# Patient Record
Sex: MALE | Race: WHITE | NOT HISPANIC OR LATINO | URBAN - METROPOLITAN AREA
[De-identification: names, ages, dates, MRNs, and addresses within clinical notes are randomized per-mention and may not be internally consistent; named-entity substitution may affect disease eponyms.]

---

## 2017-02-22 ENCOUNTER — OUTPATIENT (OUTPATIENT)
Dept: OUTPATIENT SERVICES | Facility: HOSPITAL | Age: 67
LOS: 1 days | Discharge: HOME | End: 2017-02-22
Payer: MEDICARE

## 2017-02-22 ENCOUNTER — APPOINTMENT (OUTPATIENT)
Dept: MRI IMAGING | Facility: HOSPITAL | Age: 67
End: 2017-02-22

## 2017-02-22 DIAGNOSIS — K74.60 UNSPECIFIED CIRRHOSIS OF LIVER: ICD-10-CM

## 2017-02-22 DIAGNOSIS — R79.1 ABNORMAL COAGULATION PROFILE: ICD-10-CM

## 2017-02-22 LAB
BUN SERPL-MCNC: 37 MG/DL — HIGH (ref 7–23)
CREAT SERPL-MCNC: 2.06 MG/DL — HIGH (ref 0.5–1.3)

## 2017-02-22 PROCEDURE — 74183 MRI ABD W/O CNTR FLWD CNTR: CPT | Mod: 26

## 2017-03-24 ENCOUNTER — APPOINTMENT (OUTPATIENT)
Dept: ORTHOPEDIC SURGERY | Facility: CLINIC | Age: 67
End: 2017-03-24

## 2017-03-24 VITALS — HEIGHT: 64 IN | WEIGHT: 153 LBS | BODY MASS INDEX: 26.12 KG/M2

## 2017-03-24 DIAGNOSIS — Z78.9 OTHER SPECIFIED HEALTH STATUS: ICD-10-CM

## 2017-03-24 DIAGNOSIS — Z86.79 PERSONAL HISTORY OF OTHER DISEASES OF THE CIRCULATORY SYSTEM: ICD-10-CM

## 2017-03-24 DIAGNOSIS — Z60.2 PROBLEMS RELATED TO LIVING ALONE: ICD-10-CM

## 2017-03-24 SDOH — SOCIAL STABILITY - SOCIAL INSECURITY: PROBLEMS RELATED TO LIVING ALONE: Z60.2

## 2017-04-20 ENCOUNTER — APPOINTMENT (OUTPATIENT)
Dept: PHYSICAL MEDICINE AND REHAB | Facility: CLINIC | Age: 67
End: 2017-04-20

## 2017-04-20 VITALS
HEIGHT: 64 IN | HEART RATE: 85 BPM | TEMPERATURE: 97.7 F | OXYGEN SATURATION: 99 % | SYSTOLIC BLOOD PRESSURE: 169 MMHG | WEIGHT: 152 LBS | DIASTOLIC BLOOD PRESSURE: 93 MMHG | BODY MASS INDEX: 25.95 KG/M2

## 2017-04-20 DIAGNOSIS — G14 POSTPOLIO SYNDROME: ICD-10-CM

## 2017-04-20 DIAGNOSIS — G83.12: ICD-10-CM

## 2017-04-20 DIAGNOSIS — R26.1 PARALYTIC GAIT: ICD-10-CM

## 2017-04-20 DIAGNOSIS — A80.9 ACUTE POLIOMYELITIS, UNSPECIFIED: ICD-10-CM

## 2017-04-20 DIAGNOSIS — M21.42 FLAT FOOT [PES PLANUS] (ACQUIRED), LEFT FOOT: ICD-10-CM

## 2018-03-10 ENCOUNTER — TRANSCRIPTION ENCOUNTER (OUTPATIENT)
Age: 68
End: 2018-03-10

## 2018-03-10 ENCOUNTER — INPATIENT (INPATIENT)
Facility: HOSPITAL | Age: 68
LOS: 1 days | Discharge: HOME | DRG: 871 | End: 2018-03-12
Attending: INTERNAL MEDICINE | Admitting: INTERNAL MEDICINE
Payer: MEDICARE

## 2018-03-10 DIAGNOSIS — B91 SEQUELAE OF POLIOMYELITIS: ICD-10-CM

## 2018-03-10 DIAGNOSIS — J12.3 HUMAN METAPNEUMOVIRUS PNEUMONIA: ICD-10-CM

## 2018-03-10 DIAGNOSIS — D69.6 THROMBOCYTOPENIA, UNSPECIFIED: ICD-10-CM

## 2018-03-10 DIAGNOSIS — N18.3 CHRONIC KIDNEY DISEASE, STAGE 3 (MODERATE): ICD-10-CM

## 2018-03-10 DIAGNOSIS — Z88.8 ALLERGY STATUS TO OTHER DRUGS, MEDICAMENTS AND BIOLOGICAL SUBSTANCES: ICD-10-CM

## 2018-03-10 DIAGNOSIS — K74.60 UNSPECIFIED CIRRHOSIS OF LIVER: ICD-10-CM

## 2018-03-10 DIAGNOSIS — Z90.5 ACQUIRED ABSENCE OF KIDNEY: ICD-10-CM

## 2018-03-10 DIAGNOSIS — G83.14 MONOPLEGIA OF LOWER LIMB AFFECTING LEFT NONDOMINANT SIDE: ICD-10-CM

## 2018-03-10 DIAGNOSIS — J18.9 PNEUMONIA, UNSPECIFIED ORGANISM: ICD-10-CM

## 2018-03-10 DIAGNOSIS — N40.0 BENIGN PROSTATIC HYPERPLASIA WITHOUT LOWER URINARY TRACT SYMPTOMS: ICD-10-CM

## 2018-03-10 DIAGNOSIS — Z87.891 PERSONAL HISTORY OF NICOTINE DEPENDENCE: ICD-10-CM

## 2018-03-10 DIAGNOSIS — B18.2 CHRONIC VIRAL HEPATITIS C: ICD-10-CM

## 2018-03-10 DIAGNOSIS — I12.9 HYPERTENSIVE CHRONIC KIDNEY DISEASE WITH STAGE 1 THROUGH STAGE 4 CHRONIC KIDNEY DISEASE, OR UNSPECIFIED CHRONIC KIDNEY DISEASE: ICD-10-CM

## 2018-03-10 DIAGNOSIS — A41.9 SEPSIS, UNSPECIFIED ORGANISM: ICD-10-CM

## 2018-03-10 PROCEDURE — 71045 X-RAY EXAM CHEST 1 VIEW: CPT | Mod: 26

## 2018-03-10 PROCEDURE — 99223 1ST HOSP IP/OBS HIGH 75: CPT | Mod: AI

## 2018-03-10 PROCEDURE — 93010 ELECTROCARDIOGRAM REPORT: CPT

## 2018-03-11 PROCEDURE — 99233 SBSQ HOSP IP/OBS HIGH 50: CPT

## 2018-03-12 PROCEDURE — 83605 ASSAY OF LACTIC ACID: CPT

## 2018-03-12 PROCEDURE — 99285 EMERGENCY DEPT VISIT HI MDM: CPT | Mod: 25

## 2018-03-12 PROCEDURE — 87040 BLOOD CULTURE FOR BACTERIA: CPT

## 2018-03-12 PROCEDURE — 93005 ELECTROCARDIOGRAM TRACING: CPT

## 2018-03-12 PROCEDURE — 81003 URINALYSIS AUTO W/O SCOPE: CPT

## 2018-03-12 PROCEDURE — 85025 COMPLETE CBC W/AUTO DIFF WBC: CPT

## 2018-03-12 PROCEDURE — 96365 THER/PROPH/DIAG IV INF INIT: CPT

## 2018-03-12 PROCEDURE — 85027 COMPLETE CBC AUTOMATED: CPT

## 2018-03-12 PROCEDURE — 85610 PROTHROMBIN TIME: CPT

## 2018-03-12 PROCEDURE — 83735 ASSAY OF MAGNESIUM: CPT

## 2018-03-12 PROCEDURE — 80076 HEPATIC FUNCTION PANEL: CPT

## 2018-03-12 PROCEDURE — 87486 CHLMYD PNEUM DNA AMP PROBE: CPT

## 2018-03-12 PROCEDURE — 87581 M.PNEUMON DNA AMP PROBE: CPT

## 2018-03-12 PROCEDURE — 87400 INFLUENZA A/B EACH AG IA: CPT

## 2018-03-12 PROCEDURE — 87633 RESP VIRUS 12-25 TARGETS: CPT

## 2018-03-12 PROCEDURE — 71045 X-RAY EXAM CHEST 1 VIEW: CPT

## 2018-03-12 PROCEDURE — 85730 THROMBOPLASTIN TIME PARTIAL: CPT

## 2018-03-12 PROCEDURE — 80048 BASIC METABOLIC PNL TOTAL CA: CPT

## 2018-03-19 ENCOUNTER — CHART COPY (OUTPATIENT)
Age: 68
End: 2018-03-19

## 2018-03-19 ENCOUNTER — APPOINTMENT (OUTPATIENT)
Dept: FAMILY MEDICINE | Facility: HOSPITAL | Age: 68
End: 2018-03-19

## 2018-11-08 ENCOUNTER — APPOINTMENT (OUTPATIENT)
Dept: UROLOGY | Facility: CLINIC | Age: 68
End: 2018-11-08
Payer: MEDICARE

## 2018-11-08 VITALS
HEART RATE: 85 BPM | OXYGEN SATURATION: 98 % | TEMPERATURE: 97.8 F | HEIGHT: 64 IN | WEIGHT: 152 LBS | SYSTOLIC BLOOD PRESSURE: 166 MMHG | RESPIRATION RATE: 16 BRPM | BODY MASS INDEX: 25.95 KG/M2 | DIASTOLIC BLOOD PRESSURE: 100 MMHG

## 2018-11-08 DIAGNOSIS — Z63.4 DISAPPEARANCE AND DEATH OF FAMILY MEMBER: ICD-10-CM

## 2018-11-08 DIAGNOSIS — Z78.9 OTHER SPECIFIED HEALTH STATUS: ICD-10-CM

## 2018-11-08 PROCEDURE — 99203 OFFICE O/P NEW LOW 30 MIN: CPT

## 2018-11-08 SDOH — SOCIAL STABILITY - SOCIAL INSECURITY: DISSAPEARANCE AND DEATH OF FAMILY MEMBER: Z63.4

## 2019-01-14 ENCOUNTER — OUTPATIENT (OUTPATIENT)
Dept: OUTPATIENT SERVICES | Facility: HOSPITAL | Age: 69
LOS: 1 days | Discharge: HOME | End: 2019-01-14
Payer: MEDICARE

## 2019-01-14 DIAGNOSIS — K74.60 UNSPECIFIED CIRRHOSIS OF LIVER: ICD-10-CM

## 2019-01-14 DIAGNOSIS — Q61.02 CONGENITAL MULTIPLE RENAL CYSTS: ICD-10-CM

## 2019-01-14 DIAGNOSIS — N17.9 ACUTE KIDNEY FAILURE, UNSPECIFIED: ICD-10-CM

## 2019-01-14 DIAGNOSIS — Z90.5 ACQUIRED ABSENCE OF KIDNEY: ICD-10-CM

## 2019-01-14 DIAGNOSIS — I12.9 HYPERTENSIVE CHRONIC KIDNEY DISEASE WITH STAGE 1 THROUGH STAGE 4 CHRONIC KIDNEY DISEASE, OR UNSPECIFIED CHRONIC KIDNEY DISEASE: ICD-10-CM

## 2019-01-14 PROCEDURE — 76700 US EXAM ABDOM COMPLETE: CPT | Mod: 26

## 2019-04-11 ENCOUNTER — APPOINTMENT (OUTPATIENT)
Dept: UROLOGY | Facility: CLINIC | Age: 69
End: 2019-04-11

## 2019-04-18 ENCOUNTER — APPOINTMENT (OUTPATIENT)
Dept: UROLOGY | Facility: CLINIC | Age: 69
End: 2019-04-18

## 2019-05-01 ENCOUNTER — OUTPATIENT (OUTPATIENT)
Dept: OUTPATIENT SERVICES | Facility: HOSPITAL | Age: 69
LOS: 1 days | Discharge: HOME | End: 2019-05-01
Payer: MEDICARE

## 2019-05-01 DIAGNOSIS — N17.9 ACUTE KIDNEY FAILURE, UNSPECIFIED: ICD-10-CM

## 2019-05-01 PROCEDURE — 76775 US EXAM ABDO BACK WALL LIM: CPT | Mod: 26,59

## 2019-05-01 PROCEDURE — 93975 VASCULAR STUDY: CPT | Mod: 26

## 2019-05-01 PROCEDURE — 93976 VASCULAR STUDY: CPT

## 2019-06-04 ENCOUNTER — LABORATORY RESULT (OUTPATIENT)
Age: 69
End: 2019-06-04

## 2019-06-04 ENCOUNTER — APPOINTMENT (OUTPATIENT)
Dept: TRANSPLANT | Facility: CLINIC | Age: 69
End: 2019-06-04

## 2019-06-04 ENCOUNTER — APPOINTMENT (OUTPATIENT)
Dept: TRANSPLANT | Facility: CLINIC | Age: 69
End: 2019-06-04
Payer: COMMERCIAL

## 2019-06-04 ENCOUNTER — APPOINTMENT (OUTPATIENT)
Dept: NEPHROLOGY | Facility: CLINIC | Age: 69
End: 2019-06-04
Payer: COMMERCIAL

## 2019-06-04 VITALS
HEART RATE: 89 BPM | BODY MASS INDEX: 26.63 KG/M2 | DIASTOLIC BLOOD PRESSURE: 96 MMHG | TEMPERATURE: 97.6 F | OXYGEN SATURATION: 98 % | SYSTOLIC BLOOD PRESSURE: 154 MMHG | RESPIRATION RATE: 16 BRPM | HEIGHT: 64 IN | WEIGHT: 156 LBS

## 2019-06-04 PROCEDURE — 99205 OFFICE O/P NEW HI 60 MIN: CPT

## 2019-06-04 PROCEDURE — 99204 OFFICE O/P NEW MOD 45 MIN: CPT

## 2019-06-04 NOTE — HISTORY OF PRESENT ILLNESS
[FreeTextEntry1] : 68 years old male, born in [facility], living in  since .\par Patient has known CKD (), on follow up with Dr. Lewis is here for pre kidney transplant evaluation. \par He is a preemptive candidate. Was told to have 20% kidney function.\par Kidney disease- had nephrectomy in  and hypertension, h/o Hep C.\par he is accompanied by his daughter, Shahrzad today. 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 Known  HTN (). No H/o Hyperlipidemia/ Gout\par Has known h/o kidney stone () Had ECSWL; Has h/o  Prostatism.\par H/o hematuria in  and had left nephrectomy for a tumor- was told no cancer ([facility])  No h/o Transfusions\par H/o Hep C (diagnosed in ), treated and cured 2 years ago.(dr. suazo)\par Nocturia:\par Has no h/o Pneumonia / UTI.\par No known h/o active CAD/CVA/PVD/DVT/neoplasia/active infections/bleeding.\par Reports h/o allergy to Demerol (passed out). Does not take any blood thinners. No known h/o tuberculosis.\par Most recent hospitalization/for:Pneumonia (Winter 2018), at [facility].\par Past surgeries:\par Left nephrectomy\par Left leg surgery for Polio\par No history of bladder/ prostate surgery.\par Non smoker. Quit 40 years ago. Smoked 1 ppd X 15 years\par Fam: Parents are . Father - at 95yrs old  Mother- was on dialysis, HTN Siblings- One brother had brain Ca, sister had CVA. One brother alive and healthy\par Children: 3, Healthy. \par Has family history of kidney disease- mother\par Independent for ADL\par Able to walk a mile, uses crutches for about a year. Had POlio as a child, can climb stairs with difficulty.\par ROS: Has h/o shortness of breath on exertion. No h/o Sleep apnea. No h/o Thyroid disease.\par Functional/employment status: Retired, previously did import/export accounting.\par He lives by himself. In a [employer] coop. Wife passed away in .\par Dialysis history:preemptive\par Potential Live donors: All 3 daughters are willing and one son in law. \par Cardiology: None\par Cancer Screen:Had colonoscopy 1.5 years ago\par Primary MD:Micheal Bobo\par \par \par

## 2019-06-04 NOTE — PHYSICAL EXAM
[General Appearance - Alert] : alert [General Appearance - In No Acute Distress] : in no acute distress [Sclera] : the sclera and conjunctiva were normal [PERRL With Normal Accommodation] : pupils were equal in size, round, and reactive to light [Extraocular Movements] : extraocular movements were intact [Outer Ear] : the ears and nose were normal in appearance [Oropharynx] : the oropharynx was normal [Neck Appearance] : the appearance of the neck was normal [Thyroid Diffuse Enlargement] : the thyroid was not enlarged [Jugular Venous Distention Increased] : there was no jugular-venous distention [Neck Cervical Mass (___cm)] : no neck mass was observed [Thyroid Nodule] : there were no palpable thyroid nodules [Auscultation Breath Sounds / Voice Sounds] : lungs were clear to auscultation bilaterally [Heart Rate And Rhythm] : heart rate was normal and rhythm regular [Heart Sounds] : normal S1 and S2 [Heart Sounds Gallop] : no gallops [Murmurs] : no murmurs [Heart Sounds Pericardial Friction Rub] : no pericardial rub [Full Pulse] : the pedal pulses are present [Bowel Sounds] : normal bowel sounds [Edema] : there was no peripheral edema [Abdomen Tenderness] : non-tender [Abdomen Mass (___ Cm)] : no abdominal mass palpated [Abdomen Soft] : soft [Cervical Lymph Nodes Enlarged Anterior Bilaterally] : anterior cervical [Supraclavicular Lymph Nodes Enlarged Bilaterally] : supraclavicular [Cervical Lymph Nodes Enlarged Posterior Bilaterally] : posterior cervical [Inguinal Lymph Nodes Enlarged Bilaterally] : inguinal [Axillary Lymph Nodes Enlarged Bilaterally] : axillary [Femoral Lymph Nodes Enlarged Bilaterally] : femoral [Involuntary Movements] : no involuntary movements were seen [___ (cm) Fistula] : [unfilled] (cm) fistula [] : no rash [Oriented To Time, Place, And Person] : oriented to person, place, and time [Impaired Insight] : insight and judgment were intact [Affect] : the affect was normal [FreeTextEntry1] : left leg muscle wasting

## 2019-06-04 NOTE — ASSESSMENT
[FreeTextEntry1] : .Mr. PLUMMER 68 year He is evaluated for kidney transplantation.\par Pre transplant/CKD: Patient will benefit from renal allotransplantation he is an acceptable  risk candidate\par Medical risks: Cardiovascular, cancer screening.\par Hypertension: Discussed implications. Continue follow up with primary physicians.\par Cardiac risk:  will get further evaluation; echo, stress test; Reviewed cardiovascular risk reduction strategies\par Cancer screening: PSA.  Colon saritha screening. No known h/o neoplastic disease\par ID: Serology for acute and chronic viral infections. Screening for latent TB. \par Imaging: Renal/abdominal /chest /Iliac/  imaging \par Consults: Nutrition, social work, cardiology, Transplant surgery.\par Reviewed factors affecting survival and morbidity while on wait list and reviewed kaylee-operative and long-term risk factors affecting outcome in kidney transplantation.\par Details of transplant surgery, immunosuppression and its complications and benefits of live donor transplantation as well as variability in wait times across regions and multiple listing were discussed. KDPI >85% and PHS high risk criteria donors were discussed. Discussed factors affecting morbidity and mortality while on hemodialysis.\par Patient has potential live donor (possible- daughters and son in law ) at present. \par Will proceed with completing/ updating work up and listing for transplant/ live donor transplant once work up is reviewed and found to be ok.\par

## 2019-06-05 LAB
ABO + RH PNL BLD: NORMAL
ALBUMIN SERPL ELPH-MCNC: 4.7 G/DL
ALP BLD-CCNC: 74 U/L
ALT SERPL-CCNC: 9 U/L
ANION GAP SERPL CALC-SCNC: 14 MMOL/L
APPEARANCE: CLEAR
AST SERPL-CCNC: 11 U/L
BACTERIA: NEGATIVE
BASOPHILS # BLD AUTO: 0.01 K/UL
BASOPHILS NFR BLD AUTO: 0.2 %
BILIRUB SERPL-MCNC: 0.3 MG/DL
BILIRUBIN URINE: NEGATIVE
BLOOD URINE: NEGATIVE
BUN SERPL-MCNC: 70 MG/DL
C PEPTIDE SERPL-MCNC: 7.2 NG/ML
CALCIUM SERPL-MCNC: 9.8 MG/DL
CHLORIDE SERPL-SCNC: 107 MMOL/L
CMV IGG SERPL QL: >10 U/ML
CMV IGG SERPL-IMP: POSITIVE
CO2 SERPL-SCNC: 22 MMOL/L
COLOR: NORMAL
CREAT SERPL-MCNC: 3.24 MG/DL
CREAT SPEC-SCNC: 62 MG/DL
CREAT/PROT UR: 3.4 RATIO
EBV EA AB SER IA-ACNC: <5 U/ML
EBV EA AB TITR SER IF: POSITIVE
EBV EA IGG SER QL IA: 351 U/ML
EBV EA IGG SER-ACNC: NEGATIVE
EBV EA IGM SER IA-ACNC: NEGATIVE
EBV PATRN SPEC IB-IMP: NORMAL
EBV VCA IGG SER IA-ACNC: 13.8 U/ML
EBV VCA IGM SER QL IA: <10 U/ML
EOSINOPHIL # BLD AUTO: 0.11 K/UL
EOSINOPHIL NFR BLD AUTO: 2.2 %
EPSTEIN-BARR VIRUS CAPSID ANTIGEN IGG: NEGATIVE
ESTIMATED AVERAGE GLUCOSE: 103 MG/DL
GLUCOSE QUALITATIVE U: NEGATIVE
GLUCOSE SERPL-MCNC: 98 MG/DL
HAV IGM SER QL: NONREACTIVE
HBA1C MFR BLD HPLC: 5.2 %
HBV CORE IGG+IGM SER QL: NONREACTIVE
HBV SURFACE AB SER QL: NONREACTIVE
HBV SURFACE AG SER QL: NONREACTIVE
HCT VFR BLD CALC: 41.8 %
HCV AB SER QL: REACTIVE
HCV S/CO RATIO: 12.09 S/CO
HGB BLD-MCNC: 13.9 G/DL
HIV1+2 AB SPEC QL IA.RAPID: NONREACTIVE
HSV 1+2 IGG SER IA-IMP: NEGATIVE
HSV 1+2 IGG SER IA-IMP: POSITIVE
HSV1 IGG SER QL: 14.8 INDEX
HSV2 IGG SER QL: 0.16 INDEX
HYALINE CASTS: 2 /LPF
IMM GRANULOCYTES NFR BLD AUTO: 0.2 %
KETONES URINE: NEGATIVE
LEUKOCYTE ESTERASE URINE: NEGATIVE
LYMPHOCYTES # BLD AUTO: 1.31 K/UL
LYMPHOCYTES NFR BLD AUTO: 25.6 %
MAGNESIUM SERPL-MCNC: 2.3 MG/DL
MAN DIFF?: NORMAL
MCHC RBC-ENTMCNC: 30.2 PG
MCHC RBC-ENTMCNC: 33.3 GM/DL
MCV RBC AUTO: 90.9 FL
MICROSCOPIC-UA: NORMAL
MONOCYTES # BLD AUTO: 0.37 K/UL
MONOCYTES NFR BLD AUTO: 7.2 %
NEUTROPHILS # BLD AUTO: 3.3 K/UL
NEUTROPHILS NFR BLD AUTO: 64.6 %
NITRITE URINE: NEGATIVE
PH URINE: 6
PHOSPHATE SERPL-MCNC: 4.6 MG/DL
PLATELET # BLD AUTO: 167 K/UL
POTASSIUM SERPL-SCNC: 4.6 MMOL/L
PROT SERPL-MCNC: 7.1 G/DL
PROT UR-MCNC: 212 MG/DL
PROTEIN URINE: ABNORMAL
PSA SERPL-MCNC: 1.14 NG/ML
RBC # BLD: 4.6 M/UL
RBC # FLD: 13.2 %
RED BLOOD CELLS URINE: 4 /HPF
RUBV IGG FLD-ACNC: 2 INDEX
RUBV IGG SER-IMP: POSITIVE
SODIUM SERPL-SCNC: 143 MMOL/L
SPECIFIC GRAVITY URINE: 1.01
SQUAMOUS EPITHELIAL CELLS: 1 /HPF
T GONDII AB SER-IMP: POSITIVE
T GONDII IGG SER QL: 26.8 IU/ML
T PALLIDUM AB SER QL IA: NEGATIVE
URATE SERPL-MCNC: 9.2 MG/DL
UROBILINOGEN URINE: NORMAL
VZV AB TITR SER: POSITIVE
VZV IGG SER IF-ACNC: 2157 INDEX
WBC # FLD AUTO: 5.11 K/UL
WHITE BLOOD CELLS URINE: 6 /HPF

## 2019-06-06 LAB
EBV DNA SERPL NAA+PROBE-ACNC: NOT DETECTED IU/ML
EBVPCR LOG: NOT DETECTED LOGIU/ML
M TB IFN-G BLD-IMP: NEGATIVE
QUANTIFERON TB PLUS MITOGEN MINUS NIL: 7.72 IU/ML
QUANTIFERON TB PLUS NIL: 0.01 IU/ML
QUANTIFERON TB PLUS TB1 MINUS NIL: 0.01 IU/ML
QUANTIFERON TB PLUS TB2 MINUS NIL: 0.01 IU/ML

## 2019-06-07 ENCOUNTER — TRANSCRIPTION ENCOUNTER (OUTPATIENT)
Age: 69
End: 2019-06-07

## 2019-06-10 ENCOUNTER — APPOINTMENT (OUTPATIENT)
Dept: UROLOGY | Facility: CLINIC | Age: 69
End: 2019-06-10
Payer: MEDICARE

## 2019-06-10 PROCEDURE — 99213 OFFICE O/P EST LOW 20 MIN: CPT

## 2019-06-10 NOTE — HISTORY OF PRESENT ILLNESS
[FreeTextEntry1] : He is a 68-year-old man who is seen today in follow up for a small prostate nodule and hydrocele. Nocturia is 1-2 times. He is not bothered by hydrocele. There is no hematuria or dysuria. PSA level was 1.1 in June 2019. Urinalysis shows protein and creatinine was 3.2. Patient states that he is on renal transplant list.\par Previous notes: There is no family history of prostate cancer. In October 2018, PSA level was 0.9, urinalysis was normal. His left kidney was removed in 1990 because of lack of function. He has a large left-sided hydrocele for almost 20 years. He uses crutches because of history of polio. He is on Hytrin 5 mg.

## 2019-06-10 NOTE — ASSESSMENT
[FreeTextEntry1] : Tiny prostate nodule is unchanged. PSA level has remained low. He would rather continue observation. Alternatively, pelvic MRI of prostate biopsy have been discussed. He will continue with close observation and reexamination in about 6 months. He is not bothered by hydrocele.\par \par Martín Rios MD, FACS\par [facility]\par  of Urology\par \par [address], [address]\[place] [address]\par \par [address], [address]\[place] [address]\par \par Tel: [phone]\par Fax: [phone]

## 2019-06-10 NOTE — PHYSICAL EXAM
[General Appearance - Well Developed] : well developed [General Appearance - Well Nourished] : well nourished [Well Groomed] : well groomed [Normal Appearance] : normal appearance [General Appearance - In No Acute Distress] : no acute distress [Abdomen Soft] : soft [Abdomen Tenderness] : non-tender [Costovertebral Angle Tenderness] : no ~M costovertebral angle tenderness [Urethral Meatus] : meatus normal [Urinary Bladder Findings] : the bladder was normal on palpation [Penis Abnormality] : normal uncircumcised penis [Prostate Tenderness] : the prostate was not tender [] : no respiratory distress [Respiration, Rhythm And Depth] : normal respiratory rhythm and effort [Exaggerated Use Of Accessory Muscles For Inspiration] : no accessory muscle use [FreeTextEntry1] : using a cane

## 2019-06-10 NOTE — LETTER BODY
[Dear  ___] : Dear  [unfilled], [Consult Letter:] : I had the pleasure of evaluating your patient, [unfilled]. [Consult Closing:] : Thank you very much for allowing me to participate in the care of this patient.  If you have any questions, please do not hesitate to contact me. [FreeTextEntry1] : \par \par Address: [address], [place] [address]\par Phone: [phone]

## 2019-07-11 ENCOUNTER — APPOINTMENT (OUTPATIENT)
Dept: CARDIOLOGY | Facility: CLINIC | Age: 69
End: 2019-07-11
Payer: COMMERCIAL

## 2019-07-11 ENCOUNTER — APPOINTMENT (OUTPATIENT)
Dept: CT IMAGING | Facility: CLINIC | Age: 69
End: 2019-07-11
Payer: COMMERCIAL

## 2019-07-11 ENCOUNTER — APPOINTMENT (OUTPATIENT)
Dept: RADIOLOGY | Facility: CLINIC | Age: 69
End: 2019-07-11
Payer: COMMERCIAL

## 2019-07-11 ENCOUNTER — OUTPATIENT (OUTPATIENT)
Dept: OUTPATIENT SERVICES | Facility: HOSPITAL | Age: 69
LOS: 1 days | End: 2019-07-11
Payer: COMMERCIAL

## 2019-07-11 ENCOUNTER — NON-APPOINTMENT (OUTPATIENT)
Age: 69
End: 2019-07-11

## 2019-07-11 VITALS
WEIGHT: 148 LBS | OXYGEN SATURATION: 99 % | HEIGHT: 64 IN | HEART RATE: 87 BPM | BODY MASS INDEX: 25.27 KG/M2 | DIASTOLIC BLOOD PRESSURE: 86 MMHG | SYSTOLIC BLOOD PRESSURE: 138 MMHG

## 2019-07-11 DIAGNOSIS — Z00.8 ENCOUNTER FOR OTHER GENERAL EXAMINATION: ICD-10-CM

## 2019-07-11 PROCEDURE — 99204 OFFICE O/P NEW MOD 45 MIN: CPT

## 2019-07-11 PROCEDURE — 74176 CT ABD & PELVIS W/O CONTRAST: CPT

## 2019-07-11 PROCEDURE — 71046 X-RAY EXAM CHEST 2 VIEWS: CPT | Mod: 26

## 2019-07-11 PROCEDURE — 74176 CT ABD & PELVIS W/O CONTRAST: CPT | Mod: 26

## 2019-07-11 PROCEDURE — 71046 X-RAY EXAM CHEST 2 VIEWS: CPT

## 2019-07-11 PROCEDURE — 93000 ELECTROCARDIOGRAM COMPLETE: CPT

## 2019-07-12 ENCOUNTER — MEDICATION RENEWAL (OUTPATIENT)
Age: 69
End: 2019-07-12

## 2019-08-07 ENCOUNTER — APPOINTMENT (OUTPATIENT)
Dept: HEPATOLOGY | Facility: HOSPITAL | Age: 69
End: 2019-08-07

## 2019-08-15 ENCOUNTER — APPOINTMENT (OUTPATIENT)
Dept: CARDIOLOGY | Facility: CLINIC | Age: 69
End: 2019-08-15

## 2019-09-05 ENCOUNTER — APPOINTMENT (OUTPATIENT)
Dept: CV DIAGNOSITCS | Facility: HOSPITAL | Age: 69
End: 2019-09-05

## 2019-09-05 ENCOUNTER — APPOINTMENT (OUTPATIENT)
Dept: CV DIAGNOSTICS | Facility: HOSPITAL | Age: 69
End: 2019-09-05

## 2019-09-11 ENCOUNTER — APPOINTMENT (OUTPATIENT)
Dept: CV DIAGNOSITCS | Facility: HOSPITAL | Age: 69
End: 2019-09-11

## 2019-09-11 ENCOUNTER — OUTPATIENT (OUTPATIENT)
Dept: OUTPATIENT SERVICES | Facility: HOSPITAL | Age: 69
LOS: 1 days | End: 2019-09-11
Payer: COMMERCIAL

## 2019-09-11 DIAGNOSIS — Z01.818 ENCOUNTER FOR OTHER PREPROCEDURAL EXAMINATION: ICD-10-CM

## 2019-09-11 PROCEDURE — 93018 CV STRESS TEST I&R ONLY: CPT

## 2019-09-11 PROCEDURE — 93320 DOPPLER ECHO COMPLETE: CPT | Mod: 26

## 2019-09-11 PROCEDURE — 93320 DOPPLER ECHO COMPLETE: CPT

## 2019-09-11 PROCEDURE — 93351 STRESS TTE COMPLETE: CPT

## 2019-09-11 PROCEDURE — 93325 DOPPLER ECHO COLOR FLOW MAPG: CPT | Mod: 26

## 2019-09-11 PROCEDURE — 93325 DOPPLER ECHO COLOR FLOW MAPG: CPT

## 2019-09-11 PROCEDURE — 93350 STRESS TTE ONLY: CPT | Mod: 26

## 2019-09-11 PROCEDURE — 93016 CV STRESS TEST SUPVJ ONLY: CPT

## 2019-10-02 PROBLEM — Z60.2 PERSON LIVING ALONE: Status: ACTIVE | Noted: 2017-03-24

## 2019-11-18 NOTE — ADDENDUM
[FreeTextEntry1] : On 9/11/2019, patient presented for dobutamine stress echo.\par The resting echocardiogram (with Definity) showed normal LV systolic function with LVEF 60%, normal LA size, and normal pulmonary pressures.\par With dobutamine, patient achieved target HR of 89% maximum predicted. He had normal augmentation of LV systolic function and there was no evidence of ischemia seen.\par \par No further cardiovascular testing is necessary prior to consideration for renal transplant.\par Would repeat testing in one year or earlier if clinical status changes.

## 2019-11-18 NOTE — DISCUSSION/SUMMARY
[FreeTextEntry1] : 68 year-old gentleman with cardiovascular risk factors as above presents today for evaluation prior to possible renal transplant.\par \par Will check echocardiogram to evaluate for structural heart disease.\par Will check pharmacologic nuclear stress test to assess for ischemia in patient who exertional capacity is limited by a history of polio during infancy.

## 2019-11-18 NOTE — PHYSICAL EXAM
[Normal Appearance] : normal appearance [General Appearance - Well Developed] : well developed [General Appearance - Well Nourished] : well nourished [Well Groomed] : well groomed [No Deformities] : no deformities [General Appearance - In No Acute Distress] : no acute distress [Conjunctiva] : the conjunctiva were normal in both eyes [PERRL] : pupils were equal in size, round, and reactive to light [EOM Intact] : extraocular movements were intact [Normal Oral Mucosa] : normal oral mucosa [No Oral Pallor] : no oral pallor [Normal Oropharynx] : normal oropharynx [Normal Jugular Venous A Waves Present] : normal jugular venous A waves present [No Oral Cyanosis] : no oral cyanosis [Normal Jugular Venous V Waves Present] : normal jugular venous V waves present [No Jugular Venous Murcia A Waves] : no jugular venous murcia A waves [5th Left ICS - MCL] : palpated at the 5th LICS in the midclavicular line [No Precordial Heave] : no precordial heave was noted [Normal] : normal [Normal Rate] : normal [Rhythm Regular] : regular [Normal S1] : normal S1 [Normal S2] : normal S2 [No Gallop] : no gallop heard [No Murmur] : no murmurs heard [2+] : left 2+ [No Pitting Edema] : no pitting edema present [] : no respiratory distress [Respiration, Rhythm And Depth] : normal respiratory rhythm and effort [Exaggerated Use Of Accessory Muscles For Inspiration] : no accessory muscle use [Auscultation Breath Sounds / Voice Sounds] : lungs were clear to auscultation bilaterally [Bowel Sounds] : normal bowel sounds [Abdomen Soft] : soft [Abdomen Tenderness] : non-tender [Nail Clubbing] : no clubbing of the fingernails [Cyanosis, Localized] : no localized cyanosis [Skin Color & Pigmentation] : normal skin color and pigmentation [No Venous Stasis] : no venous stasis [No Xanthoma] : no  xanthoma was observed [Oriented To Time, Place, And Person] : oriented to person, place, and time [Impaired Insight] : insight and judgment were intact [Affect] : the affect was normal [No Anxiety] : not feeling anxious [Mood] : the mood was normal [Yellow Sclera (Icteric)] : no scleral icterus was seen [Right Carotid Bruit] : no bruit heard over the right carotid [Left Carotid Bruit] : no bruit heard over the left carotid [FreeTextEntry1] : tattoo in honor of his late wife on right arm

## 2019-11-18 NOTE — HISTORY OF PRESENT ILLNESS
[FreeTextEntry1] : Patient is a 68 year-old gentleman with cardiovascular risk factors of hypertension, HCV, and CKD who presents today for cardiac evaluation as a preemptive candidate for renal transplant.\par Patient had polio as a child (age 6 months, in [place]). Walks with crutches as a result.\par He takes two antihypertensive medications (diltiazem and terazosin) as his only medications.\par Patient exercises regularly, walking approximately one mile with crutches, and doing some upper extremity resistance exercises.\par He gets occasional lower extremity edema, but it is resolved with elevation.\par \par PMD: David Schwartz DO [phone]\par Nephrologist: Adonis Lewis MD [phone]

## 2019-11-22 ENCOUNTER — OTHER (OUTPATIENT)
Age: 69
End: 2019-11-22

## 2019-12-09 ENCOUNTER — APPOINTMENT (OUTPATIENT)
Dept: UROLOGY | Facility: CLINIC | Age: 69
End: 2019-12-09
Payer: MEDICARE

## 2019-12-09 VITALS
DIASTOLIC BLOOD PRESSURE: 82 MMHG | OXYGEN SATURATION: 98 % | RESPIRATION RATE: 13 BRPM | HEART RATE: 88 BPM | WEIGHT: 148 LBS | SYSTOLIC BLOOD PRESSURE: 134 MMHG | HEIGHT: 64 IN | BODY MASS INDEX: 25.27 KG/M2

## 2019-12-09 DIAGNOSIS — N43.3 HYDROCELE, UNSPECIFIED: ICD-10-CM

## 2019-12-09 PROCEDURE — 99213 OFFICE O/P EST LOW 20 MIN: CPT

## 2019-12-09 NOTE — ASSESSMENT
[FreeTextEntry1] : His urologic examination is unchanged. His PSA level was normal. He will be checked again in about 6 months. Continue alpha-blocker therapy. CT scan results were discussed.

## 2019-12-09 NOTE — LETTER BODY
[Dear  ___] : Dear  [unfilled], [Consult Letter:] : I had the pleasure of evaluating your patient, [unfilled]. [Consult Closing:] : Thank you very much for allowing me to participate in the care of this patient.  If you have any questions, please do not hesitate to contact me. [FreeTextEntry1] : \par \par Address: [address], [place] [address]\par Phone: [phone]

## 2019-12-09 NOTE — PHYSICAL EXAM
[General Appearance - Well Developed] : well developed [General Appearance - Well Nourished] : well nourished [Normal Appearance] : normal appearance [Well Groomed] : well groomed [General Appearance - In No Acute Distress] : no acute distress [Abdomen Soft] : soft [Abdomen Tenderness] : non-tender [Costovertebral Angle Tenderness] : no ~M costovertebral angle tenderness [Urethral Meatus] : meatus normal [Penis Abnormality] : normal uncircumcised penis [Urinary Bladder Findings] : the bladder was normal on palpation [Prostate Tenderness] : the prostate was not tender [FreeTextEntry1] : Left hydrocele, tiny right prostate nodule. [] : no respiratory distress [Respiration, Rhythm And Depth] : normal respiratory rhythm and effort [Exaggerated Use Of Accessory Muscles For Inspiration] : no accessory muscle use [Oriented To Time, Place, And Person] : oriented to person, place, and time [Affect] : the affect was normal [Mood] : the mood was normal [Not Anxious] : not anxious

## 2019-12-09 NOTE — HISTORY OF PRESENT ILLNESS
[FreeTextEntry1] : He is a 69-year-old man who is seen today in follow up for a small prostate nodule and hydrocele. He is not significantly bothered by urination. Nocturia is 2-3 times. He is following with the transplant team for possible future renal transplant. GFR was 18. He is not on dialysis. There is no hematuria, dysuria or flank pain. He is not bothered by hydrocele. PSA level was 1.1 in June 2019. Urinalysis shows protein and creatinine was 3.2. CT scan in July 2019 showed large right kidney with multiple cysts and parenchymal calcification and also dense cysts.\par Previous notes: There is no family history of prostate cancer. His left kidney was removed in 1990 because of lack of function. He has a left-sided hydrocele for almost 20 years. He uses crutches because of history of polio. He is on Hytrin 5 mg.

## 2019-12-13 ENCOUNTER — OTHER (OUTPATIENT)
Age: 69
End: 2019-12-13

## 2019-12-16 ENCOUNTER — APPOINTMENT (OUTPATIENT)
Dept: TRANSPLANT | Facility: CLINIC | Age: 69
End: 2019-12-16

## 2020-11-24 ENCOUNTER — LABORATORY RESULT (OUTPATIENT)
Age: 70
End: 2020-11-24

## 2020-11-24 ENCOUNTER — APPOINTMENT (OUTPATIENT)
Dept: NEPHROLOGY | Facility: CLINIC | Age: 70
End: 2020-11-24
Payer: COMMERCIAL

## 2020-11-24 VITALS
OXYGEN SATURATION: 99 % | DIASTOLIC BLOOD PRESSURE: 99 MMHG | RESPIRATION RATE: 13 BRPM | TEMPERATURE: 97 F | BODY MASS INDEX: 26.63 KG/M2 | HEART RATE: 94 BPM | WEIGHT: 156 LBS | SYSTOLIC BLOOD PRESSURE: 159 MMHG | HEIGHT: 64 IN

## 2020-11-24 PROCEDURE — 99214 OFFICE O/P EST MOD 30 MIN: CPT

## 2020-11-24 RX ORDER — POLYETHYLENE GLYCOL 3350, SODIUM SULFATE, SODIUM CHLORIDE, POTASSIUM CHLORIDE, ASCORBIC ACID, SODIUM ASCORBATE 7.5-2.691G
100 KIT ORAL
Qty: 1 | Refills: 0 | Status: DISCONTINUED | COMMUNITY
Start: 2019-07-12 | End: 2020-11-24

## 2020-11-24 RX ORDER — DILTIAZEM HYDROCHLORIDE 300 MG/1
300 CAPSULE, COATED, EXTENDED RELEASE ORAL
Refills: 0 | Status: DISCONTINUED | COMMUNITY
End: 2020-11-24

## 2020-11-24 NOTE — HISTORY OF PRESENT ILLNESS
[FreeTextEntry1] : 70years old male, born in [facility], living in  since .He is a preemptive candidate. Has no acute symptoms except mild ankle swelling towards evening.\par Patient has known CKD (), on follow up with Dr. Lewis is here for pre kidney transplant evaluation. \par He is a preemptive candidate. Was told to have 20% kidney function.\par Kidney disease- had nephrectomy in  and hypertension, h/o Hep C.\par he is accompanied by his daughter, Shahrzad today. Shahrzad is a nurse works for [employer] at [employer]\par He has no known DM. Known  HTN (). No H/o Hyperlipidemia/ Gout\par Has known h/o kidney stone () Had ECSWL; Has h/o  Prostatism.\par H/o hematuria in  and had left nephrectomy for a tumor- was told no cancer ([facility])  No h/o Transfusions\par H/o Hep C (diagnosed in ), treated and cured 2 years ago.(dr. suazo)\par Nocturia:\par Has no h/o Pneumonia / UTI.\par No known h/o active CAD/CVA/PVD/DVT/neoplasia/active infections/bleeding.\par Reports h/o allergy to Demerol (passed out). Does not take any blood thinners. No known h/o tuberculosis.\par Most recent hospitalization/for:Pneumonia (Winter 2018), at [facility].\par Past surgeries:\par Left nephrectomy\par Left leg surgery for Polio\par No history of bladder/ prostate surgery.\par Non smoker. Quit 40 years ago. Smoked 1 ppd X 15 years\par Fam: Parents are . Father - at 95yrs old  Mother- was on dialysis, HTN Siblings- One brother had brain Ca, sister had CVA. One brother alive and healthy\par Children: 3, Healthy. \par Has family history of kidney disease- mother\par Independent for ADL\par Able to walk a mile, uses crutches for about a year. Had POlio as a child, can climb stairs with difficulty.\par ROS: Has h/o shortness of breath on exertion. No h/o Sleep apnea. No h/o Thyroid disease.\par Functional/employment status: Retired, previously did import/export accounting.\par He lives by himself. In a [facility]. Wife passed away in .\par Dialysis history:preemptive\par Potential Live donors: All 3 daughters are willing and one son in law. \par \par PMD: David Schwartz DO [phone]\par Nephrologist: Adonis Lewis MD [phone] \par \par \par Weight Changes: None\par Activity: No limitations (Has childhood polio and uses crutches, left leg weaker)\par Open Lesions/ swelling :None\par Interim Event/ hospitalizations/transfusions\par COVID Exposure\par Living Donor: Patient is refusing his daughters and son in law from being live donors\par \par Last Seen 19\par Listed 19\par Dialysis pre-emptive\par ABO O\par PRA 19% PRA as per [facility]OS\par \par Most recent testing\par EK2019, normal, sinus 76 bpm \par ECHO/Stress 2019  The resting echocardiogram (with Definity) showed normal LV systolic function with LVEF 60%, normal LA size, and normal pulmonary pressures.\par With dobutamine, patient achieved target HR of 89% maximum predicted. He had normal augmentation of LV systolic function and there was no evidence of ischemia seen.\par \par Radiology\par 2019 Chest X-Ray  Clear lungs\par 2019 CT of Abd/Pelvis without Contrast Enlarged multicystic right kidney.  No hydronephrosis. Left nephrectomy Multiple parenchymal calcifications, cysts and hyperdense foci. Moderate prostatic hypertrophy. Moderate sigmoid diverticulosis.Vasculature WNL\par 25-Sep-2019 MRCP \par 1. Multiple Gallstones\par 2. No intrahepatic or extrahepatic biliary dilatation. Small\par CBD stones are seen.\par 3. Absent left kidney. Right kidney replaced by\par 4. Several scattered cystic foci of the pancreas. Also,\par multiple punctate cystic foci seen in the pancreatic head.\par Given presence of innumerable cystic foci in the right\par kidney, findings likely represent polycystic kidney disease\par with multiple tiny pancreatic cysts. Clinical correlation\par \par Cancer Screening\par 2019 Screen PSA 1.14\par 2019 Colonoscopy A. Tubular adenoma, B. 10 cm hyperplastic polyp C. 5 cm Hyperplastic polyp\par RECOMMENDATIONS: High fiber diet, REPEAT in 5years innumberable cysts and hemorrhagic or proteinaceous cysts. Solid lesion can't be excluded. Suspect polycystic kidney disease.\par \par COnsults\par Urology 19 Dr Rios on followup for prostate nodule and hydrocele. His urologic examination is unchanged. His PSA level was normal. He will be checked again in about 6 months. Continue alpha-blocker therapy. CT scan results were discussed.

## 2020-11-24 NOTE — PHYSICAL EXAM
[General Appearance - Alert] : alert [General Appearance - In No Acute Distress] : in no acute distress [Sclera] : the sclera and conjunctiva were normal [PERRL With Normal Accommodation] : pupils were equal in size, round, and reactive to light [Extraocular Movements] : extraocular movements were intact [Outer Ear] : the ears and nose were normal in appearance [Oropharynx] : the oropharynx was normal [Neck Appearance] : the appearance of the neck was normal [Neck Cervical Mass (___cm)] : no neck mass was observed [Jugular Venous Distention Increased] : there was no jugular-venous distention [Thyroid Diffuse Enlargement] : the thyroid was not enlarged [Thyroid Nodule] : there were no palpable thyroid nodules [Auscultation Breath Sounds / Voice Sounds] : lungs were clear to auscultation bilaterally [Heart Rate And Rhythm] : heart rate was normal and rhythm regular [Heart Sounds] : normal S1 and S2 [Heart Sounds Gallop] : no gallops [Murmurs] : no murmurs [Heart Sounds Pericardial Friction Rub] : no pericardial rub [Full Pulse] : the pedal pulses are present [Edema] : there was no peripheral edema [Bowel Sounds] : normal bowel sounds [Abdomen Soft] : soft [Abdomen Tenderness] : non-tender [Abdomen Mass (___ Cm)] : no abdominal mass palpated [Cervical Lymph Nodes Enlarged Posterior Bilaterally] : posterior cervical [Cervical Lymph Nodes Enlarged Anterior Bilaterally] : anterior cervical [Supraclavicular Lymph Nodes Enlarged Bilaterally] : supraclavicular [Axillary Lymph Nodes Enlarged Bilaterally] : axillary [Femoral Lymph Nodes Enlarged Bilaterally] : femoral [Inguinal Lymph Nodes Enlarged Bilaterally] : inguinal [Involuntary Movements] : no involuntary movements were seen [___ (cm) Fistula] : [unfilled] (cm) fistula [] : no rash [Oriented To Time, Place, And Person] : oriented to person, place, and time [Impaired Insight] : insight and judgment were intact [Affect] : the affect was normal [FreeTextEntry1] : left leg muscle wasting

## 2020-12-02 ENCOUNTER — NON-APPOINTMENT (OUTPATIENT)
Age: 70
End: 2020-12-02

## 2020-12-02 LAB
ABO + RH PNL BLD: NORMAL
ALBUMIN SERPL ELPH-MCNC: 4.6 G/DL
ALP BLD-CCNC: 76 U/L
ALT SERPL-CCNC: 10 U/L
ANION GAP SERPL CALC-SCNC: 13 MMOL/L
APPEARANCE: CLEAR
AST SERPL-CCNC: 10 U/L
BASOPHILS # BLD AUTO: 0.01 K/UL
BASOPHILS NFR BLD AUTO: 0.2 %
BILIRUB SERPL-MCNC: 0.4 MG/DL
BILIRUBIN URINE: NEGATIVE
BLOOD URINE: NEGATIVE
BUN SERPL-MCNC: 51 MG/DL
C PEPTIDE SERPL-MCNC: 6.9 NG/ML
CALCIUM SERPL-MCNC: 10 MG/DL
CHLORIDE SERPL-SCNC: 107 MMOL/L
CHOLEST SERPL-MCNC: 183 MG/DL
CK SERPL-CCNC: 147 U/L
CMV DNA SPEC QL NAA+PROBE: NOT DETECTED IU/ML
CMV IGG SERPL QL: >10 U/ML
CMV IGG SERPL-IMP: POSITIVE
CMV IGM SERPL QL: <8 AU/ML
CMV IGM SERPL QL: NEGATIVE
CO2 SERPL-SCNC: 23 MMOL/L
COLOR: NORMAL
CREAT SERPL-MCNC: 3.39 MG/DL
CREAT SPEC-SCNC: 73 MG/DL
CREAT/PROT UR: 3.3 RATIO
CRP SERPL-MCNC: <0.1 MG/DL
EBV EA AB SER IA-ACNC: <5 U/ML
EBV EA AB TITR SER IF: POSITIVE
EBV EA IGG SER QL IA: 468 U/ML
EBV EA IGG SER-ACNC: NEGATIVE
EBV EA IGM SER IA-ACNC: NEGATIVE
EBV PATRN SPEC IB-IMP: NORMAL
EBV VCA IGG SER IA-ACNC: 14 U/ML
EBV VCA IGM SER QL IA: <10 U/ML
EOSINOPHIL # BLD AUTO: 0.09 K/UL
EOSINOPHIL NFR BLD AUTO: 1.5 %
EPSTEIN-BARR VIRUS CAPSID ANTIGEN IGG: NEGATIVE
ERYTHROCYTE [SEDIMENTATION RATE] IN BLOOD BY WESTERGREN METHOD: 4 MM/HR
ESTIMATED AVERAGE GLUCOSE: 100 MG/DL
GLUCOSE QUALITATIVE U: NEGATIVE
GLUCOSE SERPL-MCNC: 100 MG/DL
HAV IGM SER QL: NONREACTIVE
HBA1C MFR BLD HPLC: 5.1 %
HBV CORE IGG+IGM SER QL: NONREACTIVE
HBV SURFACE AB SER QL: NONREACTIVE
HBV SURFACE AB SERPL IA-ACNC: <3 MIU/ML
HBV SURFACE AG SER QL: NONREACTIVE
HCT VFR BLD CALC: 39 %
HCV AB SER QL: REACTIVE
HCV S/CO RATIO: 9.78 S/CO
HDLC SERPL-MCNC: 59 MG/DL
HEPATITIS A IGG ANTIBODY: REACTIVE
HGB BLD-MCNC: 12.8 G/DL
HIV1+2 AB SPEC QL IA.RAPID: NONREACTIVE
HSV 1+2 IGG SER IA-IMP: NEGATIVE
HSV 1+2 IGG SER IA-IMP: POSITIVE
HSV 1+2 IGG SER IA-IMP: POSITIVE
HSV1 IGG SER QL: 13.5 INDEX
HSV1 IGG SER QL: 13.5 INDEX
HSV1 IGM SER QL: NORMAL TITER
HSV2 AB FLD-ACNC: NORMAL TITER
HSV2 IGG SER QL: 0.15 INDEX
IMM GRANULOCYTES NFR BLD AUTO: 0.2 %
KETONES URINE: NEGATIVE
LDLC SERPL CALC-MCNC: 112 MG/DL
LEUKOCYTE ESTERASE URINE: NEGATIVE
LYMPHOCYTES # BLD AUTO: 1.05 K/UL
LYMPHOCYTES NFR BLD AUTO: 17.2 %
M TB IFN-G BLD-IMP: NEGATIVE
MAGNESIUM SERPL-MCNC: 2.4 MG/DL
MAN DIFF?: NORMAL
MCHC RBC-ENTMCNC: 30.3 PG
MCHC RBC-ENTMCNC: 32.8 GM/DL
MCV RBC AUTO: 92.4 FL
MONOCYTES # BLD AUTO: 0.42 K/UL
MONOCYTES NFR BLD AUTO: 6.9 %
NEUTROPHILS # BLD AUTO: 4.54 K/UL
NEUTROPHILS NFR BLD AUTO: 74 %
NITRITE URINE: NEGATIVE
NONHDLC SERPL-MCNC: 124 MG/DL
PH URINE: 6
PHOSPHATE SERPL-MCNC: 3.8 MG/DL
PLATELET # BLD AUTO: 137 K/UL
POTASSIUM SERPL-SCNC: 4.9 MMOL/L
PROT SERPL-MCNC: 6.8 G/DL
PROT UR-MCNC: 238 MG/DL
PROTEIN URINE: ABNORMAL
PSA SERPL-MCNC: 1.45 NG/ML
QUANTIFERON TB PLUS MITOGEN MINUS NIL: 7.7 IU/ML
QUANTIFERON TB PLUS NIL: 0.01 IU/ML
QUANTIFERON TB PLUS TB1 MINUS NIL: 0 IU/ML
QUANTIFERON TB PLUS TB2 MINUS NIL: 0.01 IU/ML
RBC # BLD: 4.22 M/UL
RBC # FLD: 13.8 %
RUBV IGG FLD-ACNC: 2 INDEX
RUBV IGG SER-IMP: POSITIVE
SARS-COV-2 IGG SERPL IA-ACNC: 0.1 INDEX
SARS-COV-2 IGG SERPL QL IA: NEGATIVE
SODIUM SERPL-SCNC: 143 MMOL/L
SPECIFIC GRAVITY URINE: 1.01
T GONDII AB SER-IMP: POSITIVE
T GONDII IGG SER QL: 31.6 IU/ML
T PALLIDUM AB SER QL IA: NEGATIVE
T3 SERPL-MCNC: 118 NG/DL
T4 FREE SERPL-MCNC: 1.1 NG/DL
TRIGL SERPL-MCNC: 60 MG/DL
TSH SERPL-ACNC: 1.02 UIU/ML
URATE SERPL-MCNC: 8.1 MG/DL
UROBILINOGEN URINE: NORMAL
VZV AB TITR SER: POSITIVE
VZV IGG SER IF-ACNC: 3627 INDEX
WBC # FLD AUTO: 6.12 K/UL

## 2020-12-23 ENCOUNTER — NON-APPOINTMENT (OUTPATIENT)
Age: 70
End: 2020-12-23

## 2020-12-29 ENCOUNTER — APPOINTMENT (OUTPATIENT)
Dept: RADIOLOGY | Facility: CLINIC | Age: 70
End: 2020-12-29
Payer: COMMERCIAL

## 2020-12-29 ENCOUNTER — APPOINTMENT (OUTPATIENT)
Dept: ULTRASOUND IMAGING | Facility: CLINIC | Age: 70
End: 2020-12-29
Payer: COMMERCIAL

## 2020-12-29 ENCOUNTER — APPOINTMENT (OUTPATIENT)
Dept: CARDIOLOGY | Facility: CLINIC | Age: 70
End: 2020-12-29
Payer: COMMERCIAL

## 2020-12-29 ENCOUNTER — NON-APPOINTMENT (OUTPATIENT)
Age: 70
End: 2020-12-29

## 2020-12-29 ENCOUNTER — APPOINTMENT (OUTPATIENT)
Dept: TRANSPLANT | Facility: CLINIC | Age: 70
End: 2020-12-29

## 2020-12-29 ENCOUNTER — OUTPATIENT (OUTPATIENT)
Dept: OUTPATIENT SERVICES | Facility: HOSPITAL | Age: 70
LOS: 1 days | End: 2020-12-29
Payer: COMMERCIAL

## 2020-12-29 VITALS
OXYGEN SATURATION: 98 % | SYSTOLIC BLOOD PRESSURE: 130 MMHG | HEART RATE: 89 BPM | BODY MASS INDEX: 26.61 KG/M2 | DIASTOLIC BLOOD PRESSURE: 78 MMHG | TEMPERATURE: 97 F | WEIGHT: 155 LBS

## 2020-12-29 DIAGNOSIS — I10 ESSENTIAL (PRIMARY) HYPERTENSION: ICD-10-CM

## 2020-12-29 DIAGNOSIS — N18.9 CHRONIC KIDNEY DISEASE, UNSPECIFIED: ICD-10-CM

## 2020-12-29 DIAGNOSIS — Z00.8 ENCOUNTER FOR OTHER GENERAL EXAMINATION: ICD-10-CM

## 2020-12-29 DIAGNOSIS — Z01.818 ENCOUNTER FOR OTHER PREPROCEDURAL EXAMINATION: ICD-10-CM

## 2020-12-29 PROCEDURE — 93000 ELECTROCARDIOGRAM COMPLETE: CPT

## 2020-12-29 PROCEDURE — 71046 X-RAY EXAM CHEST 2 VIEWS: CPT

## 2020-12-29 PROCEDURE — 76700 US EXAM ABDOM COMPLETE: CPT | Mod: 26,59

## 2020-12-29 PROCEDURE — 93976 VASCULAR STUDY: CPT | Mod: 26

## 2020-12-29 PROCEDURE — 93975 VASCULAR STUDY: CPT

## 2020-12-29 PROCEDURE — 99072 ADDL SUPL MATRL&STAF TM PHE: CPT

## 2020-12-29 PROCEDURE — 71046 X-RAY EXAM CHEST 2 VIEWS: CPT | Mod: 26

## 2020-12-29 PROCEDURE — 99214 OFFICE O/P EST MOD 30 MIN: CPT

## 2020-12-29 PROCEDURE — 76700 US EXAM ABDOM COMPLETE: CPT

## 2020-12-29 NOTE — HISTORY OF PRESENT ILLNESS
[FreeTextEntry1] : 70-year-old male being seen in cardiac follow-up as a renal pretransplant candidate.  He was initially seen and evaluated about 15 months ago and an echo and stress test were normal at that time.  He has remained active with no exertional symptoms or change in his exercise capacity and is feeling generally well.  His renal function is slowly deteriorating, and it is a few years away from transplant.

## 2020-12-29 NOTE — PHYSICAL EXAM
[General Appearance - Well Developed] : well developed [Normal Appearance] : normal appearance [Well Groomed] : well groomed [General Appearance - Well Nourished] : well nourished [No Deformities] : no deformities [General Appearance - In No Acute Distress] : no acute distress [Normal Conjunctiva] : the conjunctiva exhibited no abnormalities [Eyelids - No Xanthelasma] : the eyelids demonstrated no xanthelasmas [Normal Oral Mucosa] : normal oral mucosa [No Oral Pallor] : no oral pallor [No Oral Cyanosis] : no oral cyanosis [Normal Jugular Venous A Waves Present] : normal jugular venous A waves present [Normal Jugular Venous V Waves Present] : normal jugular venous V waves present [No Jugular Venous Murcia A Waves] : no jugular venous murcia A waves [Respiration, Rhythm And Depth] : normal respiratory rhythm and effort [Exaggerated Use Of Accessory Muscles For Inspiration] : no accessory muscle use [Auscultation Breath Sounds / Voice Sounds] : lungs were clear to auscultation bilaterally [Heart Rate And Rhythm] : heart rate and rhythm were normal [Heart Sounds] : normal S1 and S2 [Murmurs] : no murmurs present [Abdomen Soft] : soft [Abdomen Tenderness] : non-tender [Abdomen Mass (___ Cm)] : no abdominal mass palpated [Abnormal Walk] : normal gait [Gait - Sufficient For Exercise Testing] : the gait was sufficient for exercise testing [Nail Clubbing] : no clubbing of the fingernails [Cyanosis, Localized] : no localized cyanosis [Petechial Hemorrhages (___cm)] : no petechial hemorrhages [Skin Color & Pigmentation] : normal skin color and pigmentation [] : no rash [No Venous Stasis] : no venous stasis [Skin Lesions] : no skin lesions [No Skin Ulcers] : no skin ulcer [No Xanthoma] : no  xanthoma was observed [Oriented To Time, Place, And Person] : oriented to person, place, and time [Affect] : the affect was normal [Mood] : the mood was normal [No Anxiety] : not feeling anxious

## 2020-12-29 NOTE — DISCUSSION/SUMMARY
[FreeTextEntry1] : Mr. Hawthorne remains without any cardiac symptoms and appears unchanged.  His exam shows no change in his weight, normal blood pressure, clear lungs, and a normal cardiac exam.  An EKG is within normal limits.  He does not require repeat stress testing or echocardiography at this time.  He will continue on his current regimen and return again in a year.

## 2021-01-12 LAB
HCV RNA SERPL NAA DL=5-ACNC: NOT DETECTED IU/ML
HCV RNA SERPL NAA+PROBE-LOG IU: NOT DETECTED LOG10IU/ML

## 2021-07-12 ENCOUNTER — APPOINTMENT (OUTPATIENT)
Dept: ULTRASOUND IMAGING | Facility: HOSPITAL | Age: 71
End: 2021-07-12

## 2021-07-12 ENCOUNTER — OUTPATIENT (OUTPATIENT)
Dept: OUTPATIENT SERVICES | Facility: HOSPITAL | Age: 71
LOS: 1 days | Discharge: HOME | End: 2021-07-12
Payer: MEDICARE

## 2021-07-12 DIAGNOSIS — Z90.5 ACQUIRED ABSENCE OF KIDNEY: ICD-10-CM

## 2021-07-12 DIAGNOSIS — I10 ESSENTIAL (PRIMARY) HYPERTENSION: ICD-10-CM

## 2021-07-12 DIAGNOSIS — Q61.02 CONGENITAL MULTIPLE RENAL CYSTS: ICD-10-CM

## 2021-07-12 DIAGNOSIS — N20.0 CALCULUS OF KIDNEY: ICD-10-CM

## 2021-07-12 PROCEDURE — 93975 VASCULAR STUDY: CPT | Mod: 26

## 2021-10-06 PROBLEM — I10 ESSENTIAL HYPERTENSION: Status: ACTIVE | Noted: 2017-04-20

## 2021-11-30 ENCOUNTER — NON-APPOINTMENT (OUTPATIENT)
Age: 71
End: 2021-11-30

## 2021-11-30 ENCOUNTER — APPOINTMENT (OUTPATIENT)
Dept: NEPHROLOGY | Facility: CLINIC | Age: 71
End: 2021-11-30

## 2022-02-18 ENCOUNTER — TRANSCRIPTION ENCOUNTER (OUTPATIENT)
Age: 72
End: 2022-02-18

## 2022-03-22 ENCOUNTER — EMERGENCY (EMERGENCY)
Facility: HOSPITAL | Age: 72
LOS: 0 days | Discharge: HOME | End: 2022-03-22
Attending: STUDENT IN AN ORGANIZED HEALTH CARE EDUCATION/TRAINING PROGRAM
Payer: MEDICARE

## 2022-03-22 VITALS
DIASTOLIC BLOOD PRESSURE: 100 MMHG | OXYGEN SATURATION: 96 % | WEIGHT: 149.91 LBS | SYSTOLIC BLOOD PRESSURE: 163 MMHG | HEART RATE: 79 BPM | HEIGHT: 64 IN | RESPIRATION RATE: 19 BRPM | TEMPERATURE: 98 F

## 2022-03-22 VITALS
DIASTOLIC BLOOD PRESSURE: 80 MMHG | RESPIRATION RATE: 16 BRPM | HEART RATE: 72 BPM | SYSTOLIC BLOOD PRESSURE: 152 MMHG | TEMPERATURE: 98 F | OXYGEN SATURATION: 97 %

## 2022-03-22 DIAGNOSIS — I12.0 HYPERTENSIVE CHRONIC KIDNEY DISEASE WITH STAGE 5 CHRONIC KIDNEY DISEASE OR END STAGE RENAL DISEASE: ICD-10-CM

## 2022-03-22 DIAGNOSIS — Z88.5 ALLERGY STATUS TO NARCOTIC AGENT: ICD-10-CM

## 2022-03-22 DIAGNOSIS — R33.9 RETENTION OF URINE, UNSPECIFIED: ICD-10-CM

## 2022-03-22 DIAGNOSIS — N18.6 END STAGE RENAL DISEASE: ICD-10-CM

## 2022-03-22 DIAGNOSIS — Z90.5 ACQUIRED ABSENCE OF KIDNEY: ICD-10-CM

## 2022-03-22 DIAGNOSIS — N99.89 OTHER POSTPROCEDURAL COMPLICATIONS AND DISORDERS OF GENITOURINARY SYSTEM: ICD-10-CM

## 2022-03-22 DIAGNOSIS — Z85.528 PERSONAL HISTORY OF OTHER MALIGNANT NEOPLASM OF KIDNEY: ICD-10-CM

## 2022-03-22 DIAGNOSIS — Z90.5 ACQUIRED ABSENCE OF KIDNEY: Chronic | ICD-10-CM

## 2022-03-22 LAB
ALBUMIN SERPL ELPH-MCNC: 3.8 G/DL — SIGNIFICANT CHANGE UP (ref 3.3–5)
ALP SERPL-CCNC: 73 U/L — SIGNIFICANT CHANGE UP (ref 40–120)
ALT FLD-CCNC: 13 U/L — SIGNIFICANT CHANGE UP (ref 12–78)
ANION GAP SERPL CALC-SCNC: 8 MMOL/L — SIGNIFICANT CHANGE UP (ref 5–17)
AST SERPL-CCNC: 9 U/L — LOW (ref 15–37)
BASOPHILS # BLD AUTO: 0.01 K/UL — SIGNIFICANT CHANGE UP (ref 0–0.2)
BASOPHILS NFR BLD AUTO: 0.1 % — SIGNIFICANT CHANGE UP (ref 0–2)
BILIRUB SERPL-MCNC: 0.4 MG/DL — SIGNIFICANT CHANGE UP (ref 0.2–1.2)
BUN SERPL-MCNC: 99 MG/DL — HIGH (ref 7–23)
CALCIUM SERPL-MCNC: 8.5 MG/DL — SIGNIFICANT CHANGE UP (ref 8.5–10.1)
CHLORIDE SERPL-SCNC: 112 MMOL/L — HIGH (ref 96–108)
CO2 SERPL-SCNC: 20 MMOL/L — LOW (ref 22–31)
CREAT SERPL-MCNC: 6.59 MG/DL — HIGH (ref 0.5–1.3)
EGFR: 8 ML/MIN/1.73M2 — LOW
EOSINOPHIL # BLD AUTO: 0.11 K/UL — SIGNIFICANT CHANGE UP (ref 0–0.5)
EOSINOPHIL NFR BLD AUTO: 1.6 % — SIGNIFICANT CHANGE UP (ref 0–6)
GLUCOSE SERPL-MCNC: 130 MG/DL — HIGH (ref 70–99)
HCT VFR BLD CALC: 27.3 % — LOW (ref 39–50)
HGB BLD-MCNC: 9.3 G/DL — LOW (ref 13–17)
IMM GRANULOCYTES NFR BLD AUTO: 0.4 % — SIGNIFICANT CHANGE UP (ref 0–1.5)
LYMPHOCYTES # BLD AUTO: 0.82 K/UL — LOW (ref 1–3.3)
LYMPHOCYTES # BLD AUTO: 12.3 % — LOW (ref 13–44)
MCHC RBC-ENTMCNC: 30.1 PG — SIGNIFICANT CHANGE UP (ref 27–34)
MCHC RBC-ENTMCNC: 34.1 G/DL — SIGNIFICANT CHANGE UP (ref 32–36)
MCV RBC AUTO: 88.3 FL — SIGNIFICANT CHANGE UP (ref 80–100)
MONOCYTES # BLD AUTO: 0.37 K/UL — SIGNIFICANT CHANGE UP (ref 0–0.9)
MONOCYTES NFR BLD AUTO: 5.5 % — SIGNIFICANT CHANGE UP (ref 2–14)
NEUTROPHILS # BLD AUTO: 5.35 K/UL — SIGNIFICANT CHANGE UP (ref 1.8–7.4)
NEUTROPHILS NFR BLD AUTO: 80.1 % — HIGH (ref 43–77)
NRBC # BLD: 0 /100 WBCS — SIGNIFICANT CHANGE UP (ref 0–0)
PLATELET # BLD AUTO: 109 K/UL — LOW (ref 150–400)
POTASSIUM SERPL-MCNC: 5.6 MMOL/L — HIGH (ref 3.5–5.3)
POTASSIUM SERPL-SCNC: 5.6 MMOL/L — HIGH (ref 3.5–5.3)
PROT SERPL-MCNC: 7.1 GM/DL — SIGNIFICANT CHANGE UP (ref 6–8.3)
RBC # BLD: 3.09 M/UL — LOW (ref 4.2–5.8)
RBC # FLD: 14.4 % — SIGNIFICANT CHANGE UP (ref 10.3–14.5)
SODIUM SERPL-SCNC: 140 MMOL/L — SIGNIFICANT CHANGE UP (ref 135–145)
WBC # BLD: 6.69 K/UL — SIGNIFICANT CHANGE UP (ref 3.8–10.5)
WBC # FLD AUTO: 6.69 K/UL — SIGNIFICANT CHANGE UP (ref 3.8–10.5)

## 2022-03-22 PROCEDURE — 99282 EMERGENCY DEPT VISIT SF MDM: CPT

## 2022-03-22 PROCEDURE — 99284 EMERGENCY DEPT VISIT MOD MDM: CPT

## 2022-03-22 RX ORDER — TAMSULOSIN HYDROCHLORIDE 0.4 MG/1
1 CAPSULE ORAL
Qty: 7 | Refills: 0
Start: 2022-03-22 | End: 2022-03-28

## 2022-03-22 RX ORDER — SODIUM ZIRCONIUM CYCLOSILICATE 10 G/10G
10 POWDER, FOR SUSPENSION ORAL ONCE
Refills: 0 | Status: COMPLETED | OUTPATIENT
Start: 2022-03-22 | End: 2022-03-22

## 2022-03-22 RX ADMIN — SODIUM ZIRCONIUM CYCLOSILICATE 10 GRAM(S): 10 POWDER, FOR SUSPENSION ORAL at 16:56

## 2022-03-22 NOTE — CONSULT NOTE ADULT - NS ATTEND AMEND GEN_ALL_CORE FT
Full hx performed.  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 Long on Friday: AUR for about 375 ml

## 2022-03-22 NOTE — ED PROVIDER NOTE - OBJECTIVE STATEMENT
71 year old male with h/o HTN, left renal cancer s/p nephrectomy, and ESRD (for dialysis) s/p left forearm AV fistula (placed this morning) presents today in urinary retention, pt states that he last voided at 6:30am, pt did have a procedure this morning, a left arm AV fistula 71 year old male with h/o HTN, left renal cancer s/p nephrectomy, and ESRD (for dialysis) s/p left forearm AV fistula (placed this morning) presents today in urinary retention, pt states that he last voided at 6:30am, pt did have a procedure this morning, a left arm AV fistula placed, prior to discharge pt admits to urinating dribbles, currently feels the need to urinate but unable

## 2022-03-22 NOTE — ED ADULT NURSE NOTE - OBJECTIVE STATEMENT
71 year old male with h/o HTN, left renal cancer s/p nephrectomy, and ESRD (for dialysis) s/p left forearm AV fistula (placed this morning) presents today in urinary retention, pt states that he last voided at 6:30am, pt did have a procedure this morning, a left arm AV fistula

## 2022-03-22 NOTE — CONSULT NOTE ADULT - SUBJECTIVE AND OBJECTIVE BOX
UROLOGY CONSULT NOTE    Patient is a 71y old  Male who presents with a chief complaint of     HPI:  Patient is a 72yo male with pmhx HTN, s/p L nephrectomy 30 years ago (cystic kidney disease), and ESRD (for dialysis) s/p left forearm AV fistula (placed this morning) presents today in urinary retention, pt states that he last voided at 6:30am, pt did have a procedure this morning, a left arm AV fistula placed, prior to discharge pt admits to urinating dribbles. Patient has     PAST MEDICAL & SURGICAL HISTORY:  Hypertension  Renal failure, chronic  Cancer of kidney, left  History of left nephrectomy        Review of Systems:      MEDICATIONS  (STANDING):    MEDICATIONS  (PRN):      Allergies    Demerol HCl (Unknown)    Intolerances        SOCIAL HISTORY          Smoking: Yes [ ]  No [ ]   ______pk yrs          ETOH  Yes [ ]  No [ ]  Social [ ]          DRUGS:  Yes [ ]  No [ ]  if so what______________    FAMILY HISTORY:      Vital Signs Last 24 Hrs  T(C): 36.4 (22 Mar 2022 15:27), Max: 36.8 (22 Mar 2022 13:21)  T(F): 97.6 (22 Mar 2022 15:27), Max: 98.3 (22 Mar 2022 13:21)  HR: 72 (22 Mar 2022 15:27) (72 - 79)  BP: 152/80 (22 Mar 2022 15:27) (152/80 - 163/100)  BP(mean): --  RR: 16 (22 Mar 2022 15:27) (16 - 19)  SpO2: 97% (22 Mar 2022 15:27) (96% - 97%)    Physical Exam:    General:  Appears stated age, well-groomed, well-nourished, no distress  Eyes : RENETTA  HENT:  WNL, no JVD  Chest:  clear breath sounds  Cardiovascular:  Regular rate & rhythm  Abdomen:    :  Extremities:    Skin:    Musculoskeletal:    Neuro/Psych:        LABS:                        9.3    6.69  )-----------( 109      ( 22 Mar 2022 14:23 )             27.3     03-22    140  |  112<H>  |  99<H>  ----------------------------<  130<H>  5.6<H>   |  20<L>  |  6.59<H>    Ca    8.5      22 Mar 2022 14:23    TPro  7.1  /  Alb  3.8  /  TBili  0.4  /  DBili  x   /  AST  9<L>  /  ALT  13  /  AlkPhos  73  03-22          RADIOLOGY & ADDITIONAL STUDIES: UROLOGY CONSULT NOTE    Patient is a 71y old  Male who presents with a chief complaint of     HPI:  Patient is a 70yo male with pmhx HTN, s/p L nephrectomy 30 years ago (cystic kidney disease), and ESRD (for dialysis) s/p left forearm AV fistula (placed this morning) presents today in urinary retention, pt states that he last voided at 6:30am, pt did have a procedure this morning, a left arm AV fistula placed, prior to discharge pt admits to urinating dribbles. Patient has had urinary symptoms for some time, frequency, urgency, weak stream, +nocturia. Denies gross hematuria. Has never required a moody catheter.    PAST MEDICAL & SURGICAL HISTORY:  Hypertension  Renal failure, chronic  History of left nephrectomy    REVIEW OF SYSTEMS:  Constitutional: Denies fever, weight loss, fatigue  Eye: Denies eye pain, visual changes, discharge, blurred vision  ENT: Denies hearing changes, tinnitus, vertigo, sinus congestion, sore throat  Neck: Denies pain or stiffness  Respiratory: Denies cough, wheezing, chills, hemoptysis, shortness of breath, difficulty breathing  Cardiovascular: Denies chest pain, palpitations, dizziness, leg swelling  Gastrointestinal: Denies abdominal pain, nausea, vomiting, hematemesis, diarrhea, constipation, melena, hematochezia  Genitourinary: Denies dysuria, frequency, hematuria, retention, incontinence  Neurological: Denies headaches, memory loss, loss of strength, numbness, tremors  Skin: Denies itching, burning, rashes, lesions   Endocrine: Denies heat or cold intolerance, hair loss  Musculoskeletal: Denies joint pain or swelling, back, extremity pain  Psychiatric: Denies depression, anxiety, mood swings, difficulty sleeping, suicidal ideation  Hematology: Denies easy bruising, bleeding gums  Immunologic: Denies hives or eczema    MEDICATIONS  (STANDING):    MEDICATIONS  (PRN):    Allergies  Demerol HCl (Unknown)    SOCIAL HISTORY          Smoking: Yes [ ]  No [x ]   ______pk yrs          ETOH  Yes [ ]  No [x ]  Social [ ]          DRUGS:  Yes [ ]  No [x ]  if so what______________    FAMILY HISTORY:      Vital Signs Last 24 Hrs  T(C): 36.4 (22 Mar 2022 15:27), Max: 36.8 (22 Mar 2022 13:21)  T(F): 97.6 (22 Mar 2022 15:27), Max: 98.3 (22 Mar 2022 13:21)  HR: 72 (22 Mar 2022 15:27) (72 - 79)  BP: 152/80 (22 Mar 2022 15:27) (152/80 - 163/100)  BP(mean): --  RR: 16 (22 Mar 2022 15:27) (16 - 19)  SpO2: 97% (22 Mar 2022 15:27) (96% - 97%)    Physical Exam:    GENERAL: NAD, well-groomed, thin  HEAD:  Atraumatic, Normocephalic  EYES: EOMI, PERRLA, conjunctiva and sclera clear  NECK: Supple, No JVD, Normal thyroid  NERVOUS SYSTEM:  Alert & Oriented X3, Good concentration  CHEST/LUNG: Clear to percussion bilaterally  HEART: Regular rate and rhythm  ABDOMEN: Soft, mildly diffuse tenderness, Nondistended  EXTREMITIES:  2+ Peripheral Pulses  LYMPH: No cervical adenopathy  : No suprapubic tenderness, no bladder distention, no gross hematuria.  SKIN: No rashes or lesions      LABS:                        9.3    6.69  )-----------( 109      ( 22 Mar 2022 14:23 )             27.3     03-22    140  |  112<H>  |  99<H>  ----------------------------<  130<H>  5.6<H>   |  20<L>  |  6.59<H>    Ca    8.5      22 Mar 2022 14:23    TPro  7.1  /  Alb  3.8  /  TBili  0.4  /  DBili  x   /  AST  9<L>  /  ALT  13  /  AlkPhos  73  03-22          RADIOLOGY & ADDITIONAL STUDIES:

## 2022-03-22 NOTE — CONSULT NOTE ADULT - ASSESSMENT
Impression: Patient is a 72yo male with pmhx HTN, s/p L nephrectomy 30 years ago (cystic kidney disease), and ESRD (for dialysis) s/p left forearm AV fistula (placed this morning) presents today in urinary retention,    Recs:  --Patient should dc with moody in place  --ToV Friday morning   --F/u in office Friday afternoon  --Please send with rx for Flomax    Seen and discussed with Dr. Stone

## 2022-03-22 NOTE — ED PROVIDER NOTE - CLINICAL SUMMARY MEDICAL DECISION MAKING FREE TEXT BOX
pt presented with postop urinary retention, last voided at 6:30am, moody placed, labs obtained, pt's baseline creatinine is 5-6 as per Dr Lewis (pt's nephrologist)

## 2022-03-22 NOTE — ED ADULT NURSE REASSESSMENT NOTE - NS ED NURSE REASSESS COMMENT FT1
late entry, 15f moody placed with sterile technique, dr shaikh at bedside, patient reports immediate relief of urinary retention

## 2022-03-22 NOTE — ED PROVIDER NOTE - PROGRESS NOTE DETAILS
urology consulted pt evaluated by urology PA, plan is for discharge with leg bag, pt instructed to removed the moody Friday morning and follow up with Dr Mendez on Friday afternoon, flomax also to be prescribed x 1 week dr aguilar called, labs discussed, recommend to give a dose stat of Lokelma 10 grams, then d/c on it q8 x 6 doses, script will be sent to the pharmacy

## 2022-03-22 NOTE — ED PROVIDER NOTE - PATIENT PORTAL LINK FT
You can access the FollowMyHealth Patient Portal offered by [facility] by registering at the following website: [email or web address]. By joining [facility]’s FollowMyHealth portal, you will also be able to view your health information using other applications (apps) compatible with our system.

## 2022-03-22 NOTE — ED ADULT TRIAGE NOTE - CHIEF COMPLAINT QUOTE
Left AV/fistula placement outpatient this morning  Unable to urinate  Last urinated around 630am  Denies pain only c/o urgency

## 2022-03-22 NOTE — ED PROVIDER NOTE - CARE PLAN
Principal Discharge DX:	Postoperative urinary retention   1 Principal Discharge DX:	Postoperative urinary retention  Secondary Diagnosis:	Renal failure, chronic

## 2022-03-23 PROBLEM — I10 ESSENTIAL (PRIMARY) HYPERTENSION: Chronic | Status: ACTIVE | Noted: 2022-03-22

## 2022-03-23 PROBLEM — N18.9 CHRONIC KIDNEY DISEASE, UNSPECIFIED: Chronic | Status: ACTIVE | Noted: 2022-03-22

## 2022-03-23 PROBLEM — C64.2 MALIGNANT NEOPLASM OF LEFT KIDNEY, EXCEPT RENAL PELVIS: Chronic | Status: ACTIVE | Noted: 2022-03-22

## 2022-03-25 ENCOUNTER — APPOINTMENT (OUTPATIENT)
Dept: UROLOGY | Facility: CLINIC | Age: 72
End: 2022-03-25
Payer: MEDICARE

## 2022-03-25 VITALS
OXYGEN SATURATION: 99 % | WEIGHT: 150 LBS | HEIGHT: 64 IN | HEART RATE: 71 BPM | BODY MASS INDEX: 25.61 KG/M2 | DIASTOLIC BLOOD PRESSURE: 79 MMHG | SYSTOLIC BLOOD PRESSURE: 149 MMHG | TEMPERATURE: 98.6 F

## 2022-03-25 DIAGNOSIS — N40.0 BENIGN PROSTATIC HYPERPLASIA WITHOUT LOWER URINARY TRACT SYMPMS: ICD-10-CM

## 2022-03-25 PROCEDURE — 51700 IRRIGATION OF BLADDER: CPT

## 2022-03-25 PROCEDURE — 99213 OFFICE O/P EST LOW 20 MIN: CPT | Mod: 25

## 2022-03-25 PROCEDURE — A4216: CPT | Mod: NC

## 2022-03-25 PROCEDURE — 51798 US URINE CAPACITY MEASURE: CPT

## 2022-03-25 NOTE — REVIEW OF SYSTEMS
[Eyesight Problems] : eyesight problems [Urine retention] : urine retention [Wake up at night to urinate  How many times?  ___] : wakes up to urinate [unfilled] times during the night [Limb Weakness] : limb weakness [Difficulty Walking] : difficulty walking [Negative] : Heme/Lymph

## 2022-03-28 ENCOUNTER — APPOINTMENT (OUTPATIENT)
Dept: UROLOGY | Facility: CLINIC | Age: 72
End: 2022-03-28
Payer: MEDICARE

## 2022-03-28 VITALS
WEIGHT: 150 LBS | HEIGHT: 64 IN | OXYGEN SATURATION: 99 % | DIASTOLIC BLOOD PRESSURE: 81 MMHG | TEMPERATURE: 97.7 F | BODY MASS INDEX: 25.61 KG/M2 | SYSTOLIC BLOOD PRESSURE: 158 MMHG | HEART RATE: 73 BPM

## 2022-03-28 DIAGNOSIS — Z82.49 FAMILY HISTORY OF ISCHEMIC HEART DISEASE AND OTHER DISEASES OF THE CIRCULATORY SYSTEM: ICD-10-CM

## 2022-03-28 DIAGNOSIS — Z86.19 PERSONAL HISTORY OF OTHER INFECTIOUS AND PARASITIC DISEASES: ICD-10-CM

## 2022-03-28 DIAGNOSIS — Z82.3 FAMILY HISTORY OF STROKE: ICD-10-CM

## 2022-03-28 DIAGNOSIS — Z87.448 PERSONAL HISTORY OF OTHER DISEASES OF URINARY SYSTEM: ICD-10-CM

## 2022-03-28 DIAGNOSIS — Z84.1 FAMILY HISTORY OF DISORDERS OF KIDNEY AND URETER: ICD-10-CM

## 2022-03-28 DIAGNOSIS — Z87.442 PERSONAL HISTORY OF URINARY CALCULI: ICD-10-CM

## 2022-03-28 PROBLEM — N40.0 BPH (BENIGN PROSTATIC HYPERPLASIA): Status: ACTIVE | Noted: 2017-04-20

## 2022-03-28 PROCEDURE — 51798 US URINE CAPACITY MEASURE: CPT

## 2022-03-28 PROCEDURE — 99213 OFFICE O/P EST LOW 20 MIN: CPT | Mod: 25

## 2022-03-28 PROCEDURE — 51700 IRRIGATION OF BLADDER: CPT

## 2022-03-28 PROCEDURE — A4216: CPT | Mod: NC

## 2022-03-28 RX ORDER — TERAZOSIN 5 MG/1
5 CAPSULE ORAL
Refills: 0 | Status: DISCONTINUED | COMMUNITY
End: 2022-03-28

## 2022-03-28 NOTE — ASSESSMENT
[FreeTextEntry1] : 71 year old male s/p TOV \par PVR: 27\par culture sent today\par \par to continue with flomax-to d/c hytrin when flomax starts\par x \par \par \par Follow up in 1 month - PSA, exam , PVR

## 2022-03-28 NOTE — LETTER BODY
[Dear  ___] : Dear  [unfilled], [Consult Letter:] : I had the pleasure of evaluating your patient, [unfilled]. [Please see my note below.] : Please see my note below. [Consult Closing:] : Thank you very much for allowing me to participate in the care of this patient.  If you have any questions, please do not hesitate to contact me. [Sincerely,] : Sincerely, [FreeTextEntry1] : see below\par \par on flomax -will d/c hytrin\par \par f/u one month with exam and psa [FreeTextEntry3] : Bo Long MD FACS\par \par Attending Urologist-[facility]\par Chief-Urology Division [facility]\par Associate Clinical Professor-[employer] at [facility]\par \par

## 2022-03-28 NOTE — ASSESSMENT
[FreeTextEntry1] : 71 year old male for TOV\par cath was removed \par patient was able to void with a PVR: 5 \par \par advised to return to the office or go to ER if unable to urinate or c/o severe abdominal pain\par \par Follow up on Monday or Tuesday\par \par eventual psa and exam

## 2022-03-28 NOTE — HISTORY OF PRESENT ILLNESS
[FreeTextEntry1] : 71 year old male here today for TOV after recent ER visit\par hx left  nephrectomy for benign disease and PCKD? \par  ESRD (-soon to be on dialysis-had AVF plaaced last week)\par \par presented to ER after left forearm AV fistula placement (that morning) -3/22\par unable to urinate, found to be in urinary retention\par cath was inserted\par \par before ER visit was having weak stream and frequency \par IPSS: 23 \par \par on terazosin chronically (5 mg), tamsulosin added in ER

## 2022-03-28 NOTE — END OF VISIT
[FreeTextEntry3] : Medical record entries made by the scribe today, were at my direction and personally dictated to them by me, Dr. Bo Long on 03/28/2022. I have reviewed the chart and agree that the record accurately reflects my personal performance of the history, physical exam, assessment, and plan.

## 2022-03-28 NOTE — END OF VISIT
[FreeTextEntry3] : Medical record entries made by the scribe today, were at my direction and personally dictated to them by me, Dr. Bo Long on 03/25/2022. I have reviewed the chart and agree that the record accurately reflects my personal performance of the history, physical exam, assessment, and plan.

## 2022-03-28 NOTE — HISTORY OF PRESENT ILLNESS
[FreeTextEntry1] : 71 year old male following up after recent TOV 3/25/22\par was prescribed 6 pills of Flomax- finished Saturday\par on terazosin 10 mg? vs 5 mg\par \par slow flow at night otherwsie not complaining

## 2022-04-08 ENCOUNTER — APPOINTMENT (OUTPATIENT)
Dept: UROLOGY | Facility: CLINIC | Age: 72
End: 2022-04-08

## 2022-04-25 ENCOUNTER — APPOINTMENT (OUTPATIENT)
Dept: UROLOGY | Facility: CLINIC | Age: 72
End: 2022-04-25
Payer: MEDICARE

## 2022-04-25 VITALS
HEART RATE: 71 BPM | WEIGHT: 147 LBS | HEIGHT: 64 IN | TEMPERATURE: 98.5 F | OXYGEN SATURATION: 100 % | DIASTOLIC BLOOD PRESSURE: 83 MMHG | BODY MASS INDEX: 25.1 KG/M2 | SYSTOLIC BLOOD PRESSURE: 157 MMHG

## 2022-04-25 DIAGNOSIS — N40.2 NODULAR PROSTATE W/OUT LOWER URINARY TRACT SYMPTOMS: ICD-10-CM

## 2022-04-25 LAB — BACTERIA UR CULT: NORMAL

## 2022-04-25 PROCEDURE — 99214 OFFICE O/P EST MOD 30 MIN: CPT

## 2022-04-25 PROCEDURE — 51798 US URINE CAPACITY MEASURE: CPT

## 2022-04-25 RX ORDER — TERAZOSIN 10 MG/1
10 CAPSULE ORAL
Refills: 0 | Status: DISCONTINUED | COMMUNITY
End: 2022-04-25

## 2022-04-25 NOTE — END OF VISIT
[FreeTextEntry3] : Medical record entries made by the scribe today, were at my direction and personally dictated to them by me, Dr. Bo Long on 04/25/2022. I have reviewed the chart and agree that the record accurately reflects my personal performance of the history, physical exam, assessment, and plan.

## 2022-04-25 NOTE — ASSESSMENT
[FreeTextEntry1] : 71 year old male with nodule on right side of prostate, hx of urinary retention\par PSA sent today\par PVR: 5 \par \par continue on flomax\par \par discussed need for prostate biopsy- will order Valium\par to follow up for prostate biopsy\par \par d/w Dr. Lewis-to see pt prior and check labs\par

## 2022-04-25 NOTE — PHYSICAL EXAM
[Urethral Meatus] : meatus normal [Penis Abnormality] : normal circumcised penis [Scrotum] : the scrotum was normal [Epididymis] : the epididymides were normal [Testes Tenderness] : no tenderness of the testes [Testes Mass (___cm)] : there were no testicular masses [Anus Abnormality] : the anus and perineum were normal [Rectal Exam - Rectum] : digital rectal exam was normal [Prostate Enlargement] : the prostate was not enlarged [Prostate Tenderness] : the prostate was not tender [General Appearance - Well Developed] : well developed [General Appearance - Well Nourished] : well nourished [Normal Appearance] : normal appearance [Well Groomed] : well groomed [General Appearance - In No Acute Distress] : no acute distress [Abdomen Soft] : soft [Abdomen Tenderness] : non-tender [Costovertebral Angle Tenderness] : no ~M costovertebral angle tenderness [FreeTextEntry1] : hard nodule on the right side of prostate

## 2022-04-25 NOTE — LETTER BODY
[Dear  ___] : Dear  [unfilled], [Courtesy Letter:] : I had the pleasure of seeing your patient, [unfilled], in my office today. [Please see my note below.] : Please see my note below. [Sincerely,] : Sincerely, [FreeTextEntry1] : see below\par \par as we discussed\par \par to have biopsy after he sees you.\par \par please also check PT /INR and fax labs to \par \par thanks [FreeTextEntry3] : Bo Long MD FACS\par \par Attending Urologist-[facility]\par Chief-Urology Division [facility]\par Associate Clinical Professor-[facility] [employer] at [facility]\par \par

## 2022-04-25 NOTE — HISTORY OF PRESENT ILLNESS
[FreeTextEntry1] : 71 year old male following up for urinary retention, also PSA screening and exam\par s/p tov 3/25\par \par patient is doing well-not yet on dialysis\par \par culture 3/25- negative\par PSA 11/24/20: 1.45\par \par on flomax 0.4\par IPSS: 8

## 2022-05-09 ENCOUNTER — APPOINTMENT (OUTPATIENT)
Dept: UROLOGY | Facility: CLINIC | Age: 72
End: 2022-05-09
Payer: MEDICARE

## 2022-05-09 VITALS
BODY MASS INDEX: 24.92 KG/M2 | DIASTOLIC BLOOD PRESSURE: 76 MMHG | HEART RATE: 73 BPM | WEIGHT: 146 LBS | HEIGHT: 64 IN | SYSTOLIC BLOOD PRESSURE: 146 MMHG | TEMPERATURE: 97.6 F | OXYGEN SATURATION: 97 %

## 2022-05-09 VITALS — HEART RATE: 64 BPM | OXYGEN SATURATION: 98 % | SYSTOLIC BLOOD PRESSURE: 112 MMHG | DIASTOLIC BLOOD PRESSURE: 69 MMHG

## 2022-05-09 LAB — PSA SERPL-MCNC: 1.28 NG/ML

## 2022-05-09 PROCEDURE — 76872 US TRANSRECTAL: CPT

## 2022-05-09 PROCEDURE — 55700: CPT

## 2022-05-10 ENCOUNTER — NON-APPOINTMENT (OUTPATIENT)
Age: 72
End: 2022-05-10

## 2022-05-16 ENCOUNTER — INPATIENT (INPATIENT)
Facility: HOSPITAL | Age: 72
LOS: 2 days | Discharge: HOME | End: 2022-05-19
Attending: HOSPITALIST | Admitting: HOSPITALIST
Payer: MEDICARE

## 2022-05-16 VITALS
HEART RATE: 72 BPM | RESPIRATION RATE: 18 BRPM | TEMPERATURE: 98 F | SYSTOLIC BLOOD PRESSURE: 138 MMHG | DIASTOLIC BLOOD PRESSURE: 76 MMHG | WEIGHT: 147.71 LBS | OXYGEN SATURATION: 97 %

## 2022-05-16 DIAGNOSIS — C64.2 MALIGNANT NEOPLASM OF LEFT KIDNEY, EXCEPT RENAL PELVIS: ICD-10-CM

## 2022-05-16 DIAGNOSIS — N40.0 BENIGN PROSTATIC HYPERPLASIA WITHOUT LOWER URINARY TRACT SYMPTOMS: ICD-10-CM

## 2022-05-16 DIAGNOSIS — Z90.5 ACQUIRED ABSENCE OF KIDNEY: Chronic | ICD-10-CM

## 2022-05-16 DIAGNOSIS — I10 ESSENTIAL (PRIMARY) HYPERTENSION: ICD-10-CM

## 2022-05-16 DIAGNOSIS — N18.9 CHRONIC KIDNEY DISEASE, UNSPECIFIED: ICD-10-CM

## 2022-05-16 LAB
ALBUMIN SERPL ELPH-MCNC: 3.5 G/DL — SIGNIFICANT CHANGE UP (ref 3.3–5)
ALP SERPL-CCNC: 68 U/L — SIGNIFICANT CHANGE UP (ref 40–120)
ALT FLD-CCNC: 10 U/L — LOW (ref 12–78)
ANION GAP SERPL CALC-SCNC: 15 MMOL/L — SIGNIFICANT CHANGE UP (ref 5–17)
APTT BLD: 29.6 SEC — SIGNIFICANT CHANGE UP (ref 27.5–35.5)
AST SERPL-CCNC: 7 U/L — LOW (ref 15–37)
BILIRUB SERPL-MCNC: 0.4 MG/DL — SIGNIFICANT CHANGE UP (ref 0.2–1.2)
BLD GP AB SCN SERPL QL: SIGNIFICANT CHANGE UP
BUN SERPL-MCNC: 140 MG/DL — HIGH (ref 7–23)
CALCIUM SERPL-MCNC: 8.2 MG/DL — LOW (ref 8.5–10.1)
CHLORIDE SERPL-SCNC: 111 MMOL/L — HIGH (ref 96–108)
CO2 SERPL-SCNC: 15 MMOL/L — LOW (ref 22–31)
CREAT SERPL-MCNC: 8.13 MG/DL — HIGH (ref 0.5–1.3)
EGFR: 6 ML/MIN/1.73M2 — LOW
GLUCOSE SERPL-MCNC: 101 MG/DL — HIGH (ref 70–99)
HAV IGM SER-ACNC: SIGNIFICANT CHANGE UP
HBV CORE IGM SER-ACNC: SIGNIFICANT CHANGE UP
HBV SURFACE AG SER-ACNC: SIGNIFICANT CHANGE UP
HCT VFR BLD CALC: 25 % — LOW (ref 39–50)
HCV AB S/CO SERPL IA: 11.69 S/CO — HIGH (ref 0–0.99)
HCV AB SERPL-IMP: REACTIVE
HGB BLD-MCNC: 8.4 G/DL — LOW (ref 13–17)
INR BLD: 1.12 RATIO — SIGNIFICANT CHANGE UP (ref 0.88–1.16)
MCHC RBC-ENTMCNC: 29.3 PG — SIGNIFICANT CHANGE UP (ref 27–34)
MCHC RBC-ENTMCNC: 33.6 G/DL — SIGNIFICANT CHANGE UP (ref 32–36)
MCV RBC AUTO: 87.1 FL — SIGNIFICANT CHANGE UP (ref 80–100)
NRBC # BLD: 0 /100 WBCS — SIGNIFICANT CHANGE UP (ref 0–0)
PLATELET # BLD AUTO: 146 K/UL — LOW (ref 150–400)
POTASSIUM SERPL-MCNC: 4.1 MMOL/L — SIGNIFICANT CHANGE UP (ref 3.5–5.3)
POTASSIUM SERPL-SCNC: 4.1 MMOL/L — SIGNIFICANT CHANGE UP (ref 3.5–5.3)
PROT SERPL-MCNC: 6.7 GM/DL — SIGNIFICANT CHANGE UP (ref 6–8.3)
PROTHROM AB SERPL-ACNC: 13.3 SEC — SIGNIFICANT CHANGE UP (ref 10.5–13.4)
RAPID RVP RESULT: SIGNIFICANT CHANGE UP
RBC # BLD: 2.87 M/UL — LOW (ref 4.2–5.8)
RBC # FLD: 14.6 % — HIGH (ref 10.3–14.5)
SARS-COV-2 RNA SPEC QL NAA+PROBE: SIGNIFICANT CHANGE UP
SODIUM SERPL-SCNC: 141 MMOL/L — SIGNIFICANT CHANGE UP (ref 135–145)
WBC # BLD: 5.79 K/UL — SIGNIFICANT CHANGE UP (ref 3.8–10.5)
WBC # FLD AUTO: 5.79 K/UL — SIGNIFICANT CHANGE UP (ref 3.8–10.5)

## 2022-05-16 PROCEDURE — 77001 FLUOROGUIDE FOR VEIN DEVICE: CPT | Mod: 26

## 2022-05-16 PROCEDURE — 36556 INSERT NON-TUNNEL CV CATH: CPT

## 2022-05-16 PROCEDURE — 99223 1ST HOSP IP/OBS HIGH 75: CPT | Mod: AI

## 2022-05-16 PROCEDURE — 76937 US GUIDE VASCULAR ACCESS: CPT | Mod: 26

## 2022-05-16 RX ORDER — ACETAMINOPHEN 500 MG
650 TABLET ORAL EVERY 6 HOURS
Refills: 0 | Status: DISCONTINUED | OUTPATIENT
Start: 2022-05-16 | End: 2022-05-19

## 2022-05-16 RX ORDER — OXYCODONE AND ACETAMINOPHEN 5; 325 MG/1; MG/1
1 TABLET ORAL EVERY 4 HOURS
Refills: 0 | Status: DISCONTINUED | OUTPATIENT
Start: 2022-05-16 | End: 2022-05-19

## 2022-05-16 RX ORDER — OXYCODONE AND ACETAMINOPHEN 5; 325 MG/1; MG/1
2 TABLET ORAL EVERY 6 HOURS
Refills: 0 | Status: DISCONTINUED | OUTPATIENT
Start: 2022-05-16 | End: 2022-05-19

## 2022-05-16 RX ORDER — AMLODIPINE BESYLATE 2.5 MG/1
10 TABLET ORAL DAILY
Refills: 0 | Status: DISCONTINUED | OUTPATIENT
Start: 2022-05-16 | End: 2022-05-19

## 2022-05-16 RX ORDER — DESMOPRESSIN ACETATE 0.1 MG/1
20 TABLET ORAL ONCE
Refills: 0 | Status: COMPLETED | OUTPATIENT
Start: 2022-05-16 | End: 2022-05-16

## 2022-05-16 RX ORDER — HEPARIN SODIUM 5000 [USP'U]/ML
5000 INJECTION INTRAVENOUS; SUBCUTANEOUS EVERY 12 HOURS
Refills: 0 | Status: DISCONTINUED | OUTPATIENT
Start: 2022-05-16 | End: 2022-05-16

## 2022-05-16 RX ORDER — CARVEDILOL PHOSPHATE 80 MG/1
25 CAPSULE, EXTENDED RELEASE ORAL EVERY 12 HOURS
Refills: 0 | Status: DISCONTINUED | OUTPATIENT
Start: 2022-05-16 | End: 2022-05-19

## 2022-05-16 RX ORDER — TAMSULOSIN HYDROCHLORIDE 0.4 MG/1
0.4 CAPSULE ORAL AT BEDTIME
Refills: 0 | Status: DISCONTINUED | OUTPATIENT
Start: 2022-05-16 | End: 2022-05-19

## 2022-05-16 RX ADMIN — TAMSULOSIN HYDROCHLORIDE 0.4 MILLIGRAM(S): 0.4 CAPSULE ORAL at 22:43

## 2022-05-16 RX ADMIN — CARVEDILOL PHOSPHATE 25 MILLIGRAM(S): 80 CAPSULE, EXTENDED RELEASE ORAL at 17:15

## 2022-05-16 RX ADMIN — DESMOPRESSIN ACETATE 220 MICROGRAM(S): 0.1 TABLET ORAL at 14:03

## 2022-05-16 NOTE — PATIENT PROFILE ADULT - FALL HARM RISK - RISK INTERVENTIONS

## 2022-05-16 NOTE — H&P ADULT - HISTORY OF PRESENT ILLNESS
72 y/o male PMH significant for HTN, end stage renal disease, left nephrectomy, polio  presents as a direct to have inpatient dialysis session and temp to perm cath placement. Currently without complaints.

## 2022-05-16 NOTE — PRE PROCEDURE NOTE - PRE PROCEDURE EVALUATION
Procedure: Nontunneled Hemodialysis Catheter Placement    Indication: End Stage Renal Disease (ESRD)      Clinical History: 71 year old male with end-stage renal disease s/p left upper extremity AV fistula creation. Now planned for nontunneled hemodialysis catheter placement for initiation of hemodialysis. Preprocedural labs are acceptable with the exception of an elevated Blood Urea Nitrogen (BUN) level of 140. Elevated BUN inhibits platelet function. As a result, preprocedural DDAVP will be administered to counteract its effect and prevent post procedural bleeding. Plan will be for initiation of dialysis tonight and conversion to a tunneled dialysis catheter on Wednesday if the hemodialysis session is well tolerated.      Meds:acetaminophen     Tablet .. 650 milliGRAM(s) Oral every 6 hours PRN  amLODIPine   Tablet 10 milliGRAM(s) Oral daily  carvedilol 25 milliGRAM(s) Oral every 12 hours  oxycodone    5 mG/acetaminophen 325 mG 1 Tablet(s) Oral every 4 hours PRN  oxycodone    5 mG/acetaminophen 325 mG 2 Tablet(s) Oral every 6 hours PRN  tamsulosin 0.4 milliGRAM(s) Oral at bedtime      Allergies:Demerol HCl (Unknown)        Labs:                           8.4    5.79  )-----------( 146      ( 16 May 2022 12:32 )             25.0     PT/INR - ( 16 May 2022 12:32 )   PT: 13.3 sec;   INR: 1.12 ratio         PTT - ( 16 May 2022 12:32 )  PTT:29.6 sec  05-16    141  |  111<H>  |  140<H>  ----------------------------<  101<H>  4.1   |  15<L>  |  8.13<H>    Ca    8.2<L>      16 May 2022 12:32    TPro  6.7  /  Alb  3.5  /  TBili  0.4  /  DBili  x   /  AST  7<L>  /  ALT  10<L>  /  AlkPhos  68  05-16        Physical Exam: NAD, LUE AVF with palpable thrill.      Anesthesia: Local    ASA: 2    NPO: No

## 2022-05-16 NOTE — H&P ADULT - NSHPPHYSICALEXAM_GEN_ALL_CORE
T(C): 36.5 (05-16-22 @ 10:40), Max: 36.5 (05-16-22 @ 10:40)  HR: 72 (05-16-22 @ 10:40) (72 - 72)  BP: 138/76 (05-16-22 @ 10:40) (138/76 - 138/76)  RR: 18 (05-16-22 @ 10:40) (18 - 18)  SpO2: 97% (05-16-22 @ 10:40) (97% - 97%)    CONSTITUTIONAL: Well groomed, no apparent distress    EYES: PERRLA and symmetric, EOMI, No conjunctival or scleral injection, non-icteric    ENMT: Oral mucosa with moist membranes. No external nasal lesions; nasal mucosa not inflamed; normal dentition; no pharyngeal injection or exudates. Otoscopic exam with normal tympanic membranes; no gross hearing impairment noted.  	NECK: Supple, symmetric and without tracheal deviation; thyroid gland not enlarged and without palpable masses    RESPIRATORY: No respiratory distress, no use of accessory muscles; CTA b/l, no wheezes, rales or rhonchi, no dullness or hyperresonance to percussion, no tactile fremitus, no subcutaneous emphysema    CARDIOVASCULAR: RRRR, +S1S2, no murmurs, no rubs, no gallops; no JVD; no peripheral edema  	Vascular: no carotid bruits; no abdominal bruit; carotid pulse palpable, radial pulse palpable, femoral pulse palpable, dorsalis pedis pulse palpable, posterior tibialis pulse palpable    GASTROINTESTINAL: Soft, non tender, non distended, no rebound, no guarding; No palpable masses; no hepatosplenomegaly; no hernia palpated;  	    LYMPHATIC: No cervical LAD or tenderness; no axillary LAD or tenderness; no inguinal LAD or tenderness    MUSCULOSKELETAL: Right avf not mature     SKIN: No rashes or ulcers noted; no subcutaneous nodules or induration palpable    NEUROLOGIC: CN II-XII intact; normal reflexes in upper and lower extremities, sensation intact in upper and lower extremities b/l to light touch; Babinski down b/l; no Kernig’s sign, no Brudzinski’s sign    PSYCHIATRIC: Appropriate insight/judgment; A+O x 3, mood and affect appropriate, recent/remote memory intact

## 2022-05-16 NOTE — CONSULT NOTE ADULT - SUBJECTIVE AND OBJECTIVE BOX
Patient chart reviewed, full consult to follow.     Advancing CKD 5; meeting indications to initiate HD;   Immature AVF LUE;     For christopher placement, 2 hours HD today  Plan for perm cath Wed;   [place] [facility] has financially cleared   Discharge planning for Wed.    Adonis Lewis MD [facility] NEPHROLOGY SERVICES, [facility]  NEPHROLOGY AND HYPERTENSION  [address]  [address]  [place] [address]  [phone]    MD MARQUEZ NAVA MD ANDREY GONCHARUK, MD MADHU KORRAPATI, MD YELENA ROSENBERG, MD BINNY KOSHY, MD CHRISTOPHER CAPUTO, MD EDWARD BOVER, MD      Information from chart:  "Patient is a 71y old  Male who presents with a chief complaint of Dialysis access and session (16 May 2022 11:43)    HPI:  72 y/o male PMH significant for HTN, end stage renal disease, left nephrectomy, polio  presents as a direct to have inpatient dialysis session and temp to perm cath placement. Currently without complaints.  (16 May 2022 11:43)   "      No distress; denies sob or chest pain  For christopher catheter and HD initiation today     PAST MEDICAL & SURGICAL HISTORY:  Hypertension      Renal failure, chronic      Cancer of kidney, left      History of left nephrectomy        FAMILY HISTORY:    Allergies    Demerol HCl (Unknown)    Intolerances      Home Medications:  amLODIPine 10 mg oral tablet: 1 tab(s) orally once a day (16 May 2022 10:56)  Coreg 25 mg oral tablet: 1 tab(s) orally 2 times a day (16 May 2022 10:57)  Iron 100 Plus oral tablet: 1 tab(s) orally once a day (16 May 2022 10:57)  tamsulosin 0.4 mg oral capsule: 1 cap(s) orally once a day (16 May 2022 10:57)    MEDICATIONS  (STANDING):  amLODIPine   Tablet 10 milliGRAM(s) Oral daily  carvedilol 25 milliGRAM(s) Oral every 12 hours  tamsulosin 0.4 milliGRAM(s) Oral at bedtime    MEDICATIONS  (PRN):  acetaminophen     Tablet .. 650 milliGRAM(s) Oral every 6 hours PRN Temp greater or equal to 38C (100.4F), Mild Pain (1 - 3)  oxycodone    5 mG/acetaminophen 325 mG 1 Tablet(s) Oral every 4 hours PRN Moderate Pain (4 - 6)  oxycodone    5 mG/acetaminophen 325 mG 2 Tablet(s) Oral every 6 hours PRN Severe Pain (7 - 10)    Vital Signs Last 24 Hrs  T(C): 36.8 (16 May 2022 22:34), Max: 36.8 (16 May 2022 22:34)  T(F): 98.2 (16 May 2022 22:34), Max: 98.2 (16 May 2022 22:34)  HR: 73 (16 May 2022 22:34) (72 - 83)  BP: 151/70 (16 May 2022 22:34) (125/68 - 151/70)  BP(mean): --  RR: 18 (16 May 2022 22:34) (16 - 20)  SpO2: 98% (16 May 2022 22:34) (96% - 98%)    Daily     Daily Weight in k.5 (16 May 2022 22:00)    22 @ 07:01  -  22 @ 22:42  --------------------------------------------------------  IN: 360 mL / OUT: 200 mL / NET: 160 mL      CAPILLARY BLOOD GLUCOSE        PHYSICAL EXAM:      T(C): 36.8 (22 @ 22:34), Max: 36.8 (22 @ 22:34)  HR: 73 (22 @ 22:34) (72 - 83)  BP: 151/70 (22 @ 22:34) (125/68 - 151/70)  RR: 18 (22 @ 22:34) (16 - 20)  SpO2: 98% (22 @ 22:34) (96% - 98%)  Wt(kg): --  Lungs clear  Heart S1S2  Abd soft NT ND  Extremities:   tr edema                  141  |  111<H>  |  140<H>  ----------------------------<  101<H>  4.1   |  15<L>  |  8.13<H>    Ca    8.2<L>      16 May 2022 12:32    TPro  6.7  /  Alb  3.5  /  TBili  0.4  /  DBili  x   /  AST  7<L>  /  ALT  10<L>  /  AlkPhos  68                            8.4    5.79  )-----------( 146      ( 16 May 2022 12:32 )             25.0     Creatinine Trend: 8.13<--          Assessment   CKD 5-6 new ESRD    Plan  Christopher catheter placement  Initiate HD today   Will follow course     Adonis Lewis MD        Patient chart reviewed, full consult to follow.     Advancing CKD 5; meeting indications to initiate HD;   Immature AVF LUE;     For christopher placement, 2 hours HD today  Plan for perm cath Wed;   [facility] has financially cleared   Discharge planning for Wed.    Adonis Lewis MD

## 2022-05-17 ENCOUNTER — APPOINTMENT (OUTPATIENT)
Dept: UROLOGY | Facility: CLINIC | Age: 72
End: 2022-05-17

## 2022-05-17 LAB
ALBUMIN SERPL ELPH-MCNC: 3.2 G/DL — LOW (ref 3.3–5)
ALP SERPL-CCNC: 61 U/L — SIGNIFICANT CHANGE UP (ref 40–120)
ALT FLD-CCNC: 10 U/L — LOW (ref 12–78)
ANION GAP SERPL CALC-SCNC: 15 MMOL/L — SIGNIFICANT CHANGE UP (ref 5–17)
AST SERPL-CCNC: 9 U/L — LOW (ref 15–37)
BILIRUB SERPL-MCNC: 0.4 MG/DL — SIGNIFICANT CHANGE UP (ref 0.2–1.2)
BUN SERPL-MCNC: 98 MG/DL — HIGH (ref 7–23)
CALCIUM SERPL-MCNC: 7.9 MG/DL — LOW (ref 8.5–10.1)
CHLORIDE SERPL-SCNC: 110 MMOL/L — HIGH (ref 96–108)
CO2 SERPL-SCNC: 19 MMOL/L — LOW (ref 22–31)
CREAT SERPL-MCNC: 6.4 MG/DL — HIGH (ref 0.5–1.3)
EGFR: 9 ML/MIN/1.73M2 — LOW
GLUCOSE SERPL-MCNC: 138 MG/DL — HIGH (ref 70–99)
HBV SURFACE AB SER-ACNC: SIGNIFICANT CHANGE UP
HCT VFR BLD CALC: 21.9 % — LOW (ref 39–50)
HCV AB S/CO SERPL IA: 10.97 S/CO — HIGH (ref 0–0.99)
HCV AB SERPL-IMP: REACTIVE
HCV RNA FLD QL NAA+PROBE: SIGNIFICANT CHANGE UP
HCV RNA SPEC QL PROBE+SIG AMP: DETECTED
HGB BLD-MCNC: 7.8 G/DL — LOW (ref 13–17)
INR BLD: 1.21 RATIO — HIGH (ref 0.88–1.16)
MCHC RBC-ENTMCNC: 30.1 PG — SIGNIFICANT CHANGE UP (ref 27–34)
MCHC RBC-ENTMCNC: 35.6 G/DL — SIGNIFICANT CHANGE UP (ref 32–36)
MCV RBC AUTO: 84.6 FL — SIGNIFICANT CHANGE UP (ref 80–100)
NRBC # BLD: 0 /100 WBCS — SIGNIFICANT CHANGE UP (ref 0–0)
PLATELET # BLD AUTO: 129 K/UL — LOW (ref 150–400)
POTASSIUM SERPL-MCNC: 3.3 MMOL/L — LOW (ref 3.5–5.3)
POTASSIUM SERPL-SCNC: 3.3 MMOL/L — LOW (ref 3.5–5.3)
PROT SERPL-MCNC: 6.3 GM/DL — SIGNIFICANT CHANGE UP (ref 6–8.3)
PROTHROM AB SERPL-ACNC: 14.5 SEC — HIGH (ref 10.5–13.4)
RBC # BLD: 2.59 M/UL — LOW (ref 4.2–5.8)
RBC # FLD: 14.6 % — HIGH (ref 10.3–14.5)
SODIUM SERPL-SCNC: 144 MMOL/L — SIGNIFICANT CHANGE UP (ref 135–145)
WBC # BLD: 5.01 K/UL — SIGNIFICANT CHANGE UP (ref 3.8–10.5)
WBC # FLD AUTO: 5.01 K/UL — SIGNIFICANT CHANGE UP (ref 3.8–10.5)

## 2022-05-17 PROCEDURE — 99233 SBSQ HOSP IP/OBS HIGH 50: CPT

## 2022-05-17 PROCEDURE — 99232 SBSQ HOSP IP/OBS MODERATE 35: CPT

## 2022-05-17 RX ORDER — ERYTHROPOIETIN 10000 [IU]/ML
10000 INJECTION, SOLUTION INTRAVENOUS; SUBCUTANEOUS ONCE
Refills: 0 | Status: COMPLETED | OUTPATIENT
Start: 2022-05-17 | End: 2022-05-18

## 2022-05-17 RX ORDER — POTASSIUM CHLORIDE 20 MEQ
20 PACKET (EA) ORAL ONCE
Refills: 0 | Status: COMPLETED | OUTPATIENT
Start: 2022-05-17 | End: 2022-05-17

## 2022-05-17 RX ORDER — CHLORHEXIDINE GLUCONATE 213 G/1000ML
1 SOLUTION TOPICAL DAILY
Refills: 0 | Status: DISCONTINUED | OUTPATIENT
Start: 2022-05-17 | End: 2022-05-19

## 2022-05-17 RX ADMIN — AMLODIPINE BESYLATE 10 MILLIGRAM(S): 2.5 TABLET ORAL at 06:34

## 2022-05-17 RX ADMIN — TAMSULOSIN HYDROCHLORIDE 0.4 MILLIGRAM(S): 0.4 CAPSULE ORAL at 21:47

## 2022-05-17 RX ADMIN — CHLORHEXIDINE GLUCONATE 1 APPLICATION(S): 213 SOLUTION TOPICAL at 07:12

## 2022-05-17 RX ADMIN — Medication 20 MILLIEQUIVALENT(S): at 18:02

## 2022-05-17 RX ADMIN — CARVEDILOL PHOSPHATE 25 MILLIGRAM(S): 80 CAPSULE, EXTENDED RELEASE ORAL at 18:02

## 2022-05-17 RX ADMIN — CARVEDILOL PHOSPHATE 25 MILLIGRAM(S): 80 CAPSULE, EXTENDED RELEASE ORAL at 06:34

## 2022-05-18 LAB
ALBUMIN SERPL ELPH-MCNC: 2.9 G/DL — LOW (ref 3.3–5)
ALP SERPL-CCNC: 59 U/L — SIGNIFICANT CHANGE UP (ref 40–120)
ALT FLD-CCNC: 11 U/L — LOW (ref 12–78)
ANION GAP SERPL CALC-SCNC: 11 MMOL/L — SIGNIFICANT CHANGE UP (ref 5–17)
ANION GAP SERPL CALC-SCNC: 14 MMOL/L — SIGNIFICANT CHANGE UP (ref 5–17)
AST SERPL-CCNC: 11 U/L — LOW (ref 15–37)
BILIRUB SERPL-MCNC: 0.4 MG/DL — SIGNIFICANT CHANGE UP (ref 0.2–1.2)
BUN SERPL-MCNC: 57 MG/DL — HIGH (ref 7–23)
BUN SERPL-MCNC: 62 MG/DL — HIGH (ref 7–23)
CALCIUM SERPL-MCNC: 7.7 MG/DL — LOW (ref 8.5–10.1)
CALCIUM SERPL-MCNC: 7.7 MG/DL — LOW (ref 8.5–10.1)
CHLORIDE SERPL-SCNC: 103 MMOL/L — SIGNIFICANT CHANGE UP (ref 96–108)
CHLORIDE SERPL-SCNC: 105 MMOL/L — SIGNIFICANT CHANGE UP (ref 96–108)
CO2 SERPL-SCNC: 24 MMOL/L — SIGNIFICANT CHANGE UP (ref 22–31)
CO2 SERPL-SCNC: 26 MMOL/L — SIGNIFICANT CHANGE UP (ref 22–31)
CREAT SERPL-MCNC: 4.09 MG/DL — HIGH (ref 0.5–1.3)
CREAT SERPL-MCNC: 4.46 MG/DL — HIGH (ref 0.5–1.3)
EGFR: 13 ML/MIN/1.73M2 — LOW
EGFR: 15 ML/MIN/1.73M2 — LOW
GLUCOSE SERPL-MCNC: 97 MG/DL — SIGNIFICANT CHANGE UP (ref 70–99)
GLUCOSE SERPL-MCNC: 98 MG/DL — SIGNIFICANT CHANGE UP (ref 70–99)
HBV SURFACE AB SER-ACNC: SIGNIFICANT CHANGE UP
HCT VFR BLD CALC: 22.6 % — LOW (ref 39–50)
HCV RNA FLD QL NAA+PROBE: SIGNIFICANT CHANGE UP
HCV RNA FLD QL NAA+PROBE: SIGNIFICANT CHANGE UP
HCV RNA SPEC QL PROBE+SIG AMP: SIGNIFICANT CHANGE UP
HCV RNA SPEC QL PROBE+SIG AMP: SIGNIFICANT CHANGE UP
HGB BLD-MCNC: 7.7 G/DL — LOW (ref 13–17)
MAGNESIUM SERPL-MCNC: 1.8 MG/DL — SIGNIFICANT CHANGE UP (ref 1.6–2.6)
MCHC RBC-ENTMCNC: 29.4 PG — SIGNIFICANT CHANGE UP (ref 27–34)
MCHC RBC-ENTMCNC: 34.1 G/DL — SIGNIFICANT CHANGE UP (ref 32–36)
MCV RBC AUTO: 86.3 FL — SIGNIFICANT CHANGE UP (ref 80–100)
NRBC # BLD: 0 /100 WBCS — SIGNIFICANT CHANGE UP (ref 0–0)
PHOSPHATE SERPL-MCNC: 4.1 MG/DL — SIGNIFICANT CHANGE UP (ref 2.5–4.5)
PLATELET # BLD AUTO: 123 K/UL — LOW (ref 150–400)
POTASSIUM SERPL-MCNC: 3.3 MMOL/L — LOW (ref 3.5–5.3)
POTASSIUM SERPL-MCNC: 3.4 MMOL/L — LOW (ref 3.5–5.3)
POTASSIUM SERPL-SCNC: 3.3 MMOL/L — LOW (ref 3.5–5.3)
POTASSIUM SERPL-SCNC: 3.4 MMOL/L — LOW (ref 3.5–5.3)
PROT SERPL-MCNC: 5.7 GM/DL — LOW (ref 6–8.3)
RBC # BLD: 2.62 M/UL — LOW (ref 4.2–5.8)
RBC # FLD: 14.6 % — HIGH (ref 10.3–14.5)
SODIUM SERPL-SCNC: 141 MMOL/L — SIGNIFICANT CHANGE UP (ref 135–145)
SODIUM SERPL-SCNC: 142 MMOL/L — SIGNIFICANT CHANGE UP (ref 135–145)
WBC # BLD: 4.84 K/UL — SIGNIFICANT CHANGE UP (ref 3.8–10.5)
WBC # FLD AUTO: 4.84 K/UL — SIGNIFICANT CHANGE UP (ref 3.8–10.5)

## 2022-05-18 PROCEDURE — 77001 FLUOROGUIDE FOR VEIN DEVICE: CPT | Mod: 26

## 2022-05-18 PROCEDURE — 71045 X-RAY EXAM CHEST 1 VIEW: CPT | Mod: 26

## 2022-05-18 PROCEDURE — 36558 INSERT TUNNELED CV CATH: CPT

## 2022-05-18 PROCEDURE — 99233 SBSQ HOSP IP/OBS HIGH 50: CPT

## 2022-05-18 PROCEDURE — 93010 ELECTROCARDIOGRAM REPORT: CPT

## 2022-05-18 RX ADMIN — Medication 650 MILLIGRAM(S): at 22:16

## 2022-05-18 RX ADMIN — TAMSULOSIN HYDROCHLORIDE 0.4 MILLIGRAM(S): 0.4 CAPSULE ORAL at 21:14

## 2022-05-18 RX ADMIN — Medication 650 MILLIGRAM(S): at 21:16

## 2022-05-18 RX ADMIN — CARVEDILOL PHOSPHATE 25 MILLIGRAM(S): 80 CAPSULE, EXTENDED RELEASE ORAL at 18:26

## 2022-05-18 RX ADMIN — CHLORHEXIDINE GLUCONATE 1 APPLICATION(S): 213 SOLUTION TOPICAL at 18:26

## 2022-05-18 RX ADMIN — ERYTHROPOIETIN 10000 UNIT(S): 10000 INJECTION, SOLUTION INTRAVENOUS; SUBCUTANEOUS at 07:50

## 2022-05-18 NOTE — PROCEDURE NOTE - GENERAL PROCEDURE NAME
Nontunneled Hemodialysis Catheter Placement
Nontunneled to Tunneled Hemodialysis Catheter Conversion

## 2022-05-18 NOTE — PROGRESS NOTE ADULT - PROBLEM SELECTOR PROBLEM 3
[Home] : at home, [unfilled] , at the time of the visit. [Medical Office: ([facility])___] : at ~his/her~ medical office located in [place] [Patient] : the patient [Self] : self [TextBox_4] : Sleep medicine followup after home sleep study BPH (benign prostatic hyperplasia)

## 2022-05-18 NOTE — PRE PROCEDURE NOTE - PRE PROCEDURE EVALUATION
Interventional Radiology    HPI: 71y Male with ESRD on dialysis    Allergies: Demerol HCl (Unknown)    Medications (Abx/Cardiac/Anticoagulation/Blood Products)  amLODIPine   Tablet: 10 milliGRAM(s) Oral (05-17 @ 06:34)  carvedilol: 25 milliGRAM(s) Oral (05-17 @ 18:02)  tamsulosin: 0.4 milliGRAM(s) Oral (05-17 @ 21:47)    Data:    T(C): 36.8  HR: 79  BP: 150/78  RR: 18  SpO2: 97%    Exam  General: No acute distress  Chest: Non labored breathing  Abdomen: Non-distended  Extremities: No swelling, warm    -WBC 4.84 / HgB 7.7 / Hct 22.6 / Plt 123  -Na 142 / Cl 105 / BUN 62 / Glucose 98  -K 3.3 / CO2 26 / Cr 4.46  -ALT 11 / Alk Phos 59 / T.Bili 0.4  -INR1.21    Plan: 71y Male presents for Tunneled dialysis catheter placement  -Risks/Benefits/alternatives explained with the patient and/or healthcare proxy and witnessed informed consent obtained.    Interventional Radiology    HPI: 71y Male with ESRD on dialysis. Status post placement of a nontunneled hemodialysis catheter on 5/16. He is tolerating dialysis and has a need for long term dialysis until his LUE AV Fistula matures. Will plan for nontunneled to tunneled hemodialysis catheter conversion.    Allergies: Demerol HCl (Unknown)    Medications (Abx/Cardiac/Anticoagulation/Blood Products)  amLODIPine   Tablet: 10 milliGRAM(s) Oral (05-17 @ 06:34)  carvedilol: 25 milliGRAM(s) Oral (05-17 @ 18:02)  tamsulosin: 0.4 milliGRAM(s) Oral (05-17 @ 21:47)    Data:    T(C): 36.8  HR: 79  BP: 150/78  RR: 18  SpO2: 97%    Exam  General: No acute distress  Chest: Non labored breathing  Abdomen: Non-distended  Extremities: No swelling, warm    -WBC 4.84 / HgB 7.7 / Hct 22.6 / Plt 123  -Na 142 / Cl 105 / BUN 62 / Glucose 98  -K 3.3 / CO2 26 / Cr 4.46  -ALT 11 / Alk Phos 59 / T.Bili 0.4  -INR1.21    Plan: 71y Male presents for Tunneled dialysis catheter placement  -Risks/Benefits/alternatives explained with the patient and/or healthcare proxy and witnessed informed consent obtained.

## 2022-05-18 NOTE — CONSULT NOTE ADULT - SUBJECTIVE AND OBJECTIVE BOX
71 yos/p prostate biopsy 1 week ago.  Admitted to establish dialysis treatment this unable to return to office for disucssion    s/w pt 30 minutes to review pathology and treatment plans    Path demonstrates claudette 6 , T2 prostate cancer

## 2022-05-18 NOTE — PROGRESS NOTE ADULT - ASSESSMENT
72 y/o male here for dialysis and dialysis catheter placement
70 y/o male here for dialysis and dialysis catheter placement. Doing well   tolerated dialysis   plan for dc in am

## 2022-05-18 NOTE — PROCEDURE NOTE - PROCEDURE FINDINGS AND DETAILS
Successful Nontunneled to Tunneled Hemodialysis Catheter Conversion with placement of a 19cm 14.5 [place] Tunneled Hemodialysis Catheter with the tip in the cephalad portion of the right atrium. Ready for immediate use.
Successful Nontunneled Hemodialysis Catheter Placement with placement of a 14 [place] 15cm nontunneled hemodialysis catheter. Tip at the superior cavoatrial junction and ready for immediate use.

## 2022-05-18 NOTE — CONSULT NOTE ADULT - ASSESSMENT
30 minute discussion with patient.  Have  also spoken to daughter.    reviewed options-discussed as vs definitive therapy    need for compulsive follow up and repeat MRI/biopsy reviewed    Active surveillance appropriate given age,  grade group and comorbidities    f/u 3 months

## 2022-05-19 ENCOUNTER — TRANSCRIPTION ENCOUNTER (OUTPATIENT)
Age: 72
End: 2022-05-19

## 2022-05-19 VITALS
HEART RATE: 78 BPM | DIASTOLIC BLOOD PRESSURE: 72 MMHG | OXYGEN SATURATION: 96 % | RESPIRATION RATE: 18 BRPM | SYSTOLIC BLOOD PRESSURE: 130 MMHG | TEMPERATURE: 98 F

## 2022-05-19 PROCEDURE — 99239 HOSP IP/OBS DSCHRG MGMT >30: CPT

## 2022-05-19 RX ADMIN — AMLODIPINE BESYLATE 10 MILLIGRAM(S): 2.5 TABLET ORAL at 05:43

## 2022-05-19 RX ADMIN — CHLORHEXIDINE GLUCONATE 1 APPLICATION(S): 213 SOLUTION TOPICAL at 11:48

## 2022-05-19 RX ADMIN — CARVEDILOL PHOSPHATE 25 MILLIGRAM(S): 80 CAPSULE, EXTENDED RELEASE ORAL at 05:43

## 2022-05-19 NOTE — DISCHARGE NOTE PROVIDER - NSDCCPCAREPLAN_GEN_ALL_CORE_FT
PRINCIPAL DISCHARGE DIAGNOSIS  Diagnosis: Renal failure, chronic  Assessment and Plan of Treatment: s/p permcath placement will need to follow with renal and is slsated to start outpatient dialysis      SECONDARY DISCHARGE DIAGNOSES  Diagnosis: Hypertension  Assessment and Plan of Treatment:     Diagnosis: BPH (benign prostatic hyperplasia)  Assessment and Plan of Treatment:     Diagnosis: Cancer of kidney, left  Assessment and Plan of Treatment:     Diagnosis: Hepatitis C carrier  Assessment and Plan of Treatment:     Diagnosis: Prostate CA  Assessment and Plan of Treatment: claudette 6  please see urology

## 2022-05-19 NOTE — DISCHARGE NOTE NURSING/CASE MANAGEMENT/SOCIAL WORK - PATIENT PORTAL LINK FT
You can access the FollowMyHealth Patient Portal offered by [facility] by registering at the following website: [email or web address]. By joining [employer]’s FollowMyHealth portal, you will also be able to view your health information using other applications (apps) compatible with our system.

## 2022-05-19 NOTE — DISCHARGE NOTE NURSING/CASE MANAGEMENT/SOCIAL WORK - NSDCPEFALRISK_GEN_ALL_CORE
For information on Fall & Injury Prevention, visit: [email or web address] OR  [email or web address] OR  https://www.cdc.gov/steadi/patient.html

## 2022-05-19 NOTE — PROGRESS NOTE ADULT - SUBJECTIVE AND OBJECTIVE BOX
[facility] NEPHROLOGY SERVICES, [facility]  NEPHROLOGY AND HYPERTENSION  [address]  [address]  [place] [address]  [phone]    MD MARQUEZ NAVA, MD JIMBO LO, MD SHYANNE PAINTER, MD KYMBERLY WHITEHEAD, MD ALICE BORGES MD          Patient events noted  No distress    MEDICATIONS  (STANDING):  amLODIPine   Tablet 10 milliGRAM(s) Oral daily  carvedilol 25 milliGRAM(s) Oral every 12 hours  chlorhexidine 2% Cloths 1 Application(s) Topical daily  tamsulosin 0.4 milliGRAM(s) Oral at bedtime    MEDICATIONS  (PRN):  acetaminophen     Tablet .. 650 milliGRAM(s) Oral every 6 hours PRN Temp greater or equal to 38C (100.4F), Mild Pain (1 - 3)  oxycodone    5 mG/acetaminophen 325 mG 1 Tablet(s) Oral every 4 hours PRN Moderate Pain (4 - 6)  oxycodone    5 mG/acetaminophen 325 mG 2 Tablet(s) Oral every 6 hours PRN Severe Pain (7 - 10)      05-17-22 @ 07:01  -  05-18-22 @ 07:00  --------------------------------------------------------  IN: 1120 mL / OUT: 1200 mL / NET: -80 mL    05-18-22 @ 07:01  -  05-18-22 @ 21:11  --------------------------------------------------------  IN: 0 mL / OUT: 2000 mL / NET: -2000 mL      PHYSICAL EXAM:      T(C): 36.7 (05-18-22 @ 17:59), Max: 36.8 (05-17-22 @ 21:49)  HR: 80 (05-18-22 @ 17:59) (61 - 82)  BP: 157/80 (05-18-22 @ 17:59) (140/77 - 157/80)  RR: 18 (05-18-22 @ 17:59) (16 - 18)  SpO2: 96% (05-18-22 @ 17:59) (95% - 99%)  Wt(kg): --  Lungs clear  Heart S1S2  Abd soft NT ND  Extremities:   tr edema                                    7.7    4.84  )-----------( 123      ( 18 May 2022 06:53 )             22.6     05-18    142  |  105  |  62<H>  ----------------------------<  98  3.3<L>   |  26  |  4.46<H>    Ca    7.7<L>      18 May 2022 06:53  Phos  4.1     05-18  Mg     1.8     05-18    TPro  5.7<L>  /  Alb  2.9<L>  /  TBili  0.4  /  DBili  x   /  AST  11<L>  /  ALT  11<L>  /  AlkPhos  59  05-18      LIVER FUNCTIONS - ( 18 May 2022 06:53 )  Alb: 2.9 g/dL / Pro: 5.7 gm/dL / ALK PHOS: 59 U/L / ALT: 11 U/L / AST: 11 U/L / GGT: x           Creatinine Trend: 4.46<--, 4.09<--, 6.40<--, 8.13<--      Assessment   New to HD  Post perm cath        Plan:  HD for today  UF as tolerated  KAREN support     Adonis Lewis MD
[facility] NEPHROLOGY SERVICES, [facility]  NEPHROLOGY AND HYPERTENSION  [address]  [address]  [place] [address]  [phone]    MD MAQRUEZ NAVA, MD JIMBO LO, MD SHYANNE PAINTER, MD KYMBERLY WHITEHEAD, MD ALICE BORGES MD          Patient events noted  No distress    MEDICATIONS  (STANDING):  amLODIPine   Tablet 10 milliGRAM(s) Oral daily  carvedilol 25 milliGRAM(s) Oral every 12 hours  chlorhexidine 2% Cloths 1 Application(s) Topical daily  tamsulosin 0.4 milliGRAM(s) Oral at bedtime    MEDICATIONS  (PRN):  acetaminophen     Tablet .. 650 milliGRAM(s) Oral every 6 hours PRN Temp greater or equal to 38C (100.4F), Mild Pain (1 - 3)  oxycodone    5 mG/acetaminophen 325 mG 1 Tablet(s) Oral every 4 hours PRN Moderate Pain (4 - 6)  oxycodone    5 mG/acetaminophen 325 mG 2 Tablet(s) Oral every 6 hours PRN Severe Pain (7 - 10)      05-18-22 @ 07:01  -  05-19-22 @ 07:00  --------------------------------------------------------  IN: 0 mL / OUT: 2000 mL / NET: -2000 mL      PHYSICAL EXAM:      T(C): 36.7 (05-19-22 @ 14:17), Max: 37 (05-18-22 @ 23:22)  HR: 78 (05-19-22 @ 14:17) (71 - 81)  BP: 130/72 (05-19-22 @ 14:17) (124/68 - 135/72)  RR: 18 (05-19-22 @ 14:17) (17 - 18)  SpO2: 96% (05-19-22 @ 14:17) (95% - 96%)  Wt(kg): --  Lungs clear  Heart S1S2  Abd soft NT ND  Extremities:   tr edema                                    7.7    4.84  )-----------( 123      ( 18 May 2022 06:53 )             22.6     05-18    142  |  105  |  62<H>  ----------------------------<  98  3.3<L>   |  26  |  4.46<H>    Ca    7.7<L>      18 May 2022 06:53  Phos  4.1     05-18  Mg     1.8     05-18    TPro  5.7<L>  /  Alb  2.9<L>  /  TBili  0.4  /  DBili  x   /  AST  11<L>  /  ALT  11<L>  /  AlkPhos  59  05-18      LIVER FUNCTIONS - ( 18 May 2022 06:53 )  Alb: 2.9 g/dL / Pro: 5.7 gm/dL / ALK PHOS: 59 U/L / ALT: 11 U/L / AST: 11 U/L / GGT: x           Creatinine Trend: 4.46<--, 4.09<--, 6.40<--, 8.13<--      Assessment   ESRD; new  Anemia , chronic;     Plan:  HD for tomorrow outpatient  Retacrit; IV Fe  Discharge today    Adonis Lewis MD
[facility] NEPHROLOGY SERVICES, [facility]  NEPHROLOGY AND HYPERTENSION  [address]  [address]  [place] [address]  [phone]    MD MARQUEZ NAVA, MD MAHSA MARCELINO, MD JIMBO GUZMAN, MD SHYANNE PAINTER, MD KYMBERLY WHITEHEAD, MD ALICE BORGES MD          Patient events noted  No distress  on hd    MEDICATIONS  (STANDING):  amLODIPine   Tablet 10 milliGRAM(s) Oral daily  carvedilol 25 milliGRAM(s) Oral every 12 hours  chlorhexidine 2% Cloths 1 Application(s) Topical daily  tamsulosin 0.4 milliGRAM(s) Oral at bedtime    MEDICATIONS  (PRN):  acetaminophen     Tablet .. 650 milliGRAM(s) Oral every 6 hours PRN Temp greater or equal to 38C (100.4F), Mild Pain (1 - 3)  oxycodone    5 mG/acetaminophen 325 mG 1 Tablet(s) Oral every 4 hours PRN Moderate Pain (4 - 6)  oxycodone    5 mG/acetaminophen 325 mG 2 Tablet(s) Oral every 6 hours PRN Severe Pain (7 - 10)      05-16-22 @ 07:01  -  05-17-22 @ 07:00  --------------------------------------------------------  IN: 360 mL / OUT: 200 mL / NET: 160 mL    05-17-22 @ 07:01  -  05-17-22 @ 22:01  --------------------------------------------------------  IN: 1120 mL / OUT: 800 mL / NET: 320 mL      PHYSICAL EXAM:      T(C): 36.8 (05-17-22 @ 21:49), Max: 36.8 (05-16-22 @ 22:34)  HR: 82 (05-17-22 @ 21:49) (69 - 82)  BP: 153/85 (05-17-22 @ 21:49) (123/73 - 163/78)  RR: 18 (05-17-22 @ 21:49) (16 - 18)  SpO2: 95% (05-17-22 @ 21:49) (95% - 98%)  Wt(kg): --  Lungs clear  Heart S1S2  Abd soft NT ND  Extremities:   tr edema                                    8.4    5.79  )-----------( 146      ( 16 May 2022 12:32 )             25.0     05-17    144  |  110<H>  |  98<H>  ----------------------------<  138<H>  3.3<L>   |  19<L>  |  6.40<H>    Ca    7.9<L>      17 May 2022 11:29    TPro  6.3  /  Alb  3.2<L>  /  TBili  0.4  /  DBili  x   /  AST  9<L>  /  ALT  10<L>  /  AlkPhos  61  05-17      LIVER FUNCTIONS - ( 17 May 2022 11:29 )  Alb: 3.2 g/dL / Pro: 6.3 gm/dL / ALK PHOS: 61 U/L / ALT: 10 U/L / AST: 9 U/L / GGT: x           Creatinine Trend: 6.40<--, 8.13<--      Assessment     New ESRD;   HD for today    Plan:  Perm cath and third HD tomorrow  Outpatient HD at [facility]  Will follow    Adonis Lewis MD
HPI:  72 y/o male PMH significant for HTN, end stage renal disease, left nephrectomy, polio  presents as a direct to have inpatient dialysis session and temp to perm cath placement. Currently without complaints.  (16 May 2022 11:43)    Patient is a 71y old  Male who presents with a chief complaint of Dialysis access and session (17 May 2022 22:01)      INTERVAL HPI/OVERNIGHT EVENTS: no acute events overnight temporary catheter placed had dialasis    MEDICATIONS  (STANDING):  amLODIPine   Tablet 10 milliGRAM(s) Oral daily  carvedilol 25 milliGRAM(s) Oral every 12 hours  chlorhexidine 2% Cloths 1 Application(s) Topical daily  tamsulosin 0.4 milliGRAM(s) Oral at bedtime    MEDICATIONS  (PRN):  acetaminophen     Tablet .. 650 milliGRAM(s) Oral every 6 hours PRN Temp greater or equal to 38C (100.4F), Mild Pain (1 - 3)  oxycodone    5 mG/acetaminophen 325 mG 1 Tablet(s) Oral every 4 hours PRN Moderate Pain (4 - 6)  oxycodone    5 mG/acetaminophen 325 mG 2 Tablet(s) Oral every 6 hours PRN Severe Pain (7 - 10)      Allergies    Demerol HCl (Unknown)    Intolerances        REVIEW OF SYSTEMS:  CONSTITUTIONAL: No fever, weight loss, or fatigue  EYES: No eye pain, visual disturbances, or discharge  ENMT:  No difficulty hearing, tinnitus, vertigo; No sinus or throat pain  NECK: No pain or stiffness  BREASTS: No pain, masses, or nipple discharge  RESPIRATORY: No cough, wheezing, chills or hemoptysis; No shortness of breath  CARDIOVASCULAR: No chest pain, palpitations, dizziness, or leg swelling  GASTROINTESTINAL: No abdominal or epigastric pain. No nausea, vomiting, or hematemesis; No diarrhea or constipation. No melena or hematochezia.  GENITOURINARY: No dysuria, frequency, hematuria, or incontinence  NEUROLOGICAL: No headaches, memory loss, loss of strength, numbness, or tremors  SKIN: No itching, burning, rashes, or lesions   LYMPH NODES: No enlarged glands  ENDOCRINE: No heat or cold intolerance; No hair loss  MUSCULOSKELETAL: No joint pain or swelling; No muscle, back, or extremity pain  PSYCHIATRIC: No depression, anxiety, mood swings, or difficulty sleeping  HEME/LYMPH: No easy bruising, or bleeding gums  ALLERGY AND IMMUNOLOGIC: No hives or eczema    Vital Signs Last 24 Hrs  T(C): 36.5 (18 May 2022 09:50), Max: 36.8 (17 May 2022 21:49)  T(F): 97.7 (18 May 2022 09:50), Max: 98.3 (18 May 2022 05:14)  HR: 68 (18 May 2022 09:50) (68 - 82)  BP: 140/77 (18 May 2022 09:50) (123/73 - 163/78)  BP(mean): --  RR: 18 (18 May 2022 09:50) (16 - 18)  SpO2: 97% (18 May 2022 09:50) (95% - 98%)    PHYSICAL EXAM:  GENERAL: NAD, well-groomed, well-developed  HEAD:  Atraumatic, Normocephalic  EYES: EOMI, PERRLA, conjunctiva and sclera clear  ENMT: No tonsillar erythema, exudates, or enlargement; Moist mucous membranes, Good dentition, No lesions  NECK: Supple, No JVD, Normal thyroid  NERVOUS SYSTEM:  Alert & Oriented X3, Good concentration; Motor Strength 5/5 B/L upper and lower extremities; DTRs 2+ intact and symmetric  CHEST/LUNG: Clear to percussion bilaterally; No rales, rhonchi, wheezing, or rubs  HEART: Regular rate and rhythm; No murmurs, rubs, or gallops  ABDOMEN: Soft, Nontender, Nondistended; Bowel sounds present  EXTREMITIES: left immature fistula LYMPH: No lymphadenopathy noted  SKIN: No rashes or lesions    LABS:                        7.7    4.84  )-----------( 123      ( 18 May 2022 06:53 )             22.6     05-18    142  |  105  |  62<H>  ----------------------------<  98  3.3<L>   |  26  |  4.46<H>    Ca    7.7<L>      18 May 2022 06:53  Phos  4.1     05-18  Mg     1.8     05-18    TPro  5.7<L>  /  Alb  2.9<L>  /  TBili  0.4  /  DBili  x   /  AST  11<L>  /  ALT  11<L>  /  AlkPhos  59  05-18    PT/INR - ( 17 May 2022 23:40 )   PT: 14.5 sec;   INR: 1.21 ratio         PTT - ( 16 May 2022 12:32 )  PTT:29.6 sec    CAPILLARY BLOOD GLUCOSE          RADIOLOGY & ADDITIONAL TESTS:    Imaging Personally Reviewed:  [ ] YES  [ ] NO    Consultant(s) Notes Reviewed:  [ ] YES  [ ] NO    Care Discussed with Consultants/Other Providers [ ] YES  [ ] NO
HPI:  72 y/o male PMH significant for HTN, end stage renal disease, left nephrectomy, polio  presents as a direct to have inpatient dialysis session and temp to perm cath placement. Currently without complaints.  (16 May 2022 11:43)    Patient is a 71y old  Male who presents with a chief complaint of Dialysis access and session (18 May 2022 21:11)      INTERVAL HPI/OVERNIGHT EVENTS: no acute events overnight tolerated dialysis well yesterday and today after permvcath     MEDICATIONS  (STANDING):  amLODIPine   Tablet 10 milliGRAM(s) Oral daily  carvedilol 25 milliGRAM(s) Oral every 12 hours  chlorhexidine 2% Cloths 1 Application(s) Topical daily  tamsulosin 0.4 milliGRAM(s) Oral at bedtime    MEDICATIONS  (PRN):  acetaminophen     Tablet .. 650 milliGRAM(s) Oral every 6 hours PRN Temp greater or equal to 38C (100.4F), Mild Pain (1 - 3)  oxycodone    5 mG/acetaminophen 325 mG 1 Tablet(s) Oral every 4 hours PRN Moderate Pain (4 - 6)  oxycodone    5 mG/acetaminophen 325 mG 2 Tablet(s) Oral every 6 hours PRN Severe Pain (7 - 10)      Allergies    Demerol HCl (Unknown)    Intolerances        REVIEW OF SYSTEMS:  CONSTITUTIONAL: No fever, weight loss, or fatigue  EYES: No eye pain, visual disturbances, or discharge  ENMT:  No difficulty hearing, tinnitus, vertigo; No sinus or throat pain  NECK: No pain or stiffness  BREASTS: No pain, masses, or nipple discharge  RESPIRATORY: No cough, wheezing, chills or hemoptysis; No shortness of breath  CARDIOVASCULAR: No chest pain, palpitations, dizziness, or leg swelling  GASTROINTESTINAL: No abdominal or epigastric pain. No nausea, vomiting, or hematemesis; No diarrhea or constipation. No melena or hematochezia.  GENITOURINARY: No dysuria, frequency, hematuria, or incontinence  NEUROLOGICAL: No headaches, memory loss, loss of strength, numbness, or tremors  SKIN: No itching, burning, rashes, or lesions   LYMPH NODES: No enlarged glands  ENDOCRINE: No heat or cold intolerance; No hair loss  MUSCULOSKELETAL: No joint pain or swelling; No muscle, back, or extremity pain  PSYCHIATRIC: No depression, anxiety, mood swings, or difficulty sleeping  HEME/LYMPH: No easy bruising, or bleeding gums  ALLERGY AND IMMUNOLOGIC: No hives or eczema    Vital Signs Last 24 Hrs  T(C): 36.7 (18 May 2022 17:59), Max: 36.8 (17 May 2022 21:49)  T(F): 98.1 (18 May 2022 17:59), Max: 98.3 (18 May 2022 05:14)  HR: 80 (18 May 2022 17:59) (61 - 82)  BP: 157/80 (18 May 2022 17:59) (140/77 - 157/80)  RR: 18 (18 May 2022 17:59) (16 - 18)  SpO2: 96% (18 May 2022 17:59) (95% - 99%)    PHYSICAL EXAM:  GENERAL: NAD, well-groomed, well-developed  HEAD:  Atraumatic, Normocephalic  EYES: EOMI, PERRLA, conjunctiva and sclera clear  ENMT: No tonsillar erythema, exudates, or enlargement; Moist mucous membranes, Good dentition, No lesions  NECK: Supple, No JVD, Normal thyroid  NERVOUS SYSTEM:  Alert & Oriented X3, Good concentration; Motor Strength 5/5 B/L upper and lower extremities; DTRs 2+ intact and symmetric  CHEST/LUNG: Clear to percussion bilaterally; No rales, rhonchi, wheezing, or rubs  HEART: Regular rate and rhythm; No murmurs, rubs, or gallops  ABDOMEN: Soft, Nontender, Nondistended; Bowel sounds present  EXTREMITIES:  2+ Peripheral Pulses, No clubbing, cyanosis, or edema  LYMPH: No lymphadenopathy noted  SKIN: No rashes or lesions    LABS:                        7.7    4.84  )-----------( 123      ( 18 May 2022 06:53 )             22.6     05-18    142  |  105  |  62<H>  ----------------------------<  98  3.3<L>   |  26  |  4.46<H>    Ca    7.7<L>      18 May 2022 06:53  Phos  4.1     05-18  Mg     1.8     05-18    TPro  5.7<L>  /  Alb  2.9<L>  /  TBili  0.4  /  DBili  x   /  AST  11<L>  /  ALT  11<L>  /  AlkPhos  59  05-18    PT/INR - ( 17 May 2022 23:40 )   PT: 14.5 sec;   INR: 1.21 ratio             CAPILLARY BLOOD GLUCOSE          RADIOLOGY & ADDITIONAL TESTS:    Imaging Personally Reviewed:  [ ] YES  [ ] NO    Consultant(s) Notes Reviewed:  [ ] YES  [ ] NO    Care Discussed with Consultants/Other Providers [ ] YES  [ ] NO

## 2022-05-19 NOTE — DISCHARGE NOTE PROVIDER - CARE PROVIDERS DIRECT ADDRESSES
[email or web address]

## 2022-05-19 NOTE — DISCHARGE NOTE PROVIDER - CARE PROVIDER_API CALL
Adonis Lewis  INTERNAL MEDICINE  [address], [address]  [place], [place] [address]  Phone: [phone]  Fax: [phone]  Follow Up Time:     Bo Long)  Urology  [address] [place] [address]  Phone: [phone]  Fax: [phone]  Follow Up Time:

## 2022-05-19 NOTE — DISCHARGE NOTE PROVIDER - ATTENDING DISCHARGE PHYSICAL EXAMINATION:
GENERAL: NAD, well-groomed, well-developed  HEAD:  Atraumatic, Normocephalic  EYES: EOMI, PERRLA, conjunctiva and sclera clear  ENMT: No tonsillar erythema, exudates, or enlargement; Moist mucous membranes, Good dentition, No lesions  NECK: Supple, No JVD, Normal thyroid  NERVOUS SYSTEM:  Alert & Oriented X3, Good concentration;  CHEST/LUNG: right chest permcatheter   HEART: Regular rate and rhythm; No murmurs, rubs, or gallops  ABDOMEN: Soft, Nontender, Nondistended; Bowel sounds present  EXTREMITIES:  left immature avf   SKIN: No rashes or lesions

## 2022-05-19 NOTE — DISCHARGE NOTE PROVIDER - HOSPITAL COURSE
HPI:  70 y/o male PMH significant for HTN, end stage renal disease, left nephrectomy, polio  presents as a direct to have inpatient dialysis session and temp to perm cath placement. Currently without complaints.  (16 May 2022 11:43)    Patient was direct admit to the hospital   Day 1 temporary dialysis catheter placed   tolerated initial dialysis session   Day 2 patient had perm cath placed by IR      Day 3 discharged after tolerating dialysis through permcath     Of not patient with hep c reactive will need to follow up with gi and PMD   Was told of [place] score 6 prostate CA

## 2022-05-19 NOTE — PROGRESS NOTE ADULT - REASON FOR ADMISSION
Dialysis access and session

## 2022-05-19 NOTE — DISCHARGE NOTE PROVIDER - NSDCMRMEDTOKEN_GEN_ALL_CORE_FT
amLODIPine 10 mg oral tablet: 1 tab(s) orally once a day  Coreg 25 mg oral tablet: 1 tab(s) orally 2 times a day  Flomax 0.4 mg oral capsule: 1 cap(s) orally once a day   Iron 100 Plus oral tablet: 1 tab(s) orally once a day  tamsulosin 0.4 mg oral capsule: 1 cap(s) orally once a day

## 2022-05-25 DIAGNOSIS — Z79.899 OTHER LONG TERM (CURRENT) DRUG THERAPY: ICD-10-CM

## 2022-05-25 DIAGNOSIS — N18.6 END STAGE RENAL DISEASE: ICD-10-CM

## 2022-05-25 DIAGNOSIS — N40.0 BENIGN PROSTATIC HYPERPLASIA WITHOUT LOWER URINARY TRACT SYMPTOMS: ICD-10-CM

## 2022-05-25 DIAGNOSIS — B18.2 CHRONIC VIRAL HEPATITIS C: ICD-10-CM

## 2022-05-25 DIAGNOSIS — C61 MALIGNANT NEOPLASM OF PROSTATE: ICD-10-CM

## 2022-05-25 DIAGNOSIS — Z99.2 DEPENDENCE ON RENAL DIALYSIS: ICD-10-CM

## 2022-05-25 DIAGNOSIS — I12.0 HYPERTENSIVE CHRONIC KIDNEY DISEASE WITH STAGE 5 CHRONIC KIDNEY DISEASE OR END STAGE RENAL DISEASE: ICD-10-CM

## 2022-05-25 DIAGNOSIS — Z90.5 ACQUIRED ABSENCE OF KIDNEY: ICD-10-CM

## 2022-05-25 DIAGNOSIS — Z85.528 PERSONAL HISTORY OF OTHER MALIGNANT NEOPLASM OF KIDNEY: ICD-10-CM

## 2022-06-05 ENCOUNTER — NON-APPOINTMENT (OUTPATIENT)
Age: 72
End: 2022-06-05

## 2022-06-05 LAB — CORE LAB BIOPSY: NORMAL

## 2022-09-20 ENCOUNTER — APPOINTMENT (OUTPATIENT)
Dept: UROLOGY | Facility: CLINIC | Age: 72
End: 2022-09-20

## 2022-09-20 VITALS
WEIGHT: 140 LBS | BODY MASS INDEX: 24.03 KG/M2 | TEMPERATURE: 98.6 F | OXYGEN SATURATION: 99 % | HEART RATE: 85 BPM | DIASTOLIC BLOOD PRESSURE: 81 MMHG | SYSTOLIC BLOOD PRESSURE: 122 MMHG

## 2022-09-20 DIAGNOSIS — R33.8 OTHER RETENTION OF URINE: ICD-10-CM

## 2022-09-20 DIAGNOSIS — Z99.2 DEPENDENCE ON RENAL DIALYSIS: ICD-10-CM

## 2022-09-20 PROCEDURE — 99213 OFFICE O/P EST LOW 20 MIN: CPT

## 2022-09-20 RX ORDER — VITAMIN E ACETATE 670 MG
CAPSULE ORAL
Refills: 0 | Status: DISCONTINUED | COMMUNITY
End: 2022-09-20

## 2022-09-20 RX ORDER — DIAZEPAM 5 MG/1
5 TABLET ORAL
Qty: 2 | Refills: 0 | Status: DISCONTINUED | COMMUNITY
Start: 2022-04-25 | End: 2022-09-20

## 2022-09-20 RX ORDER — CARVEDILOL 25 MG/1
25 TABLET, FILM COATED ORAL
Refills: 0 | Status: DISCONTINUED | COMMUNITY
End: 2022-09-20

## 2022-09-27 ENCOUNTER — NON-APPOINTMENT (OUTPATIENT)
Age: 72
End: 2022-09-27

## 2022-09-27 PROBLEM — Z99.2 DIALYSIS PATIENT: Status: ACTIVE | Noted: 2022-09-20

## 2022-09-27 PROBLEM — R33.8 ACUTE RETENTION OF URINE: Status: ACTIVE | Noted: 2022-03-28

## 2022-09-27 LAB — PSA SERPL-MCNC: 1.3 NG/ML

## 2022-09-27 NOTE — HISTORY OF PRESENT ILLNESS
[FreeTextEntry1] : 71 year old male with h/o urinary retention, [place] 3+3=6 prostate ca, here today for follow up \par \par on 0.4 flomax \par urine volume low per pt, otherwise okay -currently on hemodialysis and doing well\par \par IPSS was 8 in 4/22\par \par prostate biopsy 5/9/22- showed adenocarcinoma of prostate in the left posterior medial apex , grade group 1, [place] 3+3=6 involving 40% of 1 of 1 core \par \par PSA 4/24/22: 1.28 \par \par on AS

## 2022-09-27 NOTE — END OF VISIT
[FreeTextEntry3] : Medical record entries made by the scribe today, were at my direction and personally dictated to them by me, Dr. Bo Long on 09/20/2022. I have reviewed the chart and agree that the record accurately reflects my personal performance of the history, physical exam, assessment, and plan.

## 2022-09-27 NOTE — PHYSICAL EXAM
[General Appearance - Well Developed] : well developed [General Appearance - Well Nourished] : well nourished [Normal Appearance] : normal appearance [Well Groomed] : well groomed [General Appearance - In No Acute Distress] : no acute distress [Abdomen Soft] : soft [Abdomen Tenderness] : non-tender [Costovertebral Angle Tenderness] : no ~M costovertebral angle tenderness [Urethral Meatus] : meatus normal [Scrotum] : the scrotum was normal [Testes Mass (___cm)] : there were no testicular masses [Penis Abnormality] : normal circumcised penis [Epididymis] : the epididymides were normal [Testes Tenderness] : no tenderness of the testes [Anus Abnormality] : the anus and perineum were normal [Rectal Exam - Rectum] : digital rectal exam was normal [Prostate Tenderness] : the prostate was not tender [FreeTextEntry1] : sl induration on right, less than before

## 2022-09-27 NOTE — ASSESSMENT
[FreeTextEntry1] : 71 year old male with Jolynn 3+3=6 prostate ca on AS and LUTS\par \par PVR 4/25/22: 5 cc \par \par PSA sent today \par \par continue 0.4 flomax\par \par pending psa,\par f/u 3-4 months with eventual MRI and posible interval biopsy

## 2022-12-20 ENCOUNTER — LABORATORY RESULT (OUTPATIENT)
Age: 72
End: 2022-12-20

## 2022-12-20 ENCOUNTER — APPOINTMENT (OUTPATIENT)
Dept: UROLOGY | Facility: CLINIC | Age: 72
End: 2022-12-20

## 2022-12-20 VITALS
WEIGHT: 142 LBS | SYSTOLIC BLOOD PRESSURE: 137 MMHG | HEART RATE: 90 BPM | DIASTOLIC BLOOD PRESSURE: 81 MMHG | OXYGEN SATURATION: 99 % | TEMPERATURE: 98.2 F | BODY MASS INDEX: 24.37 KG/M2

## 2022-12-20 PROCEDURE — 99214 OFFICE O/P EST MOD 30 MIN: CPT

## 2022-12-20 PROCEDURE — 51798 US URINE CAPACITY MEASURE: CPT

## 2022-12-26 LAB — PSA SERPL-MCNC: 1.42 NG/ML

## 2022-12-26 NOTE — HISTORY OF PRESENT ILLNESS
[FreeTextEntry1] : 72 year old male with Jolynn (3+3=6) prostate ca on AS here for follow up\par \par prostate biopsy 5/9/22: adenocarcinoma in the left posterior medial apex\par grade group 1, [place] 3+3=6 involving 40% of 1 of 1 core \par \par most recent PSA 1.3\par \par h/o urinary retention, LUTS- \par \par on 0.4 flomax \par \par urination okay  but minimal output-on HD-no longer interested  in transplant

## 2022-12-26 NOTE — ASSESSMENT
[FreeTextEntry1] : 72 year old male with [place] (3+3=6) prostate ca on AS, LUTS\par \par PVR today: 34 cc \par \par PSA sent today \par \par to go for MR prostate -non cont.\par \par continue 0.4 flomax \par \par f/u pending psa/ MRI \par to discuss r/b, pros/cons of interval biopsy

## 2022-12-26 NOTE — PHYSICAL EXAM
[General Appearance - Well Developed] : well developed [General Appearance - Well Nourished] : well nourished [Normal Appearance] : normal appearance [Well Groomed] : well groomed [General Appearance - In No Acute Distress] : no acute distress [Abdomen Soft] : soft [Abdomen Tenderness] : non-tender [Costovertebral Angle Tenderness] : no ~M costovertebral angle tenderness [Urethral Meatus] : meatus normal [Penis Abnormality] : normal circumcised penis [Epididymis] : the epididymides were normal [Scrotum] : the scrotum was normal [Testes Tenderness] : no tenderness of the testes [Testes Mass (___cm)] : there were no testicular masses [Anus Abnormality] : the anus and perineum were normal [Rectal Exam - Rectum] : digital rectal exam was normal [Prostate Tenderness] : the prostate was not tender [Prostate Size ___ (0-4)] : prostate size [unfilled] (scale: 0-4) [FreeTextEntry1] : sl induration on right side of prostate

## 2022-12-26 NOTE — END OF VISIT
[FreeTextEntry3] : Medical record entries made by the scribe today, were at my direction and personally dictated to them by me, Dr. Bo Long on 12/20/2022. I have reviewed the chart and agree that the record accurately reflects my personal performance of the history, physical exam, assessment, and plan.

## 2022-12-27 ENCOUNTER — NON-APPOINTMENT (OUTPATIENT)
Age: 72
End: 2022-12-27

## 2023-01-12 ENCOUNTER — RX RENEWAL (OUTPATIENT)
Age: 73
End: 2023-01-12

## 2023-04-11 ENCOUNTER — APPOINTMENT (OUTPATIENT)
Dept: UROLOGY | Facility: CLINIC | Age: 73
End: 2023-04-11
Payer: MEDICARE

## 2023-04-11 VITALS
TEMPERATURE: 98.2 F | BODY MASS INDEX: 24.37 KG/M2 | OXYGEN SATURATION: 98 % | HEART RATE: 78 BPM | WEIGHT: 142 LBS | SYSTOLIC BLOOD PRESSURE: 145 MMHG | DIASTOLIC BLOOD PRESSURE: 70 MMHG

## 2023-04-11 DIAGNOSIS — C61 MALIGNANT NEOPLASM OF PROSTATE: ICD-10-CM

## 2023-04-11 PROCEDURE — 99214 OFFICE O/P EST MOD 30 MIN: CPT

## 2023-04-11 RX ORDER — TAMSULOSIN HYDROCHLORIDE 0.4 MG/1
0.4 CAPSULE ORAL
Refills: 0 | Status: COMPLETED | COMMUNITY
Start: 1900-01-01 | End: 2023-04-11

## 2023-04-11 RX ORDER — TAMSULOSIN HYDROCHLORIDE 0.4 MG/1
0.4 CAPSULE ORAL
Qty: 90 | Refills: 3 | Status: COMPLETED | COMMUNITY
Start: 2022-03-28 | End: 2023-04-11

## 2023-04-21 ENCOUNTER — NON-APPOINTMENT (OUTPATIENT)
Age: 73
End: 2023-04-21

## 2023-04-21 PROBLEM — C61 ADENOCARCINOMA OF PROSTATE: Status: ACTIVE | Noted: 2022-09-20

## 2023-04-21 LAB — PSA SERPL-MCNC: 1.2 NG/ML

## 2023-04-21 NOTE — LETTER BODY
[Dear  ___] : Dear  [unfilled], [Courtesy Letter:] : I had the pleasure of seeing your patient, [unfilled], in my office today. [Please see my note below.] : Please see my note below. [Sincerely,] : Sincerely, [FreeTextEntry1] : see below note [FreeTextEntry3] : Bo Long MD FACS\par \par Attending Urologist-[facility]\par Chief-Urology Division [facility]\par Associate Clinical Professor-[employer] at [facility]\par \par

## 2023-04-21 NOTE — PHYSICAL EXAM
[General Appearance - Well Developed] : well developed [General Appearance - Well Nourished] : well nourished [Normal Appearance] : normal appearance [Well Groomed] : well groomed [General Appearance - In No Acute Distress] : no acute distress [Abdomen Soft] : soft [Abdomen Tenderness] : non-tender [Costovertebral Angle Tenderness] : no ~M costovertebral angle tenderness [Urethral Meatus] : meatus normal [Penis Abnormality] : normal circumcised penis [Scrotum] : the scrotum was normal [Epididymis] : the epididymides were normal [Testes Tenderness] : no tenderness of the testes [Testes Mass (___cm)] : there were no testicular masses [Anus Abnormality] : the anus and perineum were normal [Rectal Exam - Rectum] : digital rectal exam was normal [Prostate Tenderness] : the prostate was not tender [No Prostate Nodules] : no prostate nodules [Prostate Size ___ (0-4)] : prostate size [unfilled] (scale: 0-4) [FreeTextEntry1] : prostate R side firmer

## 2023-04-21 NOTE — ASSESSMENT
[FreeTextEntry1] : 71 y/o M w claudette 6 prostate ca, CKD, and LUTS\par \par PSA sent today\par \par to go for prostate MRI w/o contrast, w/ sedation- is claustrophobic\par \par pt will discontinue tamsulosin 0.4 mg- not producing much urine\par \par f/u in 6 months pending MRI\par \par discussed interval biopsy and also taking his co morbidities in consideration with respect to both eval and treatment of prostate ca\par \par d/w pt and daughter\par \par 30 minutes spent on visit\par \par \par \par

## 2023-04-21 NOTE — END OF VISIT
[FreeTextEntry3] : All medical record entries made by the scribe today, were at my direction and personally dictated to them by me, Dr. Bo Long on 04/11/2023. I have reviewed the chart and agree that the record accurately reflects my personal performance of the history, physical exam, assessment, and plan. I have also personally directed, reviewed, and agreed with the chart.\par

## 2023-04-21 NOTE — HISTORY OF PRESENT ILLNESS
[FreeTextEntry1] : 71 y/o M following up for claudette 6 prostate cancer and LUTS\par \par has chronic kidney disease- dialysis patient-doing well\par \par prostate biopsy 5/7/22: adenocarcinoma in the left posterior medial apex\par grade group 1, [place] 3+3=6 involving 40% of 1 of 1 core \par most recent PSA: 12/20/2022, 1.39\par 12/19/2022, 1.42\par 9/20/2022, 1.30\par 4/24/2022, 1.28\par 11/24/2020, 1.45\par 6/4/2019, 1.14\par \par h/o urinary retention, LUTS-\par currently taking tamsulosin 0.4 mg\par novoiding c/o-, minimal output\par IPSS today: 6\par 4/25/2022, 8\par 3/25/2022, 23\par

## 2023-06-17 ENCOUNTER — EMERGENCY (EMERGENCY)
Facility: HOSPITAL | Age: 73
LOS: 1 days | Discharge: HOME | End: 2023-06-17
Attending: EMERGENCY MEDICINE | Admitting: EMERGENCY MEDICINE
Payer: MEDICARE

## 2023-06-17 VITALS
RESPIRATION RATE: 16 BRPM | TEMPERATURE: 102 F | DIASTOLIC BLOOD PRESSURE: 85 MMHG | HEART RATE: 110 BPM | WEIGHT: 147.71 LBS | SYSTOLIC BLOOD PRESSURE: 164 MMHG | HEIGHT: 64 IN | OXYGEN SATURATION: 96 %

## 2023-06-17 VITALS
OXYGEN SATURATION: 99 % | SYSTOLIC BLOOD PRESSURE: 110 MMHG | DIASTOLIC BLOOD PRESSURE: 72 MMHG | HEART RATE: 73 BPM | TEMPERATURE: 98 F | RESPIRATION RATE: 18 BRPM

## 2023-06-17 DIAGNOSIS — Z90.5 ACQUIRED ABSENCE OF KIDNEY: Chronic | ICD-10-CM

## 2023-06-17 LAB
ALBUMIN SERPL ELPH-MCNC: 3.8 G/DL — SIGNIFICANT CHANGE UP (ref 3.3–5)
ALP SERPL-CCNC: 74 U/L — SIGNIFICANT CHANGE UP (ref 40–120)
ALT FLD-CCNC: 26 U/L — SIGNIFICANT CHANGE UP (ref 10–45)
ANION GAP SERPL CALC-SCNC: 12 MMOL/L — SIGNIFICANT CHANGE UP (ref 5–17)
APPEARANCE UR: CLEAR — SIGNIFICANT CHANGE UP
APTT BLD: 29.6 SEC — SIGNIFICANT CHANGE UP (ref 27.5–35.5)
AST SERPL-CCNC: 10 U/L — SIGNIFICANT CHANGE UP (ref 10–40)
BACTERIA # UR AUTO: NEGATIVE /HPF — SIGNIFICANT CHANGE UP
BASOPHILS # BLD AUTO: 0.02 K/UL — SIGNIFICANT CHANGE UP (ref 0–0.2)
BASOPHILS NFR BLD AUTO: 0.2 % — SIGNIFICANT CHANGE UP (ref 0–2)
BILIRUB SERPL-MCNC: 0.6 MG/DL — SIGNIFICANT CHANGE UP (ref 0.2–1.2)
BILIRUB UR-MCNC: NEGATIVE — SIGNIFICANT CHANGE UP
BUN SERPL-MCNC: 62 MG/DL — HIGH (ref 7–23)
CALCIUM SERPL-MCNC: 9.9 MG/DL — SIGNIFICANT CHANGE UP (ref 8.4–10.5)
CHLORIDE SERPL-SCNC: 99 MMOL/L — SIGNIFICANT CHANGE UP (ref 96–108)
CO2 SERPL-SCNC: 26 MMOL/L — SIGNIFICANT CHANGE UP (ref 22–31)
COLOR SPEC: YELLOW — SIGNIFICANT CHANGE UP
CREAT SERPL-MCNC: 6.98 MG/DL — HIGH (ref 0.5–1.3)
DIFF PNL FLD: ABNORMAL
EGFR: 8 ML/MIN/1.73M2 — LOW
EOSINOPHIL # BLD AUTO: 0.01 K/UL — SIGNIFICANT CHANGE UP (ref 0–0.5)
EOSINOPHIL NFR BLD AUTO: 0.1 % — SIGNIFICANT CHANGE UP (ref 0–6)
EPI CELLS # UR: 0 — SIGNIFICANT CHANGE UP
GLUCOSE SERPL-MCNC: 117 MG/DL — HIGH (ref 70–99)
GLUCOSE UR QL: NEGATIVE MG/DL — SIGNIFICANT CHANGE UP
HCT VFR BLD CALC: 39 % — SIGNIFICANT CHANGE UP (ref 39–50)
HGB BLD-MCNC: 13.1 G/DL — SIGNIFICANT CHANGE UP (ref 13–17)
IMM GRANULOCYTES NFR BLD AUTO: 0.4 % — SIGNIFICANT CHANGE UP (ref 0–0.9)
INR BLD: 1.25 RATIO — HIGH (ref 0.88–1.16)
KETONES UR-MCNC: NEGATIVE MG/DL — SIGNIFICANT CHANGE UP
LACTATE SERPL-SCNC: 1 MMOL/L — SIGNIFICANT CHANGE UP (ref 0.7–2)
LEUKOCYTE ESTERASE UR-ACNC: NEGATIVE — SIGNIFICANT CHANGE UP
LYMPHOCYTES # BLD AUTO: 0.4 K/UL — LOW (ref 1–3.3)
LYMPHOCYTES # BLD AUTO: 5 % — LOW (ref 13–44)
MCHC RBC-ENTMCNC: 31.6 PG — SIGNIFICANT CHANGE UP (ref 27–34)
MCHC RBC-ENTMCNC: 33.6 GM/DL — SIGNIFICANT CHANGE UP (ref 32–36)
MCV RBC AUTO: 94 FL — SIGNIFICANT CHANGE UP (ref 80–100)
MONOCYTES # BLD AUTO: 0.63 K/UL — SIGNIFICANT CHANGE UP (ref 0–0.9)
MONOCYTES NFR BLD AUTO: 7.8 % — SIGNIFICANT CHANGE UP (ref 2–14)
NEUTROPHILS # BLD AUTO: 6.95 K/UL — SIGNIFICANT CHANGE UP (ref 1.8–7.4)
NEUTROPHILS NFR BLD AUTO: 86.5 % — HIGH (ref 43–77)
NITRITE UR-MCNC: NEGATIVE — SIGNIFICANT CHANGE UP
NRBC # BLD: 0 /100 WBCS — SIGNIFICANT CHANGE UP (ref 0–0)
PH UR: 8 — SIGNIFICANT CHANGE UP (ref 5–8)
PLATELET # BLD AUTO: 143 K/UL — LOW (ref 150–400)
POTASSIUM SERPL-MCNC: 4.6 MMOL/L — SIGNIFICANT CHANGE UP (ref 3.5–5.3)
POTASSIUM SERPL-SCNC: 4.6 MMOL/L — SIGNIFICANT CHANGE UP (ref 3.5–5.3)
PROT SERPL-MCNC: 7.6 G/DL — SIGNIFICANT CHANGE UP (ref 6–8.3)
PROT UR-MCNC: 100 MG/DL
PROTHROM AB SERPL-ACNC: 14.5 SEC — HIGH (ref 10.5–13.4)
RAPID RVP RESULT: SIGNIFICANT CHANGE UP
RBC # BLD: 4.15 M/UL — LOW (ref 4.2–5.8)
RBC # FLD: 13.3 % — SIGNIFICANT CHANGE UP (ref 10.3–14.5)
RBC CASTS # UR COMP ASSIST: 3 /HPF — SIGNIFICANT CHANGE UP (ref 0–4)
SARS-COV-2 RNA SPEC QL NAA+PROBE: SIGNIFICANT CHANGE UP
SODIUM SERPL-SCNC: 137 MMOL/L — SIGNIFICANT CHANGE UP (ref 135–145)
SP GR SPEC: 1.01 — SIGNIFICANT CHANGE UP (ref 1–1.03)
TROPONIN I, HIGH SENSITIVITY RESULT: 23.8 NG/L — SIGNIFICANT CHANGE UP
UROBILINOGEN FLD QL: 0.2 MG/DL — SIGNIFICANT CHANGE UP (ref 0.2–1)
WBC # BLD: 8.04 K/UL — SIGNIFICANT CHANGE UP (ref 3.8–10.5)
WBC # FLD AUTO: 8.04 K/UL — SIGNIFICANT CHANGE UP (ref 3.8–10.5)
WBC UR QL: 0 /HPF — SIGNIFICANT CHANGE UP (ref 0–5)

## 2023-06-17 PROCEDURE — 93010 ELECTROCARDIOGRAM REPORT: CPT

## 2023-06-17 PROCEDURE — 99285 EMERGENCY DEPT VISIT HI MDM: CPT | Mod: FS

## 2023-06-17 PROCEDURE — 71045 X-RAY EXAM CHEST 1 VIEW: CPT | Mod: 26

## 2023-06-17 RX ORDER — PIPERACILLIN AND TAZOBACTAM 4; .5 G/20ML; G/20ML
3.38 INJECTION, POWDER, LYOPHILIZED, FOR SOLUTION INTRAVENOUS ONCE
Refills: 0 | Status: COMPLETED | OUTPATIENT
Start: 2023-06-17 | End: 2023-06-17

## 2023-06-17 RX ORDER — ACETAMINOPHEN 500 MG
650 TABLET ORAL ONCE
Refills: 0 | Status: COMPLETED | OUTPATIENT
Start: 2023-06-17 | End: 2023-06-17

## 2023-06-17 RX ORDER — VANCOMYCIN HCL 1 G
1000 VIAL (EA) INTRAVENOUS ONCE
Refills: 0 | Status: COMPLETED | OUTPATIENT
Start: 2023-06-17 | End: 2023-06-17

## 2023-06-17 RX ORDER — BENZOYL PEROXIDE MICRONIZED 5.8 %
1 TOWELETTE (EA) TOPICAL
Qty: 0 | Refills: 0 | DISCHARGE

## 2023-06-17 RX ORDER — CARVEDILOL PHOSPHATE 80 MG/1
1 CAPSULE, EXTENDED RELEASE ORAL
Qty: 0 | Refills: 0 | DISCHARGE

## 2023-06-17 RX ORDER — SODIUM CHLORIDE 9 MG/ML
2100 INJECTION INTRAMUSCULAR; INTRAVENOUS; SUBCUTANEOUS ONCE
Refills: 0 | Status: COMPLETED | OUTPATIENT
Start: 2023-06-17 | End: 2023-06-17

## 2023-06-17 RX ORDER — TAMSULOSIN HYDROCHLORIDE 0.4 MG/1
1 CAPSULE ORAL
Qty: 0 | Refills: 0 | DISCHARGE

## 2023-06-17 RX ADMIN — Medication 650 MILLIGRAM(S): at 18:51

## 2023-06-17 RX ADMIN — Medication 1000 MILLIGRAM(S): at 20:22

## 2023-06-17 RX ADMIN — Medication 650 MILLIGRAM(S): at 19:51

## 2023-06-17 RX ADMIN — PIPERACILLIN AND TAZOBACTAM 200 GRAM(S): 4; .5 INJECTION, POWDER, LYOPHILIZED, FOR SOLUTION INTRAVENOUS at 19:22

## 2023-06-17 RX ADMIN — Medication 250 MILLIGRAM(S): at 19:22

## 2023-06-17 RX ADMIN — SODIUM CHLORIDE 2100 MILLILITER(S): 9 INJECTION INTRAMUSCULAR; INTRAVENOUS; SUBCUTANEOUS at 21:30

## 2023-06-17 RX ADMIN — PIPERACILLIN AND TAZOBACTAM 3.38 GRAM(S): 4; .5 INJECTION, POWDER, LYOPHILIZED, FOR SOLUTION INTRAVENOUS at 20:22

## 2023-06-17 RX ADMIN — SODIUM CHLORIDE 2100 MILLILITER(S): 9 INJECTION INTRAMUSCULAR; INTRAVENOUS; SUBCUTANEOUS at 19:22

## 2023-06-17 NOTE — ED PROVIDER NOTE - CROS ED SKIN ALL NEG
Chief Complaint   Patient presents with   [address] [place] [address] Annual Wellness Visit       1. Have you been to the ER, urgent care clinic since your last visit? Hospitalized since your last visit? No    2. Have you seen or consulted any other health care providers outside of the [address] [place] [address] since your last visit? Include any pap smears or colon screening.  No negative...

## 2023-06-17 NOTE — ED PROVIDER NOTE - NS ED ATTENDING STATEMENT MOD
Patient Seen in: [facility] Emergency Department      History   Patient presents with:  Headache    Stated Complaint: migraine since Thurs.  sent from [address] [place] [address]    HPI    69-year-old female past medical history of migraine headaches now presents ED with com Abdomen is soft. Musculoskeletal:         General: No deformity. Skin:     General: Skin is warm and dry. Capillary Refill: Capillary refill takes less than 2 seconds.    Neurological:      Mental Status: She is alert and oriented to person, place, radiology report from [facility]. Dictated by: Jose Guadalupe Jon MD on 2/05/2020 at 6:09     Approved by: Jose Guadalupe Jon MD on 2/05/2020 at 6:10            MDM     This is a 24-year-old female presents ED with complaints of headache.   Vital signs stable arr This was a shared visit with the [facility]. I reviewed and verified the documentation and independently performed the documented:

## 2023-06-17 NOTE — ED PROVIDER NOTE - OBJECTIVE STATEMENT
72-year-old male with a medical history including end-stage renal disease on hemodialysis for approximately 1 year (M, W, F and hepatic cirrhosis status post HCV  acquired right blood transfusion in 1990s treated successfully, complaining of approximately 3 days of fevers Tmax 104F at home responsive to Tylenol Tylenol last taken at approximately 2 PM today.  Patient complaining of generalized weakness and malaise, fevers.  Patient reports some loose stools and abdominal cramping on Thursday, but denies any pain, diarrhea, vomiting, nauseousness, chest pain, shortness of breath, cough, dysuria, hematuria or any other complaint as of today.  Patient denies any known sick contacts, did not go to hemodialysis on Friday as scheduled due to not feeling well.

## 2023-06-17 NOTE — ED PROVIDER NOTE - CROS ED HEME ALL NEG
Airway patent, Nasal mucosa clear. Mouth with normal mucosa. Throat has no vesicles, no oropharyngeal exudates and uvula is midline. +facial plethora and edema to neck b/l and lower face; no obvious LAD, non-tender
negative...

## 2023-06-17 NOTE — ED ADULT NURSE NOTE - NSFALLRISKINTERV_ED_ALL_ED

## 2023-06-17 NOTE — ED ADULT NURSE NOTE - OBJECTIVE STATEMENT
pt came from home accompanied by daughter c/o fevers. pt reported on thursday he had intermittent generalized abdominal cramps. the patient reports fevers started on friday with the temp of 104 today. pt reports he has been treated his fevers with tylenol, last taken at 2pm today. pt reports he is on dialysis MWF schedule but skipped dialysis yesterday because he wasn't feeling well. pt reports last dialyzed on wednesday (6/14). pt present on arrival with LAVF. pt reports +generalized weakness, fevers, chills. pt denies abdominal pain at this time, CP, SOB, N/V/D. pt reports hx of cirrhosis, L-nephrectomy, HTN, and polio of L-leg w/ L-leg weakness.

## 2023-06-17 NOTE — ED PROVIDER NOTE - CARDIAC, MLM
Normal rate, regular rhythm.  Heart sounds S1, S2.  No murmurs, rubs or gallops. no pedal edema, calfs soft, non-tender, no palpable cords b/l. p.t. and radial pulses 2+. equal b/l.  Left forearm AV fistula with palpable thrill.

## 2023-06-17 NOTE — ED PROVIDER NOTE - NSFOLLOWUPINSTRUCTIONS_ED_ALL_ED_FT
Follow-up with your doctor.  Keep your appointment for hemodialysis for Monday.    Return to the emergency room immediately for fevers not controlled with Tylenol, Shortness of breath, Dizziness, Weakness, Pain, or for any concern.    Cultures have been sent to check for bacterial infection.  If any of these tests come back positive or require treatment you will be contacted.        Fever, Adult  .  A fever is an increase in the body's temperature. It is usually defined as a temperature of 100.4°F (38°C) or higher. Brief mild or moderate fevers generally have no long-term effects, and they often do not need treatment. Moderate or high fevers may make you feel uncomfortable and can sometimes be a sign of a serious illness or disease. The sweating that may occur with repeated or prolonged fever may also cause a loss of fluid in the body (dehydration).    Fever is confirmed by taking a temperature with a thermometer. A measured temperature can vary with:  Age.  Time of day.  Where in the body you take the temperature. Readings may vary if you place the thermometer:  In the mouth (oral).  In the rectum (rectal).  In the ear (tympanic).  Under the arm (axillary).  On the forehead (temporal).  Follow these instructions at home:  Medicines    Take over-the counter and prescription medicines only as told by your health care provider. Follow the dosing instructions carefully.  If you were prescribed an antibiotic medicine, take it as told by your health care provider. Do not stop taking the antibiotic even if you start to feel better.  General instructions    Watch your condition for any changes. Let your health care provider know about them.  Rest as needed.  Drink enough fluid to keep your urine pale yellow. This helps to prevent dehydration.  Sponge yourself or bathe with room-temperature water to help reduce your body temperature as needed. Do not use ice water.  Do not use too many blankets or wear clothes that are too heavy.  If your fever may be caused by an infection that spreads from person to person (is contagious), such as a cold or the flu, you should stay home from work and public gatherings for at least 24 hours after your fever is gone. Your fever should be gone without the need to use medicines.  Contact a health care provider if:  You vomit.  You cannot eat or drink without vomiting.  You have diarrhea.  You have pain when you urinate.  Your symptoms do not improve with treatment.  You develop new symptoms.  You develop excessive weakness.  Get help right away if:  You have shortness of breath or have trouble breathing.  You are dizzy or you faint.  You are disoriented or confused.  You develop signs of dehydration, such as:  Dark urine, very little urine, or no urine.  Cracked lips.  Dry mouth.  Sunken eyes.  Sleepiness.  Weakness.  You develop severe pain in your abdomen.  You have persistent vomiting or diarrhea.  You develop a skin rash.  Your symptoms suddenly get worse.  Summary  A fever is an increase in the body's temperature. It is usually defined as a temperature of 100.4°F (38°C) or higher. Moderate or high fevers can sometimes be a sign of a serious illness or disease. The sweating that may occur with repeated or prolonged fever may also cause dehydration.  Pay attention to any changes in your symptoms and contact your health care provider if your symptoms do not improve with treatment.  Take over-the counter and prescription medicines only as told by your health care provider. Follow the dosing instructions carefully.  If your fever is from an infection that may be contagious, such as cold or flu, you should stay home from work and public gatherings for at least 24 hours after your fever is gone. Your fever should be gone without the need to use medicines.  Get help right away if you develop signs of dehydration, such as dark urine, cracked lips, dry mouth, sunken eyes, sleepiness, or weakness.

## 2023-06-17 NOTE — ED PROVIDER NOTE - PROGRESS NOTE DETAILS
Lungs clear to ascultation b/l, all fields after fluid administration, patient reports feeling better, was steady with his normal crutches.   D/w patient and daughter, agreed with plan to discharge home, will be sure to go to dialysis.  Patient and daughter current cautioned to return to ED immediately for any chest pain shortness of breath dizziness weakness fevers not controlled with Tylenol for any concerns patient and daughter indicate understanding and agreed.

## 2023-06-17 NOTE — ED PROVIDER NOTE - ATTENDING APP SHARED VISIT CONTRIBUTION OF CARE
[place] with MADELAINE Alvarenga. 72-year-old male with a medical history including end-stage renal disease on hemodialysis for approximately 1 year (M, W, F and hepatic cirrhosis status post HCV  acquired right blood transfusion in 1990s treated successfully, complaining of approximately 3 days of fevers Tmax 104 at home responsive to Tylenol, which was last taken at approximately 2 PM today.  Patient complaining of generalized weakness and malaise, fevers.  Patient reports some loose stools and abdominal cramping on Thursday, but denies any pain, diarrhea, vomiting, nauseousness, chest pain, shortness of breath, cough, dysuria, hematuria or any other complaint as of today.  Patient denies any known sick contacts, did not go to hemodialysis on Friday as scheduled due to not feeling well.    Sepsis workup including ABX, IVF, CBC, CMP, Lactate, ECG, CXR, UA, BC, UC ordered.  Patient signed out to incoming physician.  All decisions regarding the progression of care will be made at their discretion.     I performed a face to face bedside interview with patient regarding history of present illness, review of symptoms and past medical history. I completed an independent physical exam.  I have discussed the patient's plan of care with Physician Assistant (PA). I agree with note as stated above, having amended the EMR as needed to reflect my findings.   This includes History of Present Illness, HIV, Past Medical/Surgical/Family/Social History, Allergies and Home Medications, Review of Systems, Physical Exam, and any Progress Notes during the time I functioned as the attending physician for this patient.

## 2023-06-17 NOTE — ED PROVIDER NOTE - CLINICAL SUMMARY MEDICAL DECISION MAKING FREE TEXT BOX
72-year-old male with a medical history including end-stage renal disease on hemodialysis for approximately 1 year (M, W, F and hepatic cirrhosis status post hepatitis CVA acquired right blood transfusion in 1990s treated successfully, complaining of approximately 3 days of fevers Tmax 104 at home responsive to Tylenol Tylenol last taken at approximately 2 PM today.  Patient complaining of generalized weakness and malaise, fevers.  Patient reports some loose stools and abdominal cramping on Thursday, but denies any pain, diarrhea, vomiting, nauseousness, chest pain, shortness of breath, cough, dysuria, hematuria or any other complaint as of today.  Patient denies any known sick contacts, did not go to hemodialysis on Friday as scheduled due to not feeling well.    Sepsis workup including ABX, IVF, CBC, CMP, Lactate, ECG, CXR, UA, BC, UC ordered.    Likely admission, probable need for hemodialysis. 72-year-old male with a medical history including end-stage renal disease on hemodialysis for approximately 1 year (M, W, F and hepatic cirrhosis status post HCV  acquired right blood transfusion in 1990s treated successfully, complaining of approximately 3 days of fevers Tmax 104 at home responsive to Tylenol Tylenol last taken at approximately 2 PM today.  Patient complaining of generalized weakness and malaise, fevers.  Patient reports some loose stools and abdominal cramping on Thursday, but denies any pain, diarrhea, vomiting, nauseousness, chest pain, shortness of breath, cough, dysuria, hematuria or any other complaint as of today.  Patient denies any known sick contacts, did not go to hemodialysis on Friday as scheduled due to not feeling well.    Sepsis workup including ABX, IVF, CBC, CMP, Lactate, ECG, CXR, UA, BC, UC ordered.    Likely admission, probable need for hemodialysis. 72-year-old male with a medical history including end-stage renal disease on hemodialysis for approximately 1 year (M, W, F and hepatic cirrhosis status post HCV  acquired right blood transfusion in 1990s treated successfully, complaining of approximately 3 days of fevers Tmax 104 at home responsive to Tylenol Tylenol last taken at approximately 2 PM today.  Patient complaining of generalized weakness and malaise, fevers.  Patient reports some loose stools and abdominal cramping on Thursday, but denies any pain, diarrhea, vomiting, nauseousness, chest pain, shortness of breath, cough, dysuria, hematuria or any other complaint as of today.  Patient denies any known sick contacts, did not go to hemodialysis on Friday as scheduled due to not feeling well.    Sepsis workup including ABX, IVF, CBC, CMP, Lactate, ECG, CXR, UA, BC, UC ordered.    D/w D. Chavda:  no immediate indication for hemodialysis, no source or infection, no evidence of fluid overload, hemodynamically stable, no clear indication for admission. Pt and daughter agree with plan to d/c home, will return immediately for any concerns, will keep dialysis appointment for Monday.

## 2023-06-18 NOTE — ED POST DISCHARGE NOTE - DETAILS
Called and spoke with patient: Provided information that RVP was negative. Blood cultures and urine cultures are still pending

## 2023-06-18 NOTE — ED POST DISCHARGE NOTE - ADDITIONAL DOCUMENTATION
Patient reports he is feeling okay, stated his temperature back went back up to 102 but it is responsive to Tylenol.  Denies any chest pain, shortness of breath, leg swelling, abdominal pain or any other complaint at this point patient was encouraged that if he develops any shortness of breath any chest pain fevers or not responsive to mild fevers continue to increase or any concerns to return to the emergency room or call [phone] immediately patient indicated understanding he confirmed that he does have a dialysis appointment tomorrow Monday in the afternoon.

## 2023-06-19 ENCOUNTER — INPATIENT (INPATIENT)
Facility: HOSPITAL | Age: 73
LOS: 8 days | Discharge: HOME | DRG: 853 | End: 2023-06-28
Attending: HOSPITALIST | Admitting: INTERNAL MEDICINE
Payer: MEDICARE

## 2023-06-19 VITALS
RESPIRATION RATE: 18 BRPM | SYSTOLIC BLOOD PRESSURE: 130 MMHG | OXYGEN SATURATION: 95 % | HEIGHT: 64 IN | TEMPERATURE: 100 F | HEART RATE: 88 BPM | DIASTOLIC BLOOD PRESSURE: 71 MMHG | WEIGHT: 147.71 LBS

## 2023-06-19 DIAGNOSIS — R78.81 BACTEREMIA: ICD-10-CM

## 2023-06-19 DIAGNOSIS — Z90.5 ACQUIRED ABSENCE OF KIDNEY: Chronic | ICD-10-CM

## 2023-06-19 LAB
-  K. PNEUMONIAE GROUP: SIGNIFICANT CHANGE UP
ALBUMIN SERPL ELPH-MCNC: 2.9 G/DL — LOW (ref 3.3–5)
ALP SERPL-CCNC: 58 U/L — SIGNIFICANT CHANGE UP (ref 40–120)
ALT FLD-CCNC: 37 U/L — SIGNIFICANT CHANGE UP (ref 10–45)
ANION GAP SERPL CALC-SCNC: 7 MMOL/L — SIGNIFICANT CHANGE UP (ref 5–17)
APTT BLD: 28.2 SEC — SIGNIFICANT CHANGE UP (ref 27.5–35.5)
AST SERPL-CCNC: 25 U/L — SIGNIFICANT CHANGE UP (ref 10–40)
BASOPHILS # BLD AUTO: 0 K/UL — SIGNIFICANT CHANGE UP (ref 0–0.2)
BASOPHILS NFR BLD AUTO: 0 % — SIGNIFICANT CHANGE UP (ref 0–2)
BILIRUB SERPL-MCNC: 0.4 MG/DL — SIGNIFICANT CHANGE UP (ref 0.2–1.2)
BUN SERPL-MCNC: 42 MG/DL — HIGH (ref 7–23)
CALCIUM SERPL-MCNC: 8.9 MG/DL — SIGNIFICANT CHANGE UP (ref 8.4–10.5)
CHLORIDE SERPL-SCNC: 100 MMOL/L — SIGNIFICANT CHANGE UP (ref 96–108)
CO2 SERPL-SCNC: 28 MMOL/L — SIGNIFICANT CHANGE UP (ref 22–31)
CREAT SERPL-MCNC: 4.25 MG/DL — HIGH (ref 0.5–1.3)
CULTURE RESULTS: SIGNIFICANT CHANGE UP
EGFR: 14 ML/MIN/1.73M2 — LOW
EOSINOPHIL # BLD AUTO: 0.07 K/UL — SIGNIFICANT CHANGE UP (ref 0–0.5)
EOSINOPHIL NFR BLD AUTO: 1.7 % — SIGNIFICANT CHANGE UP (ref 0–6)
GLUCOSE SERPL-MCNC: 126 MG/DL — HIGH (ref 70–99)
GRAM STN FLD: SIGNIFICANT CHANGE UP
HCT VFR BLD CALC: 30.5 % — LOW (ref 39–50)
HGB BLD-MCNC: 10.7 G/DL — LOW (ref 13–17)
IMM GRANULOCYTES NFR BLD AUTO: 0.5 % — SIGNIFICANT CHANGE UP (ref 0–0.9)
INR BLD: 1.25 RATIO — HIGH (ref 0.88–1.16)
LACTATE SERPL-SCNC: 0.6 MMOL/L — LOW (ref 0.7–2)
LYMPHOCYTES # BLD AUTO: 0.58 K/UL — LOW (ref 1–3.3)
LYMPHOCYTES # BLD AUTO: 14.3 % — SIGNIFICANT CHANGE UP (ref 13–44)
MCHC RBC-ENTMCNC: 32 PG — SIGNIFICANT CHANGE UP (ref 27–34)
MCHC RBC-ENTMCNC: 35.1 GM/DL — SIGNIFICANT CHANGE UP (ref 32–36)
MCV RBC AUTO: 91.3 FL — SIGNIFICANT CHANGE UP (ref 80–100)
METHOD TYPE: SIGNIFICANT CHANGE UP
MONOCYTES # BLD AUTO: 0.56 K/UL — SIGNIFICANT CHANGE UP (ref 0–0.9)
MONOCYTES NFR BLD AUTO: 13.8 % — SIGNIFICANT CHANGE UP (ref 2–14)
NEUTROPHILS # BLD AUTO: 2.82 K/UL — SIGNIFICANT CHANGE UP (ref 1.8–7.4)
NEUTROPHILS NFR BLD AUTO: 69.7 % — SIGNIFICANT CHANGE UP (ref 43–77)
NRBC # BLD: 0 /100 WBCS — SIGNIFICANT CHANGE UP (ref 0–0)
PLATELET # BLD AUTO: 123 K/UL — LOW (ref 150–400)
POTASSIUM SERPL-MCNC: 3.8 MMOL/L — SIGNIFICANT CHANGE UP (ref 3.5–5.3)
POTASSIUM SERPL-SCNC: 3.8 MMOL/L — SIGNIFICANT CHANGE UP (ref 3.5–5.3)
PROT SERPL-MCNC: 6.4 G/DL — SIGNIFICANT CHANGE UP (ref 6–8.3)
PROTHROM AB SERPL-ACNC: 14.5 SEC — HIGH (ref 10.5–13.4)
RBC # BLD: 3.34 M/UL — LOW (ref 4.2–5.8)
RBC # FLD: 13.2 % — SIGNIFICANT CHANGE UP (ref 10.3–14.5)
SODIUM SERPL-SCNC: 135 MMOL/L — SIGNIFICANT CHANGE UP (ref 135–145)
SPECIMEN SOURCE: SIGNIFICANT CHANGE UP
WBC # BLD: 4.05 K/UL — SIGNIFICANT CHANGE UP (ref 3.8–10.5)
WBC # FLD AUTO: 4.05 K/UL — SIGNIFICANT CHANGE UP (ref 3.8–10.5)

## 2023-06-19 PROCEDURE — 99285 EMERGENCY DEPT VISIT HI MDM: CPT

## 2023-06-19 PROCEDURE — 71045 X-RAY EXAM CHEST 1 VIEW: CPT | Mod: 26

## 2023-06-19 PROCEDURE — 93010 ELECTROCARDIOGRAM REPORT: CPT

## 2023-06-19 RX ORDER — PIPERACILLIN AND TAZOBACTAM 4; .5 G/20ML; G/20ML
3.38 INJECTION, POWDER, LYOPHILIZED, FOR SOLUTION INTRAVENOUS ONCE
Refills: 0 | Status: COMPLETED | OUTPATIENT
Start: 2023-06-19 | End: 2023-06-19

## 2023-06-19 RX ORDER — ACETAMINOPHEN 500 MG
650 TABLET ORAL ONCE
Refills: 0 | Status: COMPLETED | OUTPATIENT
Start: 2023-06-19 | End: 2023-06-19

## 2023-06-19 RX ADMIN — Medication 650 MILLIGRAM(S): at 22:23

## 2023-06-19 RX ADMIN — PIPERACILLIN AND TAZOBACTAM 3.38 GRAM(S): 4; .5 INJECTION, POWDER, LYOPHILIZED, FOR SOLUTION INTRAVENOUS at 22:54

## 2023-06-19 RX ADMIN — PIPERACILLIN AND TAZOBACTAM 200 GRAM(S): 4; .5 INJECTION, POWDER, LYOPHILIZED, FOR SOLUTION INTRAVENOUS at 22:24

## 2023-06-19 NOTE — POST DISCHARGE NOTE - BACKGROUND:
I am not really patient presents to the ED on 617 with fevers and general malaise.  He was discharged home pending culture results. Pt presented to ED 6/17 with fevers and general malaise.  He was discharged home pending culture results.

## 2023-06-19 NOTE — ED ADULT NURSE NOTE - OBJECTIVE STATEMENT
Patient came from home with complaint of positive blood cultures. Patient told to come to ED. Patient complaint of fever in AM. Denies any nausea, vomit, dizziness, diarrhea or constipation. Hx of Dialysis and patient received dialysis in AM. AV Fistula to L arm.

## 2023-06-19 NOTE — ED ADULT NURSE NOTE - NS PRO PASSIVE SMOKE EXP
Yonas Honeycutt, WILIAM  [address], [address]  [place], [address] [facility]    Office [phone]  Fax [phone]        Cyndi Brenner is a 77 y.o. female who is followed outpatient by Dr. Eli Bender and was last seen 2/28/19. Patient is here today for eval of worsening SOB. Assessment  Encounter Diagnoses   Name Primary?  Shortness of breath     Non-rheumatic mitral valve stenosis Yes    Mitral valve insufficiency, unspecified etiology     Abnormal stress test     Atrial flutter, unspecified type ([address]     Essential hypertension     Dyslipidemia        Recommendations:  Shortness of breath:   - likely a combination of moderate to severe mitral stenosis with moderate mitral regurgitation and mild pulmonary hypertension along with possible anterior wall ischemia as noted on the recent stress test.    - I recommend left and right heart catheterization for further eval  - will also check labs including pBNP and CXR  - given severity of symptoms, discussed possible inpt evaluation; pt wants to avoid this is possible    Abnormal stress test - on toprol XL 50mg/d, imdur 30mg/d, and statin therapy  - significant dyspnea, questionable anginal equivalent  - per above  - no longer on [address] d/t abnormal bleeding; pending cath results consider starting low dose aspirin    Syncopal episode status post AV blockade with pauses status post permanent pacemaker  - Contnue follow-up with Dr. Cammie Solis. Atrial Flutter  - not on [address] d/t rectal bleeding  - Seen by GI, Dr. Anderson Beth, for internal and external hemorrhoids. Pt instructed by to hold eliquis for now. Was planned to see colorectal surgery for hemorrhoidectomy in order to tolerate anticoag longterm. Pt did not follow-up when bleeding stopped off apixapan. Discussed at length; pt will arrange follow-up. - cont rate control with Toprol XL 50mg/d    F/u in 2 weeks or PRN. Patient understands the plan.  All questions were answered to the patient's satisfaction.     Medication Side Effects and Warnings were discussed with patient: yes  Patient Labs were reviewed and or requested:  yes  Patient Past Records were reviewed and or requested: yes     I appreciate the opportunity to be involved in patient's care. Please do not hesitate to contact us with questions or concerns. Subjective:  Elke Moreno, a 77y.o. year-old who presents for followup. She is accompanied by her son and  in office. States breathing status worsened; has been very SOB with any level of activity for about 6 weeks now. Unable to do anything due to breathing issues. Lives in an assisted living facility with  and doesn't need to ambulate much. Only walks to and from dining cheema.       Patient denies any exertional chest pain, palpitations, syncope, orthopnea, edema or paroxysmal nocturnal dyspnea. Denies significant cough or congestion. No longer on apixapan. Stats abnormal bleeding resolved. Was supposed to see colorectal surgeon but didn't follow-up when bleeding stopped off medication. Cardiac testing  Stress Cardiolite 2/5/19: LVEF 60%, sm to mod defect apical to mid anteroseptal that is reversible. Sm to mod defect apical location that is rev. TTE 1/10/19 - LVEF 70%, no WMA, RV fx nml, LA mildly dilated, mod-sev MS with mod MR (2-3+) mean transmitral grad 9mmHg. AV with mild to mod calcification/sclerosis without stenosis, mild pHTN    ECHO 8/14/18 LVEF : 65-70% No WMA, LAE, Mod/Severe MS, Mean transmitral gradient was 10 mmHg. Mild Tr.    8/15/18 [employer] dual chamber PPM     EKG 4/25/19 - V paced. Past Medical History:   Diagnosis Date    Cataract     Depression     Hypercholesterolemia     Hypertension     Memory loss     Other specified glaucoma 10/11/2018        Current Outpatient Medications   Medication Sig Dispense Refill    nystatin (MYCOSTATIN) topical cream Apply  to affected area two (2) times a day.  15 g 0    metoprolol succinate (TOPROL-XL) 50 mg XL tablet Take 1 Tab by mouth daily. 90 Tab 1    ascorbate calcium (VITAMIN C PO) Take  by mouth.  ergocalciferol, vitamin D2, (VITAMIN D2 PO) Take  by mouth.  losartan (COZAAR) 50 mg tablet TAKE 1 TABLET BY MOUTH TWICE A  Tab 3    citalopram (CELEXA) 10 mg tablet Take 1 Tab by mouth daily. 30 Tab 3    isosorbide mononitrate ER (IMDUR) 30 mg tablet Take 1 Tab by mouth daily. 90 Tab 1    simvastatin (ZOCOR) 20 mg tablet Take 1 Tab by mouth nightly. 90 Tab 1    ALPRAZolam (XANAX) 0.5 mg tablet Take 0.5 Tabs by mouth daily as needed for Anxiety. 90 Tab 0    brinzolamide (AZOPT) 1 % ophthalmic suspension Administer 1 Drop to left eye three (3) times daily.  bimatoprost (LUMIGAN) 0.01 % ophthalmic drops Administer 1 Drop to left eye every evening.  cyanocobalamin (VITAMIN B-12) 1,000 mcg tablet Take 1,000 mcg by mouth daily.  ascorbic acid, vitamin C, (VITAMIN C) 500 mg tablet Take 500 mg by mouth daily. Allergies   Allergen Reactions    Codeine Drowsiness          Review of Systems  Constitutional: Negative for fever, chills, and diaphoresis. +fatigue  Respiratory: Negative for cough, hemoptysis, sputum production, and wheezing. +SOB  Cardiovascular: Negative for chest pain, palpitations, orthopnea, claudication, leg swelling and PND. Gastrointestinal: Negative for heartburn, nausea, vomiting, blood in stool and melena. Genitourinary: Negative for dysuria and flank pain. Neurological: Negative for focal weakness, seizures, loss of consciousness, and headaches. Endo/Heme/Allergies: Negative for abnormal bleeding.     Psychiatric/Behavioral: +memory loss      Physical Exam    Visit Vitals  /76 (BP 1 Location: Left arm, BP Patient Position: Sitting)   Pulse 86   Resp 16   Ht 5' 6\" (1.676 m)   Wt 196 lb (88.9 kg)   SpO2 99%   BMI 31.64 kg/m²     Wt Readings from Last 3 Encounters:   04/24/19 196 lb (88.9 kg)   03/19/19 191 lb (86.6 kg) 03/05/19 190 lb 3.2 oz (86.3 kg)      General - well developed well nourished, visually uncomfortable  Neck -no obvious JVD  Cardiac - nml S1, S2, RRR, ESM grade 2/6, no rubs or gallops.  No clicks  Vascular - radials and pedal pulses equal bilateral  Lungs - clear to auscultation bilaterals, no rales, wheezing or rhonchi, conversational dyspnea  Abd - soft nontender, non-distended, +BS  Extremities - no edema, warm, well perfused  Neuro - nonfocal  Psych - normal mood and affect    Labs:  No results found for: HBA1C, HGBE8, TLT4AACN, YUJ7NRZQ, WZG3IZJM    Lab Results   Component Value Date/Time    Sodium 137 04/24/2019 12:06 PM    Sodium 139 12/20/2018 02:51 PM    Sodium 139 09/05/2018 07:54 AM    Sodium CANCELED 09/04/2018 12:07 PM    Sodium 139 09/04/2018 12:00 AM    Potassium 5.3 (H) 04/24/2019 12:06 PM    Chloride 105 04/24/2019 12:06 PM    Chloride 105 12/20/2018 02:51 PM    Chloride 103 09/05/2018 07:54 AM    Chloride CANCELED 09/04/2018 12:07 PM    Chloride 104 09/04/2018 12:00 AM    CO2 20 04/24/2019 12:06 PM    CO2 23 12/20/2018 02:51 PM    CO2 27 09/05/2018 07:54 AM    CO2 CANCELED 09/04/2018 12:07 PM    CO2 18 (L) 09/04/2018 12:00 AM    Anion gap 11 12/20/2018 02:51 PM    Anion gap 9 09/05/2018 07:54 AM    Anion gap 7 08/16/2018 04:13 AM    Anion gap 6 08/15/2018 03:57 AM    Anion gap 8 08/14/2018 02:16 AM    Glucose 103 (H) 04/24/2019 12:06 PM    Glucose 150 (H) 12/20/2018 02:51 PM    Glucose 107 (H) 09/05/2018 07:54 AM    Glucose CANCELED 09/04/2018 12:07 PM    Glucose 94 09/04/2018 12:00 AM    BUN 23 04/24/2019 12:06 PM    BUN 19 12/20/2018 02:51 PM    BUN 27 (H) 09/05/2018 07:54 AM    BUN CANCELED 09/04/2018 12:07 PM    BUN 24 09/04/2018 12:00 AM    Creatinine 1.05 (H) 04/24/2019 12:06 PM    Creatinine 1.24 (H) 12/20/2018 02:51 PM    Creatinine 1.03 (H) 09/05/2018 07:54 AM    Creatinine CANCELED 09/04/2018 12:07 PM    Creatinine 1.06 (H) 09/04/2018 12:00 AM    BUN/Creatinine ratio 22 04/24/2019 12:06 PM    BUN/Creatinine ratio 15 12/20/2018 02:51 PM    BUN/Creatinine ratio 26 (H) 09/05/2018 07:54 AM    BUN/Creatinine ratio 23 09/04/2018 12:00 AM    BUN/Creatinine ratio 16 08/16/2018 04:13 AM    GFR est AA 58 (L) 04/24/2019 12:06 PM    GFR est AA 51 (L) 12/20/2018 02:51 PM    GFR est AA >60 09/05/2018 07:54 AM    GFR est AA 58 (L) 09/04/2018 12:00 AM    GFR est AA >60 08/16/2018 04:13 AM    GFR est non-AA 51 (L) 04/24/2019 12:06 PM    GFR est non-AA 42 (L) 12/20/2018 02:51 PM    GFR est non-AA 52 (L) 09/05/2018 07:54 AM    GFR est non-AA 50 (L) 09/04/2018 12:00 AM    GFR est non-AA 57 (L) 08/16/2018 04:13 AM    Calcium 9.5 04/24/2019 12:06 PM    Calcium 8.9 12/20/2018 02:51 PM    Calcium 8.9 09/05/2018 07:54 AM    Calcium CANCELED 09/04/2018 12:07 PM    Calcium 9.7 09/04/2018 12:00 AM     No results found for: CHOL, CHOLPOCT, CHOLX, CHLST, CHOLV, HDL, LDL, LDLC, DLDLP, TGLX, TRIGL, TRIGP, TGLPOCT, NTGLT, CHHD, CHHDX    Kika Cavalier, [facility] Yes No

## 2023-06-19 NOTE — ED PROVIDER NOTE - OBJECTIVE STATEMENT
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  Associated with chills.  Patient was seen here June 17 and was called back today due to gram-negative rods in blood culture, Klebsiella pneumonia.  Patient reports persistent fever and chills.  No headache myalgias since.  No nausea vomiting diarrhea.  No dysuria.  No abdominal pain.  No sore throat cough rhinorrhea congestion.  No rash.

## 2023-06-19 NOTE — ED PROVIDER NOTE - CLINICAL SUMMARY MEDICAL DECISION MAKING FREE TEXT BOX
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  Associated with chills.  Patient was seen here June 17 and was called back today due to gram-negative rods in blood culture, Klebsiella pneumonia.  Patient reports persistent fever and chills.  No headache myalgias since.  No nausea vomiting diarrhea.  No dysuria.  No abdominal pain.  No sore throat cough rhinorrhea congestion.  No rash.    Patient with Klebsiella pneumonia and blood cultures from July 17.  With fever chills for 5 days.  Currently not meeting sepsis criteria.  Will check labs, repeat blood cultures.  Start Zosyn.  Admit for IV antibiotics

## 2023-06-19 NOTE — POST DISCHARGE NOTE - DETAILS:
I spoke with the patient, informed her of his culture results, and urged him to come back to the emergency department. He told me that he would need his daughter to facilitate this, and I called her as well to inform her. I informed his daughter of the culture results, urged her to bring him in for IV antibiotics.  She expresses understanding, states she will bring him in tonight.

## 2023-06-19 NOTE — ED ADULT NURSE NOTE - BIRTH SEX
Patient Intake Note    Review of Systems  Review of Systems   Constitutional: Negative for chills, fatigue and fever.   HENT:        See HPI   Respiratory: Negative for cough, choking, shortness of breath and wheezing.    Cardiovascular: Negative.    Gastrointestinal: Negative for constipation, diarrhea, nausea and vomiting.   Allergic/Immunologic: Negative for environmental allergies and food allergies.   Neurological: Negative for dizziness, light-headedness and headaches.   Hematological: Does not bruise/bleed easily.   Psychiatric/Behavioral: Negative for sleep disturbance.       QUALITY MEASURES    Body Mass Index Screening and Follow-Up Plan  Body mass index is 34.15 kg/(m^2).      Tobacco Use: Screening and Cessation Intervention  Smoking status: Former Smoker                                                              Packs/day: 2.00      Years: 20.00        Start date: 1965     Quit date: 1985  Smokeless status: Never Used                            Sabina Lang  2/19/2018  1:36 PM     Male

## 2023-06-19 NOTE — ED PROVIDER NOTE - PHYSICAL EXAMINATION
exam:   General: well appearing, NAD.   HEENT: eyes perrl, nose normal, OP no erythema/exudate/swelling.   cor: RRR, s1s2, 2+rad pulses.   lungs: ctabl, no resp distress.   abd: soft, ntnd.   neuro: a&ox3, cn2-12 intact, ALLEN, 5/5 strength c nl sensation all extremities, nl coordination.   MSK: no extremity swelling.  Skin: normal, no rash.  L forearm av fistula normal.

## 2023-06-19 NOTE — ED ADULT NURSE NOTE - NSFALLHARMRISKINTERV_ED_ALL_ED

## 2023-06-20 ENCOUNTER — TRANSCRIPTION ENCOUNTER (OUTPATIENT)
Age: 73
End: 2023-06-20

## 2023-06-20 LAB
-  AMIKACIN: SIGNIFICANT CHANGE UP
-  AMPICILLIN/SULBACTAM: SIGNIFICANT CHANGE UP
-  AMPICILLIN: SIGNIFICANT CHANGE UP
-  AZTREONAM: SIGNIFICANT CHANGE UP
-  CEFAZOLIN: SIGNIFICANT CHANGE UP
-  CEFEPIME: SIGNIFICANT CHANGE UP
-  CEFOXITIN: SIGNIFICANT CHANGE UP
-  CEFTRIAXONE: SIGNIFICANT CHANGE UP
-  CIPROFLOXACIN: SIGNIFICANT CHANGE UP
-  ERTAPENEM: SIGNIFICANT CHANGE UP
-  GENTAMICIN: SIGNIFICANT CHANGE UP
-  IMIPENEM: SIGNIFICANT CHANGE UP
-  LEVOFLOXACIN: SIGNIFICANT CHANGE UP
-  MEROPENEM: SIGNIFICANT CHANGE UP
-  PIPERACILLIN/TAZOBACTAM: SIGNIFICANT CHANGE UP
-  TOBRAMYCIN: SIGNIFICANT CHANGE UP
-  TRIMETHOPRIM/SULFAMETHOXAZOLE: SIGNIFICANT CHANGE UP
ALBUMIN SERPL ELPH-MCNC: 2.7 G/DL — LOW (ref 3.3–5)
ALP SERPL-CCNC: 47 U/L — SIGNIFICANT CHANGE UP (ref 40–120)
ALT FLD-CCNC: 35 U/L — SIGNIFICANT CHANGE UP (ref 10–45)
ANION GAP SERPL CALC-SCNC: 11 MMOL/L — SIGNIFICANT CHANGE UP (ref 5–17)
AST SERPL-CCNC: 23 U/L — SIGNIFICANT CHANGE UP (ref 10–40)
BASOPHILS # BLD AUTO: 0.01 K/UL — SIGNIFICANT CHANGE UP (ref 0–0.2)
BASOPHILS NFR BLD AUTO: 0.3 % — SIGNIFICANT CHANGE UP (ref 0–2)
BILIRUB SERPL-MCNC: 0.3 MG/DL — SIGNIFICANT CHANGE UP (ref 0.2–1.2)
BUN SERPL-MCNC: 49 MG/DL — HIGH (ref 7–23)
CALCIUM SERPL-MCNC: 8.6 MG/DL — SIGNIFICANT CHANGE UP (ref 8.4–10.5)
CHLORIDE SERPL-SCNC: 100 MMOL/L — SIGNIFICANT CHANGE UP (ref 96–108)
CO2 SERPL-SCNC: 28 MMOL/L — SIGNIFICANT CHANGE UP (ref 22–31)
CREAT SERPL-MCNC: 5.03 MG/DL — HIGH (ref 0.5–1.3)
CULTURE RESULTS: SIGNIFICANT CHANGE UP
EGFR: 11 ML/MIN/1.73M2 — LOW
EOSINOPHIL # BLD AUTO: 0.14 K/UL — SIGNIFICANT CHANGE UP (ref 0–0.5)
EOSINOPHIL NFR BLD AUTO: 3.9 % — SIGNIFICANT CHANGE UP (ref 0–6)
GLUCOSE SERPL-MCNC: 98 MG/DL — SIGNIFICANT CHANGE UP (ref 70–99)
HBV CORE AB SER-ACNC: SIGNIFICANT CHANGE UP
HBV SURFACE AB SER-ACNC: 104.8 MIU/ML — SIGNIFICANT CHANGE UP
HBV SURFACE AG SER-ACNC: SIGNIFICANT CHANGE UP
HCT VFR BLD CALC: 30.2 % — LOW (ref 39–50)
HGB BLD-MCNC: 10.5 G/DL — LOW (ref 13–17)
IMM GRANULOCYTES NFR BLD AUTO: 0.3 % — SIGNIFICANT CHANGE UP (ref 0–0.9)
LYMPHOCYTES # BLD AUTO: 1.04 K/UL — SIGNIFICANT CHANGE UP (ref 1–3.3)
LYMPHOCYTES # BLD AUTO: 29.1 % — SIGNIFICANT CHANGE UP (ref 13–44)
MCHC RBC-ENTMCNC: 32 PG — SIGNIFICANT CHANGE UP (ref 27–34)
MCHC RBC-ENTMCNC: 34.8 GM/DL — SIGNIFICANT CHANGE UP (ref 32–36)
MCV RBC AUTO: 92.1 FL — SIGNIFICANT CHANGE UP (ref 80–100)
METHOD TYPE: SIGNIFICANT CHANGE UP
MONOCYTES # BLD AUTO: 0.76 K/UL — SIGNIFICANT CHANGE UP (ref 0–0.9)
MONOCYTES NFR BLD AUTO: 21.2 % — HIGH (ref 2–14)
NEUTROPHILS # BLD AUTO: 1.62 K/UL — LOW (ref 1.8–7.4)
NEUTROPHILS NFR BLD AUTO: 45.2 % — SIGNIFICANT CHANGE UP (ref 43–77)
NRBC # BLD: 0 /100 WBCS — SIGNIFICANT CHANGE UP (ref 0–0)
ORGANISM # SPEC MICROSCOPIC CNT: SIGNIFICANT CHANGE UP
PLATELET # BLD AUTO: 116 K/UL — LOW (ref 150–400)
POTASSIUM SERPL-MCNC: 3.8 MMOL/L — SIGNIFICANT CHANGE UP (ref 3.5–5.3)
POTASSIUM SERPL-SCNC: 3.8 MMOL/L — SIGNIFICANT CHANGE UP (ref 3.5–5.3)
PROT SERPL-MCNC: 6.1 G/DL — SIGNIFICANT CHANGE UP (ref 6–8.3)
RBC # BLD: 3.28 M/UL — LOW (ref 4.2–5.8)
RBC # FLD: 13.2 % — SIGNIFICANT CHANGE UP (ref 10.3–14.5)
SODIUM SERPL-SCNC: 139 MMOL/L — SIGNIFICANT CHANGE UP (ref 135–145)
SPECIMEN SOURCE: SIGNIFICANT CHANGE UP
WBC # BLD: 3.58 K/UL — LOW (ref 3.8–10.5)
WBC # FLD AUTO: 3.58 K/UL — LOW (ref 3.8–10.5)

## 2023-06-20 PROCEDURE — 12345: CPT | Mod: NC

## 2023-06-20 PROCEDURE — 74176 CT ABD & PELVIS W/O CONTRAST: CPT | Mod: 26

## 2023-06-20 PROCEDURE — 71250 CT THORAX DX C-: CPT | Mod: 26

## 2023-06-20 PROCEDURE — 99222 1ST HOSP IP/OBS MODERATE 55: CPT

## 2023-06-20 RX ORDER — CARVEDILOL PHOSPHATE 80 MG/1
25 CAPSULE, EXTENDED RELEASE ORAL EVERY 12 HOURS
Refills: 0 | Status: DISCONTINUED | OUTPATIENT
Start: 2023-06-20 | End: 2023-06-20

## 2023-06-20 RX ORDER — HEPARIN SODIUM 5000 [USP'U]/ML
5000 INJECTION INTRAVENOUS; SUBCUTANEOUS EVERY 8 HOURS
Refills: 0 | Status: DISCONTINUED | OUTPATIENT
Start: 2023-06-20 | End: 2023-06-21

## 2023-06-20 RX ORDER — AMLODIPINE BESYLATE 2.5 MG/1
10 TABLET ORAL DAILY
Refills: 0 | Status: DISCONTINUED | OUTPATIENT
Start: 2023-06-20 | End: 2023-06-20

## 2023-06-20 RX ORDER — PIPERACILLIN AND TAZOBACTAM 4; .5 G/20ML; G/20ML
3.38 INJECTION, POWDER, LYOPHILIZED, FOR SOLUTION INTRAVENOUS EVERY 12 HOURS
Refills: 0 | Status: DISCONTINUED | OUTPATIENT
Start: 2023-06-20 | End: 2023-06-21

## 2023-06-20 RX ORDER — PANTOPRAZOLE SODIUM 20 MG/1
40 TABLET, DELAYED RELEASE ORAL
Refills: 0 | Status: DISCONTINUED | OUTPATIENT
Start: 2023-06-20 | End: 2023-06-21

## 2023-06-20 RX ORDER — ACETAMINOPHEN 500 MG
650 TABLET ORAL EVERY 6 HOURS
Refills: 0 | Status: DISCONTINUED | OUTPATIENT
Start: 2023-06-20 | End: 2023-06-21

## 2023-06-20 RX ORDER — CARVEDILOL PHOSPHATE 80 MG/1
12.5 CAPSULE, EXTENDED RELEASE ORAL EVERY 12 HOURS
Refills: 0 | Status: DISCONTINUED | OUTPATIENT
Start: 2023-06-20 | End: 2023-06-21

## 2023-06-20 RX ADMIN — CARVEDILOL PHOSPHATE 25 MILLIGRAM(S): 80 CAPSULE, EXTENDED RELEASE ORAL at 07:02

## 2023-06-20 RX ADMIN — PANTOPRAZOLE SODIUM 40 MILLIGRAM(S): 20 TABLET, DELAYED RELEASE ORAL at 07:03

## 2023-06-20 RX ADMIN — HEPARIN SODIUM 5000 UNIT(S): 5000 INJECTION INTRAVENOUS; SUBCUTANEOUS at 22:22

## 2023-06-20 RX ADMIN — AMLODIPINE BESYLATE 10 MILLIGRAM(S): 2.5 TABLET ORAL at 07:02

## 2023-06-20 RX ADMIN — HEPARIN SODIUM 5000 UNIT(S): 5000 INJECTION INTRAVENOUS; SUBCUTANEOUS at 07:03

## 2023-06-20 RX ADMIN — PIPERACILLIN AND TAZOBACTAM 25 GRAM(S): 4; .5 INJECTION, POWDER, LYOPHILIZED, FOR SOLUTION INTRAVENOUS at 17:25

## 2023-06-20 RX ADMIN — PIPERACILLIN AND TAZOBACTAM 25 GRAM(S): 4; .5 INJECTION, POWDER, LYOPHILIZED, FOR SOLUTION INTRAVENOUS at 07:03

## 2023-06-20 RX ADMIN — Medication 1 TABLET(S): at 11:23

## 2023-06-20 RX ADMIN — CARVEDILOL PHOSPHATE 12.5 MILLIGRAM(S): 80 CAPSULE, EXTENDED RELEASE ORAL at 17:28

## 2023-06-20 RX ADMIN — HEPARIN SODIUM 5000 UNIT(S): 5000 INJECTION INTRAVENOUS; SUBCUTANEOUS at 15:10

## 2023-06-20 NOTE — PROGRESS NOTE ADULT - SUBJECTIVE AND OBJECTIVE BOX
Patient is a 72y old  Male who presents with a chief complaint of Klebsiella bacteremia (2023 02:16)    Patient seen and examined at bedside.  no acute overnight events    ALLERGIES:  Demerol HCl (Unknown)        Vital Signs Last 24 Hrs  T(F): 100 (2023 21:25), Max: 100 (2023 21:25)  HR: 75 (2023 01:41) (75 - 89)  BP: 104/67 (2023 01:41) (104/67 - 134/68)  RR: 18 (2023 01:41) (18 - 20)  SpO2: 97% (2023 01:41) (95% - 97%)  I&O's Summary    MEDICATIONS:  acetaminophen     Tablet .. 650 milliGRAM(s) Oral every 6 hours PRN  amLODIPine   Tablet 10 milliGRAM(s) Oral daily  carvedilol 25 milliGRAM(s) Oral every 12 hours  heparin   Injectable 5000 Unit(s) SubCutaneous every 8 hours  Nephro-odessa 1 Tablet(s) Oral daily  pantoprazole    Tablet 40 milliGRAM(s) Oral before breakfast  piperacillin/tazobactam IVPB.. 3.375 Gram(s) IV Intermittent every 12 hours      PHYSICAL EXAM:  General: NAD, A/O x 3  ENT: MMM  Neck: Supple, No JVD  Lungs: Clear to auscultation bilaterally  Cardio: RRR, S1/S2, No murmurs  Abdomen: Soft, Nontender, Nondistended; Bowel sounds present  Extremities: No cyanosis, No edema    LABS:                        10.5   3.58  )-----------( 116      ( 2023 06:23 )             30.2     06-20    139  |  100  |  49  ----------------------------<  98  3.8   |  28  |  5.03    Ca    8.6      2023 06:23    TPro  6.1  /  Alb  2.7  /  TBili  0.3  /  DBili  x   /  AST  23  /  ALT  35  /  AlkPhos  47  06-20      PT/INR - ( 2023 22:10 )   PT: 14.5 sec;   INR: 1.25 ratio         PTT - ( 2023 22:10 )  PTT:28.2 sec  Lactate, Blood: 0.6 mmol/L ( @ 22:10)  Lactate, Blood: 1.0 mmol/L ( @ 19:03)    CARDIAC MARKERS ( 2023 19:03 )  x     / 23.8 ng/L / x     / x     / x                            Urinalysis Basic - ( 2023 19:03 )    Color: Yellow / Appearance: Clear / S.009 / pH: x  Gluc: x / Ketone: Negative mg/dL  / Bili: Negative / Urobili: 0.2 mg/dL   Blood: x / Protein: 100 mg/dL / Nitrite: Negative   Leuk Esterase: Negative / RBC: 3 /HPF / WBC 0 /HPF   Sq Epi: x / Non Sq Epi: x / Bacteria: Negative /HPF        Culture - Urine (collected 2023 19:03)  Source: Clean Catch Clean Catch (Midstream)  Final Report (2023 07:26):    <10,000 CFU/mL Normal Urogenital Carmen    Culture - Blood (collected 2023 19:03)  Source: .Blood Blood-Peripheral  Gram Stain (2023 07:57):    Growth in aerobic bottle: Gram Negative Rods  Preliminary Report (2023 23:35):    Growth in aerobic bottle: Klebsiella pneumoniae    ***Blood Panel PCR results on this specimen are available    approximately 3 hours after the Gram stain result.***    Gram stain, PCR, and/or culture results may not always    correspond due to difference in methodologies.    ************************************************************    This PCR assay was performed by multiplex PCR. This    Assay tests for 66 bacterial and resistance gene targets.    Please refer to the [facility] test directory    at [email or web address] for details.  Organism: Blood Culture PCR (2023 10:30)  Organism: Blood Culture PCR (2023 10:30)      Method Type: PCR      -  K. pneumoniae group: Detec (K. pneumoniae, K. quasipneumoniae, K. variicola)    Culture - Blood (collected 2023 19:03)  Source: .Blood Blood-Peripheral  Preliminary Report (2023 01:02):    No growth to date.          RADIOLOGY & ADDITIONAL TESTS:    Care Discussed with Consultants/Other Providers:    Patient is a 72y old  Male who presents with a chief complaint of Klebsiella bacteremia (2023 02:16)    Patient seen and examined at bedside.  no acute overnight events    ALLERGIES:  Demerol HCl (Unknown)        Vital Signs Last 24 Hrs  T(F): 100 (2023 21:25), Max: 100 (2023 21:25)  HR: 75 (2023 01:41) (75 - 89)  BP: 104/67 (2023 01:41) (104/67 - 134/68)  RR: 18 (2023 01:41) (18 - 20)  SpO2: 97% (2023 01:41) (95% - 97%)  I&O's Summary    MEDICATIONS:  acetaminophen     Tablet .. 650 milliGRAM(s) Oral every 6 hours PRN  amLODIPine   Tablet 10 milliGRAM(s) Oral daily  carvedilol 25 milliGRAM(s) Oral every 12 hours  heparin   Injectable 5000 Unit(s) SubCutaneous every 8 hours  Nephro-odessa 1 Tablet(s) Oral daily  pantoprazole    Tablet 40 milliGRAM(s) Oral before breakfast  piperacillin/tazobactam IVPB.. 3.375 Gram(s) IV Intermittent every 12 hours      PHYSICAL EXAM:  General: NAD, A/O x 3  ENT: MMM, no thrush  Neck: Supple, No JVD  Lungs: Clear to auscultation bilaterally, non labored, good air entry  Cardio: RRR, S1/S2, No murmurs  Abdomen: Soft, Nontender, Nondistended; Bowel sounds present  Extremities: No cyanosis, No edema    LABS:                        10.5   3.58  )-----------( 116      ( 2023 06:23 )             30.2     06-20    139  |  100  |  49  ----------------------------<  98  3.8   |  28  |  5.03    Ca    8.6      2023 06:23    TPro  6.1  /  Alb  2.7  /  TBili  0.3  /  DBili  x   /  AST  23  /  ALT  35  /  AlkPhos  47  06-20      PT/INR - ( 2023 22:10 )   PT: 14.5 sec;   INR: 1.25 ratio         PTT - ( 2023 22:10 )  PTT:28.2 sec  Lactate, Blood: 0.6 mmol/L ( @ 22:10)  Lactate, Blood: 1.0 mmol/L ( @ 19:03)    CARDIAC MARKERS ( 2023 19:03 )  x     / 23.8 ng/L / x     / x     / x                            Urinalysis Basic - ( 2023 19:03 )    Color: Yellow / Appearance: Clear / S.009 / pH: x  Gluc: x / Ketone: Negative mg/dL  / Bili: Negative / Urobili: 0.2 mg/dL   Blood: x / Protein: 100 mg/dL / Nitrite: Negative   Leuk Esterase: Negative / RBC: 3 /HPF / WBC 0 /HPF   Sq Epi: x / Non Sq Epi: x / Bacteria: Negative /HPF        Culture - Urine (collected 2023 19:03)  Source: Clean Catch Clean Catch (Midstream)  Final Report (2023 07:26):    <10,000 CFU/mL Normal Urogenital Carmen    Culture - Blood (collected 2023 19:03)  Source: .Blood Blood-Peripheral  Gram Stain (2023 07:57):    Growth in aerobic bottle: Gram Negative Rods  Preliminary Report (2023 23:35):    Growth in aerobic bottle: Klebsiella pneumoniae    ***Blood Panel PCR results on this specimen are available    approximately 3 hours after the Gram stain result.***    Gram stain, PCR, and/or culture results may not always    correspond due to difference in methodologies.    ************************************************************    This PCR assay was performed by multiplex PCR. This    Assay tests for 66 bacterial and resistance gene targets.    Please refer to the [facility] test directory    at [email or web address] for details.  Organism: Blood Culture PCR (2023 10:30)  Organism: Blood Culture PCR (2023 10:30)      Method Type: PCR      -  K. pneumoniae group: Detec (K. pneumoniae, K. quasipneumoniae, K. variicola)    Culture - Blood (collected 2023 19:03)  Source: .Blood Blood-Peripheral  Preliminary Report (2023 01:02):    No growth to date.          RADIOLOGY & ADDITIONAL TESTS:    Care Discussed with Consultants/Other Providers:

## 2023-06-20 NOTE — CONSULT NOTE ADULT - SUBJECTIVE AND OBJECTIVE BOX
HPI:  72M hx of HTN, polio, Polycystic kidney disease s/p L nephrectomy now ESRD on HD MWF, hx of HepC (treated) pw Klebsiella bacteremia. Pt related 5 days ago developed moderate upper abd pain that lasted for a day and resolved but followed by fevers, chills Tmax 104 but no recurrent abd pain, no associated NVD, cough, SOB, CP, dysuria or flank pain. Was in ED 2 days ago and routine labs, UA, RVP neg and was given 1 dose of Vanco and Zosyn but recalled today for 1 culture bottle with Kleb pneum. Pt related still with fevers of 102 since D/C from ED but otherwise feels well. In ED Tmax: 100 P: 88 BP: 130/71 sat 95% on RA. WBC: 4.05 hgb: 10.7 69%N BUN/cr: 42/4.25 (2023 02:16)  Patient is a 72y old  Male who presents with a chief complaint of Klebsiella bacteremia (2023 12:30)  Surgery consulted for findings on CT scan suggestive of acute cholecystitis.  Currently pt denies N/V/D/fever/abd pain/blood in stool or hematemesis.       PAST MEDICAL & SURGICAL HISTORY:  Hypertension      Renal failure, chronic      Cancer of kidney, left      History of left nephrectomy    Social History:  quit tobacco 45 yrs ago. rare ETOH no illicit drug use. (2023 02:16)  FAMILY HISTORY:  FHx: renal failure (Mother)      ALLERGIES:  Demerol HCl (Unknown)    MEDICATIONS  (STANDING):  amLODIPine   Tablet 10 milliGRAM(s) Oral daily  carvedilol 25 milliGRAM(s) Oral every 12 hours  heparin   Injectable 5000 Unit(s) SubCutaneous every 8 hours  Nephro-odessa 1 Tablet(s) Oral daily  pantoprazole    Tablet 40 milliGRAM(s) Oral before breakfast  piperacillin/tazobactam IVPB.. 3.375 Gram(s) IV Intermittent every 12 hours    MEDICATIONS  (PRN):  acetaminophen     Tablet .. 650 milliGRAM(s) Oral every 6 hours PRN Temp greater or equal to 38C (100.4F), Mild Pain (1 - 3)      Home Medications:  amLODIPine 10 mg oral tablet: 1 tab(s) orally once a day (2023 20:04)  Coreg 25 mg oral tablet: 1 tab(s) orally once a day (2023 20:01)  dialyvite 800mg with zinc 50mg 1x/day:  (2023 20:04)  iron 75mg 1x/day:  (2023 20:02)    Vital Signs Last 24 Hrs  T(F): 98.4 (2023 12:42), Max: 100 (2023 21:25)  HR: 68 (2023 12:42) (68 - 89)  BP: 98/62 (2023 12:42) (98/62 - 134/68)  RR: 16 (2023 12:42) (16 - 20)  SpO2: 99% (2023 12:42) (95% - 99%)  I&O's Summary    PHYSICAL EXAM:  General: NAD, A/O x 3  ENT: MMM  Neck: Supple, No JVD  Lungs: Clear to auscultation bilaterally  Cardio: RRR, S1/S2, No murmurs  Abdomen: Soft, Nontender, Nondistended; Bowel sounds present  Extremities: No calf tenderness, No pitting edema    LABS:                        10.5   3.58  )-----------( 116      ( 2023 06:23 )             30.2     06-20    139  |  100  |  49  ----------------------------<  98  3.8   |  28  |  5.03    Ca    8.6      2023 06:23    TPro  6.1  /  Alb  2.7  /  TBili  0.3  /  DBili  x   /  AST  23  /  ALT  35  /  AlkPhos  47  06-20      PT/INR - ( 2023 22:10 )   PT: 14.5 sec;   INR: 1.25 ratio         PTT - ( 2023 22:10 )  PTT:28.2 sec  Lactate, Blood: 0.6 mmol/L ( @ 22:10)  Lactate, Blood: 1.0 mmol/L ( @ 19:03)        Urinalysis Basic - ( 2023 19:03 )    Color: Yellow / Appearance: Clear / S.009 / pH: x  Gluc: x / Ketone: Negative mg/dL  / Bili: Negative / Urobili: 0.2 mg/dL   Blood: x / Protein: 100 mg/dL / Nitrite: Negative   Leuk Esterase: Negative / RBC: 3 /HPF / WBC 0 /HPF   Sq Epi: x / Non Sq Epi: x / Bacteria: Negative /HPF        Culture - Urine (collected 2023 19:03)  Source: Clean Catch Clean Catch (Midstream)  Final Report (2023 07:26):    <10,000 CFU/mL Normal Urogenital Carmen    Culture - Blood (collected 2023 19:03)  Source: .Blood Blood-Peripheral  Gram Stain (2023 07:57):    Growth in aerobic bottle: Gram Negative Rods  Preliminary Report (2023 23:35):    Growth in aerobic bottle: Klebsiella pneumoniae    ***Blood Panel PCR results on this specimen are available    approximately 3 hours after the Gram stain result.***    Gram stain, PCR, and/or culture results may not always    correspond due to difference in methodologies.    ************************************************************    This PCR assay was performed by multiplex PCR. This    Assay tests for 66 bacterial and resistance gene targets.    Please refer to the [facility] test directory    at [email or web address] for details.  Organism: Blood Culture PCR (2023 10:30)  Organism: Blood Culture PCR (2023 10:30)      Method Type: PCR      -  K. pneumoniae group: Detec (K. pneumoniae, K. quasipneumoniae, K. variicola)    Culture - Blood (collected 2023 19:03)  Source: .Blood Blood-Peripheral  Preliminary Report (2023 01:02):    No growth to date.      RADIOLOGY & ADDITIONAL TESTS:  < from: CT Abdomen and Pelvis No Cont (23 @ 08:47) >    ACC: [id number] EXAM:  CT ABDOMEN AND PELVIS   ORDERED BY: YUKI LCIEA     ACC: [id number] EXAM:  CT CHEST   ORDERED BY: YUKI LICEA     PROCEDURE DATE:  2023          INTERPRETATION:  CLINICAL INFORMATION: Klebsiella bacteremia    COMPARISON: CT abdomen pelvis 2019    CONTRAST/COMPLICATIONS:  IV Contrast:  Oral Contrast:  Complications:    PROCEDURE:  CT of the Chest, Abdomen and Pelvis was performed.  Sagittal and coronal reformats were performed.    FINDINGS:  CHEST:  LUNGS AND LARGE AIRWAYS: Patent central airways. Scattered calcified   granulomas. No pulmonary consolidation. Trace bibasilar dependent   atelectasis.  PLEURA: No pleural effusion.  VESSELS: Atherosclerotic changes of the aorta and coronary arteries.  HEART: Heart size is normal. No pericardial effusion.  MEDIASTINUM AND NALLELY: No lymphadenopathy.  CHEST WALL AND LOWER NECK: Within normal limits.    ABDOMEN AND PELVIS:  LIVER: Within normal limits.  BILE DUCTS: Normal caliber.  GALLBLADDER: Cholelithiasis gallbladder wall thickening and   pericholecystic inflammation is noted.  SPLEEN: Within normal limits.  PANCREAS: Within normal limits.  ADRENALS: Within normal limits.  KIDNEYS/URETERS: Left nephrectomy. Polycystic enlarged right kidney.   Multiple hyperdense lesions are again noted, incompletely characterized   without intravenous contrast. No hydronephrosis.    BLADDER: Within normal limits.  REPRODUCTIVE ORGANS: Prostate is enlarged.    BOWEL: No bowel obstruction. Appendix is normal.  PERITONEUM: No ascites.  VESSELS: Atherosclerotic changes.  RETROPERITONEUM/LYMPH NODES: No lymphadenopathy.  ABDOMINAL WALL: Atrophic left psoas and pelvic girdle musculature.  BONES: Degenerative changes.    IMPRESSION:  No acute pulmonary pathology.    CTfindings suggest acute cholecystitis. Please correlate for the   presence of a Cuadra sign.        --- End of Report ---            CAROL ANN ROY MD; Attending Radiologist  This document has been electronically signed. 2023 12:01PM    < end of copied text >    Care Discussed with Consultants/Other Providers:

## 2023-06-20 NOTE — H&P ADULT - NSHPPHYSICALEXAM_GEN_ALL_CORE
Vital Signs Last 24 Hrs  T(C): 37.8 (19 Jun 2023 21:25), Max: 37.8 (19 Jun 2023 21:25)  T(F): 100 (19 Jun 2023 21:25), Max: 100 (19 Jun 2023 21:25)  HR: 76 (19 Jun 2023 23:05) (76 - 89)  BP: 111/68 (19 Jun 2023 23:05) (111/68 - 134/68)  BP(mean): --  RR: 20 (19 Jun 2023 23:05) (18 - 20)  SpO2: 96% (19 Jun 2023 23:05) (95% - 96%)    Parameters below as of 19 Jun 2023 23:05  Patient On (Oxygen Delivery Method): room air      Daily Height in cm: 162.56 (19 Jun 2023 21:25)    Daily   CAPILLARY BLOOD GLUCOSE        I&O's Summary      GENERAL: NAD  HEAD:  Normocephalic  EYES: EOMI, PERRLA, conjunctiva and sclera clear  ENMT: No tonsillar erythema, exudates, or enlargement; Moist mucous membranes, No lesions  NECK: Supple, No JVD, no bruit, normal thyroid  NERVOUS SYSTEM:  Alert & Oriented X3, Good concentration; grossly moves all threes. L LE flaccid paralysis  CHEST/LUNG: Clear to auscultation bilaterally; No rales, rhonchi, wheezing, or rubs  HEART: Regular rate and rhythm; No murmurs, rubs, or gallops  ABDOMEN: Soft, Nontender, Nondistended; Bowel sounds present  EXTREMITIES:  2+ Peripheral Pulses, No clubbing, cyanosis, or edema. L arm AV fistula  LYMPH: No lymphadenopathy noted  SKIN: No rashes or lesions

## 2023-06-20 NOTE — PATIENT PROFILE ADULT - FALL HARM RISK - HARM RISK INTERVENTIONS

## 2023-06-20 NOTE — H&P ADULT - ASSESSMENT
72M hx of HTN, polio, Polycystic kidney disease s/p L nephrectomy now ESRD on HD MWF, hx of HepC (treated) pw Klebsiella bacteremia.    #Klebsiella bacteremia  Cont IV Zosyn.  Check CT chest abd pelvis to assess for source.   ID consult.     #HTN  cont coreg and amlodipine as tolerated.    #ESRD on HD  Renal consult.     Declined my contacting his daughter as she is already aware.

## 2023-06-20 NOTE — H&P ADULT - NSHPREVIEWOFSYSTEMS_GEN_ALL_CORE
CONSTITUTIONAL: + fever, weight loss, no  fatigue  EYES: No eye pain, visual disturbances, or discharge  ENMT:  No difficulty hearing, tinnitus, vertigo; No sinus or throat pain  NECK: No pain or stiffness  RESPIRATORY: No cough, wheezing, chills or hemoptysis; No shortness of breath  CARDIOVASCULAR: No chest pain, palpitations, dizziness, or leg swelling  GASTROINTESTINAL: + abdominal  pain x 1 day. No nausea, vomiting, or hematemesis; No diarrhea or constipation. No melena or hematochezia.  GENITOURINARY: No dysuria, frequency, hematuria, or incontinence  NEUROLOGICAL: No headaches, memory loss, loss of strength, numbness, or tremors  SKIN: No itching, burning, rashes, or lesions   LYMPH NODES: No enlarged glands  ENDOCRINE: No heat or cold intolerance; No hair loss  MUSCULOSKELETAL: No joint pain or swelling; No muscle, back, or extremity pain  PSYCHIATRIC: No depression, anxiety, mood swings, or difficulty sleeping  HEME/LYMPH: No easy bruising, or bleeding gums  ALLERY AND IMMUNOLOGIC: No hives or eczema    IMPROVE VTE Individual Risk Assessment          RISK                                                          Points  [  ] Previous VTE                                                3  [  ] Thrombophilia                                             2  [  2] Lower limb paralysis                                   2        (unable to hold up >15 seconds)    [  ] Current Cancer                                             2         (within 6 months)  [  ] Immobilization > 24 hrs                              1  [  ] ICU/CCU stay > 24 hours                             1  [ 1 ] Age > 60                                                         1    IMPROVE VTE Score:         [  3       ]    Total Risk Factor Score:    0 - 1:   Consider IPC  >2 - 3:  Thromboprophylaxis required (enoxaparin or SQ heparin)        >4:   High Risk: Thromboprophylaxis required (enoxaparin or SQ heparin), optional add IPC  **If CONTRAINDICATION to enoxaparin or SQ heparin, USE IPCs**

## 2023-06-20 NOTE — PATIENT PROFILE ADULT - FUNCTIONAL ASSESSMENT - BASIC MOBILITY 6.
4-calculated by average/Not able to assess (calculate score using [facility] averaging method)

## 2023-06-20 NOTE — CONSULT NOTE ADULT - SUBJECTIVE AND OBJECTIVE BOX
NEPHROLOGY CONSULTATION    CHIEF COMPLAINT: bacteremia    HPI:  Pt is 73 yo M w/hx of HTN, polio, polycystic kidney disease s/p L nephrectomy, ESRD on HD MWF, hx of Hep C (treated) p/w Klebsiella bacteremia. Pt related 5 days ago developed upper abd pain that lasted for a day and resolved but followed by fevers, chills, T max 104 but no recurrent abd pain. Was in ED 6/17 was given 1 dose of Vanco and Zosyn and cx were sent, called today to return to the hospital due to positive bld cx. Seen in ED. In no distress. No CP, SOB, N/V/D/C, still w/some UO.     ROS:  as above    Allergies:  Demerol HCl (Unknown)    PAST MEDICAL & SURGICAL HISTORY:  Hypertension  ADPKD  ESRD  Cancer of kidney, L  L nephrectomy  AVF    SOCIAL HISTORY:  negatie    FAMILY HISTORY:  renal failure (Mother)    MEDICATIONS  (STANDING):  amLODIPine   Tablet 10 milliGRAM(s) Oral daily  carvedilol 25 milliGRAM(s) Oral every 12 hours  heparin   Injectable 5000 Unit(s) SubCutaneous every 8 hours  Nephro-odessa 1 Tablet(s) Oral daily  pantoprazole    Tablet 40 milliGRAM(s) Oral before breakfast  piperacillin/tazobactam IVPB.. 3.375 Gram(s) IV Intermittent every 12 hours    Home Medications:  amLODIPine 10 mg oral tablet: 1 tab(s) orally once a day (17 Jun 2023 20:04)  Coreg 25 mg oral tablet: 1 tab(s) orally once a day (17 Jun 2023 20:01)  dialyvite 800mg with zinc 50mg 1x/day:  (17 Jun 2023 20:04)  iron 75mg 1x/day:  (17 Jun 2023 20:02)    Vital Signs Last 24 Hrs  T(C): 36.9 (06-20-23 @ 12:42), Max: 37.8 (06-19-23 @ 21:25)  T(F): 98.4 (06-20-23 @ 12:42), Max: 100 (06-19-23 @ 21:25)  HR: 68 (06-20-23 @ 12:42) (68 - 89)  BP: 98/62 (06-20-23 @ 12:42) (98/62 - 134/68)  RR: 16 (06-20-23 @ 12:42) (16 - 20)  SpO2: 99% (06-20-23 @ 12:42) (95% - 99%)    s1s2  b/l air entry  soft, NT  no edema, LUE AVF    LABS:                        10.5   3.58  )-----------( 116      ( 20 Jun 2023 06:23 )             30.2     06-20    139  |  100  |  49<H>  ----------------------------<  98  3.8   |  28  |  5.03<H>    Ca    8.6      20 Jun 2023 06:23    TPro  6.1  /  Alb  2.7<L>  /  TBili  0.3  /  DBili  x   /  AST  23  /  ALT  35  /  AlkPhos  47  06-20    LIVER FUNCTIONS - ( 20 Jun 2023 06:23 )  Alb: 2.7 g/dL / Pro: 6.1 g/dL / ALK PHOS: 47 U/L / ALT: 35 U/L / AST: 23 U/L / GGT: x           Culture - Urine (collected 17 Jun 2023 19:03)  Source: Clean Catch Clean Catch (Midstream)  Final Report (19 Jun 2023 07:26):    <10,000 CFU/mL Normal Urogenital Carmen    Culture - Blood (collected 17 Jun 2023 19:03)  Source: .Blood Blood-Peripheral  Gram Stain (19 Jun 2023 07:57):    Growth in aerobic bottle: Gram Negative Rods  Final Report (20 Jun 2023 16:17):    Growth in aerobic bottle: Klebsiella pneumoniae    ***Blood Panel PCR results on this specimen are available    approximately 3 hours after the Gram stain result.***    Gram stain, PCR, and/or culture results may not always    correspond due to difference in methodologies.    ************************************************************    This PCR assay was performed by multiplex PCR. This    Assay tests for 66 bacterial and resistance gene targets.    Please refer to the [facility] test directory    at [email or web address] for details.  Organism: Blood Culture PCR  Klebsiella pneumoniae (20 Jun 2023 16:17)  Organism: Klebsiella pneumoniae (20 Jun 2023 16:17)  Organism: Blood Culture PCR (20 Jun 2023 16:17)    Culture - Blood (collected 17 Jun 2023 19:03)  Source: .Blood Blood-Peripheral  Preliminary Report (19 Jun 2023 01:02):    No growth to date.    PT/INR - ( 19 Jun 2023 22:10 )   PT: 14.5 sec;   INR: 1.25 ratio       PTT - ( 19 Jun 2023 22:10 )  PTT:28.2 sec    A/P:    ESRD on HD  Kleb bacteremia, suspected Gallbladder source   Abx per ID  Surgery following  Next HD tomorrow  TMP as able  Will follow     [phone]

## 2023-06-20 NOTE — PATIENT PROFILE ADULT - FOOD INSECURITY
Please call Marcella Morton in two weeks to evaluate pain response to Bilateral TPI lumbar paraspinals, multifidus, quadratus lumborum performed by Winnie VEGAS on 5/30/2023.      Pre-procedure pain: 7   no

## 2023-06-20 NOTE — H&P ADULT - HISTORY OF PRESENT ILLNESS
72M hx of HTN, Polycystic kidney disease s/p L nephrectomy now ESRD on HD MWF, hx of HepC (treated) pw Klebsiella bacteremia. Pt related 5 days ago developed moderate upper abd pain that lasted for a day and resolved but followed by fevers, chills Tmax 104 but no recurrent abd pain, no associated NVD, cough, SOB, CP, dysuria or flank pain. Was in ED 2 days ago and routine labs, UA, RVP neg and was given 1 dose of Vanco and Zosyn but recalled today for 1 culture bottle with Kleb pneum. Pt related still with fevers of 102 since D/C from ED but otherwise feels well.  72M hx of HTN, polio, Polycystic kidney disease s/p L nephrectomy now ESRD on HD MWF, hx of HepC (treated) pw Klebsiella bacteremia. Pt related 5 days ago developed moderate upper abd pain that lasted for a day and resolved but followed by fevers, chills Tmax 104 but no recurrent abd pain, no associated NVD, cough, SOB, CP, dysuria or flank pain. Was in ED 2 days ago and routine labs, UA, RVP neg and was given 1 dose of Vanco and Zosyn but recalled today for 1 culture bottle with Kleb pneum. Pt related still with fevers of 102 since D/C from ED but otherwise feels well.  72M hx of HTN, polio, Polycystic kidney disease s/p L nephrectomy now ESRD on HD MWF, hx of HepC (treated) pw Klebsiella bacteremia. Pt related 5 days ago developed moderate upper abd pain that lasted for a day and resolved but followed by fevers, chills Tmax 104 but no recurrent abd pain, no associated NVD, cough, SOB, CP, dysuria or flank pain. Was in ED 2 days ago and routine labs, UA, RVP neg and was given 1 dose of Vanco and Zosyn but recalled today for 1 culture bottle with Kleb pneum. Pt related still with fevers of 102 since D/C from ED but otherwise feels well. In ED Tmax: 100 P: 88 BP: 130/71 sat 95% on RA. WBC: 4.05 hgb: 10.7 69%N BUN/cr: 42/4.25

## 2023-06-20 NOTE — CONSULT NOTE ADULT - SUBJECTIVE AND OBJECTIVE BOX
HPI:   Patient is a 72y male with  HTN, polio, Polycystic kidney disease s/p L nephrectomy now ESRD on HD MWF, hx of HepC (treated) He initially presented to ED with upper abd pain, fevers, chills Tmax 104, received a dose of Vanco and Zosyn but recalled to come back because blood cult now with Latoya pneum. He still had fevers at home, now on Zosyn.     REVIEW OF SYSTEMS:  All other review of systems negative (Comprehensive ROS)    PAST MEDICAL & SURGICAL HISTORY:  Hypertension      Renal failure, chronic      Cancer of kidney, left      History of left nephrectomy          Allergies    Demerol HCl (Unknown)    Intolerances        Antimicrobials Day #    piperacillin/tazobactam IVPB.. 3.375 Gram(s) IV Intermittent every 12 hours    Other Medications:  acetaminophen     Tablet .. 650 milliGRAM(s) Oral every 6 hours PRN  amLODIPine   Tablet 10 milliGRAM(s) Oral daily  carvedilol 25 milliGRAM(s) Oral every 12 hours  heparin   Injectable 5000 Unit(s) SubCutaneous every 8 hours  Nephro-odessa 1 Tablet(s) Oral daily  pantoprazole    Tablet 40 milliGRAM(s) Oral before breakfast      FAMILY HISTORY:  FHx: renal failure (Mother)        SOCIAL HISTORY:  Smoking:     ETOH:     Drug Use:     Single     T(F): 100 (06-19-23 @ 21:25), Max: 100 (06-19-23 @ 21:25)  HR: 75 (06-20-23 @ 01:41)  BP: 104/67 (06-20-23 @ 01:41)  RR: 18 (06-20-23 @ 01:41)  SpO2: 97% (06-20-23 @ 01:41)  Wt(kg): --    PHYSICAL EXAM:  General: alert, no acute distress  Eyes:  anicteric, no conjunctival injection, no discharge  Oropharynx: no lesions or injection 	  Neck: supple, without adenopathy  Lungs: clear to auscultation  Heart: regular rate and rhythm; no murmur, rubs or gallops  Abdomen: soft, nondistended, nontender, without mass or organomegaly  Skin: no lesions  Extremities: no clubbing, cyanosis, or edema  Neurologic: alert, oriented, moves all extremities    LAB RESULTS:                        10.5   3.58  )-----------( 116      ( 20 Jun 2023 06:23 )             30.2     06-20    139  |  100  |  49<H>  ----------------------------<  98  3.8   |  28  |  5.03<H>    Ca    8.6      20 Jun 2023 06:23    TPro  6.1  /  Alb  2.7<L>  /  TBili  0.3  /  DBili  x   /  AST  23  /  ALT  35  /  AlkPhos  47  06-20    LIVER FUNCTIONS - ( 20 Jun 2023 06:23 )  Alb: 2.7 g/dL / Pro: 6.1 g/dL / ALK PHOS: 47 U/L / ALT: 35 U/L / AST: 23 U/L / GGT: x               MICROBIOLOGY REVIEWED:  Culture - Blood (06.17.23 @ 19:03)   Gram Stain:   Growth in aerobic bottle: Gram Negative Rods  - K. pneumoniae group: Detec (K. pneumoniae, K. quasipneumoniae, K. variicola)  Specimen Source: .Blood Blood-Peripheral  Organism: Blood Culture PCR  Culture Results:   Growth in aerobic bottle: Klebsiella pneumoniae   ***Blood Panel PCR results on this specimen are available   approximately 3 hours after the Gram stain result.***   Gram stain, PCR, and/or culture results may not always   correspond due to difference in methodologies.   RADIOLOGY REVIEWED:  < from: CT Chest No Cont (06.20.23 @ 08:47) >  IMPRESSION:  No acute pulmonary pathology.    CTfindings suggest acute cholecystitis. Please correlate for the   presence of a Cuadra sign.    < end of copied text >

## 2023-06-20 NOTE — CONSULT NOTE ADULT - ASSESSMENT
72y male with  HTN, polio, Polycystic kidney disease s/p L nephrectomy now ESRD on HD MWF, hx of HepC (treated) He initially presented to ED with upper abd pain, fevers, chills Tmax 104, received a dose of Vanco and Zosyn but recalled to come back because blood cult now with Latoya pneum. He still had fevers at home, now on Zosyn.   CT with findings suggestive of acute antonio, LFTs are normal    P:  will follow. will need laparoscopic cholecystectomy on this admission or elective basis once bacteremia is treated and patient is medically optimized.  continue current management per medical/ID teams.

## 2023-06-20 NOTE — H&P ADULT - NSICDXFAMILYHX_GEN_ALL_CORE_FT
FAMILY HISTORY:  Mother  Still living? Unknown  FHx: renal failure, Age at diagnosis: Age Unknown

## 2023-06-20 NOTE — H&P ADULT - NSHPLABSRESULTS_GEN_ALL_CORE
10.7   4.05  )-----------( 123      ( 19 Jun 2023 22:10 )             30.5       06-19    135  |  100  |  42<H>  ----------------------------<  126<H>  3.8   |  28  |  4.25<H>    Ca    8.9      19 Jun 2023 22:10    TPro  6.4  /  Alb  2.9<L>  /  TBili  0.4  /  DBili  x   /  AST  25  /  ALT  37  /  AlkPhos  58  06-19    Lactate, Blood: 0.6 mmol/L (06-19 @ 22:10)  Lactate, Blood: 1.0 mmol/L (06-17 @ 19:03)       LIVER FUNCTIONS - ( 19 Jun 2023 22:10 )  Alb: 2.9 g/dL / Pro: 6.4 g/dL / ALK PHOS: 58 U/L / ALT: 37 U/L / AST: 25 U/L / GGT: x               PT/INR - ( 19 Jun 2023 22:10 )   PT: 14.5 sec;   INR: 1.25 ratio         PTT - ( 19 Jun 2023 22:10 )  PTT:28.2 sec              CAPILLARY BLOOD GLUCOSE            EKG: personally rev. NSR at 74bpm, no acute ST changes.       CXR: wet read. NAPD

## 2023-06-20 NOTE — CONSULT NOTE ADULT - ASSESSMENT
72y male with  HTN, polio, Polycystic kidney disease s/p L nephrectomy now ESRD on HD MWF, hx of HepC (treated) He initially presented to ED with upper abd pain, fevers, chills Tmax 104, received a dose of Vanco and Zosyn but recalled to come back because blood cult now with Kleb pneum. He still had fevers at home, now on Zosyn.   CT with findings suggestive of acute antonio, LFTs are normal, also with polycystic kidney disease, but urinc cult is negative and pt is on HD, doubt  source    Suggest:   Zosyn ok for now, will f/u sens of Klebsiella  Consider GI/surgery, may need HIDA, perc antonio, ERCP  d/w medicine NP and Dr Wong

## 2023-06-20 NOTE — PROGRESS NOTE ADULT - ASSESSMENT
72 male with HTN, Polio, PKD s/p LT nephrectomy now ESRD on HD MWF, hx of Hepatitis C (treated), pw klebsiella bacteremia.    Klebsiella Bacteremia  pt admitted with klebsiella in blood 1/2 bottles  Continue IV zosyn for now, follow repeat blood cultures  ID consult pending, follow up CT A/P/C  monitor fever curve, antipyretics    Anemia of CKD  Pancytopenia, likely reactive from bacteremia  doubt acute blood loss  monitor CBC    HTN  chronic condition  continue norvasc and coreg    ESRD on HD  renal consult  continue nephro odessa, HD as per renal    DVT PPx  heparin    reports will update his daughter on his own 72 male with HTN, Polio, PKD s/p LT nephrectomy now ESRD on HD MWF, hx of Hepatitis C (treated), pw klebsiella bacteremia.    Klebsiella Bacteremia  pt admitted with klebsiella in blood 1/2 bottles  Continue IV zosyn for now, follow repeat blood cultures  ID consult pending, follow up CT A/P/C  Monitor fever curve, antipyretics  Surgery Consult for CT findings of possible acute Cholecystitis-  Vital signs q4hrs  Bed rest  Input and output  keep patient NPO   IVF D5 1/2 NS @ 125 ml/hr  Please start Ciprofloxacin 500 mg po q12 hr and Flagyl 500 mg ivpb q8hr  Unasyn 1.5-3.0 gr IVPB q6hr  Cefoxitin   Please order CXR, EKG, RUQ US, HIDA scan, acute abd series  GI consult  Surigical consult     Anemia of CKD  Pancytopenia, likely reactive from bacteremia  doubt acute blood loss  monitor CBC    HTN  chronic condition  continue norvasc and coreg    ESRD on HD  renal consult  continue nephro odessa, HD as per renal    DVT PPx  heparin    reports will update his daughter on his own

## 2023-06-21 ENCOUNTER — TRANSCRIPTION ENCOUNTER (OUTPATIENT)
Age: 73
End: 2023-06-21

## 2023-06-21 DIAGNOSIS — N18.6 END STAGE RENAL DISEASE: ICD-10-CM

## 2023-06-21 DIAGNOSIS — R78.81 BACTEREMIA: ICD-10-CM

## 2023-06-21 DIAGNOSIS — Z29.9 ENCOUNTER FOR PROPHYLACTIC MEASURES, UNSPECIFIED: ICD-10-CM

## 2023-06-21 DIAGNOSIS — I10 ESSENTIAL (PRIMARY) HYPERTENSION: ICD-10-CM

## 2023-06-21 LAB
ALBUMIN SERPL ELPH-MCNC: 2.9 G/DL — LOW (ref 3.3–5)
ALBUMIN SERPL ELPH-MCNC: 2.9 G/DL — LOW (ref 3.3–5)
ALP SERPL-CCNC: 125 U/L — HIGH (ref 40–120)
ALP SERPL-CCNC: 53 U/L — SIGNIFICANT CHANGE UP (ref 40–120)
ALT FLD-CCNC: 146 U/L — HIGH (ref 10–45)
ALT FLD-CCNC: 35 U/L — SIGNIFICANT CHANGE UP (ref 10–45)
ANION GAP SERPL CALC-SCNC: 11 MMOL/L — SIGNIFICANT CHANGE UP (ref 5–17)
ANION GAP SERPL CALC-SCNC: 14 MMOL/L — SIGNIFICANT CHANGE UP (ref 5–17)
ANION GAP SERPL CALC-SCNC: 17 MMOL/L — SIGNIFICANT CHANGE UP (ref 5–17)
APPEARANCE UR: CLEAR — SIGNIFICANT CHANGE UP
AST SERPL-CCNC: 119 U/L — HIGH (ref 10–40)
AST SERPL-CCNC: 19 U/L — SIGNIFICANT CHANGE UP (ref 10–40)
BASE EXCESS BLDA CALC-SCNC: -3.8 MMOL/L — LOW (ref -2–3)
BASOPHILS # BLD AUTO: 0.02 K/UL — SIGNIFICANT CHANGE UP (ref 0–0.2)
BASOPHILS NFR BLD AUTO: 0.4 % — SIGNIFICANT CHANGE UP (ref 0–2)
BILIRUB SERPL-MCNC: 0.4 MG/DL — SIGNIFICANT CHANGE UP (ref 0.2–1.2)
BILIRUB SERPL-MCNC: 0.5 MG/DL — SIGNIFICANT CHANGE UP (ref 0.2–1.2)
BILIRUB UR-MCNC: NEGATIVE — SIGNIFICANT CHANGE UP
BLD GP AB SCN SERPL QL: SIGNIFICANT CHANGE UP
BLOOD GAS COMMENTS ARTERIAL: SIGNIFICANT CHANGE UP
BUN SERPL-MCNC: 32 MG/DL — HIGH (ref 7–23)
BUN SERPL-MCNC: 37 MG/DL — HIGH (ref 7–23)
BUN SERPL-MCNC: 70 MG/DL — HIGH (ref 7–23)
CALCIUM SERPL-MCNC: 9 MG/DL — SIGNIFICANT CHANGE UP (ref 8.4–10.5)
CALCIUM SERPL-MCNC: 9 MG/DL — SIGNIFICANT CHANGE UP (ref 8.4–10.5)
CALCIUM SERPL-MCNC: 9.1 MG/DL — SIGNIFICANT CHANGE UP (ref 8.4–10.5)
CHLORIDE SERPL-SCNC: 96 MMOL/L — SIGNIFICANT CHANGE UP (ref 96–108)
CHLORIDE SERPL-SCNC: 98 MMOL/L — SIGNIFICANT CHANGE UP (ref 96–108)
CHLORIDE SERPL-SCNC: 98 MMOL/L — SIGNIFICANT CHANGE UP (ref 96–108)
CO2 BLDA-SCNC: 22 MMOL/L — SIGNIFICANT CHANGE UP (ref 19–24)
CO2 SERPL-SCNC: 24 MMOL/L — SIGNIFICANT CHANGE UP (ref 22–31)
CO2 SERPL-SCNC: 27 MMOL/L — SIGNIFICANT CHANGE UP (ref 22–31)
CO2 SERPL-SCNC: 28 MMOL/L — SIGNIFICANT CHANGE UP (ref 22–31)
COLOR SPEC: YELLOW — SIGNIFICANT CHANGE UP
CREAT SERPL-MCNC: 3.45 MG/DL — HIGH (ref 0.5–1.3)
CREAT SERPL-MCNC: 4.08 MG/DL — HIGH (ref 0.5–1.3)
CREAT SERPL-MCNC: 6.23 MG/DL — HIGH (ref 0.5–1.3)
DIFF PNL FLD: ABNORMAL
EGFR: 15 ML/MIN/1.73M2 — LOW
EGFR: 18 ML/MIN/1.73M2 — LOW
EGFR: 9 ML/MIN/1.73M2 — LOW
EOSINOPHIL # BLD AUTO: 0.16 K/UL — SIGNIFICANT CHANGE UP (ref 0–0.5)
EOSINOPHIL NFR BLD AUTO: 3.4 % — SIGNIFICANT CHANGE UP (ref 0–6)
GAS PNL BLDA: SIGNIFICANT CHANGE UP
GLUCOSE SERPL-MCNC: 114 MG/DL — HIGH (ref 70–99)
GLUCOSE SERPL-MCNC: 128 MG/DL — HIGH (ref 70–99)
GLUCOSE SERPL-MCNC: 160 MG/DL — HIGH (ref 70–99)
GLUCOSE UR QL: NEGATIVE MG/DL — SIGNIFICANT CHANGE UP
HCO3 BLDA-SCNC: 21 MMOL/L — SIGNIFICANT CHANGE UP (ref 21–28)
HCT VFR BLD CALC: 33.5 % — LOW (ref 39–50)
HCT VFR BLD CALC: 34 % — LOW (ref 39–50)
HCT VFR BLD CALC: 35.1 % — LOW (ref 39–50)
HCV AB S/CO SERPL IA: 9.19 S/CO — HIGH (ref 0–0.99)
HCV AB SERPL-IMP: REACTIVE
HCV RNA FLD QL NAA+PROBE: SIGNIFICANT CHANGE UP
HCV RNA SPEC QL PROBE+SIG AMP: SIGNIFICANT CHANGE UP
HGB BLD-MCNC: 11.4 G/DL — LOW (ref 13–17)
HGB BLD-MCNC: 11.7 G/DL — LOW (ref 13–17)
HGB BLD-MCNC: 11.8 G/DL — LOW (ref 13–17)
HOROWITZ INDEX BLDA+IHG-RTO: 50 — SIGNIFICANT CHANGE UP
IMM GRANULOCYTES NFR BLD AUTO: 0.4 % — SIGNIFICANT CHANGE UP (ref 0–0.9)
INR BLD: 1.1 RATIO — SIGNIFICANT CHANGE UP (ref 0.88–1.16)
KETONES UR-MCNC: ABNORMAL MG/DL
LEUKOCYTE ESTERASE UR-ACNC: NEGATIVE — SIGNIFICANT CHANGE UP
LYMPHOCYTES # BLD AUTO: 0.95 K/UL — LOW (ref 1–3.3)
LYMPHOCYTES # BLD AUTO: 20 % — SIGNIFICANT CHANGE UP (ref 13–44)
MAGNESIUM SERPL-MCNC: 1.9 MG/DL — SIGNIFICANT CHANGE UP (ref 1.6–2.6)
MCHC RBC-ENTMCNC: 31.2 PG — SIGNIFICANT CHANGE UP (ref 27–34)
MCHC RBC-ENTMCNC: 31.6 PG — SIGNIFICANT CHANGE UP (ref 27–34)
MCHC RBC-ENTMCNC: 32.5 PG — SIGNIFICANT CHANGE UP (ref 27–34)
MCHC RBC-ENTMCNC: 33.5 GM/DL — SIGNIFICANT CHANGE UP (ref 32–36)
MCHC RBC-ENTMCNC: 33.6 GM/DL — SIGNIFICANT CHANGE UP (ref 32–36)
MCHC RBC-ENTMCNC: 34.9 GM/DL — SIGNIFICANT CHANGE UP (ref 32–36)
MCV RBC AUTO: 93.1 FL — SIGNIFICANT CHANGE UP (ref 80–100)
MCV RBC AUTO: 93.2 FL — SIGNIFICANT CHANGE UP (ref 80–100)
MCV RBC AUTO: 94.1 FL — SIGNIFICANT CHANGE UP (ref 80–100)
MONOCYTES # BLD AUTO: 0.57 K/UL — SIGNIFICANT CHANGE UP (ref 0–0.9)
MONOCYTES NFR BLD AUTO: 12 % — SIGNIFICANT CHANGE UP (ref 2–14)
NEUTROPHILS # BLD AUTO: 3.02 K/UL — SIGNIFICANT CHANGE UP (ref 1.8–7.4)
NEUTROPHILS NFR BLD AUTO: 63.8 % — SIGNIFICANT CHANGE UP (ref 43–77)
NITRITE UR-MCNC: NEGATIVE — SIGNIFICANT CHANGE UP
NRBC # BLD: 0 /100 WBCS — SIGNIFICANT CHANGE UP (ref 0–0)
PCO2 BLDA: 35 MMHG — SIGNIFICANT CHANGE UP (ref 35–48)
PH BLDA: 7.39 — SIGNIFICANT CHANGE UP (ref 7.35–7.45)
PH UR: 7.5 — SIGNIFICANT CHANGE UP (ref 5–8)
PHOSPHATE SERPL-MCNC: 6.9 MG/DL — HIGH (ref 2.5–4.5)
PLATELET # BLD AUTO: 122 K/UL — LOW (ref 150–400)
PLATELET # BLD AUTO: 126 K/UL — LOW (ref 150–400)
PLATELET # BLD AUTO: 147 K/UL — LOW (ref 150–400)
PO2 BLDA: 120 MMHG — HIGH (ref 83–108)
POTASSIUM SERPL-MCNC: 3.9 MMOL/L — SIGNIFICANT CHANGE UP (ref 3.5–5.3)
POTASSIUM SERPL-MCNC: 4 MMOL/L — SIGNIFICANT CHANGE UP (ref 3.5–5.3)
POTASSIUM SERPL-MCNC: 4.6 MMOL/L — SIGNIFICANT CHANGE UP (ref 3.5–5.3)
POTASSIUM SERPL-SCNC: 3.9 MMOL/L — SIGNIFICANT CHANGE UP (ref 3.5–5.3)
POTASSIUM SERPL-SCNC: 4 MMOL/L — SIGNIFICANT CHANGE UP (ref 3.5–5.3)
POTASSIUM SERPL-SCNC: 4.6 MMOL/L — SIGNIFICANT CHANGE UP (ref 3.5–5.3)
PROT SERPL-MCNC: 6.6 G/DL — SIGNIFICANT CHANGE UP (ref 6–8.3)
PROT SERPL-MCNC: 6.7 G/DL — SIGNIFICANT CHANGE UP (ref 6–8.3)
PROT UR-MCNC: 300 MG/DL
PROTHROM AB SERPL-ACNC: 12.8 SEC — SIGNIFICANT CHANGE UP (ref 10.5–13.4)
RBC # BLD: 3.6 M/UL — LOW (ref 4.2–5.8)
RBC # BLD: 3.65 M/UL — LOW (ref 4.2–5.8)
RBC # BLD: 3.73 M/UL — LOW (ref 4.2–5.8)
RBC # FLD: 13.3 % — SIGNIFICANT CHANGE UP (ref 10.3–14.5)
SAO2 % BLDA: 100 % — HIGH (ref 94–98)
SODIUM SERPL-SCNC: 137 MMOL/L — SIGNIFICANT CHANGE UP (ref 135–145)
SODIUM SERPL-SCNC: 137 MMOL/L — SIGNIFICANT CHANGE UP (ref 135–145)
SODIUM SERPL-SCNC: 139 MMOL/L — SIGNIFICANT CHANGE UP (ref 135–145)
SP GR SPEC: 1.01 — SIGNIFICANT CHANGE UP (ref 1–1.03)
UROBILINOGEN FLD QL: 1 MG/DL — SIGNIFICANT CHANGE UP (ref 0.2–1)
WBC # BLD: 4.74 K/UL — SIGNIFICANT CHANGE UP (ref 3.8–10.5)
WBC # BLD: 4.95 K/UL — SIGNIFICANT CHANGE UP (ref 3.8–10.5)
WBC # BLD: 8.09 K/UL — SIGNIFICANT CHANGE UP (ref 3.8–10.5)
WBC # FLD AUTO: 4.74 K/UL — SIGNIFICANT CHANGE UP (ref 3.8–10.5)
WBC # FLD AUTO: 4.95 K/UL — SIGNIFICANT CHANGE UP (ref 3.8–10.5)
WBC # FLD AUTO: 8.09 K/UL — SIGNIFICANT CHANGE UP (ref 3.8–10.5)

## 2023-06-21 PROCEDURE — S2900 ROBOTIC SURGICAL SYSTEM: CPT | Mod: NC

## 2023-06-21 PROCEDURE — 99232 SBSQ HOSP IP/OBS MODERATE 35: CPT

## 2023-06-21 PROCEDURE — 47563 LAPARO CHOLECYSTECTOMY/GRAPH: CPT

## 2023-06-21 PROCEDURE — 88304 TISSUE EXAM BY PATHOLOGIST: CPT | Mod: 26

## 2023-06-21 PROCEDURE — 71045 X-RAY EXAM CHEST 1 VIEW: CPT | Mod: 26

## 2023-06-21 PROCEDURE — 47562 LAPAROSCOPIC CHOLECYSTECTOMY: CPT | Mod: AS

## 2023-06-21 PROCEDURE — 99223 1ST HOSP IP/OBS HIGH 75: CPT

## 2023-06-21 DEVICE — SURGIFLO HEMOSTATIC MATRIX KIT: Type: IMPLANTABLE DEVICE | Status: FUNCTIONAL

## 2023-06-21 DEVICE — XI STAPLER SUREFORM RELOAD 45 BLUE: Type: IMPLANTABLE DEVICE | Status: FUNCTIONAL

## 2023-06-21 DEVICE — SURGICEL FIBRILLAR 4 X 4": Type: IMPLANTABLE DEVICE | Status: FUNCTIONAL

## 2023-06-21 DEVICE — LIGATING CLIPS WECK HEMOLOK POLYMER MEDIUM-LARGE (GREEN) 6: Type: IMPLANTABLE DEVICE | Status: FUNCTIONAL

## 2023-06-21 RX ORDER — PIPERACILLIN AND TAZOBACTAM 4; .5 G/20ML; G/20ML
3.38 INJECTION, POWDER, LYOPHILIZED, FOR SOLUTION INTRAVENOUS EVERY 12 HOURS
Refills: 0 | Status: DISCONTINUED | OUTPATIENT
Start: 2023-06-21 | End: 2023-06-26

## 2023-06-21 RX ORDER — ONDANSETRON 8 MG/1
4 TABLET, FILM COATED ORAL ONCE
Refills: 0 | Status: COMPLETED | OUTPATIENT
Start: 2023-06-21 | End: 2023-06-21

## 2023-06-21 RX ORDER — CHLORHEXIDINE GLUCONATE 213 G/1000ML
1 SOLUTION TOPICAL
Refills: 0 | Status: DISCONTINUED | OUTPATIENT
Start: 2023-06-21 | End: 2023-06-23

## 2023-06-21 RX ORDER — PANTOPRAZOLE SODIUM 20 MG/1
40 TABLET, DELAYED RELEASE ORAL DAILY
Refills: 0 | Status: DISCONTINUED | OUTPATIENT
Start: 2023-06-21 | End: 2023-06-23

## 2023-06-21 RX ORDER — HYDROMORPHONE HYDROCHLORIDE 2 MG/ML
0.5 INJECTION INTRAMUSCULAR; INTRAVENOUS; SUBCUTANEOUS EVERY 4 HOURS
Refills: 0 | Status: DISCONTINUED | OUTPATIENT
Start: 2023-06-21 | End: 2023-06-22

## 2023-06-21 RX ORDER — PROPOFOL 10 MG/ML
20 INJECTION, EMULSION INTRAVENOUS
Qty: 1000 | Refills: 0 | Status: DISCONTINUED | OUTPATIENT
Start: 2023-06-21 | End: 2023-06-22

## 2023-06-21 RX ORDER — HYDROMORPHONE HYDROCHLORIDE 2 MG/ML
1 INJECTION INTRAMUSCULAR; INTRAVENOUS; SUBCUTANEOUS ONCE
Refills: 0 | Status: DISCONTINUED | OUTPATIENT
Start: 2023-06-21 | End: 2023-06-21

## 2023-06-21 RX ORDER — ACETAMINOPHEN 500 MG
650 TABLET ORAL EVERY 6 HOURS
Refills: 0 | Status: DISCONTINUED | OUTPATIENT
Start: 2023-06-21 | End: 2023-06-28

## 2023-06-21 RX ORDER — HEPARIN SODIUM 5000 [USP'U]/ML
5000 INJECTION INTRAVENOUS; SUBCUTANEOUS EVERY 12 HOURS
Refills: 0 | Status: DISCONTINUED | OUTPATIENT
Start: 2023-06-21 | End: 2023-06-28

## 2023-06-21 RX ORDER — CHLORHEXIDINE GLUCONATE 213 G/1000ML
15 SOLUTION TOPICAL EVERY 12 HOURS
Refills: 0 | Status: DISCONTINUED | OUTPATIENT
Start: 2023-06-21 | End: 2023-06-22

## 2023-06-21 RX ADMIN — HYDROMORPHONE HYDROCHLORIDE 1 MILLIGRAM(S): 2 INJECTION INTRAMUSCULAR; INTRAVENOUS; SUBCUTANEOUS at 09:28

## 2023-06-21 RX ADMIN — PANTOPRAZOLE SODIUM 40 MILLIGRAM(S): 20 TABLET, DELAYED RELEASE ORAL at 06:02

## 2023-06-21 RX ADMIN — HYDROMORPHONE HYDROCHLORIDE 1 MILLIGRAM(S): 2 INJECTION INTRAMUSCULAR; INTRAVENOUS; SUBCUTANEOUS at 08:26

## 2023-06-21 RX ADMIN — HYDROMORPHONE HYDROCHLORIDE 1 MILLIGRAM(S): 2 INJECTION INTRAMUSCULAR; INTRAVENOUS; SUBCUTANEOUS at 08:41

## 2023-06-21 RX ADMIN — HEPARIN SODIUM 5000 UNIT(S): 5000 INJECTION INTRAVENOUS; SUBCUTANEOUS at 06:02

## 2023-06-21 RX ADMIN — PIPERACILLIN AND TAZOBACTAM 25 GRAM(S): 4; .5 INJECTION, POWDER, LYOPHILIZED, FOR SOLUTION INTRAVENOUS at 06:02

## 2023-06-21 RX ADMIN — ONDANSETRON 4 MILLIGRAM(S): 8 TABLET, FILM COATED ORAL at 08:26

## 2023-06-21 RX ADMIN — Medication 650 MILLIGRAM(S): at 06:01

## 2023-06-21 RX ADMIN — HYDROMORPHONE HYDROCHLORIDE 1 MILLIGRAM(S): 2 INJECTION INTRAMUSCULAR; INTRAVENOUS; SUBCUTANEOUS at 09:43

## 2023-06-21 RX ADMIN — Medication 650 MILLIGRAM(S): at 07:01

## 2023-06-21 RX ADMIN — CARVEDILOL PHOSPHATE 12.5 MILLIGRAM(S): 80 CAPSULE, EXTENDED RELEASE ORAL at 06:02

## 2023-06-21 NOTE — CONSULT NOTE ADULT - PROBLEM SELECTOR RECOMMENDATION 2
Now normotensive, may represent sedation or component of sepsis.  Hold antihypertensives, restart as tolerated when taking PO

## 2023-06-21 NOTE — PROGRESS NOTE ADULT - ASSESSMENT
72M hx of HTN, polio, Polycystic kidney disease s/p L nephrectomy now ESRD on HD MWF, hx of HepC (treated) pw Klebsiella bacteremia.    Acute cholecystitis with Klebsiella bacteremia  - cont zosyn  - pain control  - surgery following - likely will need lap antonio  - NPO  - repeat blood cultures    Hypertension  cont coreg and amlodipine as tolerated.    Anemia of chronic kidney disease  - suspect secondary to ESRD    Thrombocytopenia  - suspect secondary to acute infection     ESRD on HD  - HD today

## 2023-06-21 NOTE — PROGRESS NOTE ADULT - SUBJECTIVE AND OBJECTIVE BOX
INTERVAL HPI/OVERNIGHT EVENTS:  Pt seen and examined at bedside, resting comfortably. No acute events reported overnight. Pt complaining of severe pain across upper abdomen, reports N/V and states he has been belching a lot. No appetite. BM this AM. Denies fever, chills, chest pain, SOB, diarrhea, reflux.  Denies fever/chills, chest pain, dyspnea, cough, dizziness.     Vital Signs Last 24 Hrs  T(C): 36.6 (21 Jun 2023 10:05), Max: 36.9 (20 Jun 2023 12:42)  T(F): 97.8 (21 Jun 2023 10:05), Max: 98.5 (20 Jun 2023 22:26)  HR: 62 (21 Jun 2023 10:05) (56 - 75)  BP: 136/73 (21 Jun 2023 10:05) (98/62 - 150/81)  BP(mean): --  RR: 16 (21 Jun 2023 10:05) (16 - 18)  SpO2: 97% (21 Jun 2023 10:05) (94% - 99%)    Parameters below as of 21 Jun 2023 10:05  Patient On (Oxygen Delivery Method): room air        PHYSICAL EXAM:  GENERAL: awake and alert, NAD  HEAD:  Atraumatic, Normocephalic  EYES: EOMI  CHEST/LUNG: non-labored breathing bilaterally  HEART: normal HR  ABDOMEN: soft, non distended, tender to palpation over RUQ  EXTREMITIES:  calf soft, non tender b/l    I&O's Detail      LABS:                        11.4   4.74  )-----------( 147      ( 21 Jun 2023 05:45 )             34.0     06-21    137  |  98  |  70<H>  ----------------------------<  114<H>  4.6   |  28  |  6.23<H>    Ca    9.0      21 Jun 2023 05:45    TPro  6.6  /  Alb  2.9<L>  /  TBili  0.4  /  DBili  x   /  AST  19  /  ALT  35  /  AlkPhos  53  06-21    PT/INR - ( 19 Jun 2023 22:10 )   PT: 14.5 sec;   INR: 1.25 ratio         PTT - ( 19 Jun 2023 22:10 )  PTT:28.2 sec  Urinalysis Basic - ( 21 Jun 2023 05:45 )    Color: x / Appearance: x / SG: x / pH: x  Gluc: 114 mg/dL / Ketone: x  / Bili: x / Urobili: x   Blood: x / Protein: x / Nitrite: x   Leuk Esterase: x / RBC: x / WBC x   Sq Epi: x / Non Sq Epi: x / Bacteria: x      Culture Results:   No growth to date. (06-19 @ 22:10)  Culture Results:   No growth to date. (06-19 @ 22:10) INTERVAL HPI/OVERNIGHT EVENTS:  Pt seen and examined at bedside, resting comfortably. No acute events reported overnight. Pt complaining of severe pain across upper abdomen, reports N/V and states he has been belching a lot. No appetite. On IV abx. BM this AM. Denies fever, chills, chest pain, SOB, diarrhea, reflux.  Denies fever/chills, chest pain, dyspnea, cough, dizziness.     Vital Signs Last 24 Hrs  T(C): 36.6 (21 Jun 2023 10:05), Max: 36.9 (20 Jun 2023 12:42)  T(F): 97.8 (21 Jun 2023 10:05), Max: 98.5 (20 Jun 2023 22:26)  HR: 62 (21 Jun 2023 10:05) (56 - 75)  BP: 136/73 (21 Jun 2023 10:05) (98/62 - 150/81)  BP(mean): --  RR: 16 (21 Jun 2023 10:05) (16 - 18)  SpO2: 97% (21 Jun 2023 10:05) (94% - 99%)    Parameters below as of 21 Jun 2023 10:05  Patient On (Oxygen Delivery Method): room air        PHYSICAL EXAM:  GENERAL: awake and alert, NAD  HEAD:  Atraumatic, Normocephalic  EYES: EOMI  CHEST/LUNG: non-labored breathing bilaterally  HEART: normal HR  ABDOMEN: soft, non distended, tender to palpation over RUQ  EXTREMITIES:  calf soft, non tender b/l    I&O's Detail      LABS:                        11.4   4.74  )-----------( 147      ( 21 Jun 2023 05:45 )             34.0     06-21    137  |  98  |  70<H>  ----------------------------<  114<H>  4.6   |  28  |  6.23<H>    Ca    9.0      21 Jun 2023 05:45    TPro  6.6  /  Alb  2.9<L>  /  TBili  0.4  /  DBili  x   /  AST  19  /  ALT  35  /  AlkPhos  53  06-21    PT/INR - ( 19 Jun 2023 22:10 )   PT: 14.5 sec;   INR: 1.25 ratio         PTT - ( 19 Jun 2023 22:10 )  PTT:28.2 sec  Urinalysis Basic - ( 21 Jun 2023 05:45 )    Color: x / Appearance: x / SG: x / pH: x  Gluc: 114 mg/dL / Ketone: x  / Bili: x / Urobili: x   Blood: x / Protein: x / Nitrite: x   Leuk Esterase: x / RBC: x / WBC x   Sq Epi: x / Non Sq Epi: x / Bacteria: x      Culture Results:   No growth to date. (06-19 @ 22:10)  Culture Results:   No growth to date. (06-19 @ 22:10)

## 2023-06-21 NOTE — PROGRESS NOTE ADULT - ASSESSMENT
72M with PMHx of HTN, polio, PCKD s/p nephrectomy, now ESRD on HD MWF, hx of HepC (treated), with acute cholecystitis, bacteremic  - Preop - NPO, labs  - OR today for robotic lap antonio  - Appreciate medical optimization  - HD as per nephro  - Medical management as per primary team  - Please reach out with any questions  Plan discussed with Dr. Brar

## 2023-06-21 NOTE — BRIEF OPERATIVE NOTE - OPERATION/FINDINGS
See full op report for details  Subtotal cholecystectomy with placement of 19F Matt drain in liver bed

## 2023-06-21 NOTE — PROGRESS NOTE ADULT - ASSESSMENT
72y male with  HTN, polio, Polycystic kidney disease s/p L nephrectomy now ESRD on HD MWF, hx of HepC (treated) He initially presented to ED with upper abd pain, fevers, chills Tmax 104, received a dose of Vanco and Zosyn but recalled to come back because blood cult now with Latoya esparza. He still had fevers at home, now on Zosyn.   CT with findings suggestive of acute antonio, LFTs are normal, also with polycystic kidney disease, but urinc cult is negative and pt is on HD, doubt  source  He once again has abdominal pain and a lap cholecystectomy is being considered  Suggest:   Zosyn ok for now, will continue it pending cholecystectomy  Await surgical intervention, hopefully "source control"  We may narrow to CTX in next 1-2 days, would continue zosyn for now in case he has anaerobic component to cholecystitis .  Bacteremia may be cleared but he remains ill in appearance.

## 2023-06-21 NOTE — CONSULT NOTE ADULT - PROBLEM SELECTOR RECOMMENDATION 9
suspected biliary source, now status post laparoscopic cholecystectomy, remains intubated post procedure as he remained lethargic after anesthesia.  Continue mechanical ventilation and sedation overnight, wean to extubate in AM.  ID evaluation appreciated, repeat cultures with no growth to date.  Continue Zosyn.  Monitor WBC count, fever curve and cultures.  Check post operative CXR, labs, ABG.  Wound care as per surgical team.

## 2023-06-21 NOTE — PROGRESS NOTE ADULT - SUBJECTIVE AND OBJECTIVE BOX
Patient is a 72y old  Male who presents with a chief complaint of Klebsiella bacteremia (20 Jun 2023 16:53)    Severe abdominal pain.  asking for stronger pain medication.      Patient seen and examined at bedside. No overnight events reported.     ALLERGIES:  Demerol HCl (Unknown)    MEDICATIONS  (STANDING):  carvedilol 12.5 milliGRAM(s) Oral every 12 hours  heparin   Injectable 5000 Unit(s) SubCutaneous every 8 hours  HYDROmorphone  Injectable 1 milliGRAM(s) IV Push once  Nephro-odessa 1 Tablet(s) Oral daily  ondansetron Injectable 4 milliGRAM(s) IV Push once  pantoprazole    Tablet 40 milliGRAM(s) Oral before breakfast  piperacillin/tazobactam IVPB.. 3.375 Gram(s) IV Intermittent every 12 hours    MEDICATIONS  (PRN):  acetaminophen     Tablet .. 650 milliGRAM(s) Oral every 6 hours PRN Temp greater or equal to 38C (100.4F), Mild Pain (1 - 3)    Vital Signs Last 24 Hrs  T(F): 98 (21 Jun 2023 06:23), Max: 98.5 (20 Jun 2023 22:26)  HR: 71 (21 Jun 2023 06:23) (68 - 75)  BP: 150/81 (21 Jun 2023 06:23) (98/62 - 150/81)  RR: 18 (21 Jun 2023 06:23) (16 - 18)  SpO2: 98% (21 Jun 2023 06:23) (94% - 99%)  I&O's Summary    PHYSICAL EXAM:  General: appears uncomfortable this AM, holding his stomach, A/O x 3  ENT: No gross hearing impairment, Moist mucous membranes, no thrush  Neck: Supple, No JVD  Lungs: Clear to auscultation bilaterally, good air entry, non-labored breathing  Cardio: RRR, S1/S2, No murmur  Abdomen: Soft, tenderness to palpation through but most prominent in right upper quadrant, mild guarding, Bowel sounds present  Extremities: No calf tenderness, No cyanosis, No pitting edema  Psych: Appropriate mood and affect    LABS:             11.4   4.74  )-----------( 147      ( 21 Jun 2023 05:45 )             34.0     06-21  137  |  98  |  70  ----------------------------<  114  4.6   |  28  |  6.23    Ca    9.0      21 Jun 2023 05:45    TPro  6.6  /  Alb  2.9  /  TBili  0.4  /  DBili  x   /  AST  19  /  ALT  35  /  AlkPhos  53  06-21    PT/INR - ( 19 Jun 2023 22:10 )   PT: 14.5 sec;   INR: 1.25 ratio      PTT - ( 19 Jun 2023 22:10 )  PTT:28.2 sec  Lactate, Blood: 0.6 mmol/L (06-19 @ 22:10)    Urinalysis Basic - ( 21 Jun 2023 05:45 )    Color: x / Appearance: x / SG: x / pH: x  Gluc: 114 mg/dL / Ketone: x  / Bili: x / Urobili: x   Blood: x / Protein: x / Nitrite: x   Leuk Esterase: x / RBC: x / WBC x   Sq Epi: x / Non Sq Epi: x / Bacteria: x    Culture - Blood (collected 19 Jun 2023 22:10)  Source: .Blood Blood-Peripheral  Preliminary Report (21 Jun 2023 04:01):    No growth to date.    Culture - Blood (collected 19 Jun 2023 22:10)  Source: .Blood Blood-Peripheral  Preliminary Report (21 Jun 2023 04:01):    No growth to date.    Culture - Urine (collected 17 Jun 2023 19:03)  Source: Clean Catch Clean Catch (Midstream)  Final Report (19 Jun 2023 07:26):    <10,000 CFU/mL Normal Urogenital Carmen    Culture - Blood (collected 17 Jun 2023 19:03)  Source: .Blood Blood-Peripheral  Gram Stain (19 Jun 2023 07:57):    Growth in aerobic bottle: Gram Negative Rods  Final Report (20 Jun 2023 16:17):    Growth in aerobic bottle: Klebsiella pneumoniae    ***Blood Panel PCR results on this specimen are available    approximately 3 hours after the Gram stain result.***    Gram stain, PCR, and/or culture results may not always    correspond due to difference in methodologies.    ************************************************************    This PCR assay was performed by multiplex PCR. This    Assay tests for 66 bacterial and resistance gene targets.    Please refer to the [facility] test directory    at [email or web address] for details.  Organism: Blood Culture PCR  Klebsiella pneumoniae (20 Jun 2023 16:17)  Organism: Klebsiella pneumoniae (20 Jun 2023 16:17)      Method Type: WALESKA      -  Amikacin: S <=16      -  Ampicillin: R >16 These ampicillin results predict results for amoxicillin      -  Ampicillin/Sulbactam: S <=4/2 Enterobacter, Klebsiella aerogenes, Citrobacter, and Serratia may develop resistance during prolonged therapy (3-4 days)      -  Aztreonam: S <=4      -  Cefazolin: S <=2 Enterobacter, Klebsiella aerogenes, Citrobacter, and Serratia may develop resistance during prolonged therapy (3-4 days)      -  Cefepime: S <=2      -  Cefoxitin: S <=8      -  Ceftriaxone: S <=1 Enterobacter, Klebsiella aerogenes, Citrobacter, and Serratia may develop resistance during prolonged therapy      -  Ciprofloxacin: S <=0.25      -  Ertapenem: S <=0.5      -  Gentamicin: S <=2      -  Imipenem: S <=1      -  Levofloxacin: S <=0.5      -  Meropenem: S <=1      -  Piperacillin/Tazobactam: S <=8      -  Tobramycin: S <=2      -  Trimethoprim/Sulfamethoxazole: S <=0.5/9.5  Organism: Blood Culture PCR (20 Jun 2023 16:17)      Method Type: PCR      -  K. pneumoniae group: Detec (K. pneumoniae, K. quasipneumoniae, K. variicola)    Culture - Blood (collected 17 Jun 2023 19:03)  Source: .Blood Blood-Peripheral  Preliminary Report (19 Jun 2023 01:02):    No growth to date.    RADIOLOGY & ADDITIONAL TESTS:    Care Discussed with Consultants/Other Providers:

## 2023-06-21 NOTE — CONSULT NOTE ADULT - SUBJECTIVE AND OBJECTIVE BOX
Source:  Providers and chart  Reliability:  Good    HPI  72 year old male with PMH polycystic kidney disease s/p nephrectomy for renal carcinoma, ESRD on HD, Hepatitis C and HTN who presented to ED at [facility] 6/19 with a five day history fever and chills, had been in the ED two days prior and was called back to hospital because 1/4 blood cultures positive for klebsiella.  Started on Zosyn, today went to the OR today for laparoscopic cholecystectomy.        Intraoperatively noted to have necrotic gall bladder, had subtotal cholecystectomy with placement of 19F Matt drain in liver bed.  Postoperatively awoke with purposeful movements but remained lethargic.  Deemed unsafe to extubate and transferred to ICU for further management.  Cannot elicit further history from patient at this time.    PMH  Polycystic kidney disease  ESRD on HD MWF via LUE fistula  Hepatitis C, treated  HTN  Renal carcinoma treated surgically    PSH  History of left nephrectomy  LUE AV fistula placement    Allergies  Demerol HCl (Unknown)    Current Medications  chlorhexidine 0.12% Liquid 15 milliLiter(s) Oral Mucosa every 12 hours  chlorhexidine 2% Cloths 1 Application(s) Topical <User Schedule>  heparin   Injectable 5000 Unit(s) SubCutaneous every 12 hours  pantoprazole  Injectable 40 milliGRAM(s) IV Push daily  propofol Infusion 20 MICROgram(s)/kG/Min (8.04 mL/Hr) IV Continuous <Continuous>  acetaminophen  Suppository .. 650 milliGRAM(s) Rectal every 6 hours PRN Temp greater or equal to 38C (100.4F)  HYDROmorphone  Injectable 0.5 milliGRAM(s) IV Push every 4 hours PRN Moderate Pain (4 - 6)    Psoc  Quit tobacco 45 years ago  No history of ETOH or substance abuse  No history of illicit drug use    Pfam  FHx: renal failure (Mother)    Review of Systems  Unable to elicit as patient intubated and sedated    Labs  Post operative labs and imaging pending    Vital Signs  T(C): 36.6 (21 Jun 2023 13:15), Max: 36.9 (20 Jun 2023 22:26)  T(F): 97.8 (21 Jun 2023 13:15), Max: 98.5 (20 Jun 2023 22:26)  HR: 75 (21 Jun 2023 20:27) (56 - 75)  BP: 113/66 (21 Jun 2023 20:27) (103/51 - 152/85)  BP(mean): 82 (21 Jun 2023 20:27) (82 - 82)  RR: 19 (21 Jun 2023 20:27) (16 - 19)  SpO2: 100% (21 Jun 2023 20:27) (94% - 100%)  AC 14 400 50% +5    Physical Exam  Gen:  WN/WD Male resting in bed, NAD  ENT:  NC/AT, orally intubated no JVD noted  Thorax:  Symmetric, no retractions  Lung:  CTA b/l  CV:  S1, S2. RRR  Abd:  Surgical sites MICHOACANO, C/D/I.  soft, NT/ND.  BS hypoactive no masses to palp  Extrem:  No C/C/E, DP/radial pulses +2.  LUE fistula with positive thrill, positive bruit  Neuro:  Moves all extremities to command, nods to questioning  Skin:  Intact

## 2023-06-21 NOTE — PROGRESS NOTE ADULT - SUBJECTIVE AND OBJECTIVE BOX
Seen this am on HD    Vital Signs Last 24 Hrs  T(C): 36.7 (06-21-23 @ 22:00), Max: 37.6 (06-21-23 @ 20:57)  T(F): 98 (06-21-23 @ 22:00), Max: 99.7 (06-21-23 @ 20:57)  HR: 72 (06-21-23 @ 22:00) (56 - 88)  BP: 129/76 (06-21-23 @ 22:00) (105/69 - 152/85)  BP(mean): 92 (06-21-23 @ 22:00) (82 - 106)  RR: 12 (06-21-23 @ 22:00) (12 - 19)  SpO2: 100% (06-21-23 @ 22:00) (96% - 100%)    I&O's Detail    21 Jun 2023 07:01  -  21 Jun 2023 23:30  --------------------------------------------------------  IN:    Other (mL): 800 mL    Propofol: 36.6 mL  Total IN: 836.6 mL    OUT:    Bulb (mL): 15 mL    Other (mL): 1800 mL  Total OUT: 1815 mL    Total NET: -978.4 mL    s1s2  b/l air entry  soft, NT  no edema, LUE AVF                       11.8   8.09  )-----------( 126      ( 21 Jun 2023 20:45 )             35.1     21 Jun 2023 20:45    137    |  96     |  37     ----------------------------<  160    4.0     |  24     |  4.08     Ca    9.0        21 Jun 2023 20:45  Phos  6.9       21 Jun 2023 20:45  Mg     1.9       21 Jun 2023 20:45    TPro  6.7    /  Alb  2.9    /  TBili  0.5    /  DBili  x      /  AST  119    /  ALT  146    /  AlkPhos  125    21 Jun 2023 20:45    LIVER FUNCTIONS - ( 21 Jun 2023 20:45 )  Alb: 2.9 g/dL / Pro: 6.7 g/dL / ALK PHOS: 125 U/L / ALT: 146 U/L / AST: 119 U/L / GGT: x           PT/INR - ( 21 Jun 2023 14:25 )   PT: 12.8 sec;   INR: 1.10 ratio      Urinalysis Basic - ( 21 Jun 2023 20:45 )    Color: x / Appearance: x / SG: x / pH: x  Gluc: 160 mg/dL / Ketone: x  / Bili: x / Urobili: x   Blood: x / Protein: x / Nitrite: x   Leuk Esterase: x / RBC: x / WBC x   Sq Epi: x / Non Sq Epi: x / Bacteria: x    Culture - Blood (collected 19 Jun 2023 22:10)  Source: .Blood Blood-Peripheral  Preliminary Report (21 Jun 2023 04:01):    No growth to date.    Culture - Blood (collected 19 Jun 2023 22:10)  Source: .Blood Blood-Peripheral  Preliminary Report (21 Jun 2023 04:01):    No growth to date.    acetaminophen  Suppository .. 650 milliGRAM(s) Rectal every 6 hours PRN  chlorhexidine 0.12% Liquid 15 milliLiter(s) Oral Mucosa every 12 hours  chlorhexidine 2% Cloths 1 Application(s) Topical <User Schedule>  heparin   Injectable 5000 Unit(s) SubCutaneous every 12 hours  HYDROmorphone  Injectable 0.5 milliGRAM(s) IV Push every 4 hours PRN  pantoprazole  Injectable 40 milliGRAM(s) IV Push daily  piperacillin/tazobactam IVPB.. 3.375 Gram(s) IV Intermittent every 12 hours  propofol Infusion 20 MICROgram(s)/kG/Min IV Continuous <Continuous>    A/P:    ESRD on HD  Kleb bacteremia on adm, suspected Gallbladder source   Cholecystectomy today  Abx per ID  Next HD on Friday  TMP as able  Will follow     [phone]

## 2023-06-21 NOTE — CONSULT NOTE ADULT - ASSESSMENT
72 year old male with PMH polycystic kidney disease s/p nephrectomy for renal carcinoma, ESRD on HD, Hepatitis C and HTN, gram negative bacteremia.  Now POD # 0 subtotal cholecystectomy with placement of 19F Matt drain

## 2023-06-22 ENCOUNTER — TRANSCRIPTION ENCOUNTER (OUTPATIENT)
Age: 73
End: 2023-06-22

## 2023-06-22 DIAGNOSIS — K83.8 OTHER SPECIFIED DISEASES OF BILIARY TRACT: ICD-10-CM

## 2023-06-22 LAB
ALBUMIN SERPL ELPH-MCNC: 2.6 G/DL — LOW (ref 3.3–5)
ALP SERPL-CCNC: 101 U/L — SIGNIFICANT CHANGE UP (ref 40–120)
ALT FLD-CCNC: 122 U/L — HIGH (ref 10–45)
ANION GAP SERPL CALC-SCNC: 19 MMOL/L — HIGH (ref 5–17)
AST SERPL-CCNC: 74 U/L — HIGH (ref 10–40)
BILIRUB SERPL-MCNC: 0.5 MG/DL — SIGNIFICANT CHANGE UP (ref 0.2–1.2)
BUN SERPL-MCNC: 48 MG/DL — HIGH (ref 7–23)
CALCIUM SERPL-MCNC: 8.6 MG/DL — SIGNIFICANT CHANGE UP (ref 8.4–10.5)
CHLORIDE SERPL-SCNC: 95 MMOL/L — LOW (ref 96–108)
CO2 SERPL-SCNC: 23 MMOL/L — SIGNIFICANT CHANGE UP (ref 22–31)
CREAT SERPL-MCNC: 4.76 MG/DL — HIGH (ref 0.5–1.3)
EGFR: 12 ML/MIN/1.73M2 — LOW
GLUCOSE SERPL-MCNC: 135 MG/DL — HIGH (ref 70–99)
HCT VFR BLD CALC: 31.5 % — LOW (ref 39–50)
HGB BLD-MCNC: 10.5 G/DL — LOW (ref 13–17)
MAGNESIUM SERPL-MCNC: 2 MG/DL — SIGNIFICANT CHANGE UP (ref 1.6–2.6)
MCHC RBC-ENTMCNC: 31.1 PG — SIGNIFICANT CHANGE UP (ref 27–34)
MCHC RBC-ENTMCNC: 33.3 GM/DL — SIGNIFICANT CHANGE UP (ref 32–36)
MCV RBC AUTO: 93.2 FL — SIGNIFICANT CHANGE UP (ref 80–100)
NRBC # BLD: 0 /100 WBCS — SIGNIFICANT CHANGE UP (ref 0–0)
PHOSPHATE SERPL-MCNC: 7.9 MG/DL — HIGH (ref 2.5–4.5)
PLATELET # BLD AUTO: 147 K/UL — LOW (ref 150–400)
POTASSIUM SERPL-MCNC: 3.9 MMOL/L — SIGNIFICANT CHANGE UP (ref 3.5–5.3)
POTASSIUM SERPL-SCNC: 3.9 MMOL/L — SIGNIFICANT CHANGE UP (ref 3.5–5.3)
PROT SERPL-MCNC: 6.2 G/DL — SIGNIFICANT CHANGE UP (ref 6–8.3)
RBC # BLD: 3.38 M/UL — LOW (ref 4.2–5.8)
RBC # FLD: 13.5 % — SIGNIFICANT CHANGE UP (ref 10.3–14.5)
SODIUM SERPL-SCNC: 137 MMOL/L — SIGNIFICANT CHANGE UP (ref 135–145)
WBC # BLD: 7.77 K/UL — SIGNIFICANT CHANGE UP (ref 3.8–10.5)
WBC # FLD AUTO: 7.77 K/UL — SIGNIFICANT CHANGE UP (ref 3.8–10.5)

## 2023-06-22 PROCEDURE — 43262 ENDO CHOLANGIOPANCREATOGRAPH: CPT | Mod: 59

## 2023-06-22 PROCEDURE — 99223 1ST HOSP IP/OBS HIGH 75: CPT | Mod: FS,25

## 2023-06-22 PROCEDURE — 71045 X-RAY EXAM CHEST 1 VIEW: CPT | Mod: 26

## 2023-06-22 PROCEDURE — 43274 ERCP DUCT STENT PLACEMENT: CPT | Mod: 59

## 2023-06-22 DEVICE — WIRE GUIDE BILIARY DREAMWIRE ANG 0.35INX260CM: Type: IMPLANTABLE DEVICE | Status: FUNCTIONAL

## 2023-06-22 DEVICE — HYDRATOME 44: Type: IMPLANTABLE DEVICE | Status: FUNCTIONAL

## 2023-06-22 DEVICE — IMPLANTABLE DEVICE: Type: IMPLANTABLE DEVICE | Status: FUNCTIONAL

## 2023-06-22 DEVICE — STENT ADVANIX BILIARY CTR BEND 10FRX7CM: Type: IMPLANTABLE DEVICE | Status: FUNCTIONAL

## 2023-06-22 RX ORDER — HYDROMORPHONE HYDROCHLORIDE 2 MG/ML
1 INJECTION INTRAMUSCULAR; INTRAVENOUS; SUBCUTANEOUS EVERY 4 HOURS
Refills: 0 | Status: DISCONTINUED | OUTPATIENT
Start: 2023-06-22 | End: 2023-06-27

## 2023-06-22 RX ORDER — INDOMETHACIN 50 MG
100 CAPSULE ORAL ONCE
Refills: 0 | Status: COMPLETED | OUTPATIENT
Start: 2023-06-22 | End: 2023-06-22

## 2023-06-22 RX ORDER — HYDROMORPHONE HYDROCHLORIDE 2 MG/ML
0.5 INJECTION INTRAMUSCULAR; INTRAVENOUS; SUBCUTANEOUS EVERY 4 HOURS
Refills: 0 | Status: DISCONTINUED | OUTPATIENT
Start: 2023-06-22 | End: 2023-06-27

## 2023-06-22 RX ORDER — HYDROMORPHONE HYDROCHLORIDE 2 MG/ML
2 INJECTION INTRAMUSCULAR; INTRAVENOUS; SUBCUTANEOUS EVERY 4 HOURS
Refills: 0 | Status: DISCONTINUED | OUTPATIENT
Start: 2023-06-22 | End: 2023-06-27

## 2023-06-22 RX ADMIN — PROPOFOL 8.04 MICROGRAM(S)/KG/MIN: 10 INJECTION, EMULSION INTRAVENOUS at 05:50

## 2023-06-22 RX ADMIN — PIPERACILLIN AND TAZOBACTAM 25 GRAM(S): 4; .5 INJECTION, POWDER, LYOPHILIZED, FOR SOLUTION INTRAVENOUS at 05:51

## 2023-06-22 RX ADMIN — HEPARIN SODIUM 5000 UNIT(S): 5000 INJECTION INTRAVENOUS; SUBCUTANEOUS at 18:45

## 2023-06-22 RX ADMIN — PROPOFOL 8.04 MICROGRAM(S)/KG/MIN: 10 INJECTION, EMULSION INTRAVENOUS at 00:31

## 2023-06-22 RX ADMIN — HYDROMORPHONE HYDROCHLORIDE 1 MILLIGRAM(S): 2 INJECTION INTRAMUSCULAR; INTRAVENOUS; SUBCUTANEOUS at 12:13

## 2023-06-22 RX ADMIN — PIPERACILLIN AND TAZOBACTAM 25 GRAM(S): 4; .5 INJECTION, POWDER, LYOPHILIZED, FOR SOLUTION INTRAVENOUS at 18:46

## 2023-06-22 RX ADMIN — CHLORHEXIDINE GLUCONATE 15 MILLILITER(S): 213 SOLUTION TOPICAL at 05:50

## 2023-06-22 RX ADMIN — HYDROMORPHONE HYDROCHLORIDE 0.5 MILLIGRAM(S): 2 INJECTION INTRAMUSCULAR; INTRAVENOUS; SUBCUTANEOUS at 09:09

## 2023-06-22 RX ADMIN — HYDROMORPHONE HYDROCHLORIDE 1 MILLIGRAM(S): 2 INJECTION INTRAMUSCULAR; INTRAVENOUS; SUBCUTANEOUS at 12:22

## 2023-06-22 RX ADMIN — HEPARIN SODIUM 5000 UNIT(S): 5000 INJECTION INTRAVENOUS; SUBCUTANEOUS at 05:50

## 2023-06-22 RX ADMIN — CHLORHEXIDINE GLUCONATE 1 APPLICATION(S): 213 SOLUTION TOPICAL at 05:50

## 2023-06-22 RX ADMIN — PANTOPRAZOLE SODIUM 40 MILLIGRAM(S): 20 TABLET, DELAYED RELEASE ORAL at 12:13

## 2023-06-22 RX ADMIN — HYDROMORPHONE HYDROCHLORIDE 0.5 MILLIGRAM(S): 2 INJECTION INTRAMUSCULAR; INTRAVENOUS; SUBCUTANEOUS at 09:25

## 2023-06-22 RX ADMIN — Medication 100 MILLIGRAM(S): at 15:00

## 2023-06-22 NOTE — DIETITIAN INITIAL EVALUATION ADULT - PERTINENT MEDS FT
MEDICATIONS  (STANDING):  chlorhexidine 0.12% Liquid 15 milliLiter(s) Oral Mucosa every 12 hours  chlorhexidine 2% Cloths 1 Application(s) Topical <User Schedule>  heparin   Injectable 5000 Unit(s) SubCutaneous every 12 hours  pantoprazole  Injectable 40 milliGRAM(s) IV Push daily  piperacillin/tazobactam IVPB.. 3.375 Gram(s) IV Intermittent every 12 hours  propofol Infusion 20 MICROgram(s)/kG/Min (8.04 mL/Hr) IV Continuous <Continuous>    MEDICATIONS  (PRN):  acetaminophen  Suppository .. 650 milliGRAM(s) Rectal every 6 hours PRN Temp greater or equal to 38C (100.4F)  HYDROmorphone  Injectable 2 milliGRAM(s) IV Push every 4 hours PRN Severe Pain (7 - 10)  HYDROmorphone  Injectable 0.5 milliGRAM(s) IV Push every 4 hours PRN Mild Pain (1 - 3)  HYDROmorphone  Injectable 1 milliGRAM(s) IV Push every 4 hours PRN Moderate Pain (4 - 6)

## 2023-06-22 NOTE — CONSULT NOTE ADULT - PROBLEM SELECTOR RECOMMENDATION 9
S/p OR for necrotic gall bladder, had subtotal cholecystectomy and placement of 19F Matt drain in liver bed  NPO  ERCP for stent placement  Continue IV fluids  Continue IV antibiotics

## 2023-06-22 NOTE — CHART NOTE - NSCHARTNOTEFT_GEN_A_CORE
I was asked by Dr. Brar yesterday for intraop consult:    I went to OR and sat at the robotic console.  Patient undergoing robotic cholecystectomy for acute calculous cholecystitis.    The gallbladder was quite inflammed and there were 4 faceted stones impacted in the cystic duct/infundibular junction thus distorting the anatomic landmarks.    I agreed with Dr. Brar's decision to perform a  subtotal cholecystectomy for safety.  He evacuated all the stones in the remnant and the 3d imaging afforded excellent visualization.  I agreed with his decision to transect the remnant with robotic staple(blue load).    The gallbladder was extracted and handed off the field.  The area was dry and drain was placed in area.    Dr. Odell Carreno  cell#[phone]

## 2023-06-22 NOTE — PROGRESS NOTE ADULT - SUBJECTIVE AND OBJECTIVE BOX
F/U Note:    72y Male with ESRD on HD via AV-fistula admitted with klebsiella bacteremia, found that gall bladder was the source and is now POD # 1 from Robot-assisted cholecystectomy, arrived to ICU last night still intubated on vent support. patient was successfuly extubated during day shift today.     Interval Hx;    Vital Signs Last 24 Hrs  T(C): 36.4 (22 Jun 2023 21:00), Max: 37.3 (22 Jun 2023 12:00)  T(F): 97.6 (22 Jun 2023 21:00), Max: 99.2 (22 Jun 2023 12:00)  HR: 73 (22 Jun 2023 21:00) (69 - 91)  BP: 107/66 (22 Jun 2023 21:00) (83/59 - 140/84)  BP(mean): 79 (22 Jun 2023 21:00) (68 - 107)  RR: 13 (22 Jun 2023 21:00) (12 - 21)  SpO2: 94% (22 Jun 2023 21:00) (94% - 100%)    Parameters below as of 22 Jun 2023 07:00  Patient On (Oxygen Delivery Method): ventilator    O2 Concentration (%): 50                            10.5   7.77  )-----------( 147      ( 22 Jun 2023 05:00 )             31.5         06-22    137  |  95<L>  |  48<H>  ----------------------------<  135<H>  3.9   |  23  |  4.76<H>    Ca    8.6      22 Jun 2023 05:00  Phos  7.9     06-22  Mg     2.0     06-22    TPro  6.2  /  Alb  2.6<L>  /  TBili  0.5  /  DBili  x   /  AST  74<H>  /  ALT  122<H>  /  AlkPhos  101  06-22        NEURO: no headaches, blurry vision, tremors, depression, anxity  CV: no chest pain, palpitations, murmurs, orthopnea  Resp: no shortness of breath, cough, wheeze, sputum production  GI: no stomach pain,nausea, vomitting, flatulence, hematemesis, hematochezia  PV: no swelling of extremities, no hair loss, no coolness to extremities  ENDO: no polydypsia, polyphagia, polyuria, weight loss, night sweats          NEURO: awake and alert  CV: (+) S1/S2, rrr, no mrg  RESP: CTA b/l  GI: soft, non tender

## 2023-06-22 NOTE — PROGRESS NOTE ADULT - SUBJECTIVE AND OBJECTIVE BOX
INTERVAL HPI/OVERNIGHT EVENTS:  intubate and sedated       Antimicrobial:  piperacillin/tazobactam IVPB.. 3.375 Gram(s) IV Intermittent every 12 hours    Cardiovascular:    Pulmonary:    Hematalogic:  heparin   Injectable 5000 Unit(s) SubCutaneous every 12 hours    Other:  acetaminophen  Suppository .. 650 milliGRAM(s) Rectal every 6 hours PRN  chlorhexidine 0.12% Liquid 15 milliLiter(s) Oral Mucosa every 12 hours  chlorhexidine 2% Cloths 1 Application(s) Topical <User Schedule>  HYDROmorphone  Injectable 2 milliGRAM(s) IV Push every 4 hours PRN  HYDROmorphone  Injectable 0.5 milliGRAM(s) IV Push every 4 hours PRN  HYDROmorphone  Injectable 1 milliGRAM(s) IV Push every 4 hours PRN  pantoprazole  Injectable 40 milliGRAM(s) IV Push daily  propofol Infusion 20 MICROgram(s)/kG/Min IV Continuous <Continuous>      Drug Dosing Weight  Height (cm): 162.6 (19 Jun 2023 21:25)  Weight (kg): 67 (19 Jun 2023 21:25)  BMI (kg/m2): 25.3 (19 Jun 2023 21:25)  BSA (m2): 1.72 (19 Jun 2023 21:25)    CENTRAL LINE: [ ] YES [ ] NO  LOCATION:   DATE INSERTED:    LINARES: [x ] YES [ ] NO    DATE INSERTED:    A-LINE:  [ ] YES [ ] NO  LOCATION:   DATE INSERTED:    [facility]/Social Hx/Fam Hx -reviewed admission note, no change since admission  PAST MEDICAL & SURGICAL HISTORY:  Hypertension      Renal failure, chronic      Cancer of kidney, left      History of left nephrectomy          T(C): 36.6 (06-22-23 @ 08:00), Max: 37.6 (06-21-23 @ 20:57)  HR: 91 (06-22-23 @ 12:00)  BP: 114/77 (06-22-23 @ 12:00)  BP(mean): 87 (06-22-23 @ 12:00)  ABP: --  ABP(mean): --  RR: 13 (06-22-23 @ 12:00)  SpO2: 100% (06-22-23 @ 12:00)  Wt(kg): --    ABG - ( 21 Jun 2023 20:50 )  pH, Arterial: 7.39  pH, Blood: x     /  pCO2: 35    /  pO2: 120   / HCO3: 21    / Base Excess: -3.8  /  SaO2: 100.0                 06-21 @ 07:01  -  06-22 @ 07:00  --------------------------------------------------------  IN: 975.5 mL / OUT: 2045 mL / NET: -1069.5 mL        Mode: AC/ CMV (Assist Control/ Continuous Mandatory Ventilation)  RR (machine): 12  TV (machine): 400  FiO2: 50  PEEP: 5  MAP: 10  PIP: 25      PHYSICAL EXAM:    GENERAL: No signs of distress, comfortable + ETT  HEAD: Atraumatic, Normocephalic  EYES:  PERRLA  ENMT: No erythema, exudates, or enlargement, Moist mucous membranes  NECK: Supple, normal appearance, No JVD; [  ] central line (if applicable)  CHEST/LUNG: No chest deformity, fair bilateral air entry; No rales, rhonchi, wheezing; crackles  HEART: Regular rate and rhythm; No murmurs, rubs, or gallops;   ABDOMEN: Soft, Nontender, + multiple small surgical wound   EXTREMITIES:  + Peripheral Pulses, No clubbing, cyanosis, or edema  NERVOUS SYSTEM: sedated   LYMPH: No lymphadenopathy noted  SKIN: No rashes or lesions; good turgor, warm, dry      LABS:  CBC Full  -  ( 22 Jun 2023 05:00 )  WBC Count : 7.77 K/uL  RBC Count : 3.38 M/uL  Hemoglobin : 10.5 g/dL  Hematocrit : 31.5 %  Platelet Count - Automated : 147 K/uL  Mean Cell Volume : 93.2 fl  Mean Cell Hemoglobin : 31.1 pg  Mean Cell Hemoglobin Concentration : 33.3 gm/dL  Auto Neutrophil # : x  Auto Lymphocyte # : x  Auto Monocyte # : x  Auto Eosinophil # : x  Auto Basophil # : x  Auto Neutrophil % : x  Auto Lymphocyte % : x  Auto Monocyte % : x  Auto Eosinophil % : x  Auto Basophil % : x    06-22    137  |  95<L>  |  48<H>  ----------------------------<  135<H>  3.9   |  23  |  4.76<H>    Ca    8.6      22 Jun 2023 05:00  Phos  7.9     06-22  Mg     2.0     06-22    TPro  6.2  /  Alb  2.6<L>  /  TBili  0.5  /  DBili  x   /  AST  74<H>  /  ALT  122<H>  /  AlkPhos  101  06-22    PT/INR - ( 21 Jun 2023 14:25 )   PT: 12.8 sec;   INR: 1.10 ratio           Urinalysis Basic - ( 22 Jun 2023 05:00 )    Color: x / Appearance: x / SG: x / pH: x  Gluc: 135 mg/dL / Ketone: x  / Bili: x / Urobili: x   Blood: x / Protein: x / Nitrite: x   Leuk Esterase: x / RBC: x / WBC x   Sq Epi: x / Non Sq Epi: x / Bacteria: x      Culture Results:   No growth to date. (06-19 @ 22:10)  Culture Results:   No growth to date. (06-19 @ 22:10)      RADIOLOGY & ADDITIONAL STUDIES REVIEWED     CXR:< from: Xray Chest 1 View- PORTABLE-Routine (06.22.23 @ 06:38) >    ACC: [id number] EXAM:  XR CHEST PORTABLE ROUTINE 1V   ORDERED BY: VINI FARRIS     ACC: [id number] EXAM:  XR CHEST PORTABLE URGENT 1V   ORDERED BY: VINI FARRIS     PROCEDURE DATE:  06/21/2023          INTERPRETATION:  INDICATION: Patient intubated    Portable chest 9:35 PM    COMPARISON: 6/19/2023    FINDINGS:  Heart/Vascular: The heart size, mediastinum, hilum and aorta are within   normal limits for projection. Atherosclerotic change.  Pulmonary: Midline trachea. There is no focal infiltrate, congestion or   effusion.  Bones: There is no fracture.  Lines and catheter: ET tube is at level of clavicles be advanced slightly.  Distention of the stomach. Correlate clinically.    Portable chest 6/22/2023 at 6:30 AM    FINDINGS: Mild atelectasis left lower lobe. The lungs are otherwise   clear. ET tube is still at the level of clavicles. Correlate clinically.  Focal opacity right midlung field likely represents some atelectasis.   Heart size and mediastinum stable.    IMPRESSION: ET tube is at the level of the clavicles consider advancing   slightly.    Mild subsegmental atelectasis left lower lobe and possibly right midlung   field.    --- End of Report ---             CRISS CARRION DO; Attending Radiologist  This document hasbeen electronically signed. Jun 22 2023 11:03AM    < end of copied text >      IMPRESSION:  PAST MEDICAL & SURGICAL HISTORY:  Hypertension      Renal failure, chronic      Cancer of kidney, left      History of left nephrectomy       p/w           IMP: This is a 72 year old man  with polycystic kidney disease s/p nephrectomy for renal carcinoma, ESRD on HD, Hepatitis C and HTN who presented to ED at [facility] 6/19 with a five day history fever and chills, had been in the ED two days prior and was called back to hospital because 1/4 blood cultures positive for klebsiella.  Started on Zosyn, today went to the OR 6/21 for laparoscopic cholecystectomy.        Intraoperatively noted to have necrotic gall bladder, had subtotal cholecystectomy with placement of 19F Matt drain in liver bed.  Postoperatively awoke with purposeful movements but remained lethargic.  Deemed unsafe to extubate and transferred to ICU for further management.        ASSESSMENT     - Post Op resp failure   - Bacteremia : Kleb   - Acute cholecystitis  - ESRD on HD with RUE fistula  - Renal cell ca  - Hep C      Plan     - Sedation and pain control   - Continue vent support adjust by ABG  - Hemodynamic monitoring   - NPO  - GI for ERCP and possible stent placement   - Elevated LFT due to surgery   - Continue broad spec antibx   - F/U cultures  - ID following   - Dialysis as per Neph   - DVT GI prophy       spoke with daughter at bedside

## 2023-06-22 NOTE — PROGRESS NOTE ADULT - SUBJECTIVE AND OBJECTIVE BOX
Seen in ICU, extubated    Vital Signs Last 24 Hrs  T(C): 36.8 (06-22-23 @ 18:34), Max: 37.6 (06-21-23 @ 20:57)  T(F): 98.2 (06-22-23 @ 18:34), Max: 99.7 (06-21-23 @ 20:57)  HR: 69 (06-22-23 @ 19:00) (69 - 91)  BP: 101/74 (06-22-23 @ 19:00) (83/59 - 140/84)  BP(mean): 84 (06-22-23 @ 19:00) (68 - 107)  RR: 13 (06-22-23 @ 19:00) (12 - 21)  SpO2: 98% (06-22-23 @ 19:00) (98% - 100%)    I&O's Detail    21 Jun 2023 07:01  -  22 Jun 2023 07:00  --------------------------------------------------------  IN:    Other (mL): 800 mL    Propofol: 175.5 mL  Total IN: 975.5 mL    OUT:    Bulb (mL): 245 mL    Other (mL): 1800 mL  Total OUT: 2045 mL    Total NET: -1069.5 mL    22 Jun 2023 07:01  -  22 Jun 2023 20:18  --------------------------------------------------------  IN:    IV PiggyBack: 100 mL    Propofol: 111.3 mL  Total IN: 211.3 mL    OUT:    Bulb (mL): 316 mL  Total OUT: 316 mL    Total NET: -104.7 mL    s1s2  b/l air entry  soft, NT  no edema, LUE AVF                               10.5   7.77  )-----------( 147      ( 22 Jun 2023 05:00 )             31.5     22 Jun 2023 05:00    137    |  95     |  48     ----------------------------<  135    3.9     |  23     |  4.76     Ca    8.6        22 Jun 2023 05:00  Phos  7.9       22 Jun 2023 05:00  Mg     2.0       22 Jun 2023 05:00    TPro  6.2    /  Alb  2.6    /  TBili  0.5    /  DBili  x      /  AST  74     /  ALT  122    /  AlkPhos  101    22 Jun 2023 05:00    LIVER FUNCTIONS - ( 22 Jun 2023 05:00 )  Alb: 2.6 g/dL / Pro: 6.2 g/dL / ALK PHOS: 101 U/L / ALT: 122 U/L / AST: 74 U/L / GGT: x           PT/INR - ( 21 Jun 2023 14:25 )   PT: 12.8 sec;   INR: 1.10 ratio      Urinalysis Basic - ( 22 Jun 2023 05:00 )    Color: x / Appearance: x / SG: x / pH: x  Gluc: 135 mg/dL / Ketone: x  / Bili: x / Urobili: x   Blood: x / Protein: x / Nitrite: x   Leuk Esterase: x / RBC: x / WBC x   Sq Epi: x / Non Sq Epi: x / Bacteria: x    Culture - Blood (collected 19 Jun 2023 22:10)  Source: .Blood Blood-Peripheral  Preliminary Report (21 Jun 2023 04:01):    No growth to date.    Culture - Blood (collected 19 Jun 2023 22:10)  Source: .Blood Blood-Peripheral  Preliminary Report (21 Jun 2023 04:01):    No growth to date.    acetaminophen  Suppository .. 650 milliGRAM(s) Rectal every 6 hours PRN  chlorhexidine 2% Cloths 1 Application(s) Topical <User Schedule>  heparin   Injectable 5000 Unit(s) SubCutaneous every 12 hours  HYDROmorphone  Injectable 2 milliGRAM(s) IV Push every 4 hours PRN  HYDROmorphone  Injectable 0.5 milliGRAM(s) IV Push every 4 hours PRN  HYDROmorphone  Injectable 1 milliGRAM(s) IV Push every 4 hours PRN  indomethacin Suppository 100 milliGRAM(s) Rectal once  pantoprazole  Injectable 40 milliGRAM(s) IV Push daily  piperacillin/tazobactam IVPB 3.375 Gram(s) IV Intermittent every 12 hours    A/P:    ESRD on HD  Kleb bacteremia on adm, suspected Gallbladder source   S/p cholecystectomy yesterday  ERCP today  Abx per ID  Next HD on Friday  TMP as able  D/w family at bedside  D/w ICU team    [phone]

## 2023-06-22 NOTE — PROGRESS NOTE ADULT - ASSESSMENT
A/P:    1) 72y Male with ESRD on HD via AV-fistula admitted with klebsiella bacteremia, found that gall bladder was the source and is now POD # 1 from Robot-assisted cholecystectomy, arrived to ICU last night still intubated on vent support. patient was successfuly extubated during day shift today.     -serial exams, any changes in exam to be escelated to surgical team immedietly  -pain control  -finish post op anbx  -IVFs until taking adequate PO  -advance diet per surgical team  -dressing changes per surgical team  -DVT prophylaxis  -AM labs   -given udnerlying ESRD on HD, renal team is following  -will follow HD recs, patient o be dialyzed (likely) tomorrow 6/23/23 A/P:    1) 72y Male with ESRD on HD via AV-fistula admitted with klebsiella bacteremia, found that gall bladder was the source and is now POD # 1 from Robot-assisted cholecystectomy, arrived to ICU last night still intubated on vent support. patient was successfuly extubated during day shift today.     -serial exams, any changes in exam to be escelated to surgical team immedietly  -pain control  -finish post op anbx, currently on course of zosyn for klebsiella bacteremia  -IVFs until taking adequate PO  -advance diet per surgical team  -dressing changes per surgical team  -DVT prophylaxis  -AM labs   -given udnerlying ESRD on HD, renal team is following  -will follow HD recs, patient o be dialyzed (likely) tomorrow 6/23/23

## 2023-06-22 NOTE — CONSULT NOTE ADULT - ASSESSMENT
GI consulted to see patient for ERCP. Patient seen and examined. Unable to obtain HPI as patient remains intubated and sedated. Per records s/p OR for necrotic gall bladder, had subtotal cholecystectomy and placement of 19F Matt drain in liver bed.

## 2023-06-22 NOTE — DIETITIAN INITIAL EVALUATION ADULT - ORAL INTAKE PTA/DIET HISTORY
Patient intubated as per daughter patient tries to watch his salt intake & has basically omitted orange juice  & bananas from his diet

## 2023-06-22 NOTE — PROGRESS NOTE ADULT - SUBJECTIVE AND OBJECTIVE BOX
S/P Robotic  Assisted Cholecystectomy /Subtotal   POD #1    Patient is a 72y old  Male who presents with a chief complaint of Klebsiella bacteremia (22 Jun 2023 20:12)  found to have acute cholecystitis and taken to the OR yesterday.   Patient found to have a necrotic gallbladder and was able to have a robotic assisted subtotal cholecystectomy .   Patient was seen and examined by Dr Brar this morning and consulted GI for f/u ERCP.  Dr Brar discussed with patient and family member.       ALLERGIES:  Demerol HCl (Unknown)    MEDICATIONS  (STANDING):  chlorhexidine 2% Cloths 1 Application(s) Topical <User Schedule>  heparin   Injectable 5000 Unit(s) SubCutaneous every 12 hours  indomethacin Suppository 100 milliGRAM(s) Rectal once  pantoprazole  Injectable 40 milliGRAM(s) IV Push daily  piperacillin/tazobactam IVPB.. 3.375 Gram(s) IV Intermittent every 12 hours    MEDICATIONS  (PRN):  acetaminophen  Suppository .. 650 milliGRAM(s) Rectal every 6 hours PRN Temp greater or equal to 38C (100.4F)  HYDROmorphone  Injectable 0.5 milliGRAM(s) IV Push every 4 hours PRN Mild Pain (1 - 3)  HYDROmorphone  Injectable 1 milliGRAM(s) IV Push every 4 hours PRN Moderate Pain (4 - 6)  HYDROmorphone  Injectable 2 milliGRAM(s) IV Push every 4 hours PRN Severe Pain (7 - 10)    Vital Signs Last 24 Hrs  T(F): 98.2 (22 Jun 2023 18:34), Max: 99.7 (21 Jun 2023 20:57)  HR: 69 (22 Jun 2023 19:00) (69 - 91)  BP: 101/74 (22 Jun 2023 19:00) (83/59 - 140/84)  RR: 13 (22 Jun 2023 19:00) (12 - 21)  SpO2: 98% (22 Jun 2023 19:00) (98% - 100%)  I&O's Summary    21 Jun 2023 07:01  -  22 Jun 2023 07:00  --------------------------------------------------------  IN: 975.5 mL / OUT: 2045 mL / NET: -1069.5 mL    22 Jun 2023 07:01  -  22 Jun 2023 20:15  --------------------------------------------------------  IN: 211.3 mL / OUT: 316 mL / NET: -104.7 mL      PHYSICAL EXAM:  General: NAD, A/O x 3  ENT: MMM  Neck: Supple, No JVD  Lungs: Clear to auscultation bilaterally  Cardio: RRR, S1/S2,   Abdomen: Soft ,tender at port sites #19 Matt drain in place approx 316 ml of drainage in 24  hours ,  Bowel sounds present.   Extremities: No calf tenderness, No pitting edema    LABS:                        10.5   7.77  )-----------( 147      ( 22 Jun 2023 05:00 )             31.5     06-22    137  |  95  |  48  ----------------------------<  135  3.9   |  23  |  4.76    Ca    8.6      22 Jun 2023 05:00  Phos  7.9     06-22  Mg     2.0     06-22    TPro  6.2  /  Alb  2.6  /  TBili  0.5  /  DBili  x   /  AST  74  /  ALT  122  /  AlkPhos  101  06-22      PT/INR - ( 21 Jun 2023 14:25 )   PT: 12.8 sec;   INR: 1.10 ratio         PTT - ( 19 Jun 2023 22:10 )  PTT:28.2 sec  Lactate, Blood: 0.6 mmol/L (06-19 @ 22:10)        ABG - ( 21 Jun 2023 20:50 )  pH, Arterial: 7.39  pH, Blood: x     /  pCO2: 35    /  pO2: 120   / HCO3: 21    / Base Excess: -3.8  /  SaO2: 100.0     Urinalysis Basic - ( 22 Jun 2023 05:00 )    Color: x / Appearance: x / SG: x / pH: x  Gluc: 135 mg/dL / Ketone: x  / Bili: x / Urobili: x   Blood: x / Protein: x / Nitrite: x   Leuk Esterase: x / RBC: x / WBC x   Sq Epi: x / Non Sq Epi: x / Bacteria: x        Culture - Blood (collected 19 Jun 2023 22:10)  Source: .Blood Blood-Peripheral  Preliminary Report (21 Jun 2023 04:01):    No growth to date.    Culture - Blood (collected 19 Jun 2023 22:10)  Source: .Blood Blood-Peripheral  Preliminary Report (21 Jun 2023 04:01):    No growth to date.    Culture - Urine (collected 17 Jun 2023 19:03)  Source: Clean Catch Clean Catch (Midstream)  Final Report (19 Jun 2023 07:26):    <10,000 CFU/mL Normal Urogenital Carmen    Culture - Blood (collected 17 Jun 2023 19:03)  Source: .Blood Blood-Peripheral  Gram Stain (19 Jun 2023 07:57):    Growth in aerobic bottle: Gram Negative Rods  Final Report (20 Jun 2023 16:17):    Growth in aerobic bottle: Klebsiella pneumoniae    ***Blood Panel PCR results on this specimen are available    approximately 3 hours after the Gram stain result.***    Gram stain, PCR, and/or culture results may not always    correspond due to difference in methodologies.    ************************************************************    This PCR assay was performed by multiplex PCR. This    Assay tests for 66 bacterial and resistance gene targets.    Please refer to the [facility] test directory    at [email or web address] for details.  Organism: Blood Culture PCR  Klebsiella pneumoniae (20 Jun 2023 16:17)  Organism: Klebsiella pneumoniae (20 Jun 2023 16:17)      Method Type: WALESKA      -  Amikacin: S <=16      -  Ampicillin: R >16 These ampicillin results predict results for amoxicillin      -  Ampicillin/Sulbactam: S <=4/2 Enterobacter, Klebsiella aerogenes, Citrobacter, and Serratia may develop resistance during prolonged therapy (3-4 days)      -  Aztreonam: S <=4      -  Cefazolin: S <=2 Enterobacter, Klebsiella aerogenes, Citrobacter, and Serratia may develop resistance during prolonged therapy (3-4 days)      -  Cefepime: S <=2      -  Cefoxitin: S <=8      -  Ceftriaxone: S <=1 Enterobacter, Klebsiella aerogenes, Citrobacter, and Serratia may develop resistance during prolonged therapy      -  Ciprofloxacin: S <=0.25      -  Ertapenem: S <=0.5      -  Gentamicin: S <=2      -  Imipenem: S <=1      -  Levofloxacin: S <=0.5      -  Meropenem: S <=1      -  Piperacillin/Tazobactam: S <=8      -  Tobramycin: S <=2      -  Trimethoprim/Sulfamethoxazole: S <=0.5/9.5  Organism: Blood Culture PCR (20 Jun 2023 16:17)      Method Type: PCR      -  K. pneumoniae group: Detec (K. pneumoniae, K. quasipneumoniae, K. variicola)    Culture - Blood (collected 17 Jun 2023 19:03)  Source: .Blood Blood-Peripheral  Preliminary Report (19 Jun 2023 01:02):    No growth to date.          Care Discussed  with Dr Brar

## 2023-06-22 NOTE — DIETITIAN INITIAL EVALUATION ADULT - PERTINENT LABORATORY DATA
06-22    137  |  95<L>  |  48<H>  ----------------------------<  135<H>  3.9   |  23  |  4.76<H>    Ca    8.6      22 Jun 2023 05:00  Phos  7.9     06-22  Mg     2.0     06-22    TPro  6.2  /  Alb  2.6<L>  /  TBili  0.5  /  DBili  x   /  AST  74<H>  /  ALT  122<H>  /  AlkPhos  101  06-22

## 2023-06-22 NOTE — DIETITIAN INITIAL EVALUATION ADULT - OTHER INFO
72M hx of HTN, polio, Polycystic kidney disease s/p L nephrectomy now ESRD on HD MWF, hx of HepC (treated) pw Klebsiella bacteremia. Pt related 5 days ago developed moderate upper abd pain, patient s/p lap cholecystectomy , pending ERCP as per GI for stent placement . Patient currently intubated & Sedated Propofol infusing @ 16.1ml/hr which is providing nonprotein  425kcals. NKFA noted , PO intake reported good PTA UBW reported ~ 124lbs, No edema, no BM since PTA , Bowel sounds noted hypoactive,  (L) AV fistula noted , receives HD 3 x week

## 2023-06-22 NOTE — CONSULT NOTE ADULT - SUBJECTIVE AND OBJECTIVE BOX
INTERVAL HPI/OVERNIGHT EVENTS:  HPI:  72M hx of HTN, polio, Polycystic kidney disease s/p L nephrectomy now ESRD on HD MWF, hx of HepC (treated) pw Klebsiella bacteremia. Pt related 5 days ago developed moderate upper abd pain that lasted for a day and resolved but followed by fevers, chills Tmax 104 but no recurrent abd pain, no associated NVD, cough, SOB, CP, dysuria or flank pain. Was in ED 2 days ago and routine labs, UA, RVP neg and was given 1 dose of Vanco and Zosyn but recalled today for 1 culture bottle with Kleb pneum. Pt related still with fevers of 102 since D/C from ED but otherwise feels well. In ED Tmax: 100 P: 88 BP: 130/71 sat 95% on RA. WBC: 4.05 hgb: 10.7 69%N BUN/cr: 42/4.25 (2023 02:16)    MEDICATIONS  (STANDING):  chlorhexidine 0.12% Liquid 15 milliLiter(s) Oral Mucosa every 12 hours  chlorhexidine 2% Cloths 1 Application(s) Topical <User Schedule>  heparin   Injectable 5000 Unit(s) SubCutaneous every 12 hours  pantoprazole  Injectable 40 milliGRAM(s) IV Push daily  piperacillin/tazobactam IVPB.. 3.375 Gram(s) IV Intermittent every 12 hours  propofol Infusion 20 MICROgram(s)/kG/Min (8.04 mL/Hr) IV Continuous <Continuous>    MEDICATIONS  (PRN):  acetaminophen  Suppository .. 650 milliGRAM(s) Rectal every 6 hours PRN Temp greater or equal to 38C (100.4F)  HYDROmorphone  Injectable 2 milliGRAM(s) IV Push every 4 hours PRN Severe Pain (7 - 10)  HYDROmorphone  Injectable 0.5 milliGRAM(s) IV Push every 4 hours PRN Mild Pain (1 - 3)  HYDROmorphone  Injectable 1 milliGRAM(s) IV Push every 4 hours PRN Moderate Pain (4 - 6)      Allergies    Demerol HCl (Unknown)    Intolerances        PAST MEDICAL & SURGICAL HISTORY:  Hypertension      Renal failure, chronic      Cancer of kidney, left      History of left nephrectomy          REVIEW OF SYSTEMS      General:	    Skin/Breast:  	  Ophthalmologic:  	  ENMT:	    Respiratory and Thorax:  	  Cardiovascular:	    Gastrointestinal:	    Genitourinary:	    Musculoskeletal:	    Neurological:	    Psychiatric:	    Hematology/Lymphatics:	    Endocrine:	    Allergic/Immunologic:	    PHYSICAL EXAM:   Vital Signs:  Vital Signs Last 24 Hrs  T(C): 37.3 (2023 12:00), Max: 37.6 (2023 20:57)  T(F): 99.2 (2023 12:00), Max: 99.7 (2023 20:57)  HR: 91 (2023 12:00) (58 - 91)  BP: 114/77 (2023 12:00) (84/61 - 152/85)  BP(mean): 87 (2023 12:00) (70 - 107)  RR: 13 (2023 12:00) (12 - 19)  SpO2: 100% (:00) (98% - 100%)    Parameters below as of 2023 07:00  Patient On (Oxygen Delivery Method): ventilator    O2 Concentration (%): 50  Daily     Daily Weight in k.9 (2023 05:00)I&O's Summary    2023 07:01  -  2023 07:00  --------------------------------------------------------  IN: 975.5 mL / OUT: 2045 mL / NET: -1069.5 mL    2023 07:01  -  2023 12:39  --------------------------------------------------------  IN: 79.1 mL / OUT: 208 mL / NET: -128.9 mL        GENERAL:  Appears stated age, well-groomed, well-nourished, no distress  HEENT:  NC/AT,  conjunctivae clear and pink, no thyromegaly, nodules, adenopathy, no JVD, sclera -anicteric  CHEST:  Full & symmetric excursion, no increased effort, breath sounds clear  HEART:  Regular rhythm, S1, S2, no murmur  ABDOMEN:  Soft, non-tender, non-distended, normoactive bowel sounds,   EXTEREMITIES:  no edema  SKIN:  No rash/warm/dry  NEURO:  Alert, Sedated       LABS:                        10.5   7.77  )-----------( 147      ( 2023 05:00 )             31.5     06-    137  |  95<L>  |  48<H>  ----------------------------<  135<H>  3.9   |  23  |  4.76<H>    Ca    8.6      2023 05:00  Phos  7.9     -  Mg     2.0         TPro  6.2  /  Alb  2.6<L>  /  TBili  0.5  /  DBili  x   /  AST  74<H>  /  ALT  122<H>  /  AlkPhos  101  06-22    PT/INR - ( 2023 14:25 )   PT: 12.8 sec;   INR: 1.10 ratio           Urinalysis Basic - ( 2023 05:00 )    Color: x / Appearance: x / SG: x / pH: x  Gluc: 135 mg/dL / Ketone: x  / Bili: x / Urobili: x   Blood: x / Protein: x / Nitrite: x   Leuk Esterase: x / RBC: x / WBC x   Sq Epi: x / Non Sq Epi: x / Bacteria: x      amylase   lipase  RADIOLOGY & ADDITIONAL TESTS:   INTERVAL HPI/OVERNIGHT EVENTS:  HPI:  72M hx of HTN, polio, Polycystic kidney disease s/p L nephrectomy now ESRD on HD MWF, hx of HepC (treated) pw Klebsiella bacteremia. Pt related 5 days ago developed moderate upper abd pain that lasted for a day and resolved but followed by fevers, chills Tmax 104 but no recurrent abd pain, no associated NVD, cough, SOB, CP, dysuria or flank pain. Was in ED 2 days ago and routine labs, UA, RVP neg and was given 1 dose of Vanco and Zosyn but recalled today for 1 culture bottle with Kleb pneum. Pt related still with fevers of 102 since D/C from ED but otherwise feels well. In ED Tmax: 100 P: 88 BP: 130/71 sat 95% on RA. WBC: 4.05 hgb: 10.7 69%N BUN/cr: 42/4.25 (2023 02:16)    GI consulted to see patient for ERCP. Patient seen and examined. Unable to obtain HPI as patient remains intubated and sedated. Per records s/p OR for necrotic gall bladder, had subtotal cholecystectomy and placement of 19F Matt drain in liver bed.    MEDICATIONS  (STANDING):  chlorhexidine 0.12% Liquid 15 milliLiter(s) Oral Mucosa every 12 hours  chlorhexidine 2% Cloths 1 Application(s) Topical <User Schedule>  heparin   Injectable 5000 Unit(s) SubCutaneous every 12 hours  pantoprazole  Injectable 40 milliGRAM(s) IV Push daily  piperacillin/tazobactam IVPB.. 3.375 Gram(s) IV Intermittent every 12 hours  propofol Infusion 20 MICROgram(s)/kG/Min (8.04 mL/Hr) IV Continuous <Continuous>    MEDICATIONS  (PRN):  acetaminophen  Suppository .. 650 milliGRAM(s) Rectal every 6 hours PRN Temp greater or equal to 38C (100.4F)  HYDROmorphone  Injectable 2 milliGRAM(s) IV Push every 4 hours PRN Severe Pain (7 - 10)  HYDROmorphone  Injectable 0.5 milliGRAM(s) IV Push every 4 hours PRN Mild Pain (1 - 3)  HYDROmorphone  Injectable 1 milliGRAM(s) IV Push every 4 hours PRN Moderate Pain (4 - 6)      Allergies    Demerol HCl (Unknown)    Intolerances        PAST MEDICAL & SURGICAL HISTORY:  Hypertension      Renal failure, chronic      Cancer of kidney, left      History of left nephrectomy          REVIEW OF SYSTEMS      General:	    Skin/Breast:  	  Ophthalmologic:  	  ENMT:	    Respiratory and Thorax:  	  Cardiovascular:	    Gastrointestinal:	    Genitourinary:	    Musculoskeletal:	    Neurological:	    Psychiatric:	    Hematology/Lymphatics:	    Endocrine:	    Allergic/Immunologic:	    PHYSICAL EXAM:   Vital Signs:  Vital Signs Last 24 Hrs  T(C): 37.3 (2023 12:00), Max: 37.6 (2023 20:57)  T(F): 99.2 (2023 12:00), Max: 99.7 (2023 20:57)  HR: 91 (2023 12:00) (58 - 91)  BP: 114/77 (2023 12:00) (84/61 - 152/85)  BP(mean): 87 (2023 12:00) (70 - 107)  RR: 13 (2023 12:00) (12 - 19)  SpO2: 100% (2023 12:00) (98% - 100%)    Parameters below as of 2023 07:00  Patient On (Oxygen Delivery Method): ventilator    O2 Concentration (%): 50  Daily     Daily Weight in k.9 (2023 05:00)I&O's Summary    2023 07:01  -  2023 07:00  --------------------------------------------------------  IN: 975.5 mL / OUT: 2045 mL / NET: -1069.5 mL    2023 07:01  -  2023 12:39  --------------------------------------------------------  IN: 79.1 mL / OUT: 208 mL / NET: -128.9 mL        GENERAL:  Appears stated age, well-groomed, well-nourished, no distress  HEENT:  NC/AT,  conjunctivae clear and pink, no thyromegaly, nodules, adenopathy, no JVD, sclera -anicteric  CHEST:  Full & symmetric excursion, no increased effort, breath sounds clear  HEART:  Regular rhythm, S1, S2, no murmur  ABDOMEN:  Soft, non-tender, non-distended, normoactive bowel sounds,   EXTEREMITIES:  no edema  SKIN:  No rash/warm/dry  NEURO:  Alert, Sedated       LABS:                        10.5   7.77  )-----------( 147      ( 2023 05:00 )             31.5     06-    137  |  95<L>  |  48<H>  ----------------------------<  135<H>  3.9   |  23  |  4.76<H>    Ca    8.6      2023 05:00  Phos  7.9     -  Mg     2.0     -    TPro  6.2  /  Alb  2.6<L>  /  TBili  0.5  /  DBili  x   /  AST  74<H>  /  ALT  122<H>  /  AlkPhos  101  06-22    PT/INR - ( 2023 14:25 )   PT: 12.8 sec;   INR: 1.10 ratio           Urinalysis Basic - ( 2023 05:00 )    Color: x / Appearance: x / SG: x / pH: x  Gluc: 135 mg/dL / Ketone: x  / Bili: x / Urobili: x   Blood: x / Protein: x / Nitrite: x   Leuk Esterase: x / RBC: x / WBC x   Sq Epi: x / Non Sq Epi: x / Bacteria: x      amylase   lipase  RADIOLOGY & ADDITIONAL TESTS:

## 2023-06-23 LAB
ALBUMIN SERPL ELPH-MCNC: 2.4 G/DL — LOW (ref 3.3–5)
ALP SERPL-CCNC: 78 U/L — SIGNIFICANT CHANGE UP (ref 40–120)
ALT FLD-CCNC: 88 U/L — HIGH (ref 10–45)
ANION GAP SERPL CALC-SCNC: 17 MMOL/L — SIGNIFICANT CHANGE UP (ref 5–17)
AST SERPL-CCNC: 38 U/L — SIGNIFICANT CHANGE UP (ref 10–40)
BILIRUB SERPL-MCNC: 0.3 MG/DL — SIGNIFICANT CHANGE UP (ref 0.2–1.2)
BUN SERPL-MCNC: 72 MG/DL — HIGH (ref 7–23)
CALCIUM SERPL-MCNC: 8.4 MG/DL — SIGNIFICANT CHANGE UP (ref 8.4–10.5)
CHLORIDE SERPL-SCNC: 94 MMOL/L — LOW (ref 96–108)
CO2 SERPL-SCNC: 25 MMOL/L — SIGNIFICANT CHANGE UP (ref 22–31)
CREAT SERPL-MCNC: 6.92 MG/DL — HIGH (ref 0.5–1.3)
CULTURE RESULTS: SIGNIFICANT CHANGE UP
EGFR: 8 ML/MIN/1.73M2 — LOW
GLUCOSE SERPL-MCNC: 85 MG/DL — SIGNIFICANT CHANGE UP (ref 70–99)
HCT VFR BLD CALC: 30.3 % — LOW (ref 39–50)
HGB BLD-MCNC: 10 G/DL — LOW (ref 13–17)
MAGNESIUM SERPL-MCNC: 2.1 MG/DL — SIGNIFICANT CHANGE UP (ref 1.6–2.6)
MCHC RBC-ENTMCNC: 31 PG — SIGNIFICANT CHANGE UP (ref 27–34)
MCHC RBC-ENTMCNC: 33 GM/DL — SIGNIFICANT CHANGE UP (ref 32–36)
MCV RBC AUTO: 93.8 FL — SIGNIFICANT CHANGE UP (ref 80–100)
NRBC # BLD: 0 /100 WBCS — SIGNIFICANT CHANGE UP (ref 0–0)
PHOSPHATE SERPL-MCNC: 9.5 MG/DL — HIGH (ref 2.5–4.5)
PLATELET # BLD AUTO: 163 K/UL — SIGNIFICANT CHANGE UP (ref 150–400)
POTASSIUM SERPL-MCNC: 4.1 MMOL/L — SIGNIFICANT CHANGE UP (ref 3.5–5.3)
POTASSIUM SERPL-SCNC: 4.1 MMOL/L — SIGNIFICANT CHANGE UP (ref 3.5–5.3)
PROT SERPL-MCNC: 6 G/DL — SIGNIFICANT CHANGE UP (ref 6–8.3)
RBC # BLD: 3.23 M/UL — LOW (ref 4.2–5.8)
RBC # FLD: 13.5 % — SIGNIFICANT CHANGE UP (ref 10.3–14.5)
SODIUM SERPL-SCNC: 136 MMOL/L — SIGNIFICANT CHANGE UP (ref 135–145)
SPECIMEN SOURCE: SIGNIFICANT CHANGE UP
WBC # BLD: 6.98 K/UL — SIGNIFICANT CHANGE UP (ref 3.8–10.5)
WBC # FLD AUTO: 6.98 K/UL — SIGNIFICANT CHANGE UP (ref 3.8–10.5)

## 2023-06-23 PROCEDURE — 99233 SBSQ HOSP IP/OBS HIGH 50: CPT | Mod: FS

## 2023-06-23 RX ORDER — PANTOPRAZOLE SODIUM 20 MG/1
40 TABLET, DELAYED RELEASE ORAL
Refills: 0 | Status: DISCONTINUED | OUTPATIENT
Start: 2023-06-24 | End: 2023-06-28

## 2023-06-23 RX ADMIN — HEPARIN SODIUM 5000 UNIT(S): 5000 INJECTION INTRAVENOUS; SUBCUTANEOUS at 05:29

## 2023-06-23 RX ADMIN — CHLORHEXIDINE GLUCONATE 1 APPLICATION(S): 213 SOLUTION TOPICAL at 05:29

## 2023-06-23 RX ADMIN — PANTOPRAZOLE SODIUM 40 MILLIGRAM(S): 20 TABLET, DELAYED RELEASE ORAL at 11:20

## 2023-06-23 RX ADMIN — HEPARIN SODIUM 5000 UNIT(S): 5000 INJECTION INTRAVENOUS; SUBCUTANEOUS at 17:51

## 2023-06-23 RX ADMIN — PIPERACILLIN AND TAZOBACTAM 25 GRAM(S): 4; .5 INJECTION, POWDER, LYOPHILIZED, FOR SOLUTION INTRAVENOUS at 05:29

## 2023-06-23 RX ADMIN — PIPERACILLIN AND TAZOBACTAM 25 GRAM(S): 4; .5 INJECTION, POWDER, LYOPHILIZED, FOR SOLUTION INTRAVENOUS at 17:50

## 2023-06-23 NOTE — PROGRESS NOTE ADULT - SUBJECTIVE AND OBJECTIVE BOX
Seen in ICU, no distress    Vital Signs Last 24 Hrs  T(C): 36.7 (06-23-23 @ 19:00), Max: 36.7 (06-23-23 @ 01:00)  T(F): 98 (06-23-23 @ 19:00), Max: 98 (06-23-23 @ 01:00)  HR: 73 (06-23-23 @ 19:00) (54 - 77)  BP: 117/57 (06-23-23 @ 19:00) (84/53 - 120/58)  BP(mean): 80 (06-23-23 @ 18:00) (61 - 86)  RR: 18 (06-23-23 @ 19:00) (12 - 25)  SpO2: 96% (06-23-23 @ 19:00) (93% - 100%)    I&O's Detail    22 Jun 2023 07:01  -  23 Jun 2023 07:00  --------------------------------------------------------  IN:    IV PiggyBack: 175 mL    Propofol: 111.3 mL  Total IN: 286.3 mL    OUT:    Bulb (mL): 386 mL  Total OUT: 386 mL    Total NET: -99.7 mL    23 Jun 2023 07:01  -  23 Jun 2023 21:42  --------------------------------------------------------  IN:    IV PiggyBack: 50 mL    Oral Fluid: 480 mL  Total IN: 530 mL    OUT:    Bulb (mL): 30 mL    Other (mL): 1000 mL  Total OUT: 1030 mL    Total NET: -500 mL    s1s2  b/l air entry  soft, NT  no edema, GREGORY AVF                                        10.0   6.98  )-----------( 163      ( 23 Jun 2023 06:00 )             30.3     23 Jun 2023 06:00    136    |  94     |  72     ----------------------------<  85     4.1     |  25     |  6.92     Ca    8.4        23 Jun 2023 06:00  Phos  9.5       23 Jun 2023 06:00  Mg     2.1       23 Jun 2023 06:00    TPro  6.0    /  Alb  2.4    /  TBili  0.3    /  DBili  x      /  AST  38     /  ALT  88     /  AlkPhos  78     23 Jun 2023 06:00    LIVER FUNCTIONS - ( 23 Jun 2023 06:00 )  Alb: 2.4 g/dL / Pro: 6.0 g/dL / ALK PHOS: 78 U/L / ALT: 88 U/L / AST: 38 U/L / GGT: x           acetaminophen  Suppository .. 650 milliGRAM(s) Rectal every 6 hours PRN  heparin   Injectable 5000 Unit(s) SubCutaneous every 12 hours  HYDROmorphone  Injectable 2 milliGRAM(s) IV Push every 4 hours PRN  HYDROmorphone  Injectable 0.5 milliGRAM(s) IV Push every 4 hours PRN  HYDROmorphone  Injectable 1 milliGRAM(s) IV Push every 4 hours PRN  indomethacin Suppository 100 milliGRAM(s) Rectal once  pantoprazole    Tablet 40 milliGRAM(s) Oral before breakfast  piperacillin/tazobactam IVPB 3.375 Gram(s) IV Intermittent every 12 hours    A/P:    ESRD on HD MWF  Kleb bacteremia on adm, acute antonio  S/p cholecystectomy 6/21  S/p ERCP yesterday  Abx per ID  Next HD on Monday  TMP as able    [phone]

## 2023-06-23 NOTE — PROGRESS NOTE ADULT - ASSESSMENT
72y male with  HTN, polio, Polycystic kidney disease s/p L nephrectomy now ESRD on HD MWF, hx of HepC (treated) He initially presented to ED with upper abd pain, fevers, chills Tmax 104, received a dose of Vanco and Zosyn but recalled to come back because blood cult now with Latoya esparza. He still had fevers post discharge along with abdominal pain.  CT with findings suggestive of acute antonio, LFTs are normal, also with polycystic kidney disease, but urine cult is negative and pt is on HD, doubt  source  He underwent subtotal cholecystectomy on 6/21 for a necrotic gallbladder and required a post op ERCP with stent placement (? bile duct leak)  He has been extubated and appears stable post op.  His 6/17 blood cultures grew klebsiella, 6/19 remain negative,   Suggest:   1  Continue zosyn, day 5, perhaps 1 week post op as I am not sure how effective source control was.  2. Diet per surgery/GI  3.HD per renal  4. Will leave on broad coverage as with necrotic tissue would like anaerobic coverage as well as klebsiella coverage.

## 2023-06-23 NOTE — PROGRESS NOTE ADULT - SUBJECTIVE AND OBJECTIVE BOX
CC: f/u for klebsiella bacteremia and cholecystitis    Patient reports: he appears comfortable, s/p HD session    REVIEW OF SYSTEMS:  All other review of systems negative (Comprehensive ROS)    Antimicrobials Day #  :day 5  piperacillin/tazobactam IVPB.. 3.375 Gram(s) IV Intermittent every 12 hours    Other Medications Reviewed  MEDICATIONS  (STANDING):  chlorhexidine 2% Cloths 1 Application(s) Topical <User Schedule>  heparin   Injectable 5000 Unit(s) SubCutaneous every 12 hours  indomethacin Suppository 100 milliGRAM(s) Rectal once  pantoprazole  Injectable 40 milliGRAM(s) IV Push daily  piperacillin/tazobactam IVPB.. 3.375 Gram(s) IV Intermittent every 12 hours    T(F): 97.5 (06-23-23 @ 13:50), Max: 98.2 (06-22-23 @ 18:34)  HR: 56 (06-23-23 @ 13:50)  BP: 120/58 (06-23-23 @ 13:50)  RR: 14 (06-23-23 @ 13:50)  SpO2: 97% (06-23-23 @ 13:50)  Wt(kg): --    PHYSICAL EXAM:  General: alert, no acute distress  Eyes:  anicteric, no conjunctival injection, no discharge  Oropharynx: no lesions or injection 	  Neck: supple, without adenopathy  Lungs: clear to auscultation  Heart: regular rate and rhythm; no murmur, rubs or gallops  Abdomen: soft, nondistended, kiesha drain with sanguinous drainage,   Skin: no lesions  Extremities: no clubbing, cyanosis, or edema, left arm AVF  Neurologic: alert, oriented, moves all extremities    LAB RESULTS:                        10.0   6.98  )-----------( 163      ( 23 Jun 2023 06:00 )             30.3     06-23    136  |  94<L>  |  72<H>  ----------------------------<  85  4.1   |  25  |  6.92<H>    Ca    8.4      23 Jun 2023 06:00  Phos  9.5     06-23  Mg     2.1     06-23    TPro  6.0  /  Alb  2.4<L>  /  TBili  0.3  /  DBili  x   /  AST  38  /  ALT  88<H>  /  AlkPhos  78  06-23    LIVER FUNCTIONS - ( 23 Jun 2023 06:00 )  Alb: 2.4 g/dL / Pro: 6.0 g/dL / ALK PHOS: 78 U/L / ALT: 88 U/L / AST: 38 U/L / GGT: x           Urinalysis Basic - ( 23 Jun 2023 06:00 )    Color: x / Appearance: x / SG: x / pH: x  Gluc: 85 mg/dL / Ketone: x  / Bili: x / Urobili: x   Blood: x / Protein: x / Nitrite: x   Leuk Esterase: x / RBC: x / WBC x   Sq Epi: x / Non Sq Epi: x / Bacteria: x      MICROBIOLOGY:  RECENT CULTURES:  06-19 @ 22:10 .Blood Blood-Peripheral     No growth to date.          RADIOLOGY REVIEWED:    < from: Xray Chest 1 View- PORTABLE-Routine (06.22.23 @ 06:38) >  IMPRESSION: ET tube is at the level of the clavicles consider advancing   slightly.    Mild subsegmental atelectasis left lower lobe and possibly right midlung   field.    --- End of Report ---    < end of copied text >

## 2023-06-23 NOTE — PROGRESS NOTE ADULT - ASSESSMENT
GI consulted to see patient for ERCP. Patient seen and examined. Unable to obtain HPI as patient remains intubated and sedated. Per records s/p OR for necrotic gall bladder, had subtotal cholecystectomy and placement of 19F Matt drain in liver bed. S/P ERCP on 6/22/2023 with stent placement.

## 2023-06-23 NOTE — PROGRESS NOTE ADULT - SUBJECTIVE AND OBJECTIVE BOX
Patient is a 72y old  Male who presents with a chief complaint of Klebsiella bacteremia (23 Jun 2023 12:24)  pt POD # 2 s/p robotic assisted lap antonio and POD # 1 s/p ERCP and stent placement  no events noted overnight  pt currently getting bedside HD  pt feels well, denies n/v/f/c    Patient seen and examined at bedside    ALLERGIES:  Demerol HCl (Unknown)    MEDICATIONS  (STANDING):  chlorhexidine 2% Cloths 1 Application(s) Topical <User Schedule>  heparin   Injectable 5000 Unit(s) SubCutaneous every 12 hours  indomethacin Suppository 100 milliGRAM(s) Rectal once  pantoprazole  Injectable 40 milliGRAM(s) IV Push daily  piperacillin/tazobactam IVPB.. 3.375 Gram(s) IV Intermittent every 12 hours    MEDICATIONS  (PRN):  acetaminophen  Suppository .. 650 milliGRAM(s) Rectal every 6 hours PRN Temp greater or equal to 38C (100.4F)  HYDROmorphone  Injectable 0.5 milliGRAM(s) IV Push every 4 hours PRN Mild Pain (1 - 3)  HYDROmorphone  Injectable 1 milliGRAM(s) IV Push every 4 hours PRN Moderate Pain (4 - 6)  HYDROmorphone  Injectable 2 milliGRAM(s) IV Push every 4 hours PRN Severe Pain (7 - 10)    Vital Signs Last 24 Hrs  T(F): 97.7 (23 Jun 2023 10:50), Max: 98.2 (22 Jun 2023 18:34)  HR: 58 (23 Jun 2023 11:00) (54 - 88)  BP: 104/71 (23 Jun 2023 11:00) (83/59 - 116/65)  RR: 13 (23 Jun 2023 11:00) (12 - 21)  SpO2: 99% (23 Jun 2023 11:00) (93% - 100%)    I&O's Summary    22 Jun 2023 07:01  -  23 Jun 2023 07:00  --------------------------------------------------------  IN: 286.3 mL / OUT: 386 mL / NET: -99.7 mL    23 Jun 2023 07:01  -  23 Jun 2023 13:08  --------------------------------------------------------  IN: 25 mL / OUT: 0 mL / NET: 25 mL    PHYSICAL EXAM:  General: NAD, A/O x 3  ENT: MMM  Neck: Supple, No JVD  Abdomen: Soft, Nontender, Nondistended, incisions c/d/i, drain in place with serous fluid  Extremities: No calf tenderness, No pitting edema    LABS:                        10.0   6.98  )-----------( 163      ( 23 Jun 2023 06:00 )             30.3     06-23    136  |  94  |  72  ----------------------------<  85  4.1   |  25  |  6.92    Ca    8.4      23 Jun 2023 06:00  Phos  9.5     06-23  Mg     2.1     06-23    TPro  6.0  /  Alb  2.4  /  TBili  0.3  /  DBili  x   /  AST  38  /  ALT  88  /  AlkPhos  78  06-23      PT/INR - ( 21 Jun 2023 14:25 )   PT: 12.8 sec;   INR: 1.10 ratio         ABG - ( 21 Jun 2023 20:50 )  pH, Arterial: 7.39  pH, Blood: x     /  pCO2: 35    /  pO2: 120   / HCO3: 21    / Base Excess: -3.8  /  SaO2: 100.0     Urinalysis Basic - ( 23 Jun 2023 06:00 )    Color: x / Appearance: x / SG: x / pH: x  Gluc: 85 mg/dL / Ketone: x  / Bili: x / Urobili: x   Blood: x / Protein: x / Nitrite: x   Leuk Esterase: x / RBC: x / WBC x   Sq Epi: x / Non Sq Epi: x / Bacteria: x    Culture - Blood (collected 19 Jun 2023 22:10)  Source: .Blood Blood-Peripheral  Preliminary Report (21 Jun 2023 04:01):    No growth to date.    Culture - Blood (collected 19 Jun 2023 22:10)  Source: .Blood Blood-Peripheral  Preliminary Report (21 Jun 2023 04:01):    No growth to date.    Culture - Urine (collected 17 Jun 2023 19:03)  Source: Clean Catch Clean Catch (Midstream)  Final Report (19 Jun 2023 07:26):    <10,000 CFU/mL Normal Urogenital Carmen    Culture - Blood (collected 17 Jun 2023 19:03)  Source: .Blood Blood-Peripheral  Gram Stain (19 Jun 2023 07:57):    Growth in aerobic bottle: Gram Negative Rods  Final Report (20 Jun 2023 16:17):    Growth in aerobic bottle: Klebsiella pneumoniae    ***Blood Panel PCR results on this specimen are available    approximately 3 hours after the Gram stain result.***    Gram stain, PCR, and/or culture results may not always    correspond due to difference in methodologies.    ************************************************************    This PCR assay was performed by multiplex PCR. This    Assay tests for 66 bacterial and resistance gene targets.    Please refer to the [facility] test directory    at [email or web address] for details.  Organism: Blood Culture PCR  Klebsiella pneumoniae (20 Jun 2023 16:17)  Organism: Klebsiella pneumoniae (20 Jun 2023 16:17)      Method Type: WALESKA      -  Amikacin: S <=16      -  Ampicillin: R >16 These ampicillin results predict results for amoxicillin      -  Ampicillin/Sulbactam: S <=4/2 Enterobacter, Klebsiella aerogenes, Citrobacter, and Serratia may develop resistance during prolonged therapy (3-4 days)      -  Aztreonam: S <=4      -  Cefazolin: S <=2 Enterobacter, Klebsiella aerogenes, Citrobacter, and Serratia may develop resistance during prolonged therapy (3-4 days)      -  Cefepime: S <=2      -  Cefoxitin: S <=8      -  Ceftriaxone: S <=1 Enterobacter, Klebsiella aerogenes, Citrobacter, and Serratia may develop resistance during prolonged therapy      -  Ciprofloxacin: S <=0.25      -  Ertapenem: S <=0.5      -  Gentamicin: S <=2      -  Imipenem: S <=1      -  Levofloxacin: S <=0.5      -  Meropenem: S <=1      -  Piperacillin/Tazobactam: S <=8      -  Tobramycin: S <=2      -  Trimethoprim/Sulfamethoxazole: S <=0.5/9.5  Organism: Blood Culture PCR (20 Jun 2023 16:17)      Method Type: PCR      -  K. pneumoniae group: Detec (K. pneumoniae, K. quasipneumoniae, K. variicola)    Culture - Blood (collected 17 Jun 2023 19:03)  Source: .Blood Blood-Peripheral  Final Report (23 Jun 2023 01:01):    No Growth Final          RADIOLOGY & ADDITIONAL TESTS:    Care Discussed with Consultants/Other Providers:

## 2023-06-23 NOTE — PROGRESS NOTE ADULT - SUBJECTIVE AND OBJECTIVE BOX
INTERVAL HPI/OVERNIGHT EVENTS:  HPI:    73 y/o male s/p ERCP with stent yesterday for bile leak. Patient seen and examined. Denies abdominal pain. " I'm hungry".       MEDICATIONS  (STANDING):  chlorhexidine 2% Cloths 1 Application(s) Topical <User Schedule>  heparin   Injectable 5000 Unit(s) SubCutaneous every 12 hours  indomethacin Suppository 100 milliGRAM(s) Rectal once  pantoprazole  Injectable 40 milliGRAM(s) IV Push daily  piperacillin/tazobactam IVPB.. 3.375 Gram(s) IV Intermittent every 12 hours    MEDICATIONS  (PRN):  acetaminophen  Suppository .. 650 milliGRAM(s) Rectal every 6 hours PRN Temp greater or equal to 38C (100.4F)  HYDROmorphone  Injectable 2 milliGRAM(s) IV Push every 4 hours PRN Severe Pain (7 - 10)  HYDROmorphone  Injectable 0.5 milliGRAM(s) IV Push every 4 hours PRN Mild Pain (1 - 3)  HYDROmorphone  Injectable 1 milliGRAM(s) IV Push every 4 hours PRN Moderate Pain (4 - 6)      Allergies    Demerol HCl (Unknown)    Intolerances        PAST MEDICAL & SURGICAL HISTORY:  Hypertension      Renal failure, chronic      Cancer of kidney, left      History of left nephrectomy    PHYSICAL EXAM:   Vital Signs:  Vital Signs Last 24 Hrs  T(C): 36.5 (2023 05:00), Max: 37.3 (2023 12:00)  T(F): 97.7 (2023 05:00), Max: 99.2 (2023 12:00)  HR: 60 (2023 08:00) (60 - 91)  BP: 93/59 (2023 08:00) (83/59 - 114/77)  BP(mean): 71 (2023 08:00) (61 - 87)  RR: 13 (2023 08:00) (12 - 21)  SpO2: 96% (2023 08:00) (93% - 100%)    Parameters below as of 2023 04:00  Patient On (Oxygen Delivery Method): room air      Daily Height in cm: 167.6 (2023 12:58)    Daily Weight in k.8 (2023 05:00)I&O's Summary    2023 07:01  -  2023 07:00  --------------------------------------------------------  IN: 286.3 mL / OUT: 386 mL / NET: -99.7 mL    2023 07:01  -  2023 10:10  --------------------------------------------------------  IN: 25 mL / OUT: 0 mL / NET: 25 mL        GENERAL:  Appears stated age,   HEENT:  NC/AT,  conjunctivae clear and pink, sclera -anicteric  CHEST:  Full & symmetric excursion, no increased effort, breath sounds clear  HEART:  Regular rhythm, S1, S2, no murmur  ABDOMEN:  Soft, non-tender, non-distended, normoactive bowel sounds, BIRGIT drain w/ serous fluid noted, abd drsg CDI  EXTEREMITIES:  no edema  SKIN:  No rash/warm/dry  NEURO:  Alert, oriented,      LABS:                        10.0   6.98  )-----------( 163      ( 2023 06:00 )             30.3     06-23    136  |  94<L>  |  72<H>  ----------------------------<  85  4.1   |  25  |  6.92<H>    Ca    8.4      2023 06:00  Phos  9.5     06-23  Mg     2.1     06-23    TPro  6.0  /  Alb  2.4<L>  /  TBili  0.3  /  DBili  x   /  AST  38  /  ALT  88<H>  /  AlkPhos  78  06-23    PT/INR - ( 2023 14:25 )   PT: 12.8 sec;   INR: 1.10 ratio           Urinalysis Basic - ( 2023 06:00 )    Color: x / Appearance: x / SG: x / pH: x  Gluc: 85 mg/dL / Ketone: x  / Bili: x / Urobili: x   Blood: x / Protein: x / Nitrite: x   Leuk Esterase: x / RBC: x / WBC x   Sq Epi: x / Non Sq Epi: x / Bacteria: x      amylase   lipase  RADIOLOGY & ADDITIONAL TESTS:

## 2023-06-23 NOTE — PROGRESS NOTE ADULT - SUBJECTIVE AND OBJECTIVE BOX
INTERVAL HPI/OVERNIGHT EVENTS: laying in bed comfortable       Antimicrobial:  piperacillin/tazobactam IVPB.. 3.375 Gram(s) IV Intermittent every 12 hours    Cardiovascular:    Pulmonary:    Hematalogic:  heparin   Injectable 5000 Unit(s) SubCutaneous every 12 hours    Other:  acetaminophen  Suppository .. 650 milliGRAM(s) Rectal every 6 hours PRN  chlorhexidine 2% Cloths 1 Application(s) Topical <User Schedule>  HYDROmorphone  Injectable 0.5 milliGRAM(s) IV Push every 4 hours PRN  HYDROmorphone  Injectable 1 milliGRAM(s) IV Push every 4 hours PRN  HYDROmorphone  Injectable 2 milliGRAM(s) IV Push every 4 hours PRN  indomethacin Suppository 100 milliGRAM(s) Rectal once  pantoprazole  Injectable 40 milliGRAM(s) IV Push daily      Drug Dosing Weight  Height (cm): 167.6 (22 Jun 2023 13:04)  Weight (kg): 67 (22 Jun 2023 13:04)  BMI (kg/m2): 23.9 (22 Jun 2023 13:04)  BSA (m2): 1.76 (22 Jun 2023 13:04)    CENTRAL LINE: [ ] YES [ ] NO  LOCATION:   DATE INSERTED:    LINARES: [ ] YES [ ] NO    DATE INSERTED:    A-LINE:  [ ] YES [ ] NO  LOCATION:   DATE INSERTED:    [facility]/Social Hx/[facility] Hx -reviewed admission note, no change since admission  PAST MEDICAL & SURGICAL HISTORY:  Hypertension      Renal failure, chronic      Cancer of kidney, left      History of left nephrectomy          T(C): 36.5 (06-23-23 @ 10:50), Max: 37.3 (06-22-23 @ 12:58)  HR: 58 (06-23-23 @ 11:00)  BP: 104/71 (06-23-23 @ 11:00)  BP(mean): 82 (06-23-23 @ 11:00)  ABP: --  ABP(mean): --  RR: 13 (06-23-23 @ 11:00)  SpO2: 99% (06-23-23 @ 11:00)  Wt(kg): --    ABG - ( 21 Jun 2023 20:50 )  pH, Arterial: 7.39  pH, Blood: x     /  pCO2: 35    /  pO2: 120   / HCO3: 21    / Base Excess: -3.8  /  SaO2: 100.0                 06-22 @ 07:01  -  06-23 @ 07:00  --------------------------------------------------------  IN: 286.3 mL / OUT: 386 mL / NET: -99.7 mL        Mode: AC/ CMV (Assist Control/ Continuous Mandatory Ventilation)  RR (machine): 14  TV (machine): 400  FiO2: 50  PEEP: 2  MAP: 12  PIP: 26      PHYSICAL EXAM:    GENERAL: No signs of distress, comfortable  HEAD: Atraumatic, Normocephalic  EYES: EOMI, PERRLA  ENMT: No erythema, exudates, or enlargement, Moist mucous membranes  NECK: Supple, normal appearance, No JVD; [  ] central line (if applicable)  CHEST/LUNG: No chest deformity, fair bilateral air entry; No rales, rhonchi, wheezing; crackles  HEART: Regular rate and rhythm; No murmurs, rubs, or gallops;   ABDOMEN: Soft, midline multiple surgical wound   EXTREMITIES:  + Peripheral Pulses, No clubbing, cyanosis, or edema  NERVOUS SYSTEM: awake and alert x 3, follows commands, upper and lower extremities  LYMPH: No lymphadenopathy noted  SKIN: No rashes or lesions; good turgor, warm, dry      LABS:  CBC Full  -  ( 23 Jun 2023 06:00 )  WBC Count : 6.98 K/uL  RBC Count : 3.23 M/uL  Hemoglobin : 10.0 g/dL  Hematocrit : 30.3 %  Platelet Count - Automated : 163 K/uL  Mean Cell Volume : 93.8 fl  Mean Cell Hemoglobin : 31.0 pg  Mean Cell Hemoglobin Concentration : 33.0 gm/dL  Auto Neutrophil # : x  Auto Lymphocyte # : x  Auto Monocyte # : x  Auto Eosinophil # : x  Auto Basophil # : x  Auto Neutrophil % : x  Auto Lymphocyte % : x  Auto Monocyte % : x  Auto Eosinophil % : x  Auto Basophil % : x    06-23    136  |  94<L>  |  72<H>  ----------------------------<  85  4.1   |  25  |  6.92<H>    Ca    8.4      23 Jun 2023 06:00  Phos  9.5     06-23  Mg     2.1     06-23    TPro  6.0  /  Alb  2.4<L>  /  TBili  0.3  /  DBili  x   /  AST  38  /  ALT  88<H>  /  AlkPhos  78  06-23    PT/INR - ( 21 Jun 2023 14:25 )   PT: 12.8 sec;   INR: 1.10 ratio           Urinalysis Basic - ( 23 Jun 2023 06:00 )    Color: x / Appearance: x / SG: x / pH: x  Gluc: 85 mg/dL / Ketone: x  / Bili: x / Urobili: x   Blood: x / Protein: x / Nitrite: x   Leuk Esterase: x / RBC: x / WBC x   Sq Epi: x / Non Sq Epi: x / Bacteria: x          RADIOLOGY & ADDITIONAL STUDIES REVIEWED         IMPRESSION:  PAST MEDICAL & SURGICAL HISTORY:  Hypertension      Renal failure, chronic      Cancer of kidney, left      History of left nephrectomy       p/w         IMP: This is a 72 year old man  with polycystic kidney disease s/p nephrectomy for renal carcinoma, ESRD on HD, Hepatitis C and HTN who presented to ED at [facility] 6/19 with a five day history fever and chills, had been in the ED two days prior and was called back to hospital because 1/4 blood cultures positive for klebsiella.  Started on Zosyn, today went to the OR 6/21 for laparoscopic cholecystectomy.        Intraoperatively noted to have necrotic gall bladder, had subtotal cholecystectomy with placement of 19F Matt drain in liver bed.  Postoperatively awoke with purposeful movements but remained lethargic.  Deemed unsafe to extubate and transferred to ICU for further management.        ASSESSMENT     - Post Op resp failure   - Bacteremia : Kleb   - Acute cholecystitis  - ESRD on HD with RUE fistula  - Renal cell ca  - Hep C      Plan     - S/P ERCP 6/22 with stent placement   - Extubated post ERCP 6/22  - Hemodynamic monitoring   - Diet as tolerated   - LFT trending down    - Continue broad spec antibx   - Repeat BC neg  ( BC 6/17 positive for kleb)  - ID following   - Dialysis as per Neph   - DVT GI prophy   - OOB to chair   - Incentive spirometry   - DVT GI prophy

## 2023-06-24 LAB
ALBUMIN SERPL ELPH-MCNC: 2.5 G/DL — LOW (ref 3.3–5)
ALP SERPL-CCNC: 79 U/L — SIGNIFICANT CHANGE UP (ref 40–120)
ALT FLD-CCNC: 72 U/L — HIGH (ref 10–45)
ANION GAP SERPL CALC-SCNC: 14 MMOL/L — SIGNIFICANT CHANGE UP (ref 5–17)
AST SERPL-CCNC: 33 U/L — SIGNIFICANT CHANGE UP (ref 10–40)
BILIRUB SERPL-MCNC: 0.6 MG/DL — SIGNIFICANT CHANGE UP (ref 0.2–1.2)
BUN SERPL-MCNC: 45 MG/DL — HIGH (ref 7–23)
CALCIUM SERPL-MCNC: 8.4 MG/DL — SIGNIFICANT CHANGE UP (ref 8.4–10.5)
CHLORIDE SERPL-SCNC: 98 MMOL/L — SIGNIFICANT CHANGE UP (ref 96–108)
CO2 SERPL-SCNC: 28 MMOL/L — SIGNIFICANT CHANGE UP (ref 22–31)
CREAT SERPL-MCNC: 5.23 MG/DL — HIGH (ref 0.5–1.3)
EGFR: 11 ML/MIN/1.73M2 — LOW
GLUCOSE SERPL-MCNC: 134 MG/DL — HIGH (ref 70–99)
HCT VFR BLD CALC: 33.1 % — LOW (ref 39–50)
HGB BLD-MCNC: 11 G/DL — LOW (ref 13–17)
MAGNESIUM SERPL-MCNC: 2.1 MG/DL — SIGNIFICANT CHANGE UP (ref 1.6–2.6)
MCHC RBC-ENTMCNC: 31.3 PG — SIGNIFICANT CHANGE UP (ref 27–34)
MCHC RBC-ENTMCNC: 33.2 GM/DL — SIGNIFICANT CHANGE UP (ref 32–36)
MCV RBC AUTO: 94.3 FL — SIGNIFICANT CHANGE UP (ref 80–100)
NRBC # BLD: 0 /100 WBCS — SIGNIFICANT CHANGE UP (ref 0–0)
PHOSPHATE SERPL-MCNC: 5.8 MG/DL — HIGH (ref 2.5–4.5)
PLATELET # BLD AUTO: 216 K/UL — SIGNIFICANT CHANGE UP (ref 150–400)
POTASSIUM SERPL-MCNC: 3.8 MMOL/L — SIGNIFICANT CHANGE UP (ref 3.5–5.3)
POTASSIUM SERPL-SCNC: 3.8 MMOL/L — SIGNIFICANT CHANGE UP (ref 3.5–5.3)
PROT SERPL-MCNC: 6.4 G/DL — SIGNIFICANT CHANGE UP (ref 6–8.3)
RBC # BLD: 3.51 M/UL — LOW (ref 4.2–5.8)
RBC # FLD: 13.5 % — SIGNIFICANT CHANGE UP (ref 10.3–14.5)
SODIUM SERPL-SCNC: 140 MMOL/L — SIGNIFICANT CHANGE UP (ref 135–145)
WBC # BLD: 9.65 K/UL — SIGNIFICANT CHANGE UP (ref 3.8–10.5)
WBC # FLD AUTO: 9.65 K/UL — SIGNIFICANT CHANGE UP (ref 3.8–10.5)

## 2023-06-24 PROCEDURE — 99233 SBSQ HOSP IP/OBS HIGH 50: CPT | Mod: FS

## 2023-06-24 PROCEDURE — 99232 SBSQ HOSP IP/OBS MODERATE 35: CPT

## 2023-06-24 RX ORDER — SIMETHICONE 80 MG/1
80 TABLET, CHEWABLE ORAL
Refills: 0 | Status: DISCONTINUED | OUTPATIENT
Start: 2023-06-24 | End: 2023-06-28

## 2023-06-24 RX ORDER — ACETAMINOPHEN 500 MG
1000 TABLET ORAL ONCE
Refills: 0 | Status: COMPLETED | OUTPATIENT
Start: 2023-06-24 | End: 2023-06-24

## 2023-06-24 RX ORDER — TAMSULOSIN HYDROCHLORIDE 0.4 MG/1
0.4 CAPSULE ORAL AT BEDTIME
Refills: 0 | Status: DISCONTINUED | OUTPATIENT
Start: 2023-06-24 | End: 2023-06-28

## 2023-06-24 RX ADMIN — SIMETHICONE 80 MILLIGRAM(S): 80 TABLET, CHEWABLE ORAL at 16:42

## 2023-06-24 RX ADMIN — HYDROMORPHONE HYDROCHLORIDE 0.5 MILLIGRAM(S): 2 INJECTION INTRAMUSCULAR; INTRAVENOUS; SUBCUTANEOUS at 14:40

## 2023-06-24 RX ADMIN — HYDROMORPHONE HYDROCHLORIDE 2 MILLIGRAM(S): 2 INJECTION INTRAMUSCULAR; INTRAVENOUS; SUBCUTANEOUS at 20:43

## 2023-06-24 RX ADMIN — Medication 650 MILLIGRAM(S): at 15:36

## 2023-06-24 RX ADMIN — TAMSULOSIN HYDROCHLORIDE 0.4 MILLIGRAM(S): 0.4 CAPSULE ORAL at 21:17

## 2023-06-24 RX ADMIN — HYDROMORPHONE HYDROCHLORIDE 2 MILLIGRAM(S): 2 INJECTION INTRAMUSCULAR; INTRAVENOUS; SUBCUTANEOUS at 02:34

## 2023-06-24 RX ADMIN — PIPERACILLIN AND TAZOBACTAM 25 GRAM(S): 4; .5 INJECTION, POWDER, LYOPHILIZED, FOR SOLUTION INTRAVENOUS at 18:01

## 2023-06-24 RX ADMIN — HYDROMORPHONE HYDROCHLORIDE 0.5 MILLIGRAM(S): 2 INJECTION INTRAMUSCULAR; INTRAVENOUS; SUBCUTANEOUS at 18:45

## 2023-06-24 RX ADMIN — HYDROMORPHONE HYDROCHLORIDE 2 MILLIGRAM(S): 2 INJECTION INTRAMUSCULAR; INTRAVENOUS; SUBCUTANEOUS at 20:28

## 2023-06-24 RX ADMIN — HYDROMORPHONE HYDROCHLORIDE 0.5 MILLIGRAM(S): 2 INJECTION INTRAMUSCULAR; INTRAVENOUS; SUBCUTANEOUS at 14:31

## 2023-06-24 RX ADMIN — Medication 1000 MILLIGRAM(S): at 22:12

## 2023-06-24 RX ADMIN — PANTOPRAZOLE SODIUM 40 MILLIGRAM(S): 20 TABLET, DELAYED RELEASE ORAL at 05:27

## 2023-06-24 RX ADMIN — HEPARIN SODIUM 5000 UNIT(S): 5000 INJECTION INTRAVENOUS; SUBCUTANEOUS at 18:45

## 2023-06-24 RX ADMIN — HYDROMORPHONE HYDROCHLORIDE 2 MILLIGRAM(S): 2 INJECTION INTRAMUSCULAR; INTRAVENOUS; SUBCUTANEOUS at 02:04

## 2023-06-24 RX ADMIN — PIPERACILLIN AND TAZOBACTAM 25 GRAM(S): 4; .5 INJECTION, POWDER, LYOPHILIZED, FOR SOLUTION INTRAVENOUS at 05:26

## 2023-06-24 RX ADMIN — Medication 650 MILLIGRAM(S): at 14:58

## 2023-06-24 RX ADMIN — HYDROMORPHONE HYDROCHLORIDE 0.5 MILLIGRAM(S): 2 INJECTION INTRAMUSCULAR; INTRAVENOUS; SUBCUTANEOUS at 19:15

## 2023-06-24 RX ADMIN — HEPARIN SODIUM 5000 UNIT(S): 5000 INJECTION INTRAVENOUS; SUBCUTANEOUS at 05:26

## 2023-06-24 RX ADMIN — Medication 400 MILLIGRAM(S): at 21:57

## 2023-06-24 NOTE — PROGRESS NOTE ADULT - ASSESSMENT
72y male with  HTN, polio, Polycystic kidney disease s/p L nephrectomy now ESRD on HD MWF, hx of HepC (treated) He initially presented to ED with upper abd pain, fevers, chills Tmax 104, received a dose of Vanco and Zosyn but recalled to come back because blood cult now with Latoya esparza. He still had fevers post discharge along with abdominal pain.  CT with findings suggestive of acute antonio, LFTs are normal, also with polycystic kidney disease, but urine cult is negative and pt is on HD, doubt  source  He underwent subtotal cholecystectomy on 6/21 for a necrotic gallbladder and required a post op ERCP with stent placement (? bile duct leak)  He has been extubated and appears stable post op.  His 6/17 blood cultures grew klebsiella, 6/19 remain negative,   Suggest:   1  Continue zosyn, day 6, perhaps 1 week post op as I am not sure how effective source control was.I would like to complete a minimum of 1 week IV, 6/25 would be last day.  2. Diet per surgery/GI  3.HD per renal  4. Will leave on broad coverage as with necrotic tissue would like anaerobic coverage as well as klebsiella coverage.He will be candidate from ID viewpoint for discharge planning on 6/26, reviewed with daughter.

## 2023-06-24 NOTE — PROGRESS NOTE ADULT - SUBJECTIVE AND OBJECTIVE BOX
CC: f/u for klebsiella bacteremia    Patient reports: he is able to ambulate with crutches. He is tolerating diet.No abdominal pain, N/V    REVIEW OF SYSTEMS:  All other review of systems negative (Comprehensive ROS)    Antimicrobials Day #  :day 6  piperacillin/tazobactam IVPB.. 3.375 Gram(s) IV Intermittent every 12 hours    Other Medications Reviewed  MEDICATIONS  (STANDING):  heparin   Injectable 5000 Unit(s) SubCutaneous every 12 hours  indomethacin Suppository 100 milliGRAM(s) Rectal once  pantoprazole    Tablet 40 milliGRAM(s) Oral before breakfast  piperacillin/tazobactam IVPB.. 3.375 Gram(s) IV Intermittent every 12 hours    T(F): 98 (06-24-23 @ 04:57), Max: 98 (06-23-23 @ 19:00)  HR: 70 (06-24-23 @ 04:57)  BP: 141/80 (06-24-23 @ 04:57)  RR: 13 (06-24-23 @ 04:57)  SpO2: 91% (06-24-23 @ 04:57)  Wt(kg): --    PHYSICAL EXAM:  General: alert, no acute distress  Eyes:  anicteric, no conjunctival injection, no discharge  Oropharynx: no lesions or injection 	  Neck: supple, without adenopathy  Lungs: clear to auscultation  Heart: regular rate and rhythm; no murmur, rubs or gallops  Abdomen: soft, nondistended, nontender, without mass or organomegaly. Drain with SS drainage  Skin: no lesions  Extremities: no clubbing, cyanosis, or edema  Neurologic: alert, oriented, moves all extremities, atrophied LLE-polio    LAB RESULTS:                        11.0   9.65  )-----------( 216      ( 24 Jun 2023 05:40 )             33.1     06-24    140  |  98  |  45<H>  ----------------------------<  134<H>  3.8   |  28  |  5.23<H>    Ca    8.4      24 Jun 2023 05:40  Phos  5.8     06-24  Mg     2.1     06-24    TPro  6.4  /  Alb  2.5<L>  /  TBili  0.6  /  DBili  x   /  AST  33  /  ALT  72<H>  /  AlkPhos  79  06-24    LIVER FUNCTIONS - ( 24 Jun 2023 05:40 )  Alb: 2.5 g/dL / Pro: 6.4 g/dL / ALK PHOS: 79 U/L / ALT: 72 U/L / AST: 33 U/L / GGT: x           Urinalysis Basic - ( 24 Jun 2023 05:40 )    Color: x / Appearance: x / SG: x / pH: x  Gluc: 134 mg/dL / Ketone: x  / Bili: x / Urobili: x   Blood: x / Protein: x / Nitrite: x   Leuk Esterase: x / RBC: x / WBC x   Sq Epi: x / Non Sq Epi: x / Bacteria: x      MICROBIOLOGY:  RECENT CULTURES:  06-19 @ 22:10 .Blood Blood-Peripheral     No growth to date.          RADIOLOGY REVIEWED:  < from: Xray Chest 1 View- PORTABLE-Routine (06.22.23 @ 06:38) >      < end of copied text >

## 2023-06-24 NOTE — PROGRESS NOTE ADULT - PROBLEM SELECTOR PLAN 1
S/P ERCP with stent placement on 6/23/2023  Advance diet  Surgical follow up noted  No GI contraindication to discharge, if remains stable and tolerating diet  Outpatient follow up with Dr. Spangler for stent retrieval timing TBD S/P ERCP with stent placement on 6/22/2023  Advance diet  Surgical follow up noted  No GI contraindication to discharge, if remains stable and tolerating diet  Outpatient follow up with Dr. Spangler for stent retrieval timing TBD

## 2023-06-24 NOTE — PHYSICAL THERAPY INITIAL EVALUATION ADULT - PERTINENT HX OF CURRENT PROBLEM, REHAB EVAL
72M hx of HTN, polio, Polycystic kidney disease s/p L nephrectomy now ESRD on HD MWF, hx of HepC (treated) pw Klebsiella bacteremia. Pt related 5 days ago developed moderate upper abd pain that lasted for a day and resolved but followed by fevers, chills Tmax 104 but no recurrent abd pain, no associated NVD, cough, SOB, CP, dysuria or flank pain. Was in ED 2 days ago and routine labs, UA, RVP neg and was given 1 dose of Vanco and Zosyn but recalled today for 1 culture bottle with Kleb pneum. Pt related still with fevers of 102 since D/C from ED but otherwise feels well. In ED Tmax: 100 P: 88 BP: 130/71 sat 95% on RA. WBC: 4.05 hgb: 10.7 69%N BUN/cr: 42/4.25

## 2023-06-24 NOTE — PROGRESS NOTE ADULT - SUBJECTIVE AND OBJECTIVE BOX
pod#3 robotic cholecystectomy    coverage for dr liz      patient without complaints      afebrile    Heent-sclera anicteric    abdomen-wound sites clean and dry, no hernias  soft and non distended      labs:    wbc 9.65  h/h 11/33    k+3.8    creat 5.2

## 2023-06-24 NOTE — PROGRESS NOTE ADULT - PROBLEM SELECTOR PLAN 2
Nephrology recommendations reviewed  HD Monday Klebsiella bacteremia  D#6/? 1 week, unknown duration   ID following

## 2023-06-24 NOTE — PROGRESS NOTE ADULT - SUBJECTIVE AND OBJECTIVE BOX
INTERVAL HPI/OVERNIGHT EVENTS:  seen earlier today eating in chair , comfortable      Antimicrobial:  piperacillin/tazobactam IVPB.. 3.375 Gram(s) IV Intermittent every 12 hours    Cardiovascular:    Pulmonary:    Hematalogic:  heparin   Injectable 5000 Unit(s) SubCutaneous every 12 hours    Other:  acetaminophen  Suppository .. 650 milliGRAM(s) Rectal every 6 hours PRN  HYDROmorphone  Injectable 2 milliGRAM(s) IV Push every 4 hours PRN  HYDROmorphone  Injectable 0.5 milliGRAM(s) IV Push every 4 hours PRN  HYDROmorphone  Injectable 1 milliGRAM(s) IV Push every 4 hours PRN  indomethacin Suppository 100 milliGRAM(s) Rectal once  pantoprazole    Tablet 40 milliGRAM(s) Oral before breakfast      Drug Dosing Weight  Height (cm): 167.6 (22 Jun 2023 13:04)  Weight (kg): 67 (22 Jun 2023 13:04)  BMI (kg/m2): 23.9 (22 Jun 2023 13:04)  BSA (m2): 1.76 (22 Jun 2023 13:04)    CENTRAL LINE: [ ] YES [ ] NO  LOCATION:   DATE INSERTED:    LINARES: [ ] YES [ ] NO    DATE INSERTED:    A-LINE:  [ ] YES [ ] NO  LOCATION:   DATE INSERTED:    PMH/Social Hx/Fam Hx -reviewed admission note, no change since admission  PAST MEDICAL & SURGICAL HISTORY:  Hypertension      Renal failure, chronic      Cancer of kidney, left      History of left nephrectomy          T(C): 36.8 (06-24-23 @ 13:54), Max: 36.8 (06-24-23 @ 13:54)  HR: 97 (06-24-23 @ 13:54)  BP: 132/81 (06-24-23 @ 13:54)  BP(mean): 9 (06-24-23 @ 13:54)  ABP: --  ABP(mean): --  RR: 13 (06-24-23 @ 04:57)  SpO2: 96% (06-24-23 @ 13:54)  Wt(kg): --          06-23 @ 07:01  -  06-24 @ 07:00  --------------------------------------------------------  IN: 530 mL / OUT: 1065 mL / NET: -535 mL            PHYSICAL EXAM:    GENERAL: No signs of distress, comfortable  HEAD: Atraumatic, Normocephalic  EYES: EOMI, PERRLA  ENMT: No erythema, exudates, or enlargement, Moist mucous membranes  NECK: Supple, normal appearance, No JVD; [  ] central line (if applicable)  CHEST/LUNG: No chest deformity, fair bilateral air entry; No rales, rhonchi, wheezing; crackles  HEART: Regular rate and rhythm; No murmurs, rubs, or gallops;   ABDOMEN: Soft, Nontender, Nondistended; Bowel sounds present  EXTREMITIES:  + Peripheral Pulses, No clubbing, cyanosis, or edema  NERVOUS SYSTEM: awake and alert x 3, follows commands, upper and lower extremities  LYMPH: No lymphadenopathy noted  SKIN: No rashes or lesions; good turgor, warm, dry      LABS:  CBC Full  -  ( 24 Jun 2023 05:40 )  WBC Count : 9.65 K/uL  RBC Count : 3.51 M/uL  Hemoglobin : 11.0 g/dL  Hematocrit : 33.1 %  Platelet Count - Automated : 216 K/uL  Mean Cell Volume : 94.3 fl  Mean Cell Hemoglobin : 31.3 pg  Mean Cell Hemoglobin Concentration : 33.2 gm/dL  Auto Neutrophil # : x  Auto Lymphocyte # : x  Auto Monocyte # : x  Auto Eosinophil # : x  Auto Basophil # : x  Auto Neutrophil % : x  Auto Lymphocyte % : x  Auto Monocyte % : x  Auto Eosinophil % : x  Auto Basophil % : x    06-24    140  |  98  |  45<H>  ----------------------------<  134<H>  3.8   |  28  |  5.23<H>    Ca    8.4      24 Jun 2023 05:40  Phos  5.8     06-24  Mg     2.1     06-24    TPro  6.4  /  Alb  2.5<L>  /  TBili  0.6  /  DBili  x   /  AST  33  /  ALT  72<H>  /  AlkPhos  79  06-24      Urinalysis Basic - ( 24 Jun 2023 05:40 )    Color: x / Appearance: x / SG: x / pH: x  Gluc: 134 mg/dL / Ketone: x  / Bili: x / Urobili: x   Blood: x / Protein: x / Nitrite: x   Leuk Esterase: x / RBC: x / WBC x   Sq Epi: x / Non Sq Epi: x / Bacteria: x          RADIOLOGY & ADDITIONAL STUDIES REVIEWED         IMPRESSION:  PAST MEDICAL & SURGICAL HISTORY:  Hypertension      Renal failure, chronic      Cancer of kidney, left      History of left nephrectomy       p/w           IMP: This is a 72 year old man  with polycystic kidney disease s/p nephrectomy for renal carcinoma, ESRD on HD, Hepatitis C and HTN who presented to ED at [facility] 6/19 with a five day history fever and chills, had been in the ED two days prior and was called back to hospital because 1/4 blood cultures positive for klebsiella.  Started on Zosyn, today went to the OR 6/21 for laparoscopic cholecystectomy.        Intraoperatively noted to have necrotic gall bladder, had subtotal cholecystectomy with placement of 19F Matt drain in liver bed.  Postoperatively awoke with purposeful movements but remained lethargic.  Deemed unsafe to extubate and transferred to ICU for further management.        ASSESSMENT     - Post Op resp failure   - Bacteremia : Kleb   - Acute cholecystitis  - ESRD on HD with RUE fistula  - Renal cell ca  - Hep C      Plan     - S/P ERCP 6/22 with stent placement   - Extubated post ERCP 6/22  - Hemodynamic monitoring   - Diet as tolerated   - LFT trending down    - Continue broad spec antibx   - Repeat BC neg  ( BC 6/17 positive for kleb)  - ID following   - Dialysis as per Neph   - DVT GI prophy   - OOB to chair   - Incentive spirometry   - DVT GI prophy

## 2023-06-24 NOTE — PHYSICAL THERAPY INITIAL EVALUATION ADULT - ADDITIONAL COMMENTS
pt lives w/dtr and grandchildren in private house, 2 frank w/rail 1 flt to br. pt uses axillary crutches to ambulate (polio), independent w/ADL's, +

## 2023-06-24 NOTE — PROGRESS NOTE ADULT - ASSESSMENT
a/p    doing well    from my persective can be discharged home with drain to be followed up by dr liz next week    does not need antibiotics      thank you    dr maximilian ramirez  cell#[phone]

## 2023-06-24 NOTE — PROGRESS NOTE ADULT - SUBJECTIVE AND OBJECTIVE BOX
Patient is a 72y old  Male who presents with a chief complaint of Klebsiella bacteremia (24 Jun 2023 12:09)    Feels better.  Pain is controlled on pain medication.      Patient seen and examined at bedside. No overnight events reported.     ALLERGIES:  Demerol HCl (Unknown)    MEDICATIONS  (STANDING):  heparin   Injectable 5000 Unit(s) SubCutaneous every 12 hours  indomethacin Suppository 100 milliGRAM(s) Rectal once  pantoprazole    Tablet 40 milliGRAM(s) Oral before breakfast  piperacillin/tazobactam IVPB.. 3.375 Gram(s) IV Intermittent every 12 hours    MEDICATIONS  (PRN):  acetaminophen  Suppository .. 650 milliGRAM(s) Rectal every 6 hours PRN Temp greater or equal to 38C (100.4F)  HYDROmorphone  Injectable 2 milliGRAM(s) IV Push every 4 hours PRN Severe Pain (7 - 10)  HYDROmorphone  Injectable 0.5 milliGRAM(s) IV Push every 4 hours PRN Mild Pain (1 - 3)  HYDROmorphone  Injectable 1 milliGRAM(s) IV Push every 4 hours PRN Moderate Pain (4 - 6)    Vital Signs Last 24 Hrs  T(F): 98 (24 Jun 2023 04:57), Max: 98 (23 Jun 2023 19:00)  HR: 70 (24 Jun 2023 04:57) (56 - 77)  BP: 141/80 (24 Jun 2023 04:57) (99/68 - 141/80)  RR: 13 (24 Jun 2023 04:57) (12 - 25)  SpO2: 91% (24 Jun 2023 04:57) (91% - 100%)  I&O's Summary    23 Jun 2023 07:01  -  24 Jun 2023 07:00  --------------------------------------------------------  IN: 530 mL / OUT: 1065 mL / NET: -535 mL    24 Jun 2023 07:01  -  24 Jun 2023 12:32  --------------------------------------------------------  IN: 0 mL / OUT: 20 mL / NET: -20 mL      PHYSICAL EXAM:  General: NAD, A/O x 3  ENT: No gross hearing impairment, Moist mucous membranes, no thrush  Neck: Supple, No JVD  Lungs: Clear to auscultation bilaterally, good air entry, non-labored breathing  Cardio: RRR, S1/S2, No murmur  Abdomen: Soft, Nontender, Nondistended; Bowel sounds present  Extremities: No calf tenderness, No cyanosis, No pitting edema  Psych: Appropriate mood and affect    LABS:                        11.0   9.65  )-----------( 216      ( 24 Jun 2023 05:40 )             33.1     06-24    140  |  98  |  45  ----------------------------<  134  3.8   |  28  |  5.23    Ca    8.4      24 Jun 2023 05:40  Phos  5.8     06-24  Mg     2.1     06-24    TPro  6.4  /  Alb  2.5  /  TBili  0.6  /  DBili  x   /  AST  33  /  ALT  72  /  AlkPhos  79  06-24    PT/INR - ( 21 Jun 2023 14:25 )   PT: 12.8 sec;   INR: 1.10 ratio      ABG - ( 21 Jun 2023 20:50 )  pH, Arterial: 7.39  pH, Blood: x     /  pCO2: 35    /  pO2: 120   / HCO3: 21    / Base Excess: -3.8  /  SaO2: 100.0     Urinalysis Basic - ( 24 Jun 2023 05:40 )    Color: x / Appearance: x / SG: x / pH: x  Gluc: 134 mg/dL / Ketone: x  / Bili: x / Urobili: x   Blood: x / Protein: x / Nitrite: x   Leuk Esterase: x / RBC: x / WBC x   Sq Epi: x / Non Sq Epi: x / Bacteria: x    Culture - Blood (collected 19 Jun 2023 22:10)  Source: .Blood Blood-Peripheral  Preliminary Report (21 Jun 2023 04:01):    No growth to date.    Culture - Blood (collected 19 Jun 2023 22:10)  Source: .Blood Blood-Peripheral  Preliminary Report (21 Jun 2023 04:01):    No growth to date.    Culture - Blood (collected 17 Jun 2023 19:03)  Source: .Blood Blood-Peripheral  Final Report (23 Jun 2023 01:01):    No Growth Final    Culture - Urine (collected 17 Jun 2023 19:03)  Source: Clean Catch Clean Catch (Midstream)  Final Report (19 Jun 2023 07:26):    <10,000 CFU/mL Normal Urogenital Carmen    Culture - Blood (collected 17 Jun 2023 19:03)  Source: .Blood Blood-Peripheral  Gram Stain (19 Jun 2023 07:57):    Growth in aerobic bottle: Gram Negative Rods  Final Report (20 Jun 2023 16:17):    Growth in aerobic bottle: Klebsiella pneumoniae    ***Blood Panel PCR results on this specimen are available    approximately 3 hours after the Gram stain result.***    Gram stain, PCR, and/or culture results may not always    correspond due to difference in methodologies.    ************************************************************    This PCR assay was performed by multiplex PCR. This    Assay tests for 66 bacterial and resistance gene targets.    Please refer to the [facility] test directory    at [email or web address] for details.  Organism: Blood Culture PCR  Klebsiella pneumoniae (20 Jun 2023 16:17)  Organism: Klebsiella pneumoniae (20 Jun 2023 16:17)      -  Levofloxacin: S <=0.5      -  Tobramycin: S <=2      -  Aztreonam: S <=4      -  Gentamicin: S <=2      -  Cefazolin: S <=2 Enterobacter, Klebsiella aerogenes, Citrobacter, and Serratia may develop resistance during prolonged therapy (3-4 days)      -  Cefepime: S <=2      -  Piperacillin/Tazobactam: S <=8      -  Ciprofloxacin: S <=0.25      -  Imipenem: S <=1      -  Ceftriaxone: S <=1 Enterobacter, Klebsiella aerogenes, Citrobacter, and Serratia may develop resistance during prolonged therapy      -  Ampicillin: R >16 These ampicillin results predict results for amoxicillin      Method Type: WALESKA      -  Meropenem: S <=1      -  Ampicillin/Sulbactam: S <=4/2 Enterobacter, Klebsiella aerogenes, Citrobacter, and Serratia may develop resistance during prolonged therapy (3-4 days)      -  Cefoxitin: S <=8      -  Amikacin: S <=16      -  Trimethoprim/Sulfamethoxazole: S <=0.5/9.5      -  Ertapenem: S <=0.5  Organism: Blood Culture PCR (20 Jun 2023 16:17)      Method Type: PCR      -  K. pneumoniae group: Detec (K. pneumoniae, K. quasipneumoniae, K. variicola)        RADIOLOGY & ADDITIONAL TESTS:    Care Discussed with Consultants/Other Providers:

## 2023-06-24 NOTE — PROGRESS NOTE ADULT - ASSESSMENT
73 y/o male POD #3 from robotic sub-total cholecystectomy with 19F kiesha drain, complicated by bile leak, GI consulted, now s/p ERCP with stent on 6/23/23.     73 y/o male POD #3 from robotic sub-total cholecystectomy with 19F kiesha drain, complicated by bile leak, GI consulted, now s/p ERCP with stent on 6/22/23.

## 2023-06-24 NOTE — PROGRESS NOTE ADULT - ASSESSMENT
72M hx of HTN, polio, Polycystic kidney disease s/p L nephrectomy now ESRD on HD MWF, hx of HepC (treated) pw Klebsiella bacteremia.    Acute cholecystitis with Klebsiella bacteremia s/p cholecystectomy POD #3  POD #   - cont zosyn per ID  - pain control  - OOB to chair  - surgery following       Hypertension  cont coreg and amlodipine as tolerated.    Anemia of chronic kidney disease  - suspect secondary to ESRD    Thrombocytopenia  - suspect secondary to acute infection     ESRD on HD  - HD today     Discussed with daughter Shahrzad over phone

## 2023-06-24 NOTE — PROGRESS NOTE ADULT - SUBJECTIVE AND OBJECTIVE BOX
INTERVAL HPI/OVERNIGHT EVENTS:  HPI: 71 y/o male POD #3 from robotic cholecystectomy c/b bile leak now s/p ERCP with stent on 6/23/32.      Interval events:  Denies abdominal pain. " I'm hungry".       MEDICATIONS  (STANDING):  chlorhexidine 2% Cloths 1 Application(s) Topical <User Schedule>  heparin   Injectable 5000 Unit(s) SubCutaneous every 12 hours  indomethacin Suppository 100 milliGRAM(s) Rectal once  pantoprazole  Injectable 40 milliGRAM(s) IV Push daily  piperacillin/tazobactam IVPB.. 3.375 Gram(s) IV Intermittent every 12 hours    MEDICATIONS  (PRN):  acetaminophen  Suppository .. 650 milliGRAM(s) Rectal every 6 hours PRN Temp greater or equal to 38C (100.4F)  HYDROmorphone  Injectable 2 milliGRAM(s) IV Push every 4 hours PRN Severe Pain (7 - 10)  HYDROmorphone  Injectable 0.5 milliGRAM(s) IV Push every 4 hours PRN Mild Pain (1 - 3)  HYDROmorphone  Injectable 1 milliGRAM(s) IV Push every 4 hours PRN Moderate Pain (4 - 6)      Allergies: Demerol HCl (Unknown)        PAST MEDICAL & SURGICAL HISTORY:  Hypertension  Renal failure, chronic  Cancer of kidney, left  History of left nephrectomy    PHYSICAL EXAM:   Vital Signs Last 24 Hrs  T(C): 36.7 (24 Jun 2023 04:57), Max: 36.7 (23 Jun 2023 19:00)  T(F): 98 (24 Jun 2023 04:57), Max: 98 (23 Jun 2023 19:00)  HR: 70 (24 Jun 2023 04:57) (54 - 77)  BP: 141/80 (24 Jun 2023 04:57) (95/57 - 141/80)  BP(mean): 80 (23 Jun 2023 18:00) (69 - 83)  RR: 13 (24 Jun 2023 04:57) (12 - 25)  SpO2: 91% (24 Jun 2023 04:57) (91% - 100%)    Parameters below as of 24 Jun 2023 04:57  Patient On (Oxygen Delivery Method): room air    I&O's Summary    23 Jun 2023 07:01  -  24 Jun 2023 07:00  --------------------------------------------------------  IN: 530 mL / OUT: 1065 mL / NET: -535 mL      Physical Exam  GENERAL:  Examined in bed, no apparent distress   HEENT:  NC/AT,  conjunctivae clear and pink, sclera -anicteric  CHEST:  Full & symmetric excursion, no increased effort, breath sounds clear  HEART:  Regular rhythm, S1, S2, no murmur  ABDOMEN:  Soft, non-tender, non-distended, normoactive bowel sounds, BIRGIT drain w/ brown clear fluid noted.   Access sites clean and dry without erythema  EXTEREMITIES:  no edema  SKIN:  No rash/warm/dry  NEURO:  Alert, oriented, non-focal  Psych: pleasant, interactive       LABS: 6/24                              11.0   9.65  )-----------( 216      ( 24 Jun 2023 05:40 )             33.1       140  |  98  |  45<H>  ----------------------------<  134<H>  3.8   |  28  |  5.23<H>    Ca    8.4      24 Jun 2023 05:40  Phos  5.8     06-24  Mg     2.1     06-24    TPro  6.4  /  Alb  2.5<L>  /  TBili  0.6  /  DBili  x   /  AST  33  /  ALT  72<H>  /  AlkPhos  79  06-24      EGD/ ERCP:    EGD: esophagus, stomach and duodenum were grossly unremarkable with a prominent intraduodenal sphincter     ERCP Findings:    The duodenoscope was passed into the second portion of the duodenum. The major    papilla was identified with no bile flow.Multiple attempts were made to pass    wire but resulted in PD cannulation so a 2 mm access cut made and a 5fr3cm pd    stent placed.  Despite crossing that stent, the cbd couldnt be access so a 5 mm    needle knife cut was made in direction of bile duct over pd stent, then, using a    short tipped traction sphincterotome, the bile duct was cannulated. A wire was    passed into the bile duct, and a cholangiogram was produced by injecting    contrast didn't visualize the cystic duct.  There was still significant internal    sphincter narrowing with questionable filling defects.Using a pulsed cut    setting, a 3mm sphincterotomy was performed without complications.Using the    previously placed biliary access wire, a 7Fr9cm stent was placed into the bile    duct achieving bile flow for the first time.  Post procedure xray without    abnormalities and stent in situ.      Impressions:    Biliary obstruction.  Bile leak, postoperative.           INTERVAL HPI/OVERNIGHT EVENTS:  HPI: 73 y/o male POD #3 from robotic cholecystectomy c/b bile leak now s/p ERCP with stent on 6/23/32.      Interval events:  Denies abdominal pain. " I'm hungry".       MEDICATIONS  (STANDING):  chlorhexidine 2% Cloths 1 Application(s) Topical <User Schedule>  heparin   Injectable 5000 Unit(s) SubCutaneous every 12 hours  indomethacin Suppository 100 milliGRAM(s) Rectal once  pantoprazole  Injectable 40 milliGRAM(s) IV Push daily  piperacillin/tazobactam IVPB.. 3.375 Gram(s) IV Intermittent every 12 hours    MEDICATIONS  (PRN):  acetaminophen  Suppository .. 650 milliGRAM(s) Rectal every 6 hours PRN Temp greater or equal to 38C (100.4F)  HYDROmorphone  Injectable 2 milliGRAM(s) IV Push every 4 hours PRN Severe Pain (7 - 10)  HYDROmorphone  Injectable 0.5 milliGRAM(s) IV Push every 4 hours PRN Mild Pain (1 - 3)  HYDROmorphone  Injectable 1 milliGRAM(s) IV Push every 4 hours PRN Moderate Pain (4 - 6)      Allergies: Demerol HCl (Unknown)        PAST MEDICAL & SURGICAL HISTORY:  Hypertension  Renal failure, chronic  Cancer of kidney, left  History of left nephrectomy    PHYSICAL EXAM:   Vital Signs Last 24 Hrs  T(C): 36.7 (24 Jun 2023 04:57), Max: 36.7 (23 Jun 2023 19:00)  T(F): 98 (24 Jun 2023 04:57), Max: 98 (23 Jun 2023 19:00)  HR: 70 (24 Jun 2023 04:57) (54 - 77)  BP: 141/80 (24 Jun 2023 04:57) (95/57 - 141/80)  BP(mean): 80 (23 Jun 2023 18:00) (69 - 83)  RR: 13 (24 Jun 2023 04:57) (12 - 25)  SpO2: 91% (24 Jun 2023 04:57) (91% - 100%)    Parameters below as of 24 Jun 2023 04:57  Patient On (Oxygen Delivery Method): room air    I&O's Summary    23 Jun 2023 07:01  -  24 Jun 2023 07:00  --------------------------------------------------------  IN: 530 mL / OUT: 1065 mL / NET: -535 mL      Physical Exam  GENERAL:  Examined in bed, no apparent distress   HEENT:  NC/AT,  conjunctivae clear and pink, sclera -anicteric  CHEST:  Full & symmetric excursion, no increased effort, breath sounds clear  HEART:  Regular rhythm, S1, S2, no murmur  ABDOMEN:  Soft, non-tender, non-distended, normoactive bowel sounds, BIRGIT drain w/ brown clear fluid noted.   Access sites clean and dry without erythema  EXTEREMITIES:  no edema  SKIN:  No rash/warm/dry  NEURO:  Alert, oriented, non-focal  Psych: pleasant, interactive       LABS: 6/24                              11.0   9.65  )-----------( 216      ( 24 Jun 2023 05:40 )             33.1       140  |  98  |  45<H>  ----------------------------<  134<H>  3.8   |  28  |  5.23<H>    Ca    8.4      24 Jun 2023 05:40  Phos  5.8     06-24  Mg     2.1     06-24    TPro  6.4  /  Alb  2.5<L>  /  TBili  0.6  /  DBili  x   /  AST  33  /  ALT  72<H>  /  AlkPhos  79  06-24      EGD/ ERCP: 6/22    EGD: esophagus, stomach and duodenum were grossly unremarkable with a prominent intraduodenal sphincter     ERCP Findings:    The duodenoscope was passed into the second portion of the duodenum. The major    papilla was identified with no bile flow.Multiple attempts were made to pass    wire but resulted in PD cannulation so a 2 mm access cut made and a 5fr3cm pd    stent placed.  Despite crossing that stent, the cbd couldnt be access so a 5 mm    needle knife cut was made in direction of bile duct over pd stent, then, using a    short tipped traction sphincterotome, the bile duct was cannulated. A wire was    passed into the bile duct, and a cholangiogram was produced by injecting    contrast didn't visualize the cystic duct.  There was still significant internal    sphincter narrowing with questionable filling defects.Using a pulsed cut    setting, a 3mm sphincterotomy was performed without complications.Using the    previously placed biliary access wire, a 7Fr9cm stent was placed into the bile    duct achieving bile flow for the first time.  Post procedure xray without    abnormalities and stent in situ.      Impressions:    Biliary obstruction.  Bile leak, postoperative.           INTERVAL HPI/OVERNIGHT EVENTS:  HPI:72y male with  HTN, polio, Polycystic kidney disease s/p L nephrectomy now ESRD on HD MWF, hx of HepC (treated) who presented to ED with upper abd pain, fevers, chills, and klebsiella bacteremia.  Work up with CT with findings suggestive of acute antonio  now POD #3 from robotic cholecystectomy c/b bile leak now s/p ERCP with stent on 6/22/23.      Interval events:  Denies abdominal pain. " I'm hungry".       MEDICATIONS  (STANDING):  chlorhexidine 2% Cloths 1 Application(s) Topical <User Schedule>  heparin   Injectable 5000 Unit(s) SubCutaneous every 12 hours  indomethacin Suppository 100 milliGRAM(s) Rectal once  pantoprazole  Injectable 40 milliGRAM(s) IV Push daily  piperacillin/tazobactam IVPB.. 3.375 Gram(s) IV Intermittent every 12 hours    MEDICATIONS  (PRN):  acetaminophen  Suppository .. 650 milliGRAM(s) Rectal every 6 hours PRN Temp greater or equal to 38C (100.4F)  HYDROmorphone  Injectable 2 milliGRAM(s) IV Push every 4 hours PRN Severe Pain (7 - 10)  HYDROmorphone  Injectable 0.5 milliGRAM(s) IV Push every 4 hours PRN Mild Pain (1 - 3)  HYDROmorphone  Injectable 1 milliGRAM(s) IV Push every 4 hours PRN Moderate Pain (4 - 6)      Allergies: Demerol HCl (Unknown)        PAST MEDICAL & SURGICAL HISTORY:  Hypertension  Renal failure, chronic  Cancer of kidney, left  History of left nephrectomy    PHYSICAL EXAM:   Vital Signs Last 24 Hrs  T(C): 36.7 (24 Jun 2023 04:57), Max: 36.7 (23 Jun 2023 19:00)  T(F): 98 (24 Jun 2023 04:57), Max: 98 (23 Jun 2023 19:00)  HR: 70 (24 Jun 2023 04:57) (54 - 77)  BP: 141/80 (24 Jun 2023 04:57) (95/57 - 141/80)  BP(mean): 80 (23 Jun 2023 18:00) (69 - 83)  RR: 13 (24 Jun 2023 04:57) (12 - 25)  SpO2: 91% (24 Jun 2023 04:57) (91% - 100%)    Parameters below as of 24 Jun 2023 04:57  Patient On (Oxygen Delivery Method): room air    I&O's Summary    23 Jun 2023 07:01  -  24 Jun 2023 07:00  --------------------------------------------------------  IN: 530 mL / OUT: 1065 mL / NET: -535 mL      Physical Exam  GENERAL:  Examined in bed, no apparent distress   HEENT:  NC/AT,  conjunctivae clear and pink, sclera -anicteric  CHEST:  Full & symmetric excursion, no increased effort, breath sounds clear  HEART:  Regular rhythm, S1, S2, no murmur  ABDOMEN:  Soft, non-tender, non-distended, normoactive bowel sounds, BIRGIT drain w/ brown clear fluid noted.   Access sites clean and dry without erythema  EXTEREMITIES:  no edema  SKIN:  No rash/warm/dry  NEURO:  Alert, oriented, non-focal  Psych: pleasant, interactive       LABS: 6/24                              11.0   9.65  )-----------( 216      ( 24 Jun 2023 05:40 )             33.1       140  |  98  |  45<H>  ----------------------------<  134<H>  3.8   |  28  |  5.23<H>    Ca    8.4      24 Jun 2023 05:40  Phos  5.8     06-24  Mg     2.1     06-24    TPro  6.4  /  Alb  2.5<L>  /  TBili  0.6  /  DBili  x   /  AST  33  /  ALT  72<H>  /  AlkPhos  79  06-24      EGD/ ERCP: 6/22    EGD: esophagus, stomach and duodenum were grossly unremarkable with a prominent intraduodenal sphincter     ERCP Findings:    The duodenoscope was passed into the second portion of the duodenum. The major    papilla was identified with no bile flow.Multiple attempts were made to pass    wire but resulted in PD cannulation so a 2 mm access cut made and a 5fr3cm pd    stent placed.  Despite crossing that stent, the cbd couldnt be access so a 5 mm    needle knife cut was made in direction of bile duct over pd stent, then, using a    short tipped traction sphincterotome, the bile duct was cannulated. A wire was    passed into the bile duct, and a cholangiogram was produced by injecting    contrast didn't visualize the cystic duct.  There was still significant internal    sphincter narrowing with questionable filling defects.Using a pulsed cut    setting, a 3mm sphincterotomy was performed without complications.Using the    previously placed biliary access wire, a 7Fr9cm stent was placed into the bile    duct achieving bile flow for the first time.  Post procedure xray without    abnormalities and stent in situ.      Impressions:    Biliary obstruction.  Bile leak, postoperative.

## 2023-06-25 LAB
CULTURE RESULTS: SIGNIFICANT CHANGE UP
CULTURE RESULTS: SIGNIFICANT CHANGE UP
SPECIMEN SOURCE: SIGNIFICANT CHANGE UP
SPECIMEN SOURCE: SIGNIFICANT CHANGE UP

## 2023-06-25 PROCEDURE — 99233 SBSQ HOSP IP/OBS HIGH 50: CPT

## 2023-06-25 RX ORDER — SENNA PLUS 8.6 MG/1
2 TABLET ORAL DAILY
Refills: 0 | Status: DISCONTINUED | OUTPATIENT
Start: 2023-06-25 | End: 2023-06-28

## 2023-06-25 RX ADMIN — HYDROMORPHONE HYDROCHLORIDE 1 MILLIGRAM(S): 2 INJECTION INTRAMUSCULAR; INTRAVENOUS; SUBCUTANEOUS at 01:36

## 2023-06-25 RX ADMIN — HEPARIN SODIUM 5000 UNIT(S): 5000 INJECTION INTRAVENOUS; SUBCUTANEOUS at 17:02

## 2023-06-25 RX ADMIN — HEPARIN SODIUM 5000 UNIT(S): 5000 INJECTION INTRAVENOUS; SUBCUTANEOUS at 05:32

## 2023-06-25 RX ADMIN — PIPERACILLIN AND TAZOBACTAM 25 GRAM(S): 4; .5 INJECTION, POWDER, LYOPHILIZED, FOR SOLUTION INTRAVENOUS at 17:03

## 2023-06-25 RX ADMIN — HYDROMORPHONE HYDROCHLORIDE 1 MILLIGRAM(S): 2 INJECTION INTRAMUSCULAR; INTRAVENOUS; SUBCUTANEOUS at 01:51

## 2023-06-25 RX ADMIN — TAMSULOSIN HYDROCHLORIDE 0.4 MILLIGRAM(S): 0.4 CAPSULE ORAL at 21:14

## 2023-06-25 RX ADMIN — PANTOPRAZOLE SODIUM 40 MILLIGRAM(S): 20 TABLET, DELAYED RELEASE ORAL at 05:32

## 2023-06-25 RX ADMIN — PIPERACILLIN AND TAZOBACTAM 25 GRAM(S): 4; .5 INJECTION, POWDER, LYOPHILIZED, FOR SOLUTION INTRAVENOUS at 05:32

## 2023-06-25 NOTE — PROGRESS NOTE ADULT - ASSESSMENT
71 y/o M hx of HTN, polio (LLE), Polycystic kidney disease s/p L nephrectomy now ESRD on HD MWF, hx of HepC (treated) pw Klebsiella bacteremia.    Acute cholecystitis with Klebsiella bacteremia s/p cholecystectomy   POD # 5  - cont zosyn per ID - anticipate transition to PO 6/26  - pain control - abd pain improved today  - OOB to chair  - surgery following   - follow am LFTs    Acute urinary retention  - Now improved  - Cont bladder scans    Hypertension  - Cont coreg and amlodipine as tolerated.    Anemia of chronic kidney disease  - Suspect secondary to ESRD    Thrombocytopenia  - Suspect secondary to acute infection     ESRD on HD MWF  - Anticipate HD 6/26    PT sydnee    Discussed with daughter Shahrzad over phone  Dispo - anticipate discharge in 24-48 pending resolution of urinary retention

## 2023-06-25 NOTE — PROGRESS NOTE ADULT - SUBJECTIVE AND OBJECTIVE BOX
coverage for Dr. Brar    pod#5      events of yesterday note(abdominal pain/urinary retention)    now urinary residual is 150 or less    patient feels better    oob/chair    Heent-sclera anictercic    abdoomen-wounds clean and dry, slight distention, soft and non tender  j boo 10cc bilous    labs:    6/24  wbc 9.6  h/11/33    k+3.8    creat 5.23    bili 0.6

## 2023-06-25 NOTE — PROGRESS NOTE ADULT - ASSESSMENT
Assessment     72 year old man  with polycystic kidney disease s/p nephrectomy for renal carcinoma, ESRD on HD, Hepatitis C and HTN who presented to ED at [facility] 6/19 with a five day history fever and chills, had been in the ED two days prior and was called back to hospital because 1/4 blood cultures positive for klebsiella.  Started on Zosyn, today went to the OR 6/21 for laparoscopic cholecystectomy.        Intraoperatively noted to have necrotic gall bladder, had subtotal cholecystectomy with placement of 19F Matt drain in liver bed.  Postoperatively awoke with purposeful movements but remained lethargic.  Deemed unsafe to extubate and transferred to ICU for further management.      Problem List  - Post Op resp failure - Resolved  - Bacteremia : Kleb   - Acute cholecystitis  - ESRD on HD with RUE fistula  - Renal cell ca  - Hep C      Plan   - noted hypoxia, taper o2 to room air  - Incentive spirometry   - Antibiotics as per ID    - S/P ERCP 6/22 with stent placement   - Extubated post ERCP 6/22  - Hemodynamic monitoring   - Diet as tolerated   - LFT trending down    - Continue broad spec antibx   - Repeat BC neg  ( BC 6/17 positive for kleb)  - Dialysis as per Neph       - OOB to chair   - Incentive spirometry   - DVT GI ppx  - d/w family   - Continue care on medical floor reconsult ICU as needed

## 2023-06-25 NOTE — PROGRESS NOTE ADULT - ASSESSMENT
a/p    doing better today than yesterday afternoon.    I had long discussion with daughter Shahrzad about yesterday's events. I asked Ledy the supervisor to come in and listen.    I gave daughter my card and cell number.    Will stay here today, ? may get dialysis tomorrow.      thank you    dr. maximilian ramirez  cell#[phone]

## 2023-06-25 NOTE — PROGRESS NOTE ADULT - ASSESSMENT
72y male with  HTN, polio, Polycystic kidney disease s/p L nephrectomy now ESRD on HD MWF, hx of HepC (treated) He initially presented to ED with upper abd pain, fevers, chills Tmax 104, received a dose of Vanco and Zosyn but recalled to come back because blood cult now with Latoya esparza. He still had fevers post discharge along with abdominal pain.  CT with findings suggestive of acute antonio, LFTs are normal, also with polycystic kidney disease, but urine cult is negative and pt is on HD, doubt  source  He underwent subtotal cholecystectomy on 6/21 for a necrotic gallbladder and required a post op ERCP with stent placement (? bile duct leak)  He has been extubated and appears stable post op.  His 6/17 blood cultures grew klebsiella, 6/19 remain negative,   His abdominal pain 6/24 in PM appears improved.  He has alot of bile draining in RUQ drain  Suggest:   1  Continue zosyn, day 7, perhaps 1 week post op as I am not sure how effective source control was.I would like to complete a minimum of 1 week IV, today may be last day  2. Diet per surgery/GI  3.HD per renal  4. Will repeat LFT's in AM, defer to surgery/GI on whether amount of bilious fluid drainage is of significance.      ID issues reviewed with daughter at bedside.

## 2023-06-25 NOTE — PROGRESS NOTE ADULT - SUBJECTIVE AND OBJECTIVE BOX
Patient is a 72y old  Male who presents with a chief complaint of Klebsiella bacteremia (25 Jun 2023 11:35)      Patient seen and examined at bedside. Abd discomfort improved, urinary retention improving per nursing. denies headache, fever, chills, cp, sob, n/v, abd pain.      ALLERGIES:  Demerol HCl (Unknown)    MEDICATIONS  (STANDING):  heparin   Injectable 5000 Unit(s) SubCutaneous every 12 hours  indomethacin Suppository 100 milliGRAM(s) Rectal once  pantoprazole    Tablet 40 milliGRAM(s) Oral before breakfast  piperacillin/tazobactam IVPB.. 3.375 Gram(s) IV Intermittent every 12 hours  tamsulosin 0.4 milliGRAM(s) Oral at bedtime    MEDICATIONS  (PRN):  acetaminophen  Suppository .. 650 milliGRAM(s) Rectal every 6 hours PRN Temp greater or equal to 38C (100.4F)  HYDROmorphone  Injectable 2 milliGRAM(s) IV Push every 4 hours PRN Severe Pain (7 - 10)  HYDROmorphone  Injectable 0.5 milliGRAM(s) IV Push every 4 hours PRN Mild Pain (1 - 3)  HYDROmorphone  Injectable 1 milliGRAM(s) IV Push every 4 hours PRN Moderate Pain (4 - 6)  simethicone 80 milliGRAM(s) Chew two times a day PRN Gas    Vital Signs Last 24 Hrs  T(F): 98 (25 Jun 2023 05:36), Max: 99.4 (24 Jun 2023 21:00)  HR: 78 (25 Jun 2023 05:36) (72 - 97)  BP: 117/69 (25 Jun 2023 05:36) (117/69 - 132/81)  RR: 18 (25 Jun 2023 05:36) (17 - 18)  SpO2: 98% (25 Jun 2023 05:36) (96% - 98%)  I&O's Summary    24 Jun 2023 07:01  -  25 Jun 2023 07:00  --------------------------------------------------------  IN: 600 mL / OUT: 670 mL / NET: -70 mL      BMI (kg/m2): 23.9 (06-22-23 @ 13:04)    PHYSICAL EXAM:  General: NAD, A/O x 3  ENT: No gross hearing impairment, Moist mucous membranes, no thrush  Neck: Supple, No JVD  Lungs: Clear to auscultation bilaterally, good air entry, non-labored breathing  Cardio: RRR, S1/S2  Abdomen: Soft, Nontender, Nondistended; Bowel sounds present  Extremities: No calf tenderness, No cyanosis, No pitting edema  Psych: Appropriate mood and affect      LABS:                        11.0   9.65  )-----------( 216      ( 24 Jun 2023 05:40 )             33.1       06-24    140  |  98  |  45  ----------------------------<  134  3.8   |  28  |  5.23    Ca    8.4      24 Jun 2023 05:40  Phos  5.8     06-24  Mg     2.1     06-24    TPro  6.4  /  Alb  2.5  /  TBili  0.6  /  DBili  x   /  AST  33  /  ALT  72  /  AlkPhos  79  06-24                                  Urinalysis Basic - ( 24 Jun 2023 05:40 )    Color: x / Appearance: x / SG: x / pH: x  Gluc: 134 mg/dL / Ketone: x  / Bili: x / Urobili: x   Blood: x / Protein: x / Nitrite: x   Leuk Esterase: x / RBC: x / WBC x   Sq Epi: x / Non Sq Epi: x / Bacteria: x        Culture - Blood (collected 19 Jun 2023 22:10)  Source: .Blood Blood-Peripheral  Final Report (25 Jun 2023 04:01):    No Growth Final    Culture - Blood (collected 19 Jun 2023 22:10)  Source: .Blood Blood-Peripheral  Final Report (25 Jun 2023 04:01):    No Growth Final          RADIOLOGY & ADDITIONAL TESTS:    Care Discussed with Consultants/Other Providers:

## 2023-06-25 NOTE — PROGRESS NOTE ADULT - SUBJECTIVE AND OBJECTIVE BOX
Subjective


Remarks


Follow-up for pneumonia respiratory failure


Patient continued to complain of shortness of breathing.  She said that it is 

the same.  Positive for cough.  She remains afebrile.  When I saw patient she 

was off of her oxygen.


Denied any nosebleed or coughing up blood since I last saw patient yesterday.


No other complaints.





Objective


Vitals





Vital Signs








  Date Time  Temp Pulse Resp B/P (MAP) Pulse Ox O2 Delivery O2 Flow Rate FiO2


 


3/15/18 11:11       2.00 


 


3/15/18 09:31     91   21


 


3/15/18 08:09 97.2 70 17 148/93 (111) 93   


 


3/15/18 04:00 97.6 64 20 149/76 (100) 97   


 


3/15/18 04:00      Nasal Cannula 2.00 


 


3/15/18 00:00  66      


 


3/15/18 00:00 98.0 70 20 137/83 (101) 92   


 


3/15/18 00:00      Nasal Cannula 2.00 


 


3/14/18 20:37     97 Nasal Cannula 2.00 


 


3/14/18 20:00 97.7 64 18 110/78 (89) 91   


 


3/14/18 20:00      Nasal Cannula 2.00 


 


3/14/18 20:00  62      


 


3/14/18 16:09 97.6 71 18 107/62 (77) 94   


 


3/14/18 14:17   20     


 


3/14/18 12:09 98.2 71 18 150/85 (106) 90   














I/O      


 


 3/14/18 3/14/18 3/14/18 3/15/18 3/15/18 3/15/18





 07:00 15:00 23:00 07:00 15:00 23:00


 


Intake Total 240 ml  600 ml 550 ml  


 


Output Total    400 ml  


 


Balance 240 ml  600 ml 150 ml  


 


      


 


Intake Oral 240 ml  600 ml 400 ml  


 


IV Total    150 ml  


 


Output Urine Total    400 ml  


 


# Voids 3  3   


 


# Bowel Movements 0  1   








Result Diagram:  


3/14/18 2227                                                                   

             3/13/18 1104





Objective Remarks


GENERAL: in NAD


HEENT:nasal canal no blood noted. 


CARDIOVASCULAR: Regular rate and rhythm without murmurs, gallops, or rubs. 


RESPIRATORY: Breath sounds equal bilaterally. No accessory muscle use.


GASTROINTESTINAL: Abdomen soft, non-tender, nondistended. 


MUSCULOSKELETAL: No cyanosis, or edema. 


BACK: Nontender without obvious deformity. No CVA tenderness.





Procedures


None


Medications and IVs





Current Medications


Albuterol/ Ipratropium (Duoneb Neb) 1 ampule Q15M INH  Last administered on 3/12

/18at 22:38;  Start 3/12/18 at 21:30;  Stop 3/12/18 at 22:01;  Status DC


Methylprednisolone Sodium Succinate (SoluMEDROL INJ) 125 mg ONCE  ONCE IV PUSH  

Last administered on 3/12/18at 22:45;  Start 3/12/18 at 22:00;  Stop 3/12/18 at 

22:01;  Status DC


Albuterol Sulfate (Albuterol Neb) 2.5 mg Q15M INH ;  Start 3/12/18 at 22:00;  

Stop 3/12/18 at 22:16;  Status DC


Magnesium Sulfate/ Dextrose 100 ml @  100 mls/hr ONCE  ONCE IV  Last 

administered on 3/12/18at 22:45;  Start 3/12/18 at 22:00;  Stop 3/12/18 at 22:59

;  Status DC


Clonazepam (KlonoPIN) 1 mg ONCE  ONCE PO  Last administered on 3/12/18at 22:45;

  Start 3/12/18 at 22:15;  Stop 3/12/18 at 22:16;  Status DC


Oxycodone/ Acetaminophen (Percocet  5-325 Mg) 1 tab ONCE  ONCE PO  Last 

administered on 3/12/18at 22:46;  Start 3/12/18 at 22:15;  Stop 3/12/18 at 22:16

;  Status DC


Levofloxacin/ Dextrose 150 ml @  100 mls/hr ONCE  ONCE IV  Last administered on 

3/12/18at 22:46;  Start 3/12/18 at 22:30;  Stop 3/12/18 at 23:59;  Status DC


Sodium Chloride (NS Flush) 2 ml UNSCH  PRN IV FLUSH FLUSH AFTER USING IV ACCESS

;  Start 3/12/18 at 23:15


Sodium Chloride (NS Flush) 2 ml BID IV FLUSH  Last administered on 3/15/18at 09:

23;  Start 3/13/18 at 09:00


Naloxone HCl (Narcan Inj) 0.4 mg UNSCH  PRN IV PUSH SEE LABEL COMMENTS;  Start 3

/12/18 at 23:15


Levofloxacin/ Dextrose 150 ml @  100 mls/hr Q24H IV  Last administered on 3/14/

18at 23:20;  Start 3/13/18 at 23:00


Albuterol/ Ipratropium (Duoneb Neb) 1 ampule Q6HR  NEB NEB  Last administered 

on 3/15/18at 09:31;  Start 3/13/18 at 04:00


Albuterol/ Ipratropium (Duoneb Neb) 1 ampule Q2HR NEB  PRN NEB wheezing;  Start 

3/12/18 at 23:30


Ondansetron HCl (Zofran  Odt) 4 mg ONCE  ONCE PO ;  Start 3/13/18 at 04:30;  

Stop 3/13/18 at 05:03;  Status DC


Acetaminophen/ Hydrocodone Bitart (Norco  5-325 Mg) 1 tab ONCE  ONCE PO ;  

Start 3/13/18 at 04:30;  Stop 3/13/18 at 05:03;  Status DC


Morphine Sulfate (Morphine Inj) 2 mg ONCE  ONCE IV  Last administered on 3/13/

18at 05:18;  Start 3/13/18 at 05:15;  Stop 3/13/18 at 05:16;  Status DC


Guaifenesin (Mucinex Er) 600 mg BID PO  Last administered on 3/15/18at 09:23;  

Start 3/13/18 at 09:00


Atenolol (Tenormin) 50 mg BID PO  Last administered on 3/15/18at 09:22;  Start 3

/13/18 at 09:00


Budesonide/ Formoterol Fumarate (Symbicort 160-4.5 Mcg Inh) 2 puff Q12HR INH  

Last administered on 3/15/18at 09:23;  Start 3/13/18 at 09:00


Clonazepam (KlonoPIN) 1 mg TID PO  Last administered on 3/15/18at 09:23;  Start 

3/13/18 at 09:00


Clonidine (Catapres) 0.2 mg TID PO  Last administered on 3/15/18at 09:23;  

Start 3/13/18 at 09:00


Fluoxetine HCl (PROzac) 40 mg DAILY PO  Last administered on 3/15/18at 09:22;  

Start 3/13/18 at 09:00


Gabapentin (Neurontin) 800 mg Q6HR PO  Last administered on 3/15/18at 05:43;  

Start 3/13/18 at 06:00


Oxycodone/ Acetaminophen (Percocet  Mg) 1 tab Q6H  PRN PO PAIN Last 

administered on 3/15/18at 05:43;  Start 3/13/18 at 05:45


Pantoprazole Sodium (Protonix) 40 mg BID PO  Last administered on 3/15/18at 09:

22;  Start 3/13/18 at 09:00


Heparin Sodium (Porcine) (Heparin Inj) 5,000 units Q12HR SQ  Last administered 

on 3/13/18at 20:40;  Start 3/13/18 at 09:00;  Status Future Hold


Naproxen Sodium (Anaprox Ds) 550 mg BID PO  Last administered on 3/13/18at 13:11

;  Start 3/13/18 at 11:00


Methylprednisolone Sodium Succinate (SoluMEDROL INJ) 40 mg Q6HR IV PUSH  Last 

administered on 3/14/18at 06:04;  Start 3/13/18 at 12:00;  Stop 3/14/18 at 11:26

;  Status DC


Zolpidem Tartrate (Ambien) 5 mg ONCE  ONCE PO  Last administered on 3/13/18at 22

:30;  Start 3/13/18 at 21:45;  Stop 3/13/18 at 21:47;  Status DC


Acetaminophen/ Butalbital/ Caffeine (Fioricet 325-50-40) 1 tab Q8H  PRN PO 

headaches Last administered on 3/15/18at 05:42;  Start 3/14/18 at 11:30


Zolpidem Tartrate (Ambien) 5 mg HS  PRN PO insomnia Last administered on 3/14/

18at 21:18;  Start 3/14/18 at 21:00


Methylprednisolone Sodium Succinate (SoluMEDROL INJ) 40 mg Q12HR IV PUSH  Last 

administered on 3/15/18at 09:22;  Start 3/14/18 at 21:00


Lidocaine HCl (Lidoderm 5% Patch.12 Hr) 1 patch DAILY T-DERMAL  Last 

administered on 3/15/18at 09:24;  Start 3/14/18 at 11:30


Miscellaneous Information 1 Q24H T-DERMAL ;  Start 3/14/18 at 21:00


Promethazine HCl (Phenergan) 25 mg Q6H  PRN PO nausea or vomiting Last 

administered on 3/15/18at 05:42;  Start 3/14/18 at 12:30


Mupirocin (Bactroban Nasal 2% Oint) 1 applic BID NASAL ;  Start 3/15/18 at 09:30





A/P


Problem List:  


(1) PNA (pneumonia)


ICD Code:  J18.9 - Pneumonia, unspecified organism


Status:  Acute


(2) HTN (hypertension)


ICD Code:  I10 - Essential (primary) hypertension


Status:  Chronic


(3) Acute respiratory failure


ICD Code:  J96.00 - Acute respiratory failure, unspecified whether with hypoxia 

or hypercapnia


Status:  Acute


Assessment and Plan


This is a 42 y/o SOB





Community-acquired pneumonia:


- Chest x-ray shows bilateral infiltrates.  Continue IV Levaquin.  patient has 

not have any sputum to give sample.


-Since patient feels like she is not improving but clinical exam shows 

improvement.  We will get a chest x-ray.





Epistaxis


-no active bleeding now.  most likely due to dryness from oxygen and patient 

causing irritation.  she requesting to see specialist and was seen by ENT 

doctor  and applied nasal packing and bacitracin.  Asymptomatic since 

she was last seen yesterday.





COPD exacerbation with acute respiratory failure


- Patient had a documented oxygen saturation of 85% on room air. improving. 

Continue supplemental oxygen.  DuoNeb scheduled and as needed.  Continue 

Symbicort.  We will transition to oral steroid since no wheezing or rhonchi 

noted.


-We will get a walk test.





 Chronic pain: 


-Continue Percocet, which is what the patient takes at home.  She reportedly 

has a history of pain medication seeking behavior.  On Lidoderm patch.





HA


-Resolved.  On Fioricet as needed.





Insomnia


-On Ambien.  





Hypertension


-Continue clonidine, atenolol.





Anxiety


- Continue clonazepam, fluoxetine.





Leukocytosis


-improved.





 DVT prophylaxis: Heparin. hold heparin due to nose bleed





Problem Qualifiers





(1) PNA (pneumonia):  


Qualified Codes:  J18.9 - Pneumonia, unspecified organism


(2) Acute respiratory failure:  


Qualified Codes:  J96.01 - Acute respiratory failure with hypoxia








Kami Da Silva MD Mar 15, 2018 11:39 CC: f/u for klebsiella bacteremia and cholecystitis.    Patient reports: he is comfortable today, he had abdominal pain yesterday and was very uncomfortable. He could not eat dinner last night    REVIEW OF SYSTEMS:  All other review of systems negative (Comprehensive ROS)    Antimicrobials Day #  :day 7  piperacillin/tazobactam IVPB.. 3.375 Gram(s) IV Intermittent every 12 hours    Other Medications Reviewed  MEDICATIONS  (STANDING):  heparin   Injectable 5000 Unit(s) SubCutaneous every 12 hours  indomethacin Suppository 100 milliGRAM(s) Rectal once  pantoprazole    Tablet 40 milliGRAM(s) Oral before breakfast  piperacillin/tazobactam IVPB.. 3.375 Gram(s) IV Intermittent every 12 hours  tamsulosin 0.4 milliGRAM(s) Oral at bedtime    T(F): 98 (06-25-23 @ 05:36), Max: 99.4 (06-24-23 @ 21:00)  HR: 78 (06-25-23 @ 05:36)  BP: 117/69 (06-25-23 @ 05:36)  RR: 18 (06-25-23 @ 05:36)  SpO2: 98% (06-25-23 @ 05:36)  Wt(kg): --    PHYSICAL EXAM:  General: alert, no acute distress  Eyes:  anicteric, no conjunctival injection, no discharge  Oropharynx: no lesions or injection 	  Neck: supple, without adenopathy  Lungs: clear to auscultation  Heart: regular rate and rhythm; no murmur, rubs or gallops  Abdomen: soft, nondistended, nontender, RUQ drain with bilious drainage  Skin: no lesions  Extremities: no clubbing, cyanosis, or edema  Neurologic: alert, oriented, moves all extremities    LAB RESULTS:                        11.0   9.65  )-----------( 216      ( 24 Jun 2023 05:40 )             33.1     06-24    140  |  98  |  45<H>  ----------------------------<  134<H>  3.8   |  28  |  5.23<H>    Ca    8.4      24 Jun 2023 05:40  Phos  5.8     06-24  Mg     2.1     06-24    TPro  6.4  /  Alb  2.5<L>  /  TBili  0.6  /  DBili  x   /  AST  33  /  ALT  72<H>  /  AlkPhos  79  06-24    LIVER FUNCTIONS - ( 24 Jun 2023 05:40 )  Alb: 2.5 g/dL / Pro: 6.4 g/dL / ALK PHOS: 79 U/L / ALT: 72 U/L / AST: 33 U/L / GGT: x           Urinalysis Basic - ( 24 Jun 2023 05:40 )    Color: x / Appearance: x / SG: x / pH: x  Gluc: 134 mg/dL / Ketone: x  / Bili: x / Urobili: x   Blood: x / Protein: x / Nitrite: x   Leuk Esterase: x / RBC: x / WBC x   Sq Epi: x / Non Sq Epi: x / Bacteria: x      MICROBIOLOGY:  RECENT CULTURES:      RADIOLOGY REVIEWED:

## 2023-06-25 NOTE — PROGRESS NOTE ADULT - SUBJECTIVE AND OBJECTIVE BOX
Follow-up Critical Care Progress Note  Chief Complaint : Bacteremia      patient seen and examined  comfortable.   on 2 L n/c sat 100 %  tapered to Room air  no cp, sob, palp  incentive spirometry given  d/w family bedside      Allergies :Demerol HCl (Unknown)      PAST MEDICAL & SURGICAL HISTORY:  Hypertension    Renal failure, chronic    Cancer of kidney, left    History of left nephrectomy        Medications:  MEDICATIONS  (STANDING):  heparin   Injectable 5000 Unit(s) SubCutaneous every 12 hours  indomethacin Suppository 100 milliGRAM(s) Rectal once  pantoprazole    Tablet 40 milliGRAM(s) Oral before breakfast  piperacillin/tazobactam IVPB.. 3.375 Gram(s) IV Intermittent every 12 hours  tamsulosin 0.4 milliGRAM(s) Oral at bedtime    MEDICATIONS  (PRN):  acetaminophen  Suppository .. 650 milliGRAM(s) Rectal every 6 hours PRN Temp greater or equal to 38C (100.4F)  HYDROmorphone  Injectable 0.5 milliGRAM(s) IV Push every 4 hours PRN Mild Pain (1 - 3)  HYDROmorphone  Injectable 1 milliGRAM(s) IV Push every 4 hours PRN Moderate Pain (4 - 6)  HYDROmorphone  Injectable 2 milliGRAM(s) IV Push every 4 hours PRN Severe Pain (7 - 10)  senna 2 Tablet(s) Oral daily PRN Constipation  simethicone 80 milliGRAM(s) Chew two times a day PRN Gas      Antibiotics History  piperacillin/tazobactam IVPB.. 3.375 Gram(s) IV Intermittent every 12 hours, 06-21-23 @ 21:09, Stop order after: 8 Days  piperacillin/tazobactam IVPB.. 3.375 Gram(s) IV Intermittent every 12 hours, 06-20-23 @ 02:18, Stop order after: 8 Days  piperacillin/tazobactam IVPB... 3.375 Gram(s) IV Intermittent once, 06-19-23 @ 22:00, Stop order after: 1 Doses      Heme Medications   heparin   Injectable 5000 Unit(s) SubCutaneous every 12 hours, 06-21-23 @ 20:30      GI Medications  pantoprazole    Tablet 40 milliGRAM(s) Oral before breakfast, 06-24-23 @ 00:00, Routine  senna 2 Tablet(s) Oral daily, 06-25-23 @ 12:42, Routine PRN  simethicone 80 milliGRAM(s) Chew two times a day, 06-24-23 @ 16:11, Routine PRN        LABS:                        11.0   9.65  )-----------( 216      ( 24 Jun 2023 05:40 )             33.1     06-24    140  |  98  |  45<H>  ----------------------------<  134<H>  3.8   |  28  |  5.23<H>    Ca    8.4      24 Jun 2023 05:40  Phos  5.8     06-24  Mg     2.1     06-24    TPro  6.4  /  Alb  2.5<L>  /  TBili  0.6  /  DBili  x   /  AST  33  /  ALT  72<H>  /  AlkPhos  79  06-24     COVID  06-17-23 @ 19:03  COVID -   NotDetec  05-16-22 @ 12:56  COVID -   NotDetec      COVID Biomarkers            Trend Cardiac Enzymes    Trend BNP    Procalcitonin Trend    WBC Trend  06-24-23 @ 05:40   -  9.65  06-23-23 @ 06:00   -  6.98    H/H Trend  06-24-23 @ 05:40   -   11.0<L>/ 33.1<L>  06-23-23 @ 06:00   -   10.0<L>/ 30.3<L>  06-22-23 @ 05:00   -   10.5<L>/ 31.5<L>  06-21-23 @ 20:45   -   11.8<L>/ 35.1<L>  06-21-23 @ 14:25   -   11.7<L>/ 33.5<L>  06-21-23 @ 05:45   -   11.4<L>/ 34.0<L>    Stool Occult Blood    Platelet Trend  06-24-23 @ 05:40   -  216  06-23-23 @ 06:00   -  163    Trend Sodium  06-24-23 @ 05:40   -  140  06-23-23 @ 06:00   -  136    Trend Potassium  06-24-23 @ 05:40   -  3.8  06-23-23 @ 06:00   -  4.1    Trend Bun/Cr  06-24-23 @ 05:40  BUN/CR -  45<H> / 5.23<H>  06-23-23 @ 06:00  BUN/CR -  72<H> / 6.92<H>    Lactic Acid Trend  06-19-23 @ 22:10   -   0.6<L>  06-17-23 @ 19:03   -   1.0    ABG Trend  06-21-23 @ 20:50   - 7.39/35/120<H>/100.0<H>    Trend AST/ALT/ALK Phos/Bili  06-24-23 @ 05:40   33/72<H>/79/0.6  06-23-23 @ 06:00   38/88<H>/78/0.3  06-22-23 @ 05:00   74<H>/122<H>/101/0.5  06-21-23 @ 20:45   119<H>/146<H>/125<H>/0.5  06-21-23 @ 05:45   19/35/53/0.4  06-20-23 @ 06:23   23/35/47/0.3  06-19-23 @ 22:10   25/37/58/0.4  06-17-23 @ 19:03   10/26/74/0.6  05-18-22 @ 06:53   11<L>/11<L>/59/0.4       Albumin Trend  06-24-23 @ 05:40   -   2.5<L>  06-23-23 @ 06:00   -   2.4<L>  06-22-23 @ 05:00   -   2.6<L>  06-21-23 @ 20:45   -   2.9<L>  06-21-23 @ 05:45   -   2.9<L>  06-20-23 @ 06:23   -   2.7<L>      PTT - PT - INR Trend  06-21-23 @ 14:25   -   -- - 12.8 - 1.10  06-19-23 @ 22:10   -   28.2 - 14.5<H> - 1.25<H>  06-17-23 @ 19:03   -   29.6 - 14.5<H> - 1.25<H>  05-17-22 @ 23:40   -   -- - 14.5<H> - 1.21<H>  05-16-22 @ 12:32   -   29.6 - 13.3 - 1.12    Glucose Trend  06-24-23 @ 05:40   -  134<H> -- --  06-23-23 @ 06:00   -  85 -- --          CULTURES: (if applicable)    Culture - Blood (collected 06-19-23 @ 22:10)  Source: .Blood Blood-Peripheral  Final Report (06-25-23 @ 04:01):    No Growth Final    Culture - Blood (collected 06-19-23 @ 22:10)  Source: .Blood Blood-Peripheral  Final Report (06-25-23 @ 04:01):    No Growth Final    Culture - Blood (collected 06-17-23 @ 19:03)  Source: .Blood Blood-Peripheral  Final Report (06-23-23 @ 01:01):    No Growth Final    Culture - Urine (collected 06-17-23 @ 19:03)  Source: Clean Catch Clean Catch (Midstream)  Final Report (06-19-23 @ 07:26):    <10,000 CFU/mL Normal Urogenital Carmen    Culture - Blood (collected 06-17-23 @ 19:03)  Source: .Blood Blood-Peripheral  Gram Stain (06-19-23 @ 07:57):    Growth in aerobic bottle: Gram Negative Rods  Final Report (06-20-23 @ 16:17):    Growth in aerobic bottle: Klebsiella pneumoniae    ***Blood Panel PCR results on this specimen are available    approximately 3 hours after the Gram stain result.***    Gram stain, PCR, and/or culture results may not always    correspond due to difference in methodologies.    ************************************************************    This PCR assay was performed by multiplex PCR. This    Assay tests for 66 bacterial and resistance gene targets.    Please refer to the [facility] test directory    at [email or web address] for details.  Organism: Blood Culture PCR  Klebsiella pneumoniae (06-20-23 @ 16:17)  Organism: Klebsiella pneumoniae (06-20-23 @ 16:17)      -  Levofloxacin: S <=0.5      -  Tobramycin: S <=2      -  Aztreonam: S <=4      -  Gentamicin: S <=2      -  Cefazolin: S <=2 Enterobacter, Klebsiella aerogenes, Citrobacter, and Serratia may develop resistance during prolonged therapy (3-4 days)      -  Cefepime: S <=2      -  Piperacillin/Tazobactam: S <=8      -  Ciprofloxacin: S <=0.25      -  Imipenem: S <=1      -  Ceftriaxone: S <=1 Enterobacter, Klebsiella aerogenes, Citrobacter, and Serratia may develop resistance during prolonged therapy      -  Ampicillin: R >16 These ampicillin results predict results for amoxicillin      Method Type: WALESKA      -  Meropenem: S <=1      -  Ampicillin/Sulbactam: S <=4/2 Enterobacter, Klebsiella aerogenes, Citrobacter, and Serratia may develop resistance during prolonged therapy (3-4 days)      -  Cefoxitin: S <=8      -  Amikacin: S <=16      -  Trimethoprim/Sulfamethoxazole: S <=0.5/9.5      -  Ertapenem: S <=0.5  Organism: Blood Culture PCR (06-20-23 @ 16:17)      Method Type: PCR      -  K. pneumoniae group: Detec (K. pneumoniae, K. quasipneumoniae, K. variicola)      Rapid RVP Result: NotDetec (06-17-23 @ 19:03)       VITALS:  T(C): 36.7 (06-25-23 @ 05:36), Max: 37.4 (06-24-23 @ 21:00)  T(F): 98 (06-25-23 @ 05:36), Max: 99.4 (06-24-23 @ 21:00)  HR: 78 (06-25-23 @ 05:36) (72 - 78)  BP: 117/69 (06-25-23 @ 05:36) (117/69 - 120/74)  BP(mean): --  ABP: --  ABP(mean): --  RR: 18 (06-25-23 @ 05:36) (17 - 18)  SpO2: 98% (06-25-23 @ 05:36) (97% - 98%)  CVP(mm Hg): --  CVP(cm H2O): --    Ins and Outs     06-24-23 @ 07:01  -  06-25-23 @ 07:00  --------------------------------------------------------  IN: 600 mL / OUT: 670 mL / NET: -70 mL    06-25-23 @ 07:01 - 06-25-23 @ 15:19  --------------------------------------------------------  IN: 0 mL / OUT: 65 mL / NET: -65 mL                I&O's Detail    24 Jun 2023 07:01 - 25 Jun 2023 07:00  --------------------------------------------------------  IN:    IV PiggyBack: 100 mL    IV PiggyBack: 200 mL    Oral Fluid: 300 mL  Total IN: 600 mL    OUT:    Bulb (mL): 120 mL    Post-Void Residual per Intermittent Catheterization (mL): 550 mL  Total OUT: 670 mL    Total NET: -70 mL      25 Jun 2023 07:01  -  25 Jun 2023 15:19  --------------------------------------------------------  IN:  Total IN: 0 mL    OUT:    Bulb (mL): 65 mL  Total OUT: 65 mL    Total NET: -65 mL

## 2023-06-26 LAB
ALBUMIN SERPL ELPH-MCNC: 2.1 G/DL — LOW (ref 3.3–5)
ALP SERPL-CCNC: 63 U/L — SIGNIFICANT CHANGE UP (ref 40–120)
ALT FLD-CCNC: 39 U/L — SIGNIFICANT CHANGE UP (ref 10–45)
ANION GAP SERPL CALC-SCNC: 20 MMOL/L — HIGH (ref 5–17)
AST SERPL-CCNC: 12 U/L — SIGNIFICANT CHANGE UP (ref 10–40)
BILIRUB SERPL-MCNC: 0.5 MG/DL — SIGNIFICANT CHANGE UP (ref 0.2–1.2)
BUN SERPL-MCNC: 76 MG/DL — HIGH (ref 7–23)
CALCIUM SERPL-MCNC: 8.9 MG/DL — SIGNIFICANT CHANGE UP (ref 8.4–10.5)
CHLORIDE SERPL-SCNC: 91 MMOL/L — LOW (ref 96–108)
CO2 SERPL-SCNC: 22 MMOL/L — SIGNIFICANT CHANGE UP (ref 22–31)
CREAT SERPL-MCNC: 7.85 MG/DL — HIGH (ref 0.5–1.3)
EGFR: 7 ML/MIN/1.73M2 — LOW
GLUCOSE SERPL-MCNC: 94 MG/DL — SIGNIFICANT CHANGE UP (ref 70–99)
HCT VFR BLD CALC: 30.3 % — LOW (ref 39–50)
HGB BLD-MCNC: 10.4 G/DL — LOW (ref 13–17)
MCHC RBC-ENTMCNC: 31.5 PG — SIGNIFICANT CHANGE UP (ref 27–34)
MCHC RBC-ENTMCNC: 34.3 GM/DL — SIGNIFICANT CHANGE UP (ref 32–36)
MCV RBC AUTO: 91.8 FL — SIGNIFICANT CHANGE UP (ref 80–100)
NRBC # BLD: 0 /100 WBCS — SIGNIFICANT CHANGE UP (ref 0–0)
PLATELET # BLD AUTO: 235 K/UL — SIGNIFICANT CHANGE UP (ref 150–400)
POTASSIUM SERPL-MCNC: 4.2 MMOL/L — SIGNIFICANT CHANGE UP (ref 3.5–5.3)
POTASSIUM SERPL-SCNC: 4.2 MMOL/L — SIGNIFICANT CHANGE UP (ref 3.5–5.3)
PROT SERPL-MCNC: 6.4 G/DL — SIGNIFICANT CHANGE UP (ref 6–8.3)
RBC # BLD: 3.3 M/UL — LOW (ref 4.2–5.8)
RBC # FLD: 13.5 % — SIGNIFICANT CHANGE UP (ref 10.3–14.5)
SODIUM SERPL-SCNC: 133 MMOL/L — LOW (ref 135–145)
WBC # BLD: 9.57 K/UL — SIGNIFICANT CHANGE UP (ref 3.8–10.5)
WBC # FLD AUTO: 9.57 K/UL — SIGNIFICANT CHANGE UP (ref 3.8–10.5)

## 2023-06-26 PROCEDURE — 99233 SBSQ HOSP IP/OBS HIGH 50: CPT | Mod: FS

## 2023-06-26 PROCEDURE — 99233 SBSQ HOSP IP/OBS HIGH 50: CPT

## 2023-06-26 RX ADMIN — HEPARIN SODIUM 5000 UNIT(S): 5000 INJECTION INTRAVENOUS; SUBCUTANEOUS at 05:33

## 2023-06-26 RX ADMIN — PIPERACILLIN AND TAZOBACTAM 25 GRAM(S): 4; .5 INJECTION, POWDER, LYOPHILIZED, FOR SOLUTION INTRAVENOUS at 05:33

## 2023-06-26 RX ADMIN — PANTOPRAZOLE SODIUM 40 MILLIGRAM(S): 20 TABLET, DELAYED RELEASE ORAL at 05:32

## 2023-06-26 RX ADMIN — HEPARIN SODIUM 5000 UNIT(S): 5000 INJECTION INTRAVENOUS; SUBCUTANEOUS at 18:03

## 2023-06-26 RX ADMIN — TAMSULOSIN HYDROCHLORIDE 0.4 MILLIGRAM(S): 0.4 CAPSULE ORAL at 21:03

## 2023-06-26 NOTE — PROGRESS NOTE ADULT - ASSESSMENT
73 y/o male POD #3 from robotic sub-total cholecystectomy with 19F kiesha drain, complicated by bile leak, GI consulted, now s/p ERCP with stent on 6/22/23.

## 2023-06-26 NOTE — PROGRESS NOTE ADULT - SUBJECTIVE AND OBJECTIVE BOX
SURGERY PROGRESS NOTE  Pt. seen and examined at bedside resting comfortably. NAEO. No acute complaints, pt denies abdominal pain. Tolerating diet, Denies N/V. Denies fever/chills, chest pain, dyspnea, cough, dizziness.     Vital Signs Last 24 Hrs  T(C): 37 (26 Jun 2023 05:20), Max: 37.7 (25 Jun 2023 12:00)  T(F): 98.6 (26 Jun 2023 05:20), Max: 99.9 (25 Jun 2023 12:00)  HR: 73 (26 Jun 2023 05:20) (73 - 86)  BP: 130/75 (26 Jun 2023 05:20) (115/70 - 130/75)  BP(mean): --  RR: 19 (26 Jun 2023 05:20) (19 - 21)  SpO2: 97% (26 Jun 2023 05:20) (94% - 97%)    Parameters below as of 26 Jun 2023 05:20  Patient On (Oxygen Delivery Method): room air    PHYSICAL EXAM:    GENERAL: NAD  HEAD:  Atraumatic, Normocephalic  EYES: EOMI, PERRLA, conjunctiva and sclera clear  ABDOMEN: Soft, ND, NT, incisions c/d/i, mild ecchymosis surrounding umbilical incision, BIRGIT bilious output  EXTREMITIES:  calf soft, non tender b/l    I&O's Detail    25 Jun 2023 07:01  -  26 Jun 2023 07:00  --------------------------------------------------------  IN:    IV PiggyBack: 100 mL    Oral Fluid: 250 mL  Total IN: 350 mL    OUT:    Bulb (mL): 240 mL    Post-Void Residual per Intermittent Catheterization (mL): 13 mL    Voided (mL): 1380 mL  Total OUT: 1633 mL    Total NET: -1283 mL          LABS:                        10.4   9.57  )-----------( 235      ( 26 Jun 2023 07:14 )             30.3     06-26    133<L>  |  91<L>  |  76<H>  ----------------------------<  94  4.2   |  22  |  7.85<H>    Ca    8.9      26 Jun 2023 07:14    TPro  6.4  /  Alb  2.1<L>  /  TBili  0.5  /  DBili  x   /  AST  12  /  ALT  39  /  AlkPhos  63  06-26      Urinalysis Basic - ( 26 Jun 2023 07:14 )    Color: x / Appearance: x / SG: x / pH: x  Gluc: 94 mg/dL / Ketone: x  / Bili: x / Urobili: x   Blood: x / Protein: x / Nitrite: x   Leuk Esterase: x / RBC: x / WBC x   Sq Epi: x / Non Sq Epi: x / Bacteria: x      Impression: 71 y/o M hx of HTN, polio (LLE), Polycystic kidney disease s/p L nephrectomy now ESRD on HD MWF, hx of HepC (treated) pw Acute cholecystitis w/ klebsiella bacteremia. Pt POD6 s/p robotic subtotal cholecystectomy, pt recovering well. HD stable. Episode of acute urinary retention over weekend, improving, bladder scans prn. Pt reports this happened after anesthesia in past.    Plan:  - Surgically stable  - Abx per ID recs, likely transition to PO today  - LFTs reviewed, wnl  - Ambulate/OOB  - Medical management per hospitalist    To be discussed w/ Dr. Brar  Surgery SURGERY PROGRESS NOTE  Pt. seen and examined at bedside resting comfortably. NAEO. No acute complaints, pt denies abdominal pain. Tolerating diet, Denies N/V. Denies fever/chills, chest pain, dyspnea, cough, dizziness.     Vital Signs Last 24 Hrs  T(C): 37 (26 Jun 2023 05:20), Max: 37.7 (25 Jun 2023 12:00)  T(F): 98.6 (26 Jun 2023 05:20), Max: 99.9 (25 Jun 2023 12:00)  HR: 73 (26 Jun 2023 05:20) (73 - 86)  BP: 130/75 (26 Jun 2023 05:20) (115/70 - 130/75)  BP(mean): --  RR: 19 (26 Jun 2023 05:20) (19 - 21)  SpO2: 97% (26 Jun 2023 05:20) (94% - 97%)    Parameters below as of 26 Jun 2023 05:20  Patient On (Oxygen Delivery Method): room air    PHYSICAL EXAM:    GENERAL: NAD  HEAD:  Atraumatic, Normocephalic  EYES: EOMI, PERRLA, conjunctiva and sclera clear  ABDOMEN: Soft, ND, NT, incisions c/d/i, mild ecchymosis surrounding umbilical incision, BIRGIT bilious output  EXTREMITIES:  calf soft, non tender b/l    I&O's Detail    25 Jun 2023 07:01  -  26 Jun 2023 07:00  --------------------------------------------------------  IN:    IV PiggyBack: 100 mL    Oral Fluid: 250 mL  Total IN: 350 mL    OUT:    Bulb (mL): 240 mL    Post-Void Residual per Intermittent Catheterization (mL): 13 mL    Voided (mL): 1380 mL  Total OUT: 1633 mL    Total NET: -1283 mL          LABS:                        10.4   9.57  )-----------( 235      ( 26 Jun 2023 07:14 )             30.3     06-26    133<L>  |  91<L>  |  76<H>  ----------------------------<  94  4.2   |  22  |  7.85<H>    Ca    8.9      26 Jun 2023 07:14    TPro  6.4  /  Alb  2.1<L>  /  TBili  0.5  /  DBili  x   /  AST  12  /  ALT  39  /  AlkPhos  63  06-26      Urinalysis Basic - ( 26 Jun 2023 07:14 )    Color: x / Appearance: x / SG: x / pH: x  Gluc: 94 mg/dL / Ketone: x  / Bili: x / Urobili: x   Blood: x / Protein: x / Nitrite: x   Leuk Esterase: x / RBC: x / WBC x   Sq Epi: x / Non Sq Epi: x / Bacteria: x      Impression: 73 y/o M hx of HTN, polio (LLE), Polycystic kidney disease s/p L nephrectomy now ESRD on HD MWF, hx of HepC (treated) pw Acute cholecystitis w/ klebsiella bacteremia. Pt POD6 s/p robotic subtotal cholecystectomy, pt recovering well. HD stable. Episode of acute urinary retention over weekend, improving, bladder scans prn. Pt reports this happened after anesthesia in past.    Plan:  - Surgically stable  - Abx per ID recs, likely stop Zosyn tomorrow monitor off abx  - LFTs reviewed, wnl  - PT eval, pt would likely benefit from KACIE   - Urology consulted for urinary retention, f/u recs  - Medical management per hospitalist  - F/u outpatient w/ Dr. Brar 1 week after discharge    Seen and discussed w/ Dr. Brar  Surgery

## 2023-06-26 NOTE — PROGRESS NOTE ADULT - ASSESSMENT
72y male with  HTN, polio, Polycystic kidney disease s/p L nephrectomy now ESRD on HD MWF, hx of HepC (treated) He initially presented to ED with upper abd pain, fevers, chills Tmax 104, received a dose of Vanco and Zosyn but recalled to come back because blood cult now with Latoya esparza. He still had fevers post discharge along with abdominal pain.  CT with findings suggestive of acute antonio, LFTs are normal, also with polycystic kidney disease, but urine cult is negative and pt is on HD, doubt  source  He underwent subtotal cholecystectomy on 6/21 for a necrotic gallbladder and required a post op ERCP with stent placement (? bile duct leak)  He has been extubated and appears stable post op.  His 6/17 blood cultures grew klebsiella, 6/19 remain negative,   His abdominal pain 6/24 in PM appears improved.  He has bilious drainage in RUQ drain  Suggest:   1. will stop zosyn-8 days should be adequate  2.Monitor off antibiotics  3.Drain per surgery  4.May need to be strait catheterized if no void and bladder scan shows increased residual  5 Disposition per other services

## 2023-06-26 NOTE — PROGRESS NOTE ADULT - PROBLEM SELECTOR PLAN 1
S/P ERCP with stent placement on 6/22/2023  Advance diet  Surgical follow up noted  No GI contraindication to discharge, if remains stable and tolerating diet  Outpatient follow up with Dr. Spangler for stent retrieval timing TBD

## 2023-06-26 NOTE — PROGRESS NOTE ADULT - SUBJECTIVE AND OBJECTIVE BOX
INTERVAL HPI/OVERNIGHT EVENTS:  HPI:    73 y/o male seen and examined.       MEDICATIONS  (STANDING):  heparin   Injectable 5000 Unit(s) SubCutaneous every 12 hours  indomethacin Suppository 100 milliGRAM(s) Rectal once  pantoprazole    Tablet 40 milliGRAM(s) Oral before breakfast  piperacillin/tazobactam IVPB.. 3.375 Gram(s) IV Intermittent every 12 hours  tamsulosin 0.4 milliGRAM(s) Oral at bedtime    MEDICATIONS  (PRN):  acetaminophen  Suppository .. 650 milliGRAM(s) Rectal every 6 hours PRN Temp greater or equal to 38C (100.4F)  HYDROmorphone  Injectable 2 milliGRAM(s) IV Push every 4 hours PRN Severe Pain (7 - 10)  HYDROmorphone  Injectable 0.5 milliGRAM(s) IV Push every 4 hours PRN Mild Pain (1 - 3)  HYDROmorphone  Injectable 1 milliGRAM(s) IV Push every 4 hours PRN Moderate Pain (4 - 6)  senna 2 Tablet(s) Oral daily PRN Constipation  simethicone 80 milliGRAM(s) Chew two times a day PRN Gas      Allergies    Demerol HCl (Unknown)    Intolerances        PAST MEDICAL & SURGICAL HISTORY:  Hypertension      Renal failure, chronic      Cancer of kidney, left      History of left nephrectomy    PHYSICAL EXAM:   Vital Signs:  Vital Signs Last 24 Hrs  T(C): 37 (2023 05:20), Max: 37.7 (2023 12:00)  T(F): 98.6 (2023 05:20), Max: 99.9 (2023 12:00)  HR: 73 (2023 05:20) (73 - 86)  BP: 130/75 (2023 05:20) (115/70 - 130/75)  BP(mean): --  RR: 19 (2023 05:20) (19 - 21)  SpO2: 97% (2023 05:20) (94% - 97%)    Parameters below as of 2023 05:20  Patient On (Oxygen Delivery Method): room air      Daily     Daily Weight in k.5 (2023 05:20)I&O's Summary    2023 07:01  -  2023 07:00  --------------------------------------------------------  IN: 350 mL / OUT: 1633 mL / NET: -1283 mL    GENERAL:  Appears stated age,  HEENT:  NC/AT,  conjunctivae clear and pink, sclera -anicteric  CHEST:  Full & symmetric excursion, no increased effort, breath sounds clear  HEART:  Regular rhythm, S1, S2, no murmur  ABDOMEN:  Soft, non-tender, non-distended, normoactive bowel sounds,  EXTEREMITIES:  no edema  SKIN:  No rash/warm/dry  NEURO:  Alert, oriented,      LABS:                        10.4   9.57  )-----------( 235      ( 2023 07:14 )             30.3               amylase   lipase  RADIOLOGY & ADDITIONAL TESTS:

## 2023-06-26 NOTE — PROGRESS NOTE ADULT - SUBJECTIVE AND OBJECTIVE BOX
NEPHROLOGY PROGRESS NOTE    CHIEF COMPLAINT:  ESRD    HPI:  He had dialysis earlier today.  Treatment was uneventful.  Access worked well.  1 liter fluid removed.     EXAM:  T(F): 97.4 (06-26-23 @ 12:58)  HR: 72 (06-26-23 @ 12:58)  BP: 120/62 (06-26-23 @ 12:58)  RR: 14 (06-26-23 @ 12:58)  SpO2: 98% (06-26-23 @ 12:58)    Conversant, in no apparent distress  Normal respiratory effort, lungs clear bilaterally  Heart RRR with no murmur, no peripheral edema         LABS                             10.4   9.57  )-----------( 235      ( 26 Jun 2023 07:14 )             30.3          06-26    133<L>  |  91<L>  |  76<H>  ----------------------------<  94  4.2   |  22  |  7.85<H>    Ca    8.9      26 Jun 2023 07:14    TPro  6.4  /  Alb  2.1<L>  /  TBili  0.5  /  DBili  x   /  AST  12  /  ALT  39  /  AlkPhos  63  06-26    Urinalysis Basic - ( 26 Jun 2023 07:14 )  Color: x / Appearance: x / SG: x / pH: x  Gluc: 94 mg/dL / Ketone: x  / Bili: x / Urobili: x   Blood: x / Protein: x / Nitrite: x   Leuk Esterase: x / RBC: x / WBC x   Sq Epi: x / Non Sq Epi: x / Bacteria: x    Impression  1.  ESRD on hemodialysis  2.  Klebsiella bacteremia  3.  POD # 5 CCK    Recommend  Continue dialysis on MWF

## 2023-06-26 NOTE — PROGRESS NOTE ADULT - SUBJECTIVE AND OBJECTIVE BOX
Patient is a 72y old  Male who presents with a chief complaint of Klebsiella bacteremia (26 Jun 2023 09:52)      SUBJECTIVE / OVERNIGHT EVENTS:  Pt seen and examined at bedside. No acute events overnight.  Pt denies cp, palpitations, sob, abd pain, N/V, fever, chills.    ROS:  All other review of systems negative    Allergies    Demerol HCl (Unknown)    Intolerances        MEDICATIONS  (STANDING):  heparin   Injectable 5000 Unit(s) SubCutaneous every 12 hours  indomethacin Suppository 100 milliGRAM(s) Rectal once  pantoprazole    Tablet 40 milliGRAM(s) Oral before breakfast  piperacillin/tazobactam IVPB.. 3.375 Gram(s) IV Intermittent every 12 hours  tamsulosin 0.4 milliGRAM(s) Oral at bedtime    MEDICATIONS  (PRN):  acetaminophen  Suppository .. 650 milliGRAM(s) Rectal every 6 hours PRN Temp greater or equal to 38C (100.4F)  HYDROmorphone  Injectable 2 milliGRAM(s) IV Push every 4 hours PRN Severe Pain (7 - 10)  HYDROmorphone  Injectable 0.5 milliGRAM(s) IV Push every 4 hours PRN Mild Pain (1 - 3)  HYDROmorphone  Injectable 1 milliGRAM(s) IV Push every 4 hours PRN Moderate Pain (4 - 6)  senna 2 Tablet(s) Oral daily PRN Constipation  simethicone 80 milliGRAM(s) Chew two times a day PRN Gas      Vital Signs Last 24 Hrs  T(C): 36.6 (26 Jun 2023 09:58), Max: 37.1 (25 Jun 2023 20:32)  T(F): 97.8 (26 Jun 2023 09:58), Max: 98.7 (25 Jun 2023 20:32)  HR: 70 (26 Jun 2023 09:58) (70 - 86)  BP: 111/65 (26 Jun 2023 09:58) (111/65 - 130/75)  BP(mean): --  RR: 14 (26 Jun 2023 09:58) (14 - 20)  SpO2: 97% (26 Jun 2023 09:58) (95% - 97%)    Parameters below as of 26 Jun 2023 09:58  Patient On (Oxygen Delivery Method): room air      CAPILLARY BLOOD GLUCOSE        I&O's Summary    25 Jun 2023 07:01  -  26 Jun 2023 07:00  --------------------------------------------------------  IN: 350 mL / OUT: 1633 mL / NET: -1283 mL        PHYSICAL EXAM:  GENERAL: NAD, well-developed  HEAD:  Atraumatic, Normocephalic  EYES: EOMI, PERRLA, conjunctiva and sclera clear  NECK: Supple, No JVD  CHEST/LUNG: Clear to auscultation bilaterally; No wheeze, nonlabored breathing  HEART: Regular rate and rhythm; No murmurs, rubs, or gallops  ABDOMEN: Soft, Nontender, Nondistended; Bowel sounds present  EXTREMITIES:  2+ Peripheral Pulses, No clubbing, cyanosis, or edema  NEUROLOGY: AAOx3, non-focal  PSYCH: calm, appropriate mood  SKIN: No rashes or lesions, warm intact    LABS:                        10.4   9.57  )-----------( 235      ( 26 Jun 2023 07:14 )             30.3     06-26    133<L>  |  91<L>  |  76<H>  ----------------------------<  94  4.2   |  22  |  7.85<H>    Ca    8.9      26 Jun 2023 07:14    TPro  6.4  /  Alb  2.1<L>  /  TBili  0.5  /  DBili  x   /  AST  12  /  ALT  39  /  AlkPhos  63  06-26          Urinalysis Basic - ( 26 Jun 2023 07:14 )    Color: x / Appearance: x / SG: x / pH: x  Gluc: 94 mg/dL / Ketone: x  / Bili: x / Urobili: x   Blood: x / Protein: x / Nitrite: x   Leuk Esterase: x / RBC: x / WBC x   Sq Epi: x / Non Sq Epi: x / Bacteria: x        RADIOLOGY & ADDITIONAL TESTS:  Results Reviewed:   Imaging Personally Reviewed:  Electrocardiogram Personally Reviewed:    COORDINATION OF CARE:  Care Discussed with Consultants/Other Providers [Y/N]:  Prior or Outpatient Records Reviewed [Y/N]: Patient is a 72y old  Male who presents with a chief complaint of Klebsiella bacteremia (26 Jun 2023 09:52)      SUBJECTIVE / OVERNIGHT EVENTS:  Pt seen and examined at bedside. No acute events overnight. Complaints of urinary retention requiring intermittent SC overnight.   Pt denies cp, palpitations, sob, abd pain, N/V, fever, chills.    ROS:  All other review of systems negative    Allergies    Demerol HCl (Unknown)    Intolerances        MEDICATIONS  (STANDING):  heparin   Injectable 5000 Unit(s) SubCutaneous every 12 hours  indomethacin Suppository 100 milliGRAM(s) Rectal once  pantoprazole    Tablet 40 milliGRAM(s) Oral before breakfast  piperacillin/tazobactam IVPB.. 3.375 Gram(s) IV Intermittent every 12 hours  tamsulosin 0.4 milliGRAM(s) Oral at bedtime    MEDICATIONS  (PRN):  acetaminophen  Suppository .. 650 milliGRAM(s) Rectal every 6 hours PRN Temp greater or equal to 38C (100.4F)  HYDROmorphone  Injectable 2 milliGRAM(s) IV Push every 4 hours PRN Severe Pain (7 - 10)  HYDROmorphone  Injectable 0.5 milliGRAM(s) IV Push every 4 hours PRN Mild Pain (1 - 3)  HYDROmorphone  Injectable 1 milliGRAM(s) IV Push every 4 hours PRN Moderate Pain (4 - 6)  senna 2 Tablet(s) Oral daily PRN Constipation  simethicone 80 milliGRAM(s) Chew two times a day PRN Gas      Vital Signs Last 24 Hrs  T(C): 36.6 (26 Jun 2023 09:58), Max: 37.1 (25 Jun 2023 20:32)  T(F): 97.8 (26 Jun 2023 09:58), Max: 98.7 (25 Jun 2023 20:32)  HR: 70 (26 Jun 2023 09:58) (70 - 86)  BP: 111/65 (26 Jun 2023 09:58) (111/65 - 130/75)  BP(mean): --  RR: 14 (26 Jun 2023 09:58) (14 - 20)  SpO2: 97% (26 Jun 2023 09:58) (95% - 97%)    Parameters below as of 26 Jun 2023 09:58  Patient On (Oxygen Delivery Method): room air      CAPILLARY BLOOD GLUCOSE        I&O's Summary    25 Jun 2023 07:01  -  26 Jun 2023 07:00  --------------------------------------------------------  IN: 350 mL / OUT: 1633 mL / NET: -1283 mL        PHYSICAL EXAM:  GENERAL: NAD, well-developed male  HEAD:  Atraumatic, Normocephalic  NECK: Supple, No JVD  CHEST/LUNG: Clear to auscultation bilaterally; No wheeze, nonlabored breathing  HEART: Regular rate and rhythm; No murmurs, rubs, or gallops  ABDOMEN: Soft, Nontender, Nondistended; Bowel sounds present  EXTREMITIES:  No clubbing, cyanosis, or edema  NEUROLOGY: AAOx3, non-focal  PSYCH: calm, appropriate mood    LABS:                        10.4   9.57  )-----------( 235      ( 26 Jun 2023 07:14 )             30.3     06-26    133<L>  |  91<L>  |  76<H>  ----------------------------<  94  4.2   |  22  |  7.85<H>    Ca    8.9      26 Jun 2023 07:14    TPro  6.4  /  Alb  2.1<L>  /  TBili  0.5  /  DBili  x   /  AST  12  /  ALT  39  /  AlkPhos  63  06-26          Urinalysis Basic - ( 26 Jun 2023 07:14 )    Color: x / Appearance: x / SG: x / pH: x  Gluc: 94 mg/dL / Ketone: x  / Bili: x / Urobili: x   Blood: x / Protein: x / Nitrite: x   Leuk Esterase: x / RBC: x / WBC x   Sq Epi: x / Non Sq Epi: x / Bacteria: x        RADIOLOGY & ADDITIONAL TESTS:  Results Reviewed:   Imaging Personally Reviewed:  Electrocardiogram Personally Reviewed:    COORDINATION OF CARE:  Care Discussed with Consultants/Other Providers [Y/N]:  Prior or Outpatient Records Reviewed [Y/N]:

## 2023-06-26 NOTE — PROGRESS NOTE ADULT - ASSESSMENT
71 y/o M hx of HTN, polio (LLE), Polycystic kidney disease s/p L nephrectomy now ESRD on HD MWF, hx of HepC (treated) pw Klebsiella bacteremia.    Acute cholecystitis with Klebsiella bacteremia s/p cholecystectomy   POD # 5  - cont zosyn per ID - anticipate transition to PO 6/26  - pain control - abd pain improved today  - OOB to chair  - surgery following   - follow am LFTs    Acute urinary retention  - Now improved  - Cont bladder scans    Hypertension  - Cont coreg and amlodipine as tolerated.    Anemia of chronic kidney disease  - Suspect secondary to ESRD    Thrombocytopenia  - Suspect secondary to acute infection     ESRD on HD MWF  - Anticipate HD 6/26    PT sydnee    Discussed with daughter Shahrzad over phone  Dispo - anticipate discharge in 24-48 pending resolution of urinary retention  73 y/o M hx of HTN, polio (LLE), Polycystic kidney disease s/p L nephrectomy now ESRD on HD MWF, hx of HepC (treated) pw Klebsiella bacteremia.    #Acute cholecystitis  #Klebsiella bacteremia   -s/p cholecystectomy POD # 6  -Continue Zosyn, complete 1 week today. Will follow up with ID regarding the need for further abx or completion of course  -Surgery and GI recommendations appreciated   -Monitor vitals and symptoms     #Acute urinary retention  -Unclear etiology  -Continue Bladder scans w/ prn SC  -Will likely need Muhammad placement with outpatient urology follow up  -Urology consulted     #Hypertension  -Cont coreg and amlodipine as tolerated.    #Anemia of chronic kidney disease  -Suspect secondary to ESRD    #Thrombocytopenia  - Suspect secondary to acute infection     #ESRD  -HD MWF  -Nephrology on board    Discussed with daughter Shahrzad over phone

## 2023-06-26 NOTE — CHART NOTE - NSCHARTNOTEFT_GEN_A_CORE
Nutrition Follow Up Note  Hospital Course (Per Electronic Medical Record):   Source: Medical Record [X] Patient [X] Nursing Staff [X]     Diet: Renal , Low fat     Patient POD # 5 , s/p Robot-assisted cholecystectomy, reports tolerating current diet appetite is reported fair consuming 50-75% as per nursing flow sheet , No weight changes reported as per patient UBW ~ 147lb/67kg , aware of renal diet restrictions, no Bm noted since PTA , Senna order noted however suggest additional bowel regimen .     Current Weight: (6/26) 142.1/64.5kg                          (6/23) 140.6/63.8kg                          (6/23) 142.8/64.8kg -pre HD                          Pertinent Medications: MEDICATIONS  (STANDING):  heparin   Injectable 5000 Unit(s) SubCutaneous every 12 hours  indomethacin Suppository 100 milliGRAM(s) Rectal once  pantoprazole    Tablet 40 milliGRAM(s) Oral before breakfast  piperacillin/tazobactam IVPB.. 3.375 Gram(s) IV Intermittent every 12 hours  tamsulosin 0.4 milliGRAM(s) Oral at bedtime    MEDICATIONS  (PRN):  acetaminophen  Suppository .. 650 milliGRAM(s) Rectal every 6 hours PRN Temp greater or equal to 38C (100.4F)  HYDROmorphone  Injectable 2 milliGRAM(s) IV Push every 4 hours PRN Severe Pain (7 - 10)  HYDROmorphone  Injectable 0.5 milliGRAM(s) IV Push every 4 hours PRN Mild Pain (1 - 3)  HYDROmorphone  Injectable 1 milliGRAM(s) IV Push every 4 hours PRN Moderate Pain (4 - 6)  senna 2 Tablet(s) Oral daily PRN Constipation  simethicone 80 milliGRAM(s) Chew two times a day PRN Gas      Pertinent Labs:  06-26 Na133 mmol/L<L> Glu 94 mg/dL K+ 4.2 mmol/L Cr  7.85 mg/dL<H> BUN 76 mg/dL<H> 06-24 Phos 5.8 mg/dL<H> 06-26 Alb 2.1 g/dL<L>        Skin: surgivcal incision noted     Edema: none    Last BM: no Bm since PTA , fecal incontinence reported     Estimated Needs:   [X] No Change since initial assessment     Previous Nutrition Diagnosis: Altered Nutrition Related Lab Values    Nutrition Diagnosis is [X] Ongoing due to CKD / HD         New Nutrition Diagnosis: [X] Not Applicable      Interventions:   1. continue current diet  2. suggest adding Miralax        Monitoring & Evaluation: will monitor:  [X] Weights   [X] PO Intake   [X] Follow Up (Per Protocol)  [X] Tolerance to Diet Prescription       RD to follow as per Nutrition protocol  Emely Mo RDN

## 2023-06-26 NOTE — PROGRESS NOTE ADULT - SUBJECTIVE AND OBJECTIVE BOX
CC: f/u for klebsiella bacteremia    Patient reports: he denies any abdominal pain, he is concerned about inability to void    REVIEW OF SYSTEMS:  All other review of systems negative (Comprehensive ROS)    Antimicrobials Day #  :day 8  piperacillin/tazobactam IVPB.. 3.375 Gram(s) IV Intermittent every 12 hours    Other Medications Reviewed  MEDICATIONS  (STANDING):  heparin   Injectable 5000 Unit(s) SubCutaneous every 12 hours  indomethacin Suppository 100 milliGRAM(s) Rectal once  pantoprazole    Tablet 40 milliGRAM(s) Oral before breakfast  piperacillin/tazobactam IVPB.. 3.375 Gram(s) IV Intermittent every 12 hours  tamsulosin 0.4 milliGRAM(s) Oral at bedtime    T(F): 97.4 (06-26-23 @ 12:58), Max: 98.7 (06-25-23 @ 20:32)  HR: 72 (06-26-23 @ 12:58)  BP: 120/62 (06-26-23 @ 12:58)  RR: 14 (06-26-23 @ 12:58)  SpO2: 98% (06-26-23 @ 12:58)  Wt(kg): --    PHYSICAL EXAM:  General: alert, no acute distress  Eyes:  anicteric, no conjunctival injection, no discharge  Oropharynx: no lesions or injection 	  Neck: supple, without adenopathy  Lungs: clear to auscultation  Heart: regular rate and rhythm; no murmur, rubs or gallops  Abdomen: soft, nondistended, nontender, without mass or organomegaly  Skin: no lesions  Extremities: no clubbing, cyanosis, or edema  Neurologic: alert, oriented, moves all extremities  RUQ drain with bilious drainage  LAB RESULTS:                        10.4   9.57  )-----------( 235      ( 26 Jun 2023 07:14 )             30.3     06-26    133<L>  |  91<L>  |  76<H>  ----------------------------<  94  4.2   |  22  |  7.85<H>    Ca    8.9      26 Jun 2023 07:14    TPro  6.4  /  Alb  2.1<L>  /  TBili  0.5  /  DBili  x   /  AST  12  /  ALT  39  /  AlkPhos  63  06-26    LIVER FUNCTIONS - ( 26 Jun 2023 07:14 )  Alb: 2.1 g/dL / Pro: 6.4 g/dL / ALK PHOS: 63 U/L / ALT: 39 U/L / AST: 12 U/L / GGT: x           Urinalysis Basic - ( 26 Jun 2023 07:14 )    Color: x / Appearance: x / SG: x / pH: x  Gluc: 94 mg/dL / Ketone: x  / Bili: x / Urobili: x   Blood: x / Protein: x / Nitrite: x   Leuk Esterase: x / RBC: x / WBC x   Sq Epi: x / Non Sq Epi: x / Bacteria: x      MICROBIOLOGY:  RECENT CULTURES:      RADIOLOGY REVIEWED:

## 2023-06-27 LAB
ANION GAP SERPL CALC-SCNC: 14 MMOL/L — SIGNIFICANT CHANGE UP (ref 5–17)
BUN SERPL-MCNC: 47 MG/DL — HIGH (ref 7–23)
CALCIUM SERPL-MCNC: 8.8 MG/DL — SIGNIFICANT CHANGE UP (ref 8.4–10.5)
CHLORIDE SERPL-SCNC: 98 MMOL/L — SIGNIFICANT CHANGE UP (ref 96–108)
CO2 SERPL-SCNC: 26 MMOL/L — SIGNIFICANT CHANGE UP (ref 22–31)
CREAT SERPL-MCNC: 5.22 MG/DL — HIGH (ref 0.5–1.3)
EGFR: 11 ML/MIN/1.73M2 — LOW
GLUCOSE SERPL-MCNC: 97 MG/DL — SIGNIFICANT CHANGE UP (ref 70–99)
HCT VFR BLD CALC: 29.4 % — LOW (ref 39–50)
HGB BLD-MCNC: 9.7 G/DL — LOW (ref 13–17)
MCHC RBC-ENTMCNC: 30.9 PG — SIGNIFICANT CHANGE UP (ref 27–34)
MCHC RBC-ENTMCNC: 33 GM/DL — SIGNIFICANT CHANGE UP (ref 32–36)
MCV RBC AUTO: 93.6 FL — SIGNIFICANT CHANGE UP (ref 80–100)
NRBC # BLD: 0 /100 WBCS — SIGNIFICANT CHANGE UP (ref 0–0)
PLATELET # BLD AUTO: 219 K/UL — SIGNIFICANT CHANGE UP (ref 150–400)
POTASSIUM SERPL-MCNC: 4.1 MMOL/L — SIGNIFICANT CHANGE UP (ref 3.5–5.3)
POTASSIUM SERPL-SCNC: 4.1 MMOL/L — SIGNIFICANT CHANGE UP (ref 3.5–5.3)
RBC # BLD: 3.14 M/UL — LOW (ref 4.2–5.8)
RBC # FLD: 13.7 % — SIGNIFICANT CHANGE UP (ref 10.3–14.5)
SODIUM SERPL-SCNC: 138 MMOL/L — SIGNIFICANT CHANGE UP (ref 135–145)
WBC # BLD: 7.54 K/UL — SIGNIFICANT CHANGE UP (ref 3.8–10.5)
WBC # FLD AUTO: 7.54 K/UL — SIGNIFICANT CHANGE UP (ref 3.8–10.5)

## 2023-06-27 PROCEDURE — 99232 SBSQ HOSP IP/OBS MODERATE 35: CPT

## 2023-06-27 PROCEDURE — 76775 US EXAM ABDO BACK WALL LIM: CPT | Mod: 26

## 2023-06-27 RX ORDER — ERYTHROPOIETIN 10000 [IU]/ML
10000 INJECTION, SOLUTION INTRAVENOUS; SUBCUTANEOUS
Refills: 0 | Status: DISCONTINUED | OUTPATIENT
Start: 2023-06-27 | End: 2023-06-28

## 2023-06-27 RX ADMIN — HEPARIN SODIUM 5000 UNIT(S): 5000 INJECTION INTRAVENOUS; SUBCUTANEOUS at 06:00

## 2023-06-27 RX ADMIN — HEPARIN SODIUM 5000 UNIT(S): 5000 INJECTION INTRAVENOUS; SUBCUTANEOUS at 18:02

## 2023-06-27 RX ADMIN — TAMSULOSIN HYDROCHLORIDE 0.4 MILLIGRAM(S): 0.4 CAPSULE ORAL at 22:03

## 2023-06-27 RX ADMIN — PANTOPRAZOLE SODIUM 40 MILLIGRAM(S): 20 TABLET, DELAYED RELEASE ORAL at 06:00

## 2023-06-27 NOTE — PROGRESS NOTE ADULT - SUBJECTIVE AND OBJECTIVE BOX
No distress    Vital Signs Last 24 Hrs  T(C): 37.4 (06-27-23 @ 13:00), Max: 37.4 (06-27-23 @ 13:00)  T(F): 99.3 (06-27-23 @ 13:00), Max: 99.3 (06-27-23 @ 13:00)  HR: 90 (06-27-23 @ 13:00) (80 - 90)  BP: 112/69 (06-27-23 @ 13:00) (112/69 - 115/66)  RR: 18 (06-27-23 @ 13:00) (18 - 18)  SpO2: 98% (06-27-23 @ 13:00) (94% - 98%)    I&O's Detail    26 Jun 2023 07:01  -  27 Jun 2023 07:00  --------------------------------------------------------  OUT:    Bulb (mL): 205 mL    Other (mL): 1000 mL  Total OUT: 1205 mL    27 Jun 2023 07:01  -  27 Jun 2023 17:05  --------------------------------------------------------  OUT:    Bulb (mL): 75 mL  Total OUT: 75 mL    s1s2  b/l air entry  soft, NT  no edema, LUE AVF                                                9.7    7.54  )-----------( 219      ( 27 Jun 2023 06:56 )             29.4     27 Jun 2023 06:56    138    |  98     |  47     ----------------------------<  97     4.1     |  26     |  5.22     Ca    8.8        27 Jun 2023 06:56    TPro  6.4    /  Alb  2.1    /  TBili  0.5    /  DBili  x      /  AST  12     /  ALT  39     /  AlkPhos  63     26 Jun 2023 07:14    LIVER FUNCTIONS - ( 26 Jun 2023 07:14 )  Alb: 2.1 g/dL / Pro: 6.4 g/dL / ALK PHOS: 63 U/L / ALT: 39 U/L / AST: 12 U/L / GGT: x           acetaminophen  Suppository .. 650 milliGRAM(s) Rectal every 6 hours PRN  heparin   Injectable 5000 Unit(s) SubCutaneous every 12 hours  pantoprazole    Tablet 40 milliGRAM(s) Oral before breakfast  senna 2 Tablet(s) Oral daily PRN  simethicone 80 milliGRAM(s) Chew two times a day PRN  tamsulosin 0.4 milliGRAM(s) Oral at bedtime    A/P:    ESRD on HD MWF  Kleb bacteremia on adm, acute antonio  S/p cholecystectomy 6/21  S/p ERCP 6/22  SONO w/solitary R kidney, no hydro, collection around the liver as per report  Surgery, ID following   Abx per ID  Next HD tomorrow  TMP as able  Epoetin w/HD    [phone]

## 2023-06-27 NOTE — PROGRESS NOTE ADULT - ASSESSMENT
73 y/o M hx of HTN, polio (LLE), Polycystic kidney disease s/p L nephrectomy now ESRD on HD MWF, hx of HepC (treated) pw Acute cholecystitis w/ klebsiella bacteremia. Pt POD6 s/p robotic subtotal cholecystectomy, pt recovering well. HD stable. Episode of acute urinary retention over weekend, improving, bladder scans prn. Pt reports this happened after anesthesia in past.  - Surgically stable  - Abx per ID rec  - Nutrition eval  - PT eval, pt would likely benefit from KACIE   - Urology consulted for urinary retention, f/u recs  - Medical management and supportive care as per primary team  - F/u outpatient w/ Dr. Brar 1 week after discharge  Seen and discussed plan with Dr. Brar

## 2023-06-27 NOTE — CONSULT NOTE ADULT - CONSULT REASON
Post operative management
cholelithiasis
post-op urinary retention
ESRD
Kleb bacteremia
Suspect bile leak

## 2023-06-27 NOTE — CONSULT NOTE ADULT - SUBJECTIVE AND OBJECTIVE BOX
GENERAL SURGERY CONSULT NOTE    Patient is a 72y old  Male who presents with a chief complaint of Klebsiella bacteremia (27 Jun 2023 11:51)    HPI:  72M hx of HTN, polio, Polycystic kidney disease s/p L nephrectomy now ESRD on HD MWF, hx of HepC (treated) pw Klebsiella bacteremia. Pt related 5 days ago developed moderate upper abd pain that lasted for a day and resolved but followed by fevers, chills Tmax 104 but no recurrent abd pain, no associated NVD, cough, SOB, CP, dysuria or flank pain. Was in ED 2 days ago and routine labs, UA, RVP neg and was given 1 dose of Vanco and Zosyn but recalled today for 1 culture bottle with Kleb pneum. Pt related still with fevers of 102 since D/C from ED but otherwise feels well. In ED Tmax: 100 P: 88 BP: 130/71 sat 95% on RA. WBC: 4.05 hgb: 10.7 69%N BUN/cr: 42/4.25 (20 Jun 2023 02:16)    Urology consulted as pt has not voided since the subtotal cholecystectomy surgery on 06/21. As per daughter at bedside, pt voids regularly despite being on HD 2/2 ESRD. Reports hx of retention after AVF surgery in the past.       PAST MEDICAL & SURGICAL HISTORY:  Hypertension  Renal failure, chronic  Cancer of kidney, left  History of left nephrectomy      Review of Systems:  Contained within HPI.    MEDICATIONS  (STANDING):  heparin   Injectable 5000 Unit(s) SubCutaneous every 12 hours  pantoprazole    Tablet 40 milliGRAM(s) Oral before breakfast  tamsulosin 0.4 milliGRAM(s) Oral at bedtime    MEDICATIONS  (PRN):  acetaminophen  Suppository .. 650 milliGRAM(s) Rectal every 6 hours PRN Temp greater or equal to 38C (100.4F)  senna 2 Tablet(s) Oral daily PRN Constipation  simethicone 80 milliGRAM(s) Chew two times a day PRN Gas      Allergies    Demerol HCl (Unknown)    Intolerances        SOCIAL HISTORY       FAMILY HISTORY:  FHx: renal failure (Mother)        Vital Signs Last 24 Hrs  T(C): 37.3 (27 Jun 2023 05:56), Max: 37.3 (27 Jun 2023 05:56)  T(F): 99.1 (27 Jun 2023 05:56), Max: 99.1 (27 Jun 2023 05:56)  HR: 80 (27 Jun 2023 05:56) (72 - 80)  BP: 115/66 (27 Jun 2023 05:56) (115/66 - 120/62)  BP(mean): --  RR: 18 (27 Jun 2023 05:56) (14 - 18)  SpO2: 94% (27 Jun 2023 05:56) (94% - 98%)    Parameters below as of 26 Jun 2023 12:58  Patient On (Oxygen Delivery Method): room air        Physical Exam:  General: Appears stated age, well-groomed, well-nourished, no distress  Eyes : EOMs intact  HENT:  WNL, no JVD  Chest: non-labored breathing bilaterally  Cardiovascular: normal HR  Abdomen: soft, non-tender, non-distended; surgical sites C/D/I, drain in place w/ serous output  Extremities: moves all extremities spontaneously  Skin: warm and dry  Neuro/Psych:  Alert, oriented to time, place and person       LABS:                        9.7    7.54  )-----------( 219      ( 27 Jun 2023 06:56 )             29.4     06-27    138  |  98  |  47<H>  ----------------------------<  97  4.1   |  26  |  5.22<H>    Ca    8.8      27 Jun 2023 06:56    TPro  6.4  /  Alb  2.1<L>  /  TBili  0.5  /  DBili  x   /  AST  12  /  ALT  39  /  AlkPhos  63  06-26      Urinalysis Basic - ( 27 Jun 2023 06:56 )    Color: x / Appearance: x / SG: x / pH: x  Gluc: 97 mg/dL / Ketone: x  / Bili: x / Urobili: x   Blood: x / Protein: x / Nitrite: x   Leuk Esterase: x / RBC: x / WBC x   Sq Epi: x / Non Sq Epi: x / Bacteria: x

## 2023-06-27 NOTE — CONSULT NOTE ADULT - REASON FOR ADMISSION
Klebsiella bacteremia

## 2023-06-27 NOTE — PROGRESS NOTE ADULT - SUBJECTIVE AND OBJECTIVE BOX
INTERVAL HPI/OVERNIGHT EVENTS:  Pt seen and examined this AM. Pt resting comfortably on chair. No acute events reported overnight. Pt feels well, offers no complaints at this time. Denies fever/chills, chest pain, dyspnea, cough, dizziness, abdominal pain, N/V.     Vital Signs Last 24 Hrs  T(C): 37.3 (27 Jun 2023 05:56), Max: 37.3 (27 Jun 2023 05:56)  T(F): 99.1 (27 Jun 2023 05:56), Max: 99.1 (27 Jun 2023 05:56)  HR: 80 (27 Jun 2023 05:56) (72 - 80)  BP: 115/66 (27 Jun 2023 05:56) (115/66 - 120/62)  BP(mean): --  RR: 18 (27 Jun 2023 05:56) (14 - 18)  SpO2: 94% (27 Jun 2023 05:56) (94% - 98%)    Parameters below as of 26 Jun 2023 12:58  Patient On (Oxygen Delivery Method): room air        PHYSICAL EXAM:  GENERAL: awake and alert, NAD  HEAD:  Atraumatic, Normocephalic  EYES: EOMI  CHEST/LUNG: non-labored breathing bilaterally  HEART: normal HR  ABDOMEN: soft, non-tender, non-distended; surgical sites C/D/I, drain in place with serous output  EXTREMITIES: calf soft, non tender b/l    I&O's Detail    26 Jun 2023 07:01  -  27 Jun 2023 07:00  --------------------------------------------------------  IN:  Total IN: 0 mL    OUT:    Bulb (mL): 205 mL    Other (mL): 1000 mL  Total OUT: 1205 mL    Total NET: -1205 mL      27 Jun 2023 07:01  -  27 Jun 2023 11:52  --------------------------------------------------------  IN:  Total IN: 0 mL    OUT:    Bulb (mL): 30 mL  Total OUT: 30 mL    Total NET: -30 mL        LABS:                        9.7    7.54  )-----------( 219      ( 27 Jun 2023 06:56 )             29.4     06-27    138  |  98  |  47<H>  ----------------------------<  97  4.1   |  26  |  5.22<H>    Ca    8.8      27 Jun 2023 06:56    TPro  6.4  /  Alb  2.1<L>  /  TBili  0.5  /  DBili  x   /  AST  12  /  ALT  39  /  AlkPhos  63  06-26      Urinalysis Basic - ( 27 Jun 2023 06:56 )    Color: x / Appearance: x / SG: x / pH: x  Gluc: 97 mg/dL / Ketone: x  / Bili: x / Urobili: x   Blood: x / Protein: x / Nitrite: x   Leuk Esterase: x / RBC: x / WBC x   Sq Epi: x / Non Sq Epi: x / Bacteria: x

## 2023-06-27 NOTE — PROGRESS NOTE ADULT - ASSESSMENT
71 y/o M hx of HTN, polio (LLE), Polycystic kidney disease s/p L nephrectomy now ESRD on HD MWF, hx of HepC (treated) pw Klebsiella bacteremia.    #Acute cholecystitis  #Klebsiella bacteremia   -s/p cholecystectomy POD # 7  -Completed course of abx   -ID on board  -Surgery and GI recommendations appreciated   -Monitor vitals and symptoms     #Acute urinary retention  -Unclear etiology  -Urology team recommendations appreciated  -Continue Flomax  -Muhammad catheter w/ delayed voiding trial  -Urology follow up as outpatient    #Hypertension  -Cont coreg and amlodipine as tolerated.    #Anemia of chronic kidney disease  -Suspect secondary to ESRD    #Thrombocytopenia  - Suspect secondary to acute infection     #ESRD  -HD MWF  -Nephrology on board    Dispo home w/ PT    Updated daughter

## 2023-06-27 NOTE — PROGRESS NOTE ADULT - SUBJECTIVE AND OBJECTIVE BOX
Patient is a 72y old  Male who presents with a chief complaint of Klebsiella bacteremia (27 Jun 2023 11:58)      SUBJECTIVE / OVERNIGHT EVENTS:  Pt seen and examined at bedside in the presence of pt's daughter. No acute events overnight. Continues to have urinary retention  Pt denies cp, palpitations, sob, abd pain, N/V, fever, chills.    ROS:  All other review of systems negative    Allergies    Demerol HCl (Unknown)    Intolerances        MEDICATIONS  (STANDING):  heparin   Injectable 5000 Unit(s) SubCutaneous every 12 hours  pantoprazole    Tablet 40 milliGRAM(s) Oral before breakfast  tamsulosin 0.4 milliGRAM(s) Oral at bedtime    MEDICATIONS  (PRN):  acetaminophen  Suppository .. 650 milliGRAM(s) Rectal every 6 hours PRN Temp greater or equal to 38C (100.4F)  senna 2 Tablet(s) Oral daily PRN Constipation  simethicone 80 milliGRAM(s) Chew two times a day PRN Gas      Vital Signs Last 24 Hrs  T(C): 37.3 (27 Jun 2023 05:56), Max: 37.3 (27 Jun 2023 05:56)  T(F): 99.1 (27 Jun 2023 05:56), Max: 99.1 (27 Jun 2023 05:56)  HR: 80 (27 Jun 2023 05:56) (80 - 80)  BP: 115/66 (27 Jun 2023 05:56) (115/66 - 115/66)  BP(mean): --  RR: 18 (27 Jun 2023 05:56) (18 - 18)  SpO2: 94% (27 Jun 2023 05:56) (94% - 94%)      CAPILLARY BLOOD GLUCOSE        I&O's Summary    26 Jun 2023 07:01  -  27 Jun 2023 07:00  --------------------------------------------------------  IN: 0 mL / OUT: 1205 mL / NET: -1205 mL    27 Jun 2023 07:01  -  27 Jun 2023 13:20  --------------------------------------------------------  IN: 0 mL / OUT: 30 mL / NET: -30 mL        PHYSICAL EXAM:  GENERAL: NAD, well-developed male  HEAD:  Atraumatic, Normocephalic  NECK: Supple, No JVD  CHEST/LUNG: Clear to auscultation bilaterally; No wheeze, nonlabored breathing  HEART: Regular rate and rhythm; No murmurs, rubs, or gallops  ABDOMEN: Soft, Nontender, Nondistended; Bowel sounds present  EXTREMITIES:  No clubbing, cyanosis, or edema  NEUROLOGY: AAOx3, non-focal  PSYCH: calm, appropriate mood    LABS:                        9.7    7.54  )-----------( 219      ( 27 Jun 2023 06:56 )             29.4     06-27    138  |  98  |  47<H>  ----------------------------<  97  4.1   |  26  |  5.22<H>    Ca    8.8      27 Jun 2023 06:56    TPro  6.4  /  Alb  2.1<L>  /  TBili  0.5  /  DBili  x   /  AST  12  /  ALT  39  /  AlkPhos  63  06-26          Urinalysis Basic - ( 27 Jun 2023 06:56 )    Color: x / Appearance: x / SG: x / pH: x  Gluc: 97 mg/dL / Ketone: x  / Bili: x / Urobili: x   Blood: x / Protein: x / Nitrite: x   Leuk Esterase: x / RBC: x / WBC x   Sq Epi: x / Non Sq Epi: x / Bacteria: x        RADIOLOGY & ADDITIONAL TESTS:  Results Reviewed:   Imaging Personally Reviewed:  Electrocardiogram Personally Reviewed:    COORDINATION OF CARE:  Care Discussed with Consultants/Other Providers [Y/N]:  Prior or Outpatient Records Reviewed [Y/N]:

## 2023-06-27 NOTE — CONSULT NOTE ADULT - CONSULT REQUESTED DATE/TIME
27-Jun-2023
20-Jun-2023 12:30
20-Jun-2023 15:58
20-Jun-2023 16:54
22-Jun-2023 12:38
21-Jun-2023 19:00

## 2023-06-27 NOTE — CONSULT NOTE ADULT - ASSESSMENT
71 y/o M hx of HTN, polio (LLE), Polycystic kidney disease s/p L nephrectomy now ESRD on HD MWF, hx of HepC (treated) pw Acute cholecystitis w/ klebsiella bacteremia. Pt POD6 s/p robotic subtotal cholecystectomy, pt recovering well. HD stable. Episode of acute urinary retention over weekend, improving, bladder scans prn. Pt reports this happened after anesthesia in past.  Plans pending discussion with Dr. Jeong 73 y/o M hx of HTN, polio (LLE), Polycystic kidney disease s/p L nephrectomy now ESRD on HD MWF, hx of HepC (treated) pw Acute cholecystitis w/ klebsiella bacteremia. Pt POD6 s/p robotic subtotal cholecystectomy, pt recovering well. HD stable. Episode of acute urinary retention over weekend, improving, bladder scans prn. Pt reports this happened after anesthesia in past.  - Insert Muhammad catheter and d/c with leg bag; f/u outpatient for TOV  - Start Tamsulosin 0.4mg PO daily  - Bladder/prostate/kidney ultrasound  - Rest of care as per primary team  Plans pending discussion with Dr. Jeong

## 2023-06-27 NOTE — PROGRESS NOTE ADULT - SUBJECTIVE AND OBJECTIVE BOX
CC: f/u for  kleb bacteremia and acute antonio  Patient reports feels fine no pain    REVIEW OF SYSTEMS:  All other review of systems negative (Comprehensive ROS)    Antimicrobials Day #  :    Other Medications Reviewed    vss per flow reviewed  PHYSICAL EXAM:  General: alert, no acute distress  Eyes:  anicteric, no conjunctival injection, no discharge  Oropharynx: no lesions or injection 	  Neck: supple, without adenopathy  Lungs: clear to auscultation  Heart: regular rate and rhythm; no murmur, rubs or gallops  Abdomen: soft, nondistended, nontender, without mass or organomegaly. drain with faintly yellow fluid  Skin: no lesions  Extremities: no clubbing, cyanosis, or edema  Neurologic: alert, oriented, moves all extremities    LAB RESULTS:                        10.8   7.66  )-----------( 267      ( 28 Jun 2023 08:34 )             32.4     06-28    136  |  96  |  60<H>  ----------------------------<  100<H>  4.3   |  25  |  6.88<H>    Ca    9.1      28 Jun 2023 08:34  Phos  6.4     06-28    TPro  6.9  /  Alb  2.3<L>  /  TBili  0.5  /  DBili  0.2  /  AST  31  /  ALT  45  /  AlkPhos  89  06-28    LIVER FUNCTIONS - ( 28 Jun 2023 08:34 )  Alb: 2.3 g/dL / Pro: 6.9 g/dL / ALK PHOS: 89 U/L / ALT: 45 U/L / AST: 31 U/L / GGT: x           Urinalysis Basic - ( 28 Jun 2023 08:34 )    Color: x / Appearance: x / SG: x / pH: x  Gluc: 100 mg/dL / Ketone: x  / Bili: x / Urobili: x   Blood: x / Protein: x / Nitrite: x   Leuk Esterase: x / RBC: x / WBC x   Sq Epi: x / Non Sq Epi: x / Bacteria: x      MICROBIOLOGY:  RECENT CULTURES:      RADIOLOGY REVIEWED:  < from:  Renal (06.27.23 @ 15:44) >  ACC: [id number] EXAM:  US KIDNEY(S)   ORDERED BY: AMIRAH DE     PROCEDURE DATE:  06/27/2023          INTERPRETATION:  CLINICAL INFORMATION: Status post cholecystectomy now   presenting with urinary retention. History of polycystic right kidney and   left nephrectomy.    COMPARISON: CT abdomen dated 6/20/2023.    TECHNIQUE: Sonography of the kidneys and bladder.    FINDINGS:  Right kidney: 12.9 cm. Innumerable renal cysts measuring up to 5.3 cm. No   hydronephrosis. Scattered calculi measuring upto 5 mm.    Left kidney:  Removed.    Urinary bladder: Within normal limits.    Other: Complex fluid collection adjacent to the liver measuring 10.6 x   2.5 x 6.7 cm.    IMPRESSION:  Innumerable right renal cysts measuring up to 5.3 cm.    Left nephrectomy.    Complex fluid collection adjacent to the liver. Given the history of   recent cholecystectomy, postsurgical changes may have this appearance.   Infection not excluded.        < end of copied text >              Assessment:  patient with esrd on hd, pckd, came in with kleb bacteremia, required partial antonio, post op bile leak s/p stent, finished course of abx with zosyn yesterday. Feels fine today. Suspect biloma as cuse of the collection  Plan:  monitor off abx  drain per surgery

## 2023-06-28 ENCOUNTER — TRANSCRIPTION ENCOUNTER (OUTPATIENT)
Age: 73
End: 2023-06-28

## 2023-06-28 VITALS
RESPIRATION RATE: 14 BRPM | HEART RATE: 78 BPM | OXYGEN SATURATION: 100 % | DIASTOLIC BLOOD PRESSURE: 64 MMHG | TEMPERATURE: 98 F | WEIGHT: 137.79 LBS | SYSTOLIC BLOOD PRESSURE: 122 MMHG

## 2023-06-28 LAB
ALBUMIN SERPL ELPH-MCNC: 2.3 G/DL — LOW (ref 3.3–5)
ALP SERPL-CCNC: 89 U/L — SIGNIFICANT CHANGE UP (ref 40–120)
ALT FLD-CCNC: 45 U/L — SIGNIFICANT CHANGE UP (ref 10–45)
ANION GAP SERPL CALC-SCNC: 15 MMOL/L — SIGNIFICANT CHANGE UP (ref 5–17)
AST SERPL-CCNC: 31 U/L — SIGNIFICANT CHANGE UP (ref 10–40)
BILIRUB DIRECT SERPL-MCNC: 0.2 MG/DL — SIGNIFICANT CHANGE UP (ref 0–0.3)
BILIRUB INDIRECT FLD-MCNC: 0.3 MG/DL — SIGNIFICANT CHANGE UP (ref 0.2–1)
BILIRUB SERPL-MCNC: 0.5 MG/DL — SIGNIFICANT CHANGE UP (ref 0.2–1.2)
BUN SERPL-MCNC: 60 MG/DL — HIGH (ref 7–23)
CALCIUM SERPL-MCNC: 9.1 MG/DL — SIGNIFICANT CHANGE UP (ref 8.4–10.5)
CHLORIDE SERPL-SCNC: 96 MMOL/L — SIGNIFICANT CHANGE UP (ref 96–108)
CO2 SERPL-SCNC: 25 MMOL/L — SIGNIFICANT CHANGE UP (ref 22–31)
CREAT SERPL-MCNC: 6.88 MG/DL — HIGH (ref 0.5–1.3)
EGFR: 8 ML/MIN/1.73M2 — LOW
GLUCOSE SERPL-MCNC: 100 MG/DL — HIGH (ref 70–99)
HCT VFR BLD CALC: 32.4 % — LOW (ref 39–50)
HGB BLD-MCNC: 10.8 G/DL — LOW (ref 13–17)
MCHC RBC-ENTMCNC: 31.2 PG — SIGNIFICANT CHANGE UP (ref 27–34)
MCHC RBC-ENTMCNC: 33.3 GM/DL — SIGNIFICANT CHANGE UP (ref 32–36)
MCV RBC AUTO: 93.6 FL — SIGNIFICANT CHANGE UP (ref 80–100)
NRBC # BLD: 0 /100 WBCS — SIGNIFICANT CHANGE UP (ref 0–0)
PHOSPHATE SERPL-MCNC: 6.4 MG/DL — HIGH (ref 2.5–4.5)
PLATELET # BLD AUTO: 267 K/UL — SIGNIFICANT CHANGE UP (ref 150–400)
POTASSIUM SERPL-MCNC: 4.3 MMOL/L — SIGNIFICANT CHANGE UP (ref 3.5–5.3)
POTASSIUM SERPL-SCNC: 4.3 MMOL/L — SIGNIFICANT CHANGE UP (ref 3.5–5.3)
PROT SERPL-MCNC: 6.9 G/DL — SIGNIFICANT CHANGE UP (ref 6–8.3)
RBC # BLD: 3.46 M/UL — LOW (ref 4.2–5.8)
RBC # FLD: 13.6 % — SIGNIFICANT CHANGE UP (ref 10.3–14.5)
SODIUM SERPL-SCNC: 136 MMOL/L — SIGNIFICANT CHANGE UP (ref 135–145)
WBC # BLD: 7.66 K/UL — SIGNIFICANT CHANGE UP (ref 3.8–10.5)
WBC # FLD AUTO: 7.66 K/UL — SIGNIFICANT CHANGE UP (ref 3.8–10.5)

## 2023-06-28 PROCEDURE — 71250 CT THORAX DX C-: CPT

## 2023-06-28 PROCEDURE — 87086 URINE CULTURE/COLONY COUNT: CPT

## 2023-06-28 PROCEDURE — 84484 ASSAY OF TROPONIN QUANT: CPT

## 2023-06-28 PROCEDURE — 76775 US EXAM ABDO BACK WALL LIM: CPT

## 2023-06-28 PROCEDURE — 86704 HEP B CORE ANTIBODY TOTAL: CPT

## 2023-06-28 PROCEDURE — 86900 BLOOD TYPING SEROLOGIC ABO: CPT

## 2023-06-28 PROCEDURE — 83605 ASSAY OF LACTIC ACID: CPT

## 2023-06-28 PROCEDURE — 99239 HOSP IP/OBS DSCHRG MGMT >30: CPT

## 2023-06-28 PROCEDURE — 99261: CPT

## 2023-06-28 PROCEDURE — 96368 THER/DIAG CONCURRENT INF: CPT

## 2023-06-28 PROCEDURE — 36415 COLL VENOUS BLD VENIPUNCTURE: CPT

## 2023-06-28 PROCEDURE — 96361 HYDRATE IV INFUSION ADD-ON: CPT

## 2023-06-28 PROCEDURE — 85610 PROTHROMBIN TIME: CPT

## 2023-06-28 PROCEDURE — S2900: CPT

## 2023-06-28 PROCEDURE — 81001 URINALYSIS AUTO W/SCOPE: CPT

## 2023-06-28 PROCEDURE — 87521 HEPATITIS C PROBE&RVRS TRNSC: CPT

## 2023-06-28 PROCEDURE — 36600 WITHDRAWAL OF ARTERIAL BLOOD: CPT

## 2023-06-28 PROCEDURE — 85027 COMPLETE CBC AUTOMATED: CPT

## 2023-06-28 PROCEDURE — 97162 PT EVAL MOD COMPLEX 30 MIN: CPT

## 2023-06-28 PROCEDURE — 87040 BLOOD CULTURE FOR BACTERIA: CPT

## 2023-06-28 PROCEDURE — 94003 VENT MGMT INPAT SUBQ DAY: CPT

## 2023-06-28 PROCEDURE — 87340 HEPATITIS B SURFACE AG IA: CPT

## 2023-06-28 PROCEDURE — 87150 DNA/RNA AMPLIFIED PROBE: CPT

## 2023-06-28 PROCEDURE — 80053 COMPREHEN METABOLIC PANEL: CPT

## 2023-06-28 PROCEDURE — 74330 X-RAY BILE/PANC ENDOSCOPY: CPT

## 2023-06-28 PROCEDURE — 71045 X-RAY EXAM CHEST 1 VIEW: CPT

## 2023-06-28 PROCEDURE — C2625: CPT

## 2023-06-28 PROCEDURE — C1889: CPT

## 2023-06-28 PROCEDURE — 86901 BLOOD TYPING SEROLOGIC RH(D): CPT

## 2023-06-28 PROCEDURE — 85025 COMPLETE CBC W/AUTO DIFF WBC: CPT

## 2023-06-28 PROCEDURE — 93005 ELECTROCARDIOGRAM TRACING: CPT

## 2023-06-28 PROCEDURE — 87186 SC STD MICRODIL/AGAR DIL: CPT

## 2023-06-28 PROCEDURE — C1769: CPT

## 2023-06-28 PROCEDURE — 99285 EMERGENCY DEPT VISIT HI MDM: CPT | Mod: 25

## 2023-06-28 PROCEDURE — C2617: CPT

## 2023-06-28 PROCEDURE — 94002 VENT MGMT INPAT INIT DAY: CPT

## 2023-06-28 PROCEDURE — 88304 TISSUE EXAM BY PATHOLOGIST: CPT

## 2023-06-28 PROCEDURE — 99024 POSTOP FOLLOW-UP VISIT: CPT

## 2023-06-28 PROCEDURE — 82803 BLOOD GASES ANY COMBINATION: CPT

## 2023-06-28 PROCEDURE — 86803 HEPATITIS C AB TEST: CPT

## 2023-06-28 PROCEDURE — 74176 CT ABD & PELVIS W/O CONTRAST: CPT

## 2023-06-28 PROCEDURE — 85730 THROMBOPLASTIN TIME PARTIAL: CPT

## 2023-06-28 PROCEDURE — 96365 THER/PROPH/DIAG IV INF INIT: CPT

## 2023-06-28 PROCEDURE — 87077 CULTURE AEROBIC IDENTIFY: CPT

## 2023-06-28 PROCEDURE — 0225U NFCT DS DNA&RNA 21 SARSCOV2: CPT

## 2023-06-28 PROCEDURE — 80048 BASIC METABOLIC PNL TOTAL CA: CPT

## 2023-06-28 PROCEDURE — 84100 ASSAY OF PHOSPHORUS: CPT

## 2023-06-28 PROCEDURE — 86706 HEP B SURFACE ANTIBODY: CPT

## 2023-06-28 PROCEDURE — 86850 RBC ANTIBODY SCREEN: CPT

## 2023-06-28 PROCEDURE — 80076 HEPATIC FUNCTION PANEL: CPT

## 2023-06-28 PROCEDURE — 83735 ASSAY OF MAGNESIUM: CPT

## 2023-06-28 RX ORDER — ERYTHROPOIETIN 10000 [IU]/ML
10000 INJECTION, SOLUTION INTRAVENOUS; SUBCUTANEOUS
Refills: 0 | Status: DISCONTINUED | OUTPATIENT
Start: 2023-06-28 | End: 2023-06-28

## 2023-06-28 RX ADMIN — PANTOPRAZOLE SODIUM 40 MILLIGRAM(S): 20 TABLET, DELAYED RELEASE ORAL at 05:43

## 2023-06-28 RX ADMIN — HEPARIN SODIUM 5000 UNIT(S): 5000 INJECTION INTRAVENOUS; SUBCUTANEOUS at 05:43

## 2023-06-28 RX ADMIN — ERYTHROPOIETIN 10000 UNIT(S): 10000 INJECTION, SOLUTION INTRAVENOUS; SUBCUTANEOUS at 11:00

## 2023-06-28 NOTE — PROGRESS NOTE ADULT - PROBLEM SELECTOR PLAN 1
fu stat LFT/D.bili/labs  DVT/PE prophylaxis  will continue to follow   discussed with Dr. Duval fu stat LFT/D.bili/labs  DVT/PE prophylaxis  will continue to follow   discussed with Dr. Duval    pt evaluated by Dr. Duval and deemed stable for DC with Surgical drain teaching and home care.

## 2023-06-28 NOTE — PROGRESS NOTE ADULT - NS ATTEND AMEND GEN_ALL_CORE FT
Acute cholecystitis with klebsiella bacteremia  - cont antibiotics  - for OR later today  medically optimized for surgery    ESRD  - on dialysis now  - does NOT need post dialysis labs
CT images noted. will get surgery's opinion. Abx as per ID
Patient is medically stable for discharge
Agree
Agree

## 2023-06-28 NOTE — DISCHARGE NOTE PROVIDER - NSDCFUSCHEDAPPT_GEN_ALL_CORE_FT
[facility]  [facility]  Scheduled Appointment: 07/11/2023     Elma Brar  [facility] [address]  Scheduled Appointment: 07/11/2023

## 2023-06-28 NOTE — DISCHARGE NOTE PROVIDER - NSDCMRMEDTOKEN_GEN_ALL_CORE_FT
amLODIPine 10 mg oral tablet: 1 tab(s) orally once a day  Coreg 25 mg oral tablet: 1 tab(s) orally once a day  Ferrousal 325 mg oral tablet: 1 tab(s) orally once a day  Multiple Vitamins oral capsule: 1 cap(s) orally once a day  tamsulosin 0.4 mg oral capsule: 1 cap(s) orally once a day

## 2023-06-28 NOTE — DISCHARGE NOTE PROVIDER - CARE PROVIDER_API CALL
[facility]  Surgery  [address] [facility], [address]  [place] [address]  Phone: [phone]  Fax: [phone]  Follow Up Time:     Ashish Spangler  Gastroenterology  [address] [facility], [address]  [place] [address]  Phone: [phone]  Fax: [phone]  Follow Up Time:    [facility]  Surgery  [address] [facility], [address]  [place] [address]  Phone: [phone]  Fax: [phone]  Follow Up Time:     Ashish Spangler  Gastroenterology  [address] [facility], [address]  [place] [address]  Phone: [phone]  Fax: [phone]  Follow Up Time:     Juan Jeong  Urology  [address] [place] [address]  Phone: [phone]  Fax: [phone]  Follow Up Time:    Ashish Spangler  Gastroenterology  [address] [facility], [address]  [place] [address]  Phone: [phone]  Fax: [phone]  Follow Up Time:     Juan Jeong  Urology  [address] [place] [address]  Phone: [phone]  Fax: [phone]  Follow Up Time:     Elma Brar  Surgery  [address] [place] [address], [address]  [place] [address]  Phone: [phone]  Fax: [phone]  Follow Up Time:

## 2023-06-28 NOTE — PROGRESS NOTE ADULT - SUBJECTIVE AND OBJECTIVE BOX
71 y/o M hx of HTN, polio (LLE), Polycystic kidney disease s/p L nephrectomy now ESRD on HD MWF, hx of HepC (treated) pw Acute cholecystitis w/ klebsiella bacteremia. Pt POD7 s/p robotic subtotal cholecystectomy, pt comfortable, admits to having bowel movement, NO n/v/cp/sob      PAST MEDICAL & SURGICAL HISTORY:  Hypertension      Renal failure, chronic      Cancer of kidney, left      History of left nephrectomy      MEDICATIONS  (STANDING):  epoetin luis alberto (EPOGEN) Injectable 59366 Unit(s) IV Push <User Schedule>  heparin   Injectable 5000 Unit(s) SubCutaneous every 12 hours  pantoprazole    Tablet 40 milliGRAM(s) Oral before breakfast  tamsulosin 0.4 milliGRAM(s) Oral at bedtime    MEDICATIONS  (PRN):  acetaminophen  Suppository .. 650 milliGRAM(s) Rectal every 6 hours PRN Temp greater or equal to 38C (100.4F)  senna 2 Tablet(s) Oral daily PRN Constipation  simethicone 80 milliGRAM(s) Chew two times a day PRN Gas      PE: Vital Signs Last 24 Hrs  T(C): 36.8 (28 Jun 2023 05:00), Max: 37.4 (27 Jun 2023 13:00)  T(F): 98.2 (28 Jun 2023 05:00), Max: 99.3 (27 Jun 2023 13:00)  HR: 82 (28 Jun 2023 05:00) (82 - 90)  BP: 123/71 (28 Jun 2023 05:00) (112/69 - 127/69)  BP(mean): --  RR: 16 (28 Jun 2023 05:00) (16 - 18)  SpO2: 94% (28 Jun 2023 05:00) (94% - 98%)    Parameters below as of 28 Jun 2023 05:00  Patient On (Oxygen Delivery Method): room air    Gen: Awake in NAd  ABD: Soft, mild ecchymosis on umbilical, ng, nd, Drain with bilious output  : moody  LE: calfs soft, nontender, no swelling.

## 2023-06-28 NOTE — DISCHARGE NOTE PROVIDER - NSDCCPCAREPLAN_GEN_ALL_CORE_FT
PRINCIPAL DISCHARGE DIAGNOSIS  Diagnosis: Bacteremia  Assessment and Plan of Treatment: you received eight days of IV antibiotics      SECONDARY DISCHARGE DIAGNOSES  Diagnosis: Acute cholecystitis  Assessment and Plan of Treatment: Dr. Scahffer removed your gallbladder, please follow up outpatient    Diagnosis: Gallbladder necrosis  Assessment and Plan of Treatment: Dr. Spangler did an ERCP and placed a stent, please follow up outpatient

## 2023-06-28 NOTE — DISCHARGE NOTE NURSING/CASE MANAGEMENT/SOCIAL WORK - PATIENT PORTAL LINK FT
You can access the FollowMyHealth Patient Portal offered by [facility] by registering at the following website: [email or web address]. By joining [facility]’s FollowMyHealth portal, you will also be able to view your health information using other applications (apps) compatible with our system.

## 2023-06-28 NOTE — PROGRESS NOTE ADULT - ASSESSMENT
73 y/o M hx of HTN, polio (LLE), Polycystic kidney disease s/p L nephrectomy now ESRD on HD MWF, hx of HepC (treated) pw Acute cholecystitis w/ klebsiella bacteremia. Pt POD7 s/p robotic subtotal cholecystectomy

## 2023-06-28 NOTE — DISCHARGE NOTE PROVIDER - NPI NUMBER (FOR SYSADMIN USE ONLY) :
[[id number] [[id number]],[[id number]],[[id number]] [[id number]],[[id number]],[[id number]]

## 2023-06-28 NOTE — PROGRESS NOTE ADULT - NS ATTEND OPT1 GEN_ALL_CORE
I independently performed the documented:
I attest my time as attending is greater than 50% of the total combined time spent on qualifying patient care activities by the PA/NP and attending.
Calm

## 2023-06-28 NOTE — DISCHARGE NOTE PROVIDER - ATTENDING DISCHARGE PHYSICAL EXAMINATION:
GENERAL: NAD  NERVOUS SYSTEM:  CN II - XII intact; Sensation intact; Motor Strength 5/5 B/L upper and lower extremities  ABDOMEN: Soft, Nontender, Nondistended; Bowel sounds present; No HSM: + drain in RUQ  PSYCH: Appropriate affect, Alert & Oriented x 3

## 2023-06-28 NOTE — DISCHARGE NOTE PROVIDER - PROVIDER TOKENS
PROVIDER:[TOKEN:[[id number]]],PROVIDER:[TOKEN:[[id number]]] PROVIDER:[TOKEN:[[id number]]],PROVIDER:[TOKEN:[[id number]]],PROVIDER:[TOKEN:[[id number]]] PROVIDER:[TOKEN:[[id number]]],PROVIDER:[TOKEN:[[id number]]],PROVIDER:[TOKEN:[[id number]

## 2023-06-28 NOTE — DISCHARGE NOTE PROVIDER - HOSPITAL COURSE
Hospital Course  History of Present Illness:   72M hx of HTN, polio, Polycystic kidney disease s/p L nephrectomy now ESRD on HD MWF, hx of HepC (treated) pw Klebsiella bacteremia. Pt related 5 days ago developed moderate upper abd pain that lasted for a day and resolved but followed by fevers, chills Tmax 104 but no recurrent abd pain, no associated NVD, cough, SOB, CP, dysuria or flank pain. Was in ED 2 days ago and routine labs, UA, RVP neg and was given 1 dose of Vanco and Zosyn but recalled today for 1 culture bottle with Kleb pneum. Pt related still with fevers of 102 since D/C from ED but otherwise feels well. In ED Tmax: 100 P: 88 BP: 130/71 sat 95% on RA. WBC: 4.05 hgb: 10.7 69%N BUN/cr: 42/4.25    [place] [facility] [place] with klebsiella bacteremia found with acute cholecystitis, now status post robotic subtotal cholecystectomy on 6/21/22 with surgery. Patient was found to have necrotic gallbladder, GI was consulted.  Underwent ERCP with stent on 6/22/23. Pt completed course of IV antibiotics for bacteremia (completed eight days total).  Pt clinically improved, tolerating oral diet.  Patient evaluated by surgery this am for possible bile duct leak, found to have bilious drainage from abdomen.  Labs, LFTs, Bilirubin found to be normal. Case discussed with Dr. Duval deemed to be stable for discharge home with surgical drain, teaching and home care.   Plan outpatient follow up with surgery and GI.    Source of Infection:  Antibiotic / Last Day: completed eight days of IV antibiotics    Palliative Care / Advanced Care Planning  Code Status: full code  Patient/Family agreeable to Hospice/Palliative (Y/N)?  Summary of Goals of Care Conversation:    Discharging Provider:  Gumaro Cotton NP  Contact Info: Cell [phone] - Please call with any questions or concerns.    Outpatient Provider: Dr. Echevarria           Hospital Course  History of Present Illness:   72M hx of HTN, polio, Polycystic kidney disease s/p L nephrectomy now ESRD on HD MWF, hx of HepC (treated) pw Klebsiella bacteremia. Pt related 5 days ago developed moderate upper abd pain that lasted for a day and resolved but followed by fevers, chills Tmax 104 but no recurrent abd pain, no associated NVD, cough, SOB, CP, dysuria or flank pain. Was in ED 2 days ago and routine labs, UA, RVP neg and was given 1 dose of Vanco and Zosyn but recalled today for 1 culture bottle with Kleb pneum. Pt related still with fevers of 102 since D/C from ED but otherwise feels well. In ED Tmax: 100 P: 88 BP: 130/71 sat 95% on RA. WBC: 4.05 hgb: 10.7 69%N BUN/cr: 42/4.25    [place] [facility] [place] with klebsiella bacteremia found with acute cholecystitis, now status post robotic subtotal cholecystectomy on 6/21/22 with surgery. Patient was found to have necrotic gallbladder, GI was consulted.  Underwent ERCP with stent on 6/22/23. Pt completed course of IV antibiotics for bacteremia (completed eight days total). Course c/b acute urinary retention, etiology uncertain.  Urology expertise appreciated, moody catheter placed, plan to discharge with leg bag.  Started flomax, follow up outpatient for trial of void.   Pt clinically improved, tolerating oral diet.  Patient evaluated by surgery this am for possible bile duct leak, found to have bilious drainage from abdomen.  Labs, LFTs, Bilirubin found to be normal. Case discussed with Dr. Duval deemed to be stable for discharge home with surgical drain, teaching and home care.   Plan outpatient follow up with surgery and GI.    Source of Infection:  Antibiotic / Last Day: completed eight days of IV antibiotics    Palliative Care / Advanced Care Planning  Code Status: full code  Patient/Family agreeable to Hospice/Palliative (Y/N)?  Summary of Goals of Care Conversation:    Discharging Provider:  Gumaro Cotton NP  Contact Info: Cell [phone] - Please call with any questions or concerns.    Outpatient Provider: Dr. Echevarria           Hospital Course  History of Present Illness:   72M hx of HTN, polio, Polycystic kidney disease s/p L nephrectomy now ESRD on HD MWF, hx of HepC (treated) pw Klebsiella bacteremia. Pt related 5 days ago developed moderate upper abd pain that lasted for a day and resolved but followed by fevers, chills Tmax 104 but no recurrent abd pain, no associated NVD, cough, SOB, CP, dysuria or flank pain. Was in ED 2 days ago and routine labs, UA, RVP neg and was given 1 dose of Vanco and Zosyn but recalled today for 1 culture bottle with Kleb pneum. Pt related still with fevers of 102 since D/C from ED but otherwise feels well. In ED Tmax: 100 P: 88 BP: 130/71 sat 95% on RA. WBC: 4.05 hgb: 10.7 69%N BUN/cr: 42/4.25    [place] [facility] [place] klebsiella bacteremia found with acute cholecystitis, now status post robotic subtotal cholecystectomy on 6/21/22 with surgery. Patient was found to have necrotic gallbladder, GI was consulted.  Underwent ERCP with stent on 6/22/23. Pt completed course of IV antibiotics for bacteremia (completed eight days total). Course c/b acute urinary retention, etiology uncertain.  Urology expertise appreciated, moody catheter placed, plan to discharge with leg bag.  Started flomax, follow up outpatient for trial of void.   Pt clinically improved, tolerating oral diet.  Patient evaluated by surgery this am for possible bile duct leak, found to have bilious drainage from abdomen.  Labs, LFTs, Bilirubin found to be normal. Case discussed with surgery deemed to be stable for discharge home with surgical drain, teaching and home care.   Plan outpatient follow up with surgery, GI and urology.    Source of Infection:  Antibiotic / Last Day: completed eight days of IV antibiotics    Palliative Care / Advanced Care Planning  Code Status: full code  Patient/Family agreeable to Hospice/Palliative (Y/N)?  Summary of Goals of Care Conversation:    Discharging Provider:  Gumaro oCtton NP  Contact Info: Cell [phone] - Please call with any questions or concerns.    Outpatient Provider: Dr. Echevarria

## 2023-06-28 NOTE — PROGRESS NOTE ADULT - PROVIDER SPECIALTY LIST ADULT
Critical Care
Critical Care
Hospitalist
Hospitalist
Nephrology
Nephrology
Surgery
Critical Care
Critical Care
Hospitalist
Infectious Disease
Nephrology
Nephrology
Surgery
Critical Care
Critical Care
Hospitalist
Infectious Disease
Infectious Disease
Nephrology
Nephrology
Surgery
Gastroenterology
Hospitalist
Infectious Disease
Surgery
Surgery
Gastroenterology
Surgery
Gastroenterology

## 2023-06-28 NOTE — PROGRESS NOTE ADULT - SUBJECTIVE AND OBJECTIVE BOX
Patient is a 72y old  Male who presents with a chief complaint of Klebsiella bacteremia (28 Jun 2023 08:37)    Patient seen and examined at bedside.  no acute overnight events    ALLERGIES:  Demerol HCl (Unknown)        Vital Signs Last 24 Hrs  T(F): 98.2 (28 Jun 2023 05:00), Max: 99.3 (27 Jun 2023 13:00)  HR: 82 (28 Jun 2023 05:00) (82 - 90)  BP: 123/71 (28 Jun 2023 05:00) (112/69 - 127/69)  RR: 16 (28 Jun 2023 05:00) (16 - 18)  SpO2: 94% (28 Jun 2023 05:00) (94% - 98%)  I&O's Summary    27 Jun 2023 07:01  -  28 Jun 2023 07:00  --------------------------------------------------------  IN: 0 mL / OUT: 785 mL / NET: -785 mL      MEDICATIONS:  acetaminophen  Suppository .. 650 milliGRAM(s) Rectal every 6 hours PRN  epoetin luis alberto (EPOGEN) Injectable 76210 Unit(s) IV Push <User Schedule>  heparin   Injectable 5000 Unit(s) SubCutaneous every 12 hours  pantoprazole    Tablet 40 milliGRAM(s) Oral before breakfast  senna 2 Tablet(s) Oral daily PRN  simethicone 80 milliGRAM(s) Chew two times a day PRN  tamsulosin 0.4 milliGRAM(s) Oral at bedtime      PHYSICAL EXAM:  General: NAD, A/O x 3  ENT: MMM, no thrush  Neck: Supple, No JVD  Lungs: Clear to auscultation bilaterally, bilateral air entry, non labored  Cardio: RRR, S1/S2, No murmurs  Abdomen: Soft, Nontender, Nondistended; Bowel sounds present  Extremities: No cyanosis, No edema    LABS:                        9.7    7.54  )-----------( 219      ( 27 Jun 2023 06:56 )             29.4     06-27    138  |  98  |  47  ----------------------------<  97  4.1   |  26  |  5.22    Ca    8.8      27 Jun 2023 06:56    TPro  6.4  /  Alb  2.1  /  TBili  0.5  /  DBili  x   /  AST  12  /  ALT  39  /  AlkPhos  63  06-26                                Urinalysis Basic - ( 27 Jun 2023 06:56 )    Color: x / Appearance: x / SG: x / pH: x  Gluc: 97 mg/dL / Ketone: x  / Bili: x / Urobili: x   Blood: x / Protein: x / Nitrite: x   Leuk Esterase: x / RBC: x / WBC x   Sq Epi: x / Non Sq Epi: x / Bacteria: x            RADIOLOGY & ADDITIONAL TESTS:    Care Discussed with Consultants/Other Providers:

## 2023-06-28 NOTE — PROGRESS NOTE ADULT - REASON FOR ADMISSION
Klebsiella bacteremia

## 2023-06-28 NOTE — PROGRESS NOTE ADULT - ASSESSMENT
71 y/o M hx of HTN, polio (LLE), Polycystic kidney disease s/p L nephrectomy now ESRD on HD MWF, hx of HepC (treated) pw Klebsiella bacteremia.    Acute cholecystitis  Klebsiella bacteremia   -s/p robotic subtotal cholecystectomy POD 8  -Completed course of abx   -ID on board  -Surgery and GI recommendations appreciated, status post ERCP with stent placement on 6/22/23  -Pt with bilious drainage this am, spoke with surgery PA, keep NPO, follow up stat labs  -Monitor vitals and symptoms     Acute urinary retention  -Unclear etiology  -Urology team recommendations appreciated  -Continue Flomax  -Muhammad catheter w/ delayed voiding trial  -Urology follow up as outpatient    Hypertension  -Cont coreg and amlodipine as tolerated.    Anemia of chronic kidney disease  -Suspect secondary to ESRD    Thrombocytopenia  - Suspect secondary to acute infection     ESRD  -HD MWF  -Nephrology on board    Dispo home w/ PT when cleared    Updated daughter  73 y/o M hx of HTN, polio (LLE), Polycystic kidney disease s/p L nephrectomy now ESRD on HD MWF, hx of HepC (treated) pw Klebsiella bacteremia.    Acute cholecystitis  Klebsiella bacteremia   -s/p robotic subtotal cholecystectomy POD 8  -Completed course of abx   -ID on board  -Surgery and GI recommendations appreciated, status post ERCP with stent placement on 6/22/23  -Pt with bilious drainage this am, spoke with surgery PA, keep NPO, follow up stat labs  -Monitor vitals and symptoms     Acute urinary retention  -Unclear etiology  -Urology team recommendations appreciated  -Continue Flomax  -Muhammad catheter w/ delayed voiding trial  -Urology follow up as outpatient    Hypertension  -Cont coreg and amlodipine as tolerated.    Anemia of chronic kidney disease  -Suspect secondary to ESRD    Thrombocytopenia  - Suspect secondary to acute infection     ESRD  -HD MWF  -Nephrology on board    Dispo home w/ PT when cleared    Updated daughter about D/C today

## 2023-06-28 NOTE — PROGRESS NOTE ADULT - SUBJECTIVE AND OBJECTIVE BOX
Subjective: seen on HD. Depressed appetite.       MEDICATIONS  (STANDING):  epoetin luis alberto-epbx (RETACRIT) Injectable 24807 Unit(s) IV Push <User Schedule>  heparin   Injectable 5000 Unit(s) SubCutaneous every 12 hours  pantoprazole    Tablet 40 milliGRAM(s) Oral before breakfast  tamsulosin 0.4 milliGRAM(s) Oral at bedtime    MEDICATIONS  (PRN):  acetaminophen  Suppository .. 650 milliGRAM(s) Rectal every 6 hours PRN Temp greater or equal to 38C (100.4F)  senna 2 Tablet(s) Oral daily PRN Constipation  simethicone 80 milliGRAM(s) Chew two times a day PRN Gas          T(C): 36.6 (06-28-23 @ 09:15), Max: 37.4 (06-27-23 @ 13:00)  HR: 81 (06-28-23 @ 09:15) (81 - 90)  BP: 123/76 (06-28-23 @ 09:15) (112/69 - 127/69)  RR: 14 (06-28-23 @ 09:15) (14 - 18)  SpO2: 100% (06-28-23 @ 09:15) (94% - 100%)  Wt(kg): --        I&O's Detail    27 Jun 2023 07:01  -  28 Jun 2023 07:00  --------------------------------------------------------  IN:  Total IN: 0 mL    OUT:    Bulb (mL): 135 mL    Indwelling Catheter - Urethral (mL): 650 mL  Total OUT: 785 mL    Total NET: -785 mL               PHYSICAL EXAM:    GENERAL: NAD  NECK: Supple, no inc in JVP  CHEST/LUNG: Clear  HEART: S1S2  ABDOMEN: post lap antonio, BIRGIT drain.   EXTREMITIES: no edema  L AVF   Muhammad maintained      LABS:  CBC Full  -  ( 28 Jun 2023 08:34 )  WBC Count : 7.66 K/uL  RBC Count : 3.46 M/uL  Hemoglobin : 10.8 g/dL  Hematocrit : 32.4 %  Platelet Count - Automated : 267 K/uL  Mean Cell Volume : 93.6 fl  Mean Cell Hemoglobin : 31.2 pg  Mean Cell Hemoglobin Concentration : 33.3 gm/dL  Auto Neutrophil # : x  Auto Lymphocyte # : x  Auto Monocyte # : x  Auto Eosinophil # : x  Auto Basophil # : x  Auto Neutrophil % : x  Auto Lymphocyte % : x  Auto Monocyte % : x  Auto Eosinophil % : x  Auto Basophil % : x    06-28    136  |  96  |  60<H>  ----------------------------<  100<H>  4.3   |  25  |  6.88<H>    Ca    9.1      28 Jun 2023 08:34  Phos  6.4     06-28    TPro  6.9  /  Alb  2.3<L>  /  TBili  0.5  /  DBili  0.2  /  AST  31  /  ALT  45  /  AlkPhos  89  06-28      Urinalysis Basic - ( 28 Jun 2023 08:34 )    Color: x / Appearance: x / SG: x / pH: x  Gluc: 100 mg/dL / Ketone: x  / Bili: x / Urobili: x   Blood: x / Protein: x / Nitrite: x   Leuk Esterase: x / RBC: x / WBC x   Sq Epi: x / Non Sq Epi: x / Bacteria: x      Impression  1.  ESRD on hemodialysis  2.  Klebsiella bacteremia  3.  POD # 7 CCK    Recommend  Complete dialysis today. Lower UF to 1kg if BP drops  No objection to dc from renal POV  Pt will resume outpt dialysis in [facility] on Fri 6/30

## 2023-06-28 NOTE — DISCHARGE NOTE NURSING/CASE MANAGEMENT/SOCIAL WORK - NSDCPEFALRISK_GEN_ALL_CORE
For information on Fall & Injury Prevention, visit: [email or web address] OR  [email or web address] OR  https://www.cdc.gov/steadi/patient.html

## 2023-07-03 ENCOUNTER — APPOINTMENT (OUTPATIENT)
Dept: UROLOGY | Facility: CLINIC | Age: 73
End: 2023-07-03
Payer: MEDICARE

## 2023-07-03 VITALS
HEIGHT: 64 IN | HEART RATE: 58 BPM | WEIGHT: 142 LBS | SYSTOLIC BLOOD PRESSURE: 106 MMHG | BODY MASS INDEX: 24.24 KG/M2 | OXYGEN SATURATION: 100 % | DIASTOLIC BLOOD PRESSURE: 70 MMHG

## 2023-07-03 DIAGNOSIS — R33.9 RETENTION OF URINE, UNSPECIFIED: ICD-10-CM

## 2023-07-03 LAB — SURGICAL PATHOLOGY STUDY: SIGNIFICANT CHANGE UP

## 2023-07-03 PROCEDURE — 99214 OFFICE O/P EST MOD 30 MIN: CPT | Mod: 25

## 2023-07-03 PROCEDURE — 51798 US URINE CAPACITY MEASURE: CPT | Mod: 59

## 2023-07-03 PROCEDURE — 51741 ELECTRO-UROFLOWMETRY FIRST: CPT

## 2023-07-03 PROCEDURE — 51700 IRRIGATION OF BLADDER: CPT

## 2023-07-03 PROCEDURE — A4216: CPT | Mod: NC

## 2023-07-03 NOTE — ASSESSMENT
[FreeTextEntry1] : Mr. PLUMMER is a 72 year  White  M who comes today to clinic for urinary retention after lap cholecystectomy. PMH: ESRD on HD (producing ~300cc/day), ADPCKD s/p left nephrectomy (path benign). Endorsing mild symptoms on tamsulosin 0.4mg prior to urinary retention event (IPSS 6), mainly voiding symptoms. \par Passed TOV today at the office. Fu in 3 moths for UF/PVR/IPSS.\par \par Today we discussed that BPH is a condition of prostatic enlargement that blocks the bladder outlet and can make it difficult to void. Over time this can manifest itself as urinary frequency, urgency, straining to void, poor stream, nocturia, high PVR's and retention. While BPH is related to prostate volume (increasing prostate volume results in increased likelihood of complications from BPH), there are many men with "normal volume" prostates that have symptoms of occlusion.\par \par We discussed the effects that BPH can cause on the bladder: BPH can cause detrusor overactivity which results in urgency and frequency and in some cases, incontinence. Over a longer time,some men may develop large volume/acontractile bladders, while other men develop small or normal volume bladders with loss of compliance. Neither is ideal and both represent end stage bladder damage that can not be fixed and can cause kidney injury. I explained the 3 main jobs of the bladder which are to 1) store urine, 2) evacuate urine and 3) keep urine at low pressure to prevent upper tract injury. I explained the mechanisms of urinary tract infections, hydronephrosis and kidney injury due to urinary retention.  These consequences can be severe, irreversible and even life threatening.\par \par We then discussed the treatment options for BPH. We generally start with medical management and proceed to surgery if necessary. Alpha blockers act to relax the prostate while 5-alpha reductase inhibitors (5-DERIK’s) shrink the prostate. 5-DEIRK's are most effective in patients who have prostates that are >40gm. The former are fairly fast acting (in a matter of days-weeks) while the latter can take up to 3-6 months to take effect.\par \par \par Based on our discussion the patient wishes to proceed with this plan.\par \par \par

## 2023-07-03 NOTE — REVIEW OF SYSTEMS
[Feeling Tired] : feeling tired [Eyesight Problems] : eyesight problems [History of kidney stones] : history of kidney stones [Urine retention] : urine retention [Wake up at night to urinate  How many times?  ___] : wakes up to urinate [unfilled] times during the night [Strong urge to urinate] : strong urge to urinate [Bladder pressure] : experiences bladder pressure [Limb Weakness] : limb weakness [Difficulty Walking] : difficulty walking [Muscle Weakness] : muscle weakness [Feelings Of Weakness] : feelings of weakness [Negative] : Heme/Lymph [see HPI] : see HPI

## 2023-07-03 NOTE — ASSESSMENT
[FreeTextEntry1] : Mr. PLUMMRE is a 72 year  White  M who comes today to clinic for urinary retention after lap cholecystectomy. PMH: ESRD on HD (producing ~300cc/day), ADPCKD s/p left nephrectomy (path benign). Endorsing mild symptoms on tamsulosin 0.4mg prior to urinary retention event (IPSS 6), mainly voiding symptoms. \par Passed TOV today at the office. Fu in 3 moths for UF/PVR/IPSS.\par \par Today we discussed that BPH is a condition of prostatic enlargement that blocks the bladder outlet and can make it difficult to void. Over time this can manifest itself as urinary frequency, urgency, straining to void, poor stream, nocturia, high PVR's and retention. While BPH is related to prostate volume (increasing prostate volume results in increased likelihood of complications from BPH), there are many men with "normal volume" prostates that have symptoms of occlusion.\par \par We discussed the effects that BPH can cause on the bladder: BPH can cause detrusor overactivity which results in urgency and frequency and in some cases, incontinence. Over a longer time,some men may develop large volume/acontractile bladders, while other men develop small or normal volume bladders with loss of compliance. Neither is ideal and both represent end stage bladder damage that can not be fixed and can cause kidney injury. I explained the 3 main jobs of the bladder which are to 1) store urine, 2) evacuate urine and 3) keep urine at low pressure to prevent upper tract injury. I explained the mechanisms of urinary tract infections, hydronephrosis and kidney injury due to urinary retention.  These consequences can be severe, irreversible and even life threatening.\par \par We then discussed the treatment options for BPH. We generally start with medical management and proceed to surgery if necessary. Alpha blockers act to relax the prostate while 5-alpha reductase inhibitors (5-DERIK’s) shrink the prostate. 5-DERIK's are most effective in patients who have prostates that are >40gm. The former are fairly fast acting (in a matter of days-weeks) while the latter can take up to 3-6 months to take effect.\par \par \par Based on our discussion the patient wishes to proceed with this plan.\par \par \par

## 2023-07-03 NOTE — HISTORY OF PRESENT ILLNESS
[FreeTextEntry1] : Mr. PLUMMER is a 72 year  White  M who comes today to clinic for urinary retention after lap cholecystectomy. PMH: ESRD on HD (producing ~300cc/day), ADPCKD s/p left nephrectomy (path benign). Endorsing mild symptoms on tamsulosin 0.4mg prior to urinary retention event (IPSS 6), mainly voiding symptoms. \par HBA1c >7\par BMI >30\par Family history of Prostate cancer: Dad dx at 90+ years old\par Denies fevers, chills, flank pain, hematuria, AUR.\par PSA 1.2\par Prior negative prostate biopsy (5/2022)\par

## 2023-07-11 ENCOUNTER — APPOINTMENT (OUTPATIENT)
Dept: RADIOLOGY | Facility: HOSPITAL | Age: 73
End: 2023-07-11
Payer: MEDICARE

## 2023-07-11 ENCOUNTER — APPOINTMENT (OUTPATIENT)
Dept: SURGERY | Facility: CLINIC | Age: 73
End: 2023-07-11

## 2023-07-11 ENCOUNTER — OUTPATIENT (OUTPATIENT)
Dept: OUTPATIENT SERVICES | Facility: HOSPITAL | Age: 73
LOS: 1 days | End: 2023-07-11
Payer: MEDICARE

## 2023-07-11 ENCOUNTER — APPOINTMENT (OUTPATIENT)
Dept: GASTROENTEROLOGY | Facility: CLINIC | Age: 73
End: 2023-07-11
Payer: MEDICARE

## 2023-07-11 ENCOUNTER — APPOINTMENT (OUTPATIENT)
Dept: SURGERY | Facility: CLINIC | Age: 73
End: 2023-07-11
Payer: MEDICARE

## 2023-07-11 VITALS
TEMPERATURE: 98 F | DIASTOLIC BLOOD PRESSURE: 77 MMHG | BODY MASS INDEX: 24.24 KG/M2 | WEIGHT: 142 LBS | SYSTOLIC BLOOD PRESSURE: 126 MMHG | HEIGHT: 64 IN | HEART RATE: 103 BPM | OXYGEN SATURATION: 99 % | RESPIRATION RATE: 14 BRPM

## 2023-07-11 VITALS
WEIGHT: 145 LBS | HEART RATE: 93 BPM | DIASTOLIC BLOOD PRESSURE: 69 MMHG | TEMPERATURE: 98.2 F | OXYGEN SATURATION: 98 % | RESPIRATION RATE: 16 BRPM | BODY MASS INDEX: 24.75 KG/M2 | HEIGHT: 64 IN | SYSTOLIC BLOOD PRESSURE: 121 MMHG

## 2023-07-11 DIAGNOSIS — Z90.5 ACQUIRED ABSENCE OF KIDNEY: Chronic | ICD-10-CM

## 2023-07-11 DIAGNOSIS — Z90.49 ACQUIRED ABSENCE OF OTHER SPECIFIED PARTS OF DIGESTIVE TRACT: ICD-10-CM

## 2023-07-11 DIAGNOSIS — Z98.890 OTHER SPECIFIED POSTPROCEDURAL STATES: ICD-10-CM

## 2023-07-11 PROCEDURE — 99204 OFFICE O/P NEW MOD 45 MIN: CPT

## 2023-07-11 PROCEDURE — 74018 RADEX ABDOMEN 1 VIEW: CPT

## 2023-07-11 PROCEDURE — 74018 RADEX ABDOMEN 1 VIEW: CPT | Mod: 26

## 2023-07-11 PROCEDURE — 99024 POSTOP FOLLOW-UP VISIT: CPT

## 2023-07-11 PROCEDURE — 99214 OFFICE O/P EST MOD 30 MIN: CPT

## 2023-07-11 NOTE — PHYSICAL EXAM

## 2023-07-11 NOTE — PLAN
[FreeTextEntry1] : s/p cholecystectomy, recovering well, drain removed, plan for CBD stent removal in a few weeks.  Continue activity, increase protein.  path reviewed, angel, f/u prn

## 2023-07-11 NOTE — REASON FOR VISIT
[Post Op: _________] : a [unfilled] post op visit [FreeTextEntry1] : s/p robo antonio, drain, ERCP and stenting for gangrenous cholecystitis, at home and no pain today, drain no collection for 3 days.  Saw GI plan for stent removal in a few weeks, xray today reviewed.  Needs to work on nutritino, present with daughter 9nurse)

## 2023-07-11 NOTE — HISTORY OF PRESENT ILLNESS
[FreeTextEntry1] : The patient is status post a prolonged hospitalization that included ongoing hemodialysis and a cholecystectomy complicated by bile leak.  He underwent an ERCP with both the pancreatic and bile duct stent placement.\par \par He is largely doing well except for some increased number of nondiarrheal, nonbloody bowel movements.  He denies any significant abdominal pain.  She is going through rehab.  He denies heartburn, odynophagia, dysphagia or satiety.  His external drain stopped putting out anything 3 days ago.  He denies any fever or chills. [de-identified] : 6/22/2023 for leak, pd and cbd stented [de-identified] : ACC: [id number]     EXAM:  CT ABDOMEN AND PELVIS   ORDERED BY: YUKI LICEA  ACC: [id number]     EXAM:  CT CHEST   ORDERED BY: YUKI LICEA  PROCEDURE DATE:  06/20/2023    INTERPRETATION:  CLINICAL INFORMATION: Klebsiella bacteremia  COMPARISON: CT abdomen pelvis 7/11/2019  CONTRAST/COMPLICATIONS: IV Contrast: Oral Contrast: Complications:  PROCEDURE: CT of the Chest, Abdomen and Pelvis was performed. Sagittal and coronal reformats were performed.  FINDINGS: CHEST: LUNGS AND LARGE AIRWAYS: Patent central airways. Scattered calcified granulomas. No pulmonary consolidation. Trace bibasilar dependent atelectasis. PLEURA: No pleural effusion. VESSELS: Atherosclerotic changes of the aorta and coronary arteries. HEART: Heart size is normal. No pericardial effusion. MEDIASTINUM AND NALLELY: No lymphadenopathy. CHEST WALL AND LOWER NECK: Within normal limits.  ABDOMEN AND PELVIS: LIVER: Within normal limits. BILE DUCTS: Normal caliber. GALLBLADDER: Cholelithiasis gallbladder wall thickening and pericholecystic inflammation is noted. SPLEEN: Within normal limits. PANCREAS: Within normal limits. ADRENALS: Within normal limits. KIDNEYS/URETERS: Left nephrectomy. Polycystic enlarged right kidney. Multiple hyperdense lesions are again noted, incompletely characterized without intravenous contrast. No hydronephrosis.  BLADDER: Within normal limits. REPRODUCTIVE ORGANS: Prostate is enlarged.  BOWEL: No bowel obstruction. Appendix is normal. PERITONEUM: No ascites. VESSELS: Atherosclerotic changes. RETROPERITONEUM/LYMPH NODES: No lymphadenopathy. ABDOMINAL WALL: Atrophic left psoas and pelvic girdle musculature. BONES: Degenerative changes.  IMPRESSION: No acute pulmonary pathology.  CT findings  [de-identified] : Progress Note:\par - Provider Specialty Gastroenterology\par \par Reason for Admission:\par Reason for Admission:\par - Reason for Admission Klebsiella bacteremia\par \par \par - Subjective and Objective:\par INTERVAL HPI/OVERNIGHT EVENTS:\par HPI:\par \par 71 y/o male seen and examined.\par \par \par MEDICATIONS (STANDING):\par heparin Injectable 5000 Unit(s) SubCutaneous every 12 hours\par indomethacin Suppository 100 milliGRAM(s) Rectal once\par pantoprazole Tablet 40 milliGRAM(s) Oral before breakfast\par piperacillin/tazobactam IVPB.. 3.375 Gram(s) IV Intermittent every 12 hours\par tamsulosin 0.4 milliGRAM(s) Oral at bedtime\par \par MEDICATIONS (PRN):\par acetaminophen Suppository .. 650 milliGRAM(s) Rectal every 6 hours PRN Temp\par greater or equal to 38C (100.4F)\par HYDROmorphone Injectable 2 milliGRAM(s) IV Push every 4 hours PRN Severe Pain\par (7 - 10)\par HYDROmorphone Injectable 0.5 milliGRAM(s) IV Push every 4 hours PRN Mild Pain\par (1 - 3)\par HYDROmorphone Injectable 1 milliGRAM(s) IV Push every 4 hours PRN Moderate\par Pain (4 - 6)\par senna 2 Tablet(s) Oral daily PRN Constipation\par simethicone 80 milliGRAM(s) Chew two times a day PRN Gas\par \par \par Allergies\par \par Demerol HCl (Unknown)\par \par Intolerances\par \par \par \par PAST MEDICAL & SURGICAL HISTORY:\par Hypertension\par \par \par Renal failure, chronic\par \par \par Cancer of kidney, left\par \par \par History of left nephrectomy\par \par PHYSICAL EXAM:\par Vital Signs: Vital Signs Last 24 Hrs\par T(C): 37 (2023 05:20), Max: 37.7 (2023 12:00)\par T(F): 98.6 (2023 05:20), Max: 99.9 (2023 12:00)\par HR: 73 (2023 05:20) (73 - 86)\par BP: 130/75 (2023 05:20) (115/70 - 130/75)\par BP(mean): --\par RR: 19 (2023 05:20) (19 - 21)\par SpO2: 97% (2023 05:20) (94% - 97%)\par \par Parameters below as of 2023 05:20\par Patient On (Oxygen Delivery Method): room air\par \par \par Daily\par Daily Weight in k.5 (2023 05:20)I&O's Summary\par \par 2023 07:01 - 2023 07:00\par --------------------------------------------------------\par IN: 350 mL / OUT: 1633 mL / NET: -1283 mL\par \par GENERAL: Appears stated age,\par HEENT: NC/AT, conjunctivae clear and pink, sclera -anicteric\par CHEST: Full & symmetric excursion, no increased effort, breath sounds clear\par HEART: Regular rhythm, S1, S2, no murmur\par ABDOMEN: Soft, non-tender, non-distended, normoactive bowel sounds,\par EXTEREMITIES: no edema\par SKIN: No rash/warm/dry\par NEURO: Alert, oriented,\par \par \par LABS:\par \par 10.4\par 9.57 )-----------( 235 ( 2023 07:14 )\par 30.3\par \par \par \par \par \par \par amylase\par lipase\par RADIOLOGY & ADDITIONAL TESTS:\par \par \par \par Assessment and Plan:\par - Assessment \par 71 y/o male POD #3 from robotic sub-total cholecystectomy with 19F kiesha drain,\par complicated by bile leak, GI consulted, now s/p ERCP with stent on 23.\par \par \par \par Problem/Plan - 1:\par - Problem: Bile duct leak.\par - Plan: S/P ERCP with stent placement on 2023\par Advance diet\par Surgical follow up noted\par No GI contraindication to discharge, if remains stable and tolerating diet\par Outpatient follow up with Dr. Spangler for stent retrieval timing TBD.\par \par Problem/Plan - 2:\par - Problem: Bacteremia.\par - Plan: Klebsiella bacteremia\par Day 8/?, unknown duration\par ID following.\par \par Problem/Plan - 3:\par - Problem: ESRD on dialysis.\par - Plan: Nephrology recommendations reviewed\par HD Today.\par \par Attestation Statements:\par Attestation Statements:\par Attending and PA/NP shared services statement (NON-critical care):\par \par Attending to bill.\par \par I attest my time as attending is greater than 50% of the total combined time\par spent on qualifying patient care activities by the PA/NP and attending.\par \par I have made amendments to the documentation where necessary. Additional\par comments: Agree.\par \par \par Electronic Signatures:\par Ashish Spangler (MD) (Signed 2023 15:01)\par Entered: Attestation Statements\par Authored: Progress Note, Reason for Admission, Subjective and Objective,\par Assessment and Plan, Attestation Statements\par Rosa Florence (NP) (Entered 2023 09:36)\par Entered: Progress Note, Reason for Admission, Subjective and Objective,\par Assessment and Plan\par

## 2023-07-11 NOTE — PHYSICAL EXAM
[Normal Breath Sounds] : Normal breath sounds [Normal Heart Sounds] : normal heart sounds [de-identified] : healthy appearing 73yo male [de-identified] : enrico [de-identified] : supple [de-identified] : scars healed, + drain, removed in office, + dressing/tape

## 2023-07-11 NOTE — ASSESSMENT
[FreeTextEntry1] : My impression is that of patient doing largely well after cholecystectomy with bile leak requiring ERCP with stenting.\par \par I have asked the patient to do an abdominal x-ray today to evaluate for the presence of the pancreatic stent.\par \par He is to come for an ERCP after surgical follow-up and medical follow-up.  The risk benefits and alternatives have been discussed.

## 2023-07-17 ENCOUNTER — INPATIENT (INPATIENT)
Facility: HOSPITAL | Age: 73
LOS: 1 days | Discharge: HOME | DRG: 393 | End: 2023-07-19
Attending: FAMILY MEDICINE | Admitting: INTERNAL MEDICINE
Payer: MEDICARE

## 2023-07-17 VITALS
WEIGHT: 138.01 LBS | OXYGEN SATURATION: 96 % | DIASTOLIC BLOOD PRESSURE: 80 MMHG | TEMPERATURE: 99 F | HEIGHT: 64 IN | RESPIRATION RATE: 18 BRPM | SYSTOLIC BLOOD PRESSURE: 122 MMHG | HEART RATE: 107 BPM

## 2023-07-17 DIAGNOSIS — Z90.5 ACQUIRED ABSENCE OF KIDNEY: Chronic | ICD-10-CM

## 2023-07-17 DIAGNOSIS — R10.9 UNSPECIFIED ABDOMINAL PAIN: ICD-10-CM

## 2023-07-17 LAB
ALBUMIN SERPL ELPH-MCNC: 2.6 G/DL — LOW (ref 3.3–5)
ALP SERPL-CCNC: 91 U/L — SIGNIFICANT CHANGE UP (ref 40–120)
ALT FLD-CCNC: 24 U/L — SIGNIFICANT CHANGE UP (ref 12–78)
AMYLASE P1 CFR SERPL: 69 U/L — SIGNIFICANT CHANGE UP (ref 25–125)
ANION GAP SERPL CALC-SCNC: 6 MMOL/L — SIGNIFICANT CHANGE UP (ref 5–17)
AST SERPL-CCNC: 18 U/L — SIGNIFICANT CHANGE UP (ref 15–37)
BASOPHILS # BLD AUTO: 0.03 K/UL — SIGNIFICANT CHANGE UP (ref 0–0.2)
BASOPHILS NFR BLD AUTO: 0.3 % — SIGNIFICANT CHANGE UP (ref 0–2)
BILIRUB SERPL-MCNC: 0.4 MG/DL — SIGNIFICANT CHANGE UP (ref 0.2–1.2)
BUN SERPL-MCNC: 54 MG/DL — HIGH (ref 7–23)
CALCIUM SERPL-MCNC: 9.6 MG/DL — SIGNIFICANT CHANGE UP (ref 8.5–10.1)
CHLORIDE SERPL-SCNC: 105 MMOL/L — SIGNIFICANT CHANGE UP (ref 96–108)
CO2 SERPL-SCNC: 26 MMOL/L — SIGNIFICANT CHANGE UP (ref 22–31)
CREAT SERPL-MCNC: 6.6 MG/DL — HIGH (ref 0.5–1.3)
EGFR: 8 ML/MIN/1.73M2 — LOW
EOSINOPHIL # BLD AUTO: 0.01 K/UL — SIGNIFICANT CHANGE UP (ref 0–0.5)
EOSINOPHIL NFR BLD AUTO: 0.1 % — SIGNIFICANT CHANGE UP (ref 0–6)
GLUCOSE SERPL-MCNC: 157 MG/DL — HIGH (ref 70–99)
HCT VFR BLD CALC: 36.8 % — LOW (ref 39–50)
HGB BLD-MCNC: 11.8 G/DL — LOW (ref 13–17)
IMM GRANULOCYTES NFR BLD AUTO: 0.8 % — SIGNIFICANT CHANGE UP (ref 0–0.9)
LACTATE SERPL-SCNC: 0.9 MMOL/L — SIGNIFICANT CHANGE UP (ref 0.7–2)
LIDOCAIN IGE QN: 211 U/L — SIGNIFICANT CHANGE UP (ref 73–393)
LYMPHOCYTES # BLD AUTO: 0.78 K/UL — LOW (ref 1–3.3)
LYMPHOCYTES # BLD AUTO: 7.1 % — LOW (ref 13–44)
MCHC RBC-ENTMCNC: 30.6 PG — SIGNIFICANT CHANGE UP (ref 27–34)
MCHC RBC-ENTMCNC: 32.1 GM/DL — SIGNIFICANT CHANGE UP (ref 32–36)
MCV RBC AUTO: 95.3 FL — SIGNIFICANT CHANGE UP (ref 80–100)
MONOCYTES # BLD AUTO: 0.5 K/UL — SIGNIFICANT CHANGE UP (ref 0–0.9)
MONOCYTES NFR BLD AUTO: 4.6 % — SIGNIFICANT CHANGE UP (ref 2–14)
NEUTROPHILS # BLD AUTO: 9.57 K/UL — HIGH (ref 1.8–7.4)
NEUTROPHILS NFR BLD AUTO: 87.1 % — HIGH (ref 43–77)
NRBC # BLD: 0 /100 WBCS — SIGNIFICANT CHANGE UP (ref 0–0)
PLATELET # BLD AUTO: 237 K/UL — SIGNIFICANT CHANGE UP (ref 150–400)
POTASSIUM SERPL-MCNC: 4.9 MMOL/L — SIGNIFICANT CHANGE UP (ref 3.5–5.3)
POTASSIUM SERPL-SCNC: 4.9 MMOL/L — SIGNIFICANT CHANGE UP (ref 3.5–5.3)
PROT SERPL-MCNC: 6.3 G/DL — SIGNIFICANT CHANGE UP (ref 6–8.3)
RAPID RVP RESULT: SIGNIFICANT CHANGE UP
RBC # BLD: 3.86 M/UL — LOW (ref 4.2–5.8)
RBC # FLD: 14.2 % — SIGNIFICANT CHANGE UP (ref 10.3–14.5)
SARS-COV-2 RNA SPEC QL NAA+PROBE: SIGNIFICANT CHANGE UP
SODIUM SERPL-SCNC: 137 MMOL/L — SIGNIFICANT CHANGE UP (ref 135–145)
WBC # BLD: 10.98 K/UL — HIGH (ref 3.8–10.5)
WBC # FLD AUTO: 10.98 K/UL — HIGH (ref 3.8–10.5)

## 2023-07-17 PROCEDURE — 78226 HEPATOBILIARY SYSTEM IMAGING: CPT | Mod: 26

## 2023-07-17 PROCEDURE — 99223 1ST HOSP IP/OBS HIGH 75: CPT | Mod: GC

## 2023-07-17 PROCEDURE — 99222 1ST HOSP IP/OBS MODERATE 55: CPT

## 2023-07-17 PROCEDURE — 74176 CT ABD & PELVIS W/O CONTRAST: CPT | Mod: 26,MA

## 2023-07-17 PROCEDURE — 93010 ELECTROCARDIOGRAM REPORT: CPT

## 2023-07-17 PROCEDURE — 71250 CT THORAX DX C-: CPT | Mod: 26,MA

## 2023-07-17 PROCEDURE — 99285 EMERGENCY DEPT VISIT HI MDM: CPT

## 2023-07-17 RX ORDER — TAMSULOSIN HYDROCHLORIDE 0.4 MG/1
0.4 CAPSULE ORAL AT BEDTIME
Refills: 0 | Status: DISCONTINUED | OUTPATIENT
Start: 2023-07-17 | End: 2023-07-19

## 2023-07-17 RX ORDER — AMLODIPINE BESYLATE 2.5 MG/1
10 TABLET ORAL DAILY
Refills: 0 | Status: DISCONTINUED | OUTPATIENT
Start: 2023-07-17 | End: 2023-07-17

## 2023-07-17 RX ORDER — MORPHINE SULFATE 50 MG/1
2 CAPSULE, EXTENDED RELEASE ORAL ONCE
Refills: 0 | Status: DISCONTINUED | OUTPATIENT
Start: 2023-07-17 | End: 2023-07-17

## 2023-07-17 RX ORDER — ACETAMINOPHEN 500 MG
1000 TABLET ORAL ONCE
Refills: 0 | Status: COMPLETED | OUTPATIENT
Start: 2023-07-17 | End: 2023-07-17

## 2023-07-17 RX ORDER — SODIUM CHLORIDE 9 MG/ML
1000 INJECTION INTRAMUSCULAR; INTRAVENOUS; SUBCUTANEOUS ONCE
Refills: 0 | Status: COMPLETED | OUTPATIENT
Start: 2023-07-17 | End: 2023-07-17

## 2023-07-17 RX ORDER — ACETAMINOPHEN 500 MG
650 TABLET ORAL EVERY 6 HOURS
Refills: 0 | Status: DISCONTINUED | OUTPATIENT
Start: 2023-07-17 | End: 2023-07-19

## 2023-07-17 RX ORDER — HEPARIN SODIUM 5000 [USP'U]/ML
5000 INJECTION INTRAVENOUS; SUBCUTANEOUS EVERY 8 HOURS
Refills: 0 | Status: DISCONTINUED | OUTPATIENT
Start: 2023-07-17 | End: 2023-07-19

## 2023-07-17 RX ORDER — SODIUM CHLORIDE 9 MG/ML
1000 INJECTION INTRAMUSCULAR; INTRAVENOUS; SUBCUTANEOUS
Refills: 0 | Status: DISCONTINUED | OUTPATIENT
Start: 2023-07-17 | End: 2023-07-17

## 2023-07-17 RX ORDER — PIPERACILLIN AND TAZOBACTAM 4; .5 G/20ML; G/20ML
3.38 INJECTION, POWDER, LYOPHILIZED, FOR SOLUTION INTRAVENOUS EVERY 12 HOURS
Refills: 0 | Status: DISCONTINUED | OUTPATIENT
Start: 2023-07-17 | End: 2023-07-19

## 2023-07-17 RX ORDER — LANOLIN ALCOHOL/MO/W.PET/CERES
3 CREAM (GRAM) TOPICAL AT BEDTIME
Refills: 0 | Status: DISCONTINUED | OUTPATIENT
Start: 2023-07-17 | End: 2023-07-19

## 2023-07-17 RX ORDER — ONDANSETRON 8 MG/1
4 TABLET, FILM COATED ORAL EVERY 8 HOURS
Refills: 0 | Status: DISCONTINUED | OUTPATIENT
Start: 2023-07-17 | End: 2023-07-19

## 2023-07-17 RX ORDER — PIPERACILLIN AND TAZOBACTAM 4; .5 G/20ML; G/20ML
3.38 INJECTION, POWDER, LYOPHILIZED, FOR SOLUTION INTRAVENOUS ONCE
Refills: 0 | Status: COMPLETED | OUTPATIENT
Start: 2023-07-17 | End: 2023-07-17

## 2023-07-17 RX ORDER — CARVEDILOL PHOSPHATE 80 MG/1
25 CAPSULE, EXTENDED RELEASE ORAL DAILY
Refills: 0 | Status: DISCONTINUED | OUTPATIENT
Start: 2023-07-17 | End: 2023-07-18

## 2023-07-17 RX ORDER — FERROUS SULFATE 325(65) MG
325 TABLET ORAL DAILY
Refills: 0 | Status: DISCONTINUED | OUTPATIENT
Start: 2023-07-17 | End: 2023-07-19

## 2023-07-17 RX ORDER — SODIUM CHLORIDE 9 MG/ML
1000 INJECTION INTRAMUSCULAR; INTRAVENOUS; SUBCUTANEOUS
Refills: 0 | Status: DISCONTINUED | OUTPATIENT
Start: 2023-07-17 | End: 2023-07-18

## 2023-07-17 RX ORDER — ONDANSETRON 8 MG/1
4 TABLET, FILM COATED ORAL ONCE
Refills: 0 | Status: COMPLETED | OUTPATIENT
Start: 2023-07-17 | End: 2023-07-17

## 2023-07-17 RX ORDER — VANCOMYCIN HCL 1 G
1000 VIAL (EA) INTRAVENOUS ONCE
Refills: 0 | Status: COMPLETED | OUTPATIENT
Start: 2023-07-17 | End: 2023-07-17

## 2023-07-17 RX ADMIN — Medication 400 MILLIGRAM(S): at 08:00

## 2023-07-17 RX ADMIN — Medication 3 MILLIGRAM(S): at 21:22

## 2023-07-17 RX ADMIN — PIPERACILLIN AND TAZOBACTAM 3.38 GRAM(S): 4; .5 INJECTION, POWDER, LYOPHILIZED, FOR SOLUTION INTRAVENOUS at 09:03

## 2023-07-17 RX ADMIN — Medication 1000 MILLIGRAM(S): at 08:58

## 2023-07-17 RX ADMIN — Medication 325 MILLIGRAM(S): at 14:07

## 2023-07-17 RX ADMIN — PIPERACILLIN AND TAZOBACTAM 200 GRAM(S): 4; .5 INJECTION, POWDER, LYOPHILIZED, FOR SOLUTION INTRAVENOUS at 08:32

## 2023-07-17 RX ADMIN — Medication 250 MILLIGRAM(S): at 09:02

## 2023-07-17 RX ADMIN — SODIUM CHLORIDE 1000 MILLILITER(S): 9 INJECTION INTRAMUSCULAR; INTRAVENOUS; SUBCUTANEOUS at 08:00

## 2023-07-17 RX ADMIN — SODIUM CHLORIDE 50 MILLILITER(S): 9 INJECTION INTRAMUSCULAR; INTRAVENOUS; SUBCUTANEOUS at 18:18

## 2023-07-17 RX ADMIN — TAMSULOSIN HYDROCHLORIDE 0.4 MILLIGRAM(S): 0.4 CAPSULE ORAL at 21:22

## 2023-07-17 RX ADMIN — Medication 1000 MILLIGRAM(S): at 08:15

## 2023-07-17 RX ADMIN — HEPARIN SODIUM 5000 UNIT(S): 5000 INJECTION INTRAVENOUS; SUBCUTANEOUS at 21:23

## 2023-07-17 RX ADMIN — ONDANSETRON 4 MILLIGRAM(S): 8 TABLET, FILM COATED ORAL at 08:01

## 2023-07-17 RX ADMIN — PIPERACILLIN AND TAZOBACTAM 25 GRAM(S): 4; .5 INJECTION, POWDER, LYOPHILIZED, FOR SOLUTION INTRAVENOUS at 18:19

## 2023-07-17 RX ADMIN — MORPHINE SULFATE 2 MILLIGRAM(S): 50 CAPSULE, EXTENDED RELEASE ORAL at 08:59

## 2023-07-17 RX ADMIN — MORPHINE SULFATE 2 MILLIGRAM(S): 50 CAPSULE, EXTENDED RELEASE ORAL at 08:01

## 2023-07-17 NOTE — ED ADULT NURSE NOTE - CHIEF COMPLAINT QUOTE
Pt had surgery at [facility] on 6/21, they removed gallbladder but weren't able to remove all of it due to inflammation. Had a 2nd procedure on 22nd, had stent placed. pt had been improving since then, but tonight c/o RLQ abdominal pain, chills and fever 101.

## 2023-07-17 NOTE — CONSULT NOTE ADULT - ASSESSMENT
-72y Male s/p parital cholecystectomy for gangrenous cholecystitis about 1 month ago. CBD stent placed. Surgical drain placed intra op and removed 6 days ago. Presents with fever and abdominal pain. CT with free air but no large collection.  No bile leak.     No drainable abscess on CT, thus no IR procedure indicated.

## 2023-07-17 NOTE — CONSULT NOTE ADULT - ASSESSMENT
abdominal pain  r/o bile leak  hx subtotal cholecystectomy  s/p ERCP with stent placement in June  s/p biliary drain removal 1 week ago    CT noted  LFTs noted, trend  Pt already has biliary stent but follow up HIDA  Pain control  IVF  IV abx  D/w pt  Will follow    I reviewed the overnight course of events on the unit, re-confirming the patient history. I discussed the care with the patient and their family  Differential diagnosis and plan of care discussed with patient after the evaluation  40 minutes spent on total encounter of which more than fifty percent of the encounter was spent counseling and/or coordinating care by the attending physician.  Advanced care planning was discussed with patient and family.  Advanced care planning forms were reviewed and discussed.  Risks, benefits and alternatives of gastroenterologic procedures were discussed in detail and all questions were answered.

## 2023-07-17 NOTE — CONSULT NOTE ADULT - SUBJECTIVE AND OBJECTIVE BOX
[facility]  INFECTIOUS DISEASES   [address] [place] [address]  Tel: [phone]     Fax: [phone]  ========================================================  MD Charles Fernandez Kaushal, MD Cho, Michelle, MD Sunjit, Jaspal, MD  ========================================================    MRN-[id number]  CLEO PLUMMER     CC: abdominal pain and fever     HPI:  73 yo man with PMH of HTN, HCV, polio, PCKD s/p L nephrectomy, ESRD on HD through left forearm fistula, was admitted at [facility]  to  for Klebsiella bacteremia related to acute cholecystitis.  Had laparoscopic cholecystectomy ib  showing necrotic GB with bile leak afterward so underwent ERCP on  with stent placement.   Was on antibiotics for about 7-8days and was discharged and later drain was removed.   [facility] records reviewed MRN # [id number]   Now comes with fever and lower abdominal pain.   He has been given one dose of zosyn and vancomycin and ID was called for further recommendations.     PAST MEDICAL & SURGICAL HISTORY:  HTN  HCV  polio  PCKD s/p L nephrectomy  ESRD on HD     Social Hx: No smoking, EtOH or drugs     FAMILY HISTORY: Noncontributory     Allergies  Demerol (Faint)    Antibiotics:  MEDICATIONS  (STANDING):  amLODIPine   Tablet 10 milliGRAM(s) Oral daily  carvedilol 25 milliGRAM(s) Oral daily  ferrous    sulfate 325 milliGRAM(s) Oral daily  piperacillin/tazobactam IVPB.. 3.375 Gram(s) IV Intermittent every 12 hours  sodium chloride 0.9%. 1000 milliLiter(s) (100 mL/Hr) IV Continuous <Continuous>  tamsulosin 0.4 milliGRAM(s) Oral at bedtime    MEDICATIONS  (PRN):  acetaminophen     Tablet .. 650 milliGRAM(s) Oral every 6 hours PRN Temp greater or equal to 38C (100.4F), Mild Pain (1 - 3)  aluminum hydroxide/magnesium hydroxide/simethicone Suspension 30 milliLiter(s) Oral every 4 hours PRN Dyspepsia  melatonin 3 milliGRAM(s) Oral at bedtime PRN Insomnia  ondansetron Injectable 4 milliGRAM(s) IV Push every 8 hours PRN Nausea and/or Vomiting     REVIEW OF SYSTEMS:  CONSTITUTIONAL:  + Fever   HEENT:  No diplopia or blurred vision.  No sore throat or runny nose.  CARDIOVASCULAR:  No chest pain or SOB.  RESPIRATORY:  No cough, shortness of breath, PND or orthopnea.  GASTROINTESTINAL:  No nausea, vomiting or diarrhea. + abdominal pain   GENITOURINARY:  No dysuria, frequency or urgency. No Blood in urine  MUSCULOSKELETAL:  no joint aches, no muscle pain  SKIN:  No change in skin, hair or nails.  NEUROLOGIC:  No paresthesias or weakness.  PSYCHIATRIC:  No disorder of thought or mood.  ENDOCRINE:  No heat or cold intolerance, polyuria or polydipsia.  HEMATOLOGICAL:  No easy bruising or bleeding.     Physical Exam:  Vital Signs Last 24 Hrs  T(C): 37.2 (2023 03:36), Max: 37.2 (2023 03:36)  T(F): 99 (2023 03:36), Max: 99 (2023 03:36)  HR: 107 (2023 03:36) (107 - 107)  BP: 122/80 (2023 03:36) (122/80 - 122/80)  BP(mean): --  RR: 18 (2023 03:36) (18 - 18)  SpO2: 96% (2023 03:36) (96% - 96%)  Parameters below as of 2023 03:36  Patient On (Oxygen Delivery Method): room air  Height (cm): 162.6 ( @ 03:36)  Weight (kg): 62.6 ( @ 03:36)  BMI (kg/m2): 23.7 ( @ 03:36)  BSA (m2): 1.67 ( @ 03:36)  GEN: NAD  HEENT: normocephalic and atraumatic. EOMI. PERRL.    NECK: Supple.  No lymphadenopathy   LUNGS: Clear to auscultation.  HEART: Regular rate and rhythm  ABDOMEN: Soft, mild lower abdominal tenderness  surgical wounds are healing well, no infection or dehiscence    : No CVA tenderness  EXTREMITIES: Without edema.  NEUROLOGIC: grossly intact.  PSYCHIATRIC: Appropriate affect .  SKIN: No rash     Labs:      137  |  105  |  54<H>  ----------------------------<  157<H>  4.9   |  26  |  6.60<H>    Ca    9.6      2023 04:45    TPro  6.3  /  Alb  2.6<L>  /  TBili  0.4  /  DBili  x   /  AST  18  /  ALT  24  /  AlkPhos  91                          11.8   10.98 )-----------( 237      ( 2023 04:30 )             36.8     Urinalysis Basic - ( 2023 06:40 )    Color: Yellow / Appearance: Clear / S.010 / pH: x  Gluc: x / Ketone: Negative mg/dL  / Bili: Negative / Urobili: 0.2 mg/dL   Blood: x / Protein: 100 mg/dL / Nitrite: Negative   Leuk Esterase: Negative / RBC: 0 /HPF / WBC 0 /HPF   Sq Epi: x / Non Sq Epi: x / Bacteria: x    LIVER FUNCTIONS - ( 2023 04:45 )  Alb: 2.6 g/dL / Pro: 6.3 g/dL / ALK PHOS: 91 U/L / ALT: 24 U/L / AST: 18 U/L / GGT: x           All imaging and other data have been reviewed.  CT :   IMPRESSION:  Innumerable right renal cysts measuring up to 5.3 cm.  Left nephrectomy.  Complex fluid collection adjacent to the liver. Given the history of  recent cholecystectomy, postsurgical changes may have this appearance.  Infection not excluded.    CT :   IMPRESSION:  Postsurgical changes from partial cholecystectomy. There is a 4 cm air-fluid collection in the gallbladder fossa which likely represents the gallbladder remnant.   Small amount of free air and free fluid in the right upper quadrant is present, concerning for persistent biliary leak given the clinical history and recent surgical drain removal.     Assessment and Plan:   73 yo man with PMH of HTN, HCV, polio, PCKD s/p L nephrectomy, ESRD on HD through left forearm fistula, was admitted at [facility]  to  for Klebsiella bacteremia related to acute cholecystitis.  Had laparoscopic cholecystectomy ib  showing necrotic GB with bile leak afterward so underwent ERCP on  with stent placement.   Was on antibiotics for about 7-8days and was discharged and later drain was removed.   [facility] records reviewed MRN # [id number]   [facility] on  with fever and lower abdominal pain.   There is possibility of intra-abdominal collections due to recent surgery or bile leakage.   Will cover abdominal yoel until work up is back.   CT result with collection in abdomen possibly from persistent biliary leak    # Intraabdominal infection   - Blood cultures x 2   - Surgery and GI consults   - Will monitor WBC, Tmax   - Will continue zosyn 3.375gm q8 for now   - If drain can be placed, will send for cultures   - If febrile or uptrending WBC on zosyn, then will cover enterococcus and fungi    Thank you for courtesy of this consult.     Will follow.  Discussed with the primary team.     Brandi Piedra MD  Division of Infectious Diseases   Please call ID service at [phone] with any question.    75 minutes spent on total encounter assessing patient, examination, chart review, counseling and coordinating care by the attending physician/nurse/care manager.

## 2023-07-17 NOTE — ED ADULT NURSE NOTE - OBJECTIVE STATEMENT
73 y/o male received ambulatory from triage. Alert and oriented x4. C/o abdominal pain. Denies any cp, sob, n/v/d, dizziness, headache, fever/chills at this time. Respirations even and unlabored.

## 2023-07-17 NOTE — CONSULT NOTE ADULT - SUBJECTIVE AND OBJECTIVE BOX
HPI: 72y Male with fever and abdominal pain    72y Male presenting with fevers and abdominal pain. PMH recent admission for Klebsiella bacteremia related to acute cholecystitis in June 2023, polycystic kidney disease s/p L nephrectomy on ESRD MWF, hx of Hep C treated, HTN, Polio.   Patient seen and examined at bedside. He reports lower abdominal pain on both lower quadrants which started overnight and also fevers and chills. He does not recall how high is temperature was. He is on dialysis, still produces urine. Denies headaches, nausea, vomiting, chest pain, SOB, palpitations, constipation, diarrhea, melena, hematochezia, dysuria. Was in [facility] last month with acute gangrenous cholecystitis. Had partial cholecystectomy on 6/21/23. Also had CBD stent placed by GI on 6/22/23. A surgical drain was left in place. Drain was removed 6 days ago.  CT today shows biliary air, some free air and small amounts of fluid in the upper abdomen but no discrete collection.  HIDA negative for bile leak.    PMH/PSH  Acute cholecystitis  Hypertension  Renal failure, chronic  Cancer of kidney, left  History of left nephrectomy  Partial cholecystectomy      Allergies: Demerol HCl (Unknown)  Demerol (Faint)      Medications (Abx/Cardiac/Anticoagulation/Blood Products)  acetaminophen     Tablet .. 650 milliGRAM(s) Oral every 6 hours PRN  aluminum hydroxide/magnesium hydroxide/simethicone Suspension 30 milliLiter(s) Oral every 4 hours PRN  carvedilol 25 milliGRAM(s) Oral daily  ferrous    sulfate 325 milliGRAM(s) Oral daily  melatonin 3 milliGRAM(s) Oral at bedtime PRN  ondansetron Injectable 4 milliGRAM(s) IV Push every 8 hours PRN  piperacillin/tazobactam IVPB.. 3.375 Gram(s) IV Intermittent every 12 hours  sodium chloride 0.9%. 1000 milliLiter(s) IV Continuous <Continuous>  tamsulosin 0.4 milliGRAM(s) Oral at bedtime      Data:  162.6  62.6    T(C): 37.2  HR: 107  BP: 122/80  RR: 18  SpO2: 96%      -WBC 10.98 / HgB 11.8 / Hct 36.8 / Plt 237  -Na 137 / Cl 105 / BUN 54 / Glucose 157  -K 4.9 / CO2 26 / Cr 6.60  -ALT 24 / Alk Phos 91 / T.Bili 0.4      Imaging:  CT today shows biliary air, some free air and small amounts of fluid in the upper abdomen but no discrete collection.  HIDA negative for bile leak.

## 2023-07-17 NOTE — ED PROVIDER NOTE - PROGRESS NOTE DETAILS
Patient had been waiting several hours prior to the onset of my shift which only began at 7 am   Zac COLON

## 2023-07-17 NOTE — ED PROVIDER NOTE - OBJECTIVE STATEMENT
Patient is a 72-year-old male who presents to the emergency room with a chief complaint of abdominal pain and fever.  Past medical history of hypertension, polio, polycystic kidney disease status post left nephrectomy now with end-stage renal disease on hemodialysis Monday Wednesday Friday, history of hepatitis C treated.  Was last admitted to [facility] from June 19 through June 28, 2023 with Klebsiella bacteremia.  He had presented with abdominal pain fevers.  Have been seen in the ED 2 days prior and discharged but was called back when a blood culture returned positive with Klebsiella.  He was found to have acute cholecystitis.  Per reports underwent robotic subtotal cholecystectomy on 6/21/2023.  Patient underwent ERCP with stent on 6/22/2023 completed a course of IV antibiotics for bacteremia.  Patient subsequently developed acute urinary retention seen by urology Muhammad catheter was placed.  Patient clinically improved.  It appears that prior to arrival patient was evaluated for possible biliary leak found to have bilious drainage from the abdomen.  Surgical drain was placed patient was discharged. Patient is a 72-year-old male who presents to the emergency room with a chief complaint of abdominal pain and fever.  Past medical history of hypertension, polio, polycystic kidney disease status post left nephrectomy now with end-stage renal disease on hemodialysis Monday Wednesday Friday, history of hepatitis C treated.  Was last admitted to [facility] from June 19 through June 28, 2023 with Klebsiella bacteremia.  He had presented with abdominal pain fevers.  Have been seen in the ED 2 days prior and discharged but was called back when a blood culture returned positive with Klebsiella.  He was found to have acute cholecystitis.  Per reports underwent robotic subtotal cholecystectomy on 6/21/2023.  Patient underwent ERCP with stent on 6/22/2023 completed a course of IV antibiotics for bacteremia.  Patient subsequently developed acute urinary retention seen by urology Muhammad catheter was placed.  Patient clinically improved.  It appears that prior to arrival patient was evaluated for possible biliary leak found to have bilious drainage from the abdomen.  Surgical drain was placed patient was discharged. Patient reports that he had the surgical drain removed on Tuesday.  Last night he developed abdominal pain around 2:30 AM with fevers Tmax of 101 and associated chills.  He did take Tylenol last night he believes around 9 PM.  Denies nausea vomiting diarrhea chest pain or shortness of breath.  He has not been on antibiotics since his last round at [facility].

## 2023-07-17 NOTE — ED ADULT NURSE NOTE - NSFALLRISKINTERV_ED_ALL_ED

## 2023-07-17 NOTE — H&P ADULT - ASSESSMENT
Mr Hawthorne is a 73 yo M presenting with fevers and abdominal pain concerning for biliary leak after recent partial cholecystectomy. PMH recent admission for Klebsiella bacteremia related to acute cholecystitis in June 2023, polycystic kidney disease s/p L nephrectomy on ESRD MWF, hx of Hep C treated, HTN, Polio.     Biliary Leak: concern for biliary leak  -check HIDA scan  -CT scan results reviewed  -GI and surgery consulted  -continue IV abx given recent Klebsiella bacteremia and patient with fevers and chills  -NPO. IVF for hydration.     ESRD on HD 2/2 pCKD:   -Dr Wayne consulted for HD    HTN: BP Stable and at goal  -continue Coreg and Novasc    Anemia of chronic disease: no signs or symptoms of active bleeding. Continue to monitor hgb.     DVT ppx: SCD's pending further surgical recommendations.     Case discussed with surgery and GI team as well as ER MD.

## 2023-07-17 NOTE — ED PROVIDER NOTE - DIFFERENTIAL DIAGNOSIS
Patient presenting to the emergency room with a chief complaint of abdominal pain and fever in the setting of recent partial cholecystectomy with biliary stent placement and subsequent biliary leak.  Differential includes but not limited to possible continued biliary leak versus recurrent cholecystitis versus additional intra-abdominal pathology.  Was also consider possible pneumonia in the differential due to recent surgery.  Will obtain screening labs cultures lactate Pan scan hydrate medicate for fever begin broad-spectrum antibiotics and monitor.  Patient will likely require admission for further work-up and management. Differential Diagnosis

## 2023-07-17 NOTE — ED PROVIDER NOTE - CLINICAL SUMMARY MEDICAL DECISION MAKING FREE TEXT BOX
Patient is a 72-year-old male who presents to the emergency room with a chief complaint of abdominal pain and fever.  Past medical history of hypertension, polio, polycystic kidney disease status post left nephrectomy now with end-stage renal disease on hemodialysis Monday Wednesday Friday, history of hepatitis C treated.  Was last admitted to [facility] from June 19 through June 28, 2023 with Klebsiella bacteremia.  He had presented with abdominal pain fevers.  Have been seen in the ED 2 days prior and discharged but was called back when a blood culture returned positive with Klebsiella.  He was found to have acute cholecystitis.  Per reports underwent robotic subtotal cholecystectomy on 6/21/2023.  Patient underwent ERCP with stent on 6/22/2023 completed a course of IV antibiotics for bacteremia.  Patient subsequently developed acute urinary retention seen by urology Muhammad catheter was placed.  Patient clinically improved.  It appears that prior to arrival patient was evaluated for possible biliary leak found to have bilious drainage from the abdomen.  Surgical drain was placed patient was discharged. Patient is a 72-year-old male who presents to the emergency room with a chief complaint of abdominal pain and fever.  Past medical history of hypertension, polio, polycystic kidney disease status post left nephrectomy now with end-stage renal disease on hemodialysis Monday Wednesday Friday, history of hepatitis C treated.  Was last admitted to [facility] from June 19 through June 28, 2023 with Klebsiella bacteremia.  He had presented with abdominal pain fevers.  Have been seen in the ED 2 days prior and discharged but was called back when a blood culture returned positive with Klebsiella.  He was found to have acute cholecystitis.  Per reports underwent robotic subtotal cholecystectomy on 6/21/2023.  Patient underwent ERCP with stent on 6/22/2023 completed a course of IV antibiotics for bacteremia.  Patient subsequently developed acute urinary retention seen by urology Muhammad catheter was placed.  Patient clinically improved.  It appears that prior to arrival patient was evaluated for possible biliary leak found to have bilious drainage from the abdomen.  Surgical drain was placed patient was discharged. Results of labs reviewed patient with a white count of 10.9 normal lactate end-stage renal disease noted UA does not appear to be infected.  CT imaging reveals postsurgical changes from a partial cholecystectomy questionable biliary leak given history.  Independent review of EKG reveals normal sinus rhythm at 100 bpm.  Patient receiving fluids antibiotics reports overall improvement in symptoms.  Surgery consulted will see patient likely will consult with IR for recurrent drain.  Infectious disease made aware will see patient.  Nephrology paged.  Patient excepted to the hospital service.

## 2023-07-17 NOTE — H&P ADULT - HISTORY OF PRESENT ILLNESS
Mr Hawthorne is a 71 yo M presenting with fevers and abdominal pain. [facility] recent admission for Klebsiella bacteremia related to acute cholecystitis in June 2023, polycystic kidney disease s/p L nephrectomy on ESRD MWF, hx of Hep C treated, HTN, Polio.   Patient seen and examined at bedside. He reports lower abdominal pain on both lower quadrants which started overnight and also fevers and chills. He does not recall how high is temperature was. He is on dialysis, still produces urine. Denies headaches, nausea, vomiting, chest pain, SOB, palpitations, constipation, diarrhea, melena, hematochezia, dysuria.     Of note: patient was admitted to [facility] in June 2023 with klebsiella bacteremia found wiht acute cholecystitis s/p robotic subtotal cholecystectomy on 6/21/23 with surgical team. He was found to have nectrotic gallbladder. He underwent ERCP with stent on 6/22/23. He completed IV abx for bacteremia. On last admission he did have a surgical drain placed which was recently removed.  Mr Hawthorne is a 71 yo M presenting with fevers and abdominal pain. [facility] recent admission for Klebsiella bacteremia related to acute cholecystitis in June 2023, polycystic kidney disease s/p L nephrectomy on ESRD MWF, hx of Hep C treated, HTN, Polio.   Patient seen and examined at bedside. He reports lower abdominal pain on both lower quadrants which started overnight and also fevers and chills. He does not recall how high is temperature was. He is on dialysis, still produces urine. Denies headaches, nausea, vomiting, chest pain, SOB, palpitations, constipation, diarrhea, melena, hematochezia, dysuria.     Of note: patient was admitted to [facility] in June 2023 with klebsiella bacteremia found with acute cholecystitis s/p robotic subtotal cholecystectomy on 6/21/23 with surgical team. He was found to have necrotic gallbladder. He underwent ERCP with stent on 6/22/23. He completed IV abx for bacteremia. On last admission he did have a surgical drain placed which was recently removed. Surgery was performed by Dr. Elma Brar    [facility] records reviewed MRN # [id number]

## 2023-07-17 NOTE — CONSULT NOTE ADULT - ASSESSMENT
73 yo male post partial antonio complicated by biliary leak, post ERCP and stent placement at [facility],  post iv antibiotics treatment for bacteriemia presented with fever and abdominal pain.    Plan:  HIDA, R/O leak   ID consult   GI consult  possible re incretion of drain by IR    71 yo male post partial antonio complicated by biliary leak, post ERCP and stent placement at [facility],  post iv antibiotics treatment for bacteriemia presented with fever and abdominal pain.    Plan:  HIDA, R/O leak   ID consult   GI consult  possible re insertion of drain by IR if indicated

## 2023-07-17 NOTE — CONSULT NOTE ADULT - SUBJECTIVE AND OBJECTIVE BOX
NEPHROLOGY CONSULTATION    CHIEF COMPLAINT: fever    HPI:  Pt is a 73 yo M presenting with fevers and abdominal pain. S/p admission for Klebsiella bacteremia related to acute cholecystitis in 2023, s/p robotic subtotal cholecystectomy on 23. He underwent ERCP with stent on 23. He completed IV abx for bacteremia. Manzo also had a surgical drain placed which was recently removed. Hx polycystic kidney disease s/p L nephrectomy, ESRD on HD, hx of Hep C treated, HTN, Polio. No headaches, nausea, vomiting, chest pain, SOB, palpitations, constipation, diarrhea, melena, hematochezia, dysuria, still has dome UO. Due for HD today.     ROS:  as above    Allergies:  Demerol HCl (Unknown)  Demerol (Faint)     PAST MEDICAL & SURGICAL HISTORY: as above  Hypertension  Renal failure, chronic  Cancer of kidney, left  History of left nephrectomy    SOCIAL HISTORY:  negative    FAMILY HISTORY:  renal failure (Mother)    MEDICATIONS  (STANDING):  amLODIPine   Tablet 10 milliGRAM(s) Oral daily  carvedilol 25 milliGRAM(s) Oral daily  ferrous    sulfate 325 milliGRAM(s) Oral daily  piperacillin/tazobactam IVPB 3.375 Gram(s) IV Intermittent every 12 hours  sodium chloride 0.9%. 1000 milliLiter(s) (100 mL/Hr) IV Continuous <Continuous>  tamsulosin 0.4 milliGRAM(s) Oral at bedtime    Vital Signs Last 24 Hrs  T(C): 37.2 (23 @ 03:36), Max: 37.2 (23 @ 03:36)  T(F): 99 (23 @ 03:36), Max: 99 (23 @ 03:36)  HR: 107 (23 @ 03:36) (107 - 107)  BP: 122/80 (23 @ 03:36) (122/80 - 122/80)  RR: 18 (23 @ 03:36) (18 - 18)  SpO2: 96% (23 @ 03:36) (96% - 96%)    LABS:                        11.8   10.98 )-----------( 237      ( 2023 04:30 )             36.8         137  |  105  |  54<H>  ----------------------------<  157<H>  4.9   |  26  |  6.60<H>    Ca    9.6      2023 04:45    TPro  6.3  /  Alb  2.6<L>  /  TBili  0.4  /  DBili  x   /  AST  18  /  ALT  24  /  AlkPhos  91      Urinalysis Basic - ( 2023 06:40 )    Color: Yellow / Appearance: Clear / S.010 / pH: x  Gluc: x / Ketone: Negative mg/dL  / Bili: Negative / Urobili: 0.2 mg/dL   Blood: x / Protein: 100 mg/dL / Nitrite: Negative   Leuk Esterase: Negative / RBC: 0 /HPF / WBC 0 /HPF   Sq Epi: x / Non Sq Epi: x / Bacteria: x    LIVER FUNCTIONS - ( 2023 04:45 )  Alb: 2.6 g/dL / Pro: 6.3 g/dL / ALK PHOS: 91 U/L / ALT: 24 U/L / AST: 18 U/L / GGT: x           A/P:    full consult to follow    [phone]   NEPHROLOGY CONSULTATION    CHIEF COMPLAINT: fever    HPI:  Pt is a 73 yo M presenting with fevers and abdominal pain. S/p admission at [facility] for Klebsiella bacteremia due to acute cholecystitis in 2023, s/p robotic subtotal cholecystectomy on 23. He underwent ERCP with stent on 23. He completed IV abx for bacteremia. He also had a surgical drain placed which was recently removed. Hx polycystic kidney disease s/p L nephrectomy, ESRD on HD, hx of Hep C, treated, hx HTN, Polio. No headaches, nausea, vomiting, chest pain, SOB, palpitations, constipation, diarrhea, melena, hematochezia, dysuria, still has dome UO. Seen in ED.     ROS:  as above    Allergies:  Demerol HCl (Unknown)  Demerol (Faint)     PAST MEDICAL & SURGICAL HISTORY: as above  Hypertension  Renal failure, chronic  Cancer of kidney, left  History of left nephrectomy    SOCIAL HISTORY:  negative    FAMILY HISTORY:  renal failure (Mother)    MEDICATIONS  (STANDING):  amLODIPine   Tablet 10 milliGRAM(s) Oral daily  carvedilol 25 milliGRAM(s) Oral daily  ferrous    sulfate 325 milliGRAM(s) Oral daily  piperacillin/tazobactam IVPB 3.375 Gram(s) IV Intermittent every 12 hours  sodium chloride 0.9%. 1000 milliLiter(s) (100 mL/Hr) IV Continuous <Continuous>  tamsulosin 0.4 milliGRAM(s) Oral at bedtime    Vital Signs Last 24 Hrs  T(C): 37.2 (23 @ 03:36), Max: 37.2 (23 @ 03:36)  T(F): 99 (23 @ 03:36), Max: 99 (23 @ 03:36)  HR: 107 (23 @ 03:36) (107 - 107)  BP: 122/80 (23 @ 03:36) (122/80 - 122/80)  RR: 18 (23 @ 03:36) (18 - 18)  SpO2: 96% (23 @ 03:36) (96% - 96%)    s1s2  b/l air entry  soft, ND  no edema    LABS:                        11.8   10.98 )-----------( 237      ( 2023 04:30 )             36.8         137  |  105  |  54<H>  ----------------------------<  157<H>  4.9   |  26  |  6.60<H>    Ca    9.6      2023 04:45    TPro  6.3  /  Alb  2.6<L>  /  TBili  0.4  /  DBili  x   /  AST  18  /  ALT  24  /  AlkPhos  91      Urinalysis Basic - ( 2023 06:40 )    Color: Yellow / Appearance: Clear / S.010 / pH: x  Gluc: x / Ketone: Negative mg/dL  / Bili: Negative / Urobili: 0.2 mg/dL   Blood: x / Protein: 100 mg/dL / Nitrite: Negative   Leuk Esterase: Negative / RBC: 0 /HPF / WBC 0 /HPF   Sq Epi: x / Non Sq Epi: x / Bacteria: x    LIVER FUNCTIONS - ( 2023 04:45 )  Alb: 2.6 g/dL / Pro: 6.3 g/dL / ALK PHOS: 91 U/L / ALT: 24 U/L / AST: 18 U/L / GGT: x           A/P:    S/p admission at [facility] for Klebsiella bacteremia due to acute cholecystitis in 2023  S/p robotic subtotal cholecystectomy on 23  S/p ERCP with stent on 23  Surgical drain was recently removed  Adm w/abd pain since last night  Lytes are stable   HD ordered for am  Abx per ID  W/up per surgery, GI  D/w family at bedside    [phone]

## 2023-07-17 NOTE — CONSULT NOTE ADULT - SUBJECTIVE AND OBJECTIVE BOX
[facility]  Pastor Felix PA-C  [address] [place] [id number]  [phone]      Chief Complaint:  Patient is a 72y old  Male who presents with a chief complaint of Biliary Leak, infection (2023 11:16)      HPI:  71 yo M presenting with fevers and abdominal pain. [facility] recent admission for Klebsiella bacteremia related to acute cholecystitis in 2023, polycystic kidney disease s/p L nephrectomy on ESRD MWF, hx of Hep C treated, HTN, Polio.   Patient seen and examined at bedside. He reports lower abdominal pain on both lower quadrants which started overnight and also fevers and chills. He does not recall how high is temperature was. He is on dialysis, still produces urine. Denies headaches, nausea, vomiting, chest pain, SOB, palpitations, constipation, diarrhea, melena, hematochezia, dysuria.     Of note: patient was admitted to [facility] in 2023 with klebsiella bacteremia found with acute cholecystitis s/p robotic subtotal cholecystectomy on 23 with surgical team. He was found to have necrotic gallbladder. He underwent ERCP with stent on 23. He completed IV abx for bacteremia. On last admission he did have a surgical drain placed which was recently removed. Surgery was performed by Dr. Elma Brar    [facility] records reviewed MRN # [id number]    INTERVAL HPI:  Pt s/e in emergency department  Pt reports same hx as above  Currently still having diffuse abdominal pain and was febrile  Denies further GI complaints    Allergies:  Demerol HCl (Unknown)  Demerol (Faint)      Medications:  acetaminophen     Tablet .. 650 milliGRAM(s) Oral every 6 hours PRN  aluminum hydroxide/magnesium hydroxide/simethicone Suspension 30 milliLiter(s) Oral every 4 hours PRN  amLODIPine   Tablet 10 milliGRAM(s) Oral daily  carvedilol 25 milliGRAM(s) Oral daily  ferrous    sulfate 325 milliGRAM(s) Oral daily  melatonin 3 milliGRAM(s) Oral at bedtime PRN  ondansetron Injectable 4 milliGRAM(s) IV Push every 8 hours PRN  piperacillin/tazobactam IVPB.. 3.375 Gram(s) IV Intermittent every 12 hours  sodium chloride 0.9%. 1000 milliLiter(s) IV Continuous <Continuous>  tamsulosin 0.4 milliGRAM(s) Oral at bedtime      PMHX/PSHX:  Hypertension    Renal failure, chronic    Cancer of kidney, left    History of left nephrectomy        Family history:  FHx: renal failure (Mother)      ROS:   General:  +fevers. No chills, night sweats, fatigue  Eyes:  Good vision, no reported pain  ENT:  No sore throat, pain, runny nose, dysphagia  CV:  No pain, palpitations, hypo/hypertension  Resp:  No dyspnea, cough, tachypnea, wheezing  GI:  +pain, No nausea, No vomiting, No diarrhea, No constipation, No weight loss, No fever, No pruritis, No rectal bleeding, No tarry stools, No dysphagia  :  No pain, bleeding, incontinence, nocturia  Muscle:  No pain, weakness  Neuro:  No weakness, tingling, memory problems  Psych:  No fatigue, insomnia, mood problems, depression  Endocrine:  No polyuria, polydipsia, cold/heat intolerance  Heme:  No petechiae, ecchymosis, easy bruisability  Skin:  No rash, tattoos, scars, edema      PHYSICAL EXAM:   Vital Signs:  Vital Signs Last 24 Hrs  T(C): 37.2 (2023 03:36), Max: 37.2 (2023 03:36)  T(F): 99 (2023 03:36), Max: 99 (2023 03:36)  HR: 107 (2023 03:36) (107 - 107)  BP: 122/80 (2023 03:36) (122/80 - 122/80)  BP(mean): --  RR: 18 (2023 03:36) (18 - 18)  SpO2: 96% (2023 03:36) (96% - 96%)    Parameters below as of 2023 03:36  Patient On (Oxygen Delivery Method): room air      Daily Height in cm: 162.56 (2023 03:36)    Daily     GENERAL:  Appears stated age  HEENT:  NC/AT  CHEST:  Full & symmetric excursion  HEART:  Regular rhythm  ABDOMEN:  Soft, +TTP diffusely, non-distended  EXTEREMITIES:  no cyanosis, clubbing or edema  SKIN:  No rash  NEURO:  Alert    LABS:                        11.8   10.98 )-----------( 237      ( 2023 04:30 )             36.8         137  |  105  |  54<H>  ----------------------------<  157<H>  4.9   |  26  |  6.60<H>    Ca    9.6      2023 04:45    TPro  6.3  /  Alb  2.6<L>  /  TBili  0.4  /  DBili  x   /  AST  18  /  ALT  24  /  AlkPhos  91      LIVER FUNCTIONS - ( 2023 04:45 )  Alb: 2.6 g/dL / Pro: 6.3 g/dL / ALK PHOS: 91 U/L / ALT: 24 U/L / AST: 18 U/L / GGT: x             Urinalysis Basic - ( 2023 06:40 )    Color: Yellow / Appearance: Clear / S.010 / pH: x  Gluc: x / Ketone: Negative mg/dL  / Bili: Negative / Urobili: 0.2 mg/dL   Blood: x / Protein: 100 mg/dL / Nitrite: Negative   Leuk Esterase: Negative / RBC: 0 /HPF / WBC 0 /HPF   Sq Epi: x / Non Sq Epi: x / Bacteria: x        Lipase [id number]     Imaging:  < from: CT Abdomen and Pelvis No Cont (23 @ 07:46) >    ACC: [id number] EXAM:  CT CHEST   ORDERED BY: STEPHANIE OTTO     ACC: [id number] EXAM:  CT ABDOMEN AND PELVIS   ORDERED BY: STEPHANIE OTTO     PROCEDURE DATE:  2023          INTERPRETATION:  CLINICAL INFORMATION: Recent surgery, fever, partial   cholecystectomy on 2023 complicated by biliary leak, management   surgical drain. Drain removed 6 days prior.    COMPARISON: None.    CONTRAST/COMPLICATIONS:  IV Contrast: NONE  Oral Contrast: NONE  Complications: None reported at time of study completion    PROCEDURE:  CT of the Chest, Abdomen and Pelvis was performed.  Sagittal and coronal reformats were performed.    FINDINGS:  CHEST:  LUNGS AND LARGE AIRWAYS: Patent central airways. Bibasilar atelectasis.   Scattered calcified pulmonary granulomas. Lungs are otherwise clear.  PLEURA: No pleural effusion.  VESSELS: Aortic and coronary calcifications. Normal caliber aorta.  HEART: Heart size is normal. No pericardial effusion.  MEDIASTINUM AND NALLELY: No lymphadenopathy.  CHEST WALL AND LOWER NECK: Within normal limits.    ABDOMEN AND PELVIS:  LIVER: Within normal limits.  BILE DUCTS: There is a CBD stent in place with pneumobilia.  GALLBLADDER: Surgical clips are demonstrated in the gallbladder fossa.   There is an air and fluid-filled collection in the gallbladder fossa   measuring approximately 2.5 x 3 x 4 cm. There is surrounding free air and   free fluid in the right upper quadrant gallbladder.  SPLEEN: Within normal limits.  PANCREAS: Within normal limits.  ADRENALS: Adrenal gland thickening bilaterally.  KIDNEYS/URETERS: Numerous right renal cyst. Left nephrectomy.    BLADDER: Intraluminal air presumably related to recent instrumentation.  REPRODUCTIVE ORGANS: Prostatomegaly.    BOWEL: No bowel obstruction. Appendix normal. Colonic diverticulosis   without diverticulitis.  PERITONEUM: Small volume of free fluid and free air in the right upper   artifact.  VESSELS: Within normal limits.  RETROPERITONEUM/LYMPH NODES: No lymphadenopathy.  ABDOMINAL WALL: Within normal limits.  BONES: Degenerative changes.    IMPRESSION:  Postsurgical changes from partial cholecystectomy. There is a 4 cm   air-fluid collection in the gallbladder fossa which likely represents the   gallbladder remnant. Small amount of free air and free fluid in the right   upper quadrant is present, concerning for persistent biliary leak given   the clinical history and recent surgical drain removal.    --- End of Report ---            DEANNA VÁZQUEZ MD; Attending Radiologist  This document has been electronically signed. 2023  8:44AM    < end of copied text >

## 2023-07-17 NOTE — ED ADULT TRIAGE NOTE - CHIEF COMPLAINT QUOTE
Pt had surgery at [facility] on 6/21, they removed gallbladder but weren't able to remove all of it due to inflammation. Had a 2nd procedure on 22nd, had stent placed. pt had been improving since then, but tonight c/o RLQ abdominal pain, chills and fever 101.

## 2023-07-17 NOTE — ED ADULT NURSE NOTE - SUICIDE SCREENING QUESTION 3
No

## 2023-07-17 NOTE — H&P ADULT - NSHPPHYSICALEXAM_GEN_ALL_CORE
T(C): 37.2 (07-17-23 @ 03:36), Max: 37.2 (07-17-23 @ 03:36)  HR: 107 (07-17-23 @ 03:36) (107 - 107)  BP: 122/80 (07-17-23 @ 03:36) (122/80 - 122/80)  RR: 18 (07-17-23 @ 03:36) (18 - 18)  SpO2: 96% (07-17-23 @ 03:36) (96% - 96%)  Wt(kg): --    Physical Exam:   GENERAL: well-groomed, well-developed, NAD  HEENT: head NC/AT; EOM intact, PERRLA, conjunctiva & sclera clear; hearing grossly intact, moist mucous membranes  NECK: supple, no JVD  RESPIRATORY: CTA B/L, no wheezing, rales, rhonchi or rubs  CARDIOVASCULAR: S1&S2, RRR, no murmurs or gallops  ABDOMEN: soft, non-tender, non-distended, + Bowel sounds x4 quadrants, no guarding, rebound or rigidity  MUSCULOSKELETAL:  no clubbing, cyanosis or edema of all 4 extremities  LYMPH: no cervical lymphadenopathy  VASCULAR: Radial pulses 2+ bilaterally, no varicose veins   SKIN: warm and dry, color normal  NEUROLOGIC: AA&O X3, CN2-12 intact w/ no focal deficits, no sensory loss, motor Strength 5/5 in UE & LE B/L  Psych: Normal mood and affect, normal behavior T(C): 37.2 (07-17-23 @ 03:36), Max: 37.2 (07-17-23 @ 03:36)  HR: 107 (07-17-23 @ 03:36) (107 - 107)  BP: 122/80 (07-17-23 @ 03:36) (122/80 - 122/80)  RR: 18 (07-17-23 @ 03:36) (18 - 18)  SpO2: 96% (07-17-23 @ 03:36) (96% - 96%)  Wt(kg): --    Physical Exam:   GENERAL: well-groomed, well-developed, NAD  HEENT: head NC/AT;  conjunctiva & sclera clear; hearing grossly intact, moist mucous membranes  NECK: supple, no JVD  RESPIRATORY: CTA B/L, no wheezing, rales, rhonchi or rubs  CARDIOVASCULAR: S1&S2, RRR, no murmurs or gallops  ABDOMEN: soft, non-tender, non-distended, + Bowel sounds x4 quadrants, no guarding, rebound or rigidity. Lap antonio incision sites clean and dry, well healed  MUSCULOSKELETAL:  no clubbing, cyanosis or edema of all 4 extremities  LYMPH: no cervical lymphadenopathy  VASCULAR: Radial pulses 2+ bilaterally, no varicose veins , LUE AVF with thrill  SKIN: warm and dry, color normal  NEUROLOGIC: AA&O X3, speech fluent, moving all extremities  Psych: Normal mood and affect, normal behavior

## 2023-07-17 NOTE — CONSULT NOTE ADULT - SUBJECTIVE AND OBJECTIVE BOX
GENERAL SURGERY CONSULT NOTE    Patient is a 72y old  Male who presents with a chief complaint of abdominal pain and fever     HPI:· HPI Objective Statement: Patient is a 72-year-old male who presents to the emergency room with a chief complaint of right lower quad stabbing abdominal  pain radiating to right shoulder combined with  fever.  Past medical history of hypertension, polio, polycystic kidney disease status post left nephrectomy now with end-stage renal disease on hemodialysis , history of hepatitis C treated.  Was last admitted to [facility] from  through 2023 with Klebsiella bacteremia.  He had presented with abdominal pain fevers.  Have been seen in the ED 2 days prior and discharged but was called back when a blood culture returned positive with Klebsiella.  He was found to have acute cholecystitis.  Per reports underwent robotic subtotal cholecystectomy on 2023.  Patient underwent ERCP with stent on 2023 completed a course of IV antibiotics for bacteremia.  Patient subsequently developed acute urinary retention seen by urology Muhammad catheter was placed.  Patient clinically improved.  It appears that prior to arrival patient was evaluated for possible biliary leak found to have bilious drainage from the abdomen.  Surgical drain was placed patient was discharged. Patient reports that he had the surgical drain removed on Tuesday.  Last night he developed abdominal pain around 2:30 AM with fevers Tmax of 101 and associated chills.  He did take Tylenol last night he believes around 9 PM.  Denies nausea vomiting diarrhea chest pain or shortness of breath.  He has not been on antibiotics since his last round at [facility].     < from: CT Abdomen and Pelvis No Cont (23 @ 07:46) >  LUNGS AND LARGE AIRWAYS: Patent central airways. Bibasilar atelectasis.   Scattered calcified pulmonary granulomas. Lungs are otherwise clear.  PLEURA: No pleural effusion.  VESSELS: Aortic and coronary calcifications. Normal caliber aorta.  HEART: Heart size is normal. No pericardial effusion.  MEDIASTINUM AND NALLELY: No lymphadenopathy.  CHEST WALL AND LOWER NECK: Within normal limits.    ABDOMEN AND PELVIS:  LIVER: Within normal limits.  BILE DUCTS: There is a CBD stent in place with pneumobilia.  GALLBLADDER: Surgical clips are demonstrated in the gallbladder fossa.   There is an air and fluid-filled collection in the gallbladder fossa   measuring approximately 2.5 x 3 x 4 cm. There is surrounding free air and   free fluid in the right upper quadrant gallbladder.  SPLEEN: Within normal limits.  PANCREAS: Within normal limits.  ADRENALS: Adrenal gland thickening bilaterally.  KIDNEYS/URETERS: Numerous right renal cyst. Left nephrectomy.    BLADDER: Intraluminal air presumably related to recent instrumentation.  REPRODUCTIVE ORGANS: Prostatomegaly.    BOWEL: No bowel obstruction. Appendix normal. Colonic diverticulosis   without diverticulitis.  PERITONEUM: Small volume of free fluid and free air in the right upper   artifact.  VESSELS: Within normal limits.  RETROPERITONEUM/LYMPH NODES: No lymphadenopathy.  ABDOMINAL WALL: Within normal limits.  BONES: Degenerative changes.    IMPRESSION:      ostsurgical changes from partial cholecystectomy. There is a 4 cm   air-fluid collection in the gallbladder fossa which likely represents the   gallbladder remnant. Small amount of free air and free fluid in the right   upper quadrant is present, concerning for persistent biliary leak given   the clinical history and recent surgical drain removal.          PAST MEDICAL & SURGICAL HISTORY:  as above         I have reviewed 9 systems with the patient and the only positive findings were as above     MEDICATIONS  (STANDING):    MEDICATIONS  (PRN):      Allergies    Demerol (Faint)    Intolerances        SOCIAL HISTORY          Smoking: Yes [ ]  No [ x]   ______pk yrs          ETOH  Yes [ ]  No [ x]  Social [ ]          DRUGS:  Yes [ ]  No [ ]  if so what______________    FAMILY HISTORY:      Vital Signs Last 24 Hrs  T(C): 37.2 (2023 03:36), Max: 37.2 (2023 03:36)  T(F): 99 (2023 03:36), Max: 99 (2023 03:36)  HR: 107 (2023 03:36) (107 - 107)  BP: 122/80 (2023 03:36) (122/80 - 122/80)  BP(mean): --  RR: 18 (2023 03:36) (18 - 18)  SpO2: 96% (2023 03:36) (96% - 96%)    Parameters below as of 2023 03:36  Patient On (Oxygen Delivery Method): room air        Physical Exam:    General:  Appears stated age, well-groomed, well-nourished, no distress  Eyes : RENETTA  HENT:  WNL, no JVD  Chest:  clear breath sounds good inspiratory effort   Cardiovascular:  Regular rate & rhythm  Abdomen:  soft, voluntary guarding tender lower abdomin mainly right lower quad, +ve BS no rebound , old surgical scar well healed with no erythema   Extremities:  no edema , good capillary refill   Skin:  No rash  Musculoskeletal:  normal strength  Neuro/Psych:  Alert, oriented tp time, place and person       LABS:                        11.8   10.98 )-----------( 237      ( 2023 04:30 )             36.8         137  |  105  |  54<H>  ----------------------------<  157<H>  4.9   |  26  |  6.60<H>    Ca    9.6      2023 04:45    TPro  6.3  /  Alb  2.6<L>  /  TBili  0.4  /  DBili  x   /  AST  18  /  ALT  24  /  AlkPhos  91        Urinalysis Basic - ( 2023 06:40 )    Color: Yellow / Appearance: Clear / S.010 / pH: x  Gluc: x / Ketone: Negative mg/dL  / Bili: Negative / Urobili: 0.2 mg/dL   Blood: x / Protein: 100 mg/dL / Nitrite: Negative   Leuk Esterase: Negative / RBC: 0 /HPF / WBC 0 /HPF   Sq Epi: x / Non Sq Epi: x / Bacteria: x

## 2023-07-17 NOTE — PATIENT PROFILE ADULT - FALL HARM RISK - HARM RISK INTERVENTIONS

## 2023-07-18 LAB
ALBUMIN SERPL ELPH-MCNC: 2.1 G/DL — LOW (ref 3.3–5)
ALP SERPL-CCNC: 69 U/L — SIGNIFICANT CHANGE UP (ref 40–120)
ALT FLD-CCNC: 17 U/L — SIGNIFICANT CHANGE UP (ref 12–78)
ANION GAP SERPL CALC-SCNC: 7 MMOL/L — SIGNIFICANT CHANGE UP (ref 5–17)
AST SERPL-CCNC: 8 U/L — LOW (ref 15–37)
BASOPHILS # BLD AUTO: 0.04 K/UL — SIGNIFICANT CHANGE UP (ref 0–0.2)
BASOPHILS NFR BLD AUTO: 0.5 % — SIGNIFICANT CHANGE UP (ref 0–2)
BILIRUB SERPL-MCNC: 0.4 MG/DL — SIGNIFICANT CHANGE UP (ref 0.2–1.2)
BUN SERPL-MCNC: 56 MG/DL — HIGH (ref 7–23)
CALCIUM SERPL-MCNC: 8.5 MG/DL — SIGNIFICANT CHANGE UP (ref 8.5–10.1)
CHLORIDE SERPL-SCNC: 108 MMOL/L — SIGNIFICANT CHANGE UP (ref 96–108)
CO2 SERPL-SCNC: 22 MMOL/L — SIGNIFICANT CHANGE UP (ref 22–31)
CREAT SERPL-MCNC: 6.6 MG/DL — HIGH (ref 0.5–1.3)
EGFR: 8 ML/MIN/1.73M2 — LOW
EOSINOPHIL # BLD AUTO: 0.35 K/UL — SIGNIFICANT CHANGE UP (ref 0–0.5)
EOSINOPHIL NFR BLD AUTO: 4 % — SIGNIFICANT CHANGE UP (ref 0–6)
GLUCOSE SERPL-MCNC: 139 MG/DL — HIGH (ref 70–99)
HCT VFR BLD CALC: 29.6 % — LOW (ref 39–50)
HCV AB S/CO SERPL IA: 7.4 S/CO — HIGH (ref 0–0.99)
HCV AB SERPL-IMP: REACTIVE
HGB BLD-MCNC: 9.7 G/DL — LOW (ref 13–17)
IMM GRANULOCYTES NFR BLD AUTO: 0.5 % — SIGNIFICANT CHANGE UP (ref 0–0.9)
LYMPHOCYTES # BLD AUTO: 0.92 K/UL — LOW (ref 1–3.3)
LYMPHOCYTES # BLD AUTO: 10.5 % — LOW (ref 13–44)
MCHC RBC-ENTMCNC: 31.1 PG — SIGNIFICANT CHANGE UP (ref 27–34)
MCHC RBC-ENTMCNC: 32.8 GM/DL — SIGNIFICANT CHANGE UP (ref 32–36)
MCV RBC AUTO: 94.9 FL — SIGNIFICANT CHANGE UP (ref 80–100)
MONOCYTES # BLD AUTO: 0.47 K/UL — SIGNIFICANT CHANGE UP (ref 0–0.9)
MONOCYTES NFR BLD AUTO: 5.4 % — SIGNIFICANT CHANGE UP (ref 2–14)
NEUTROPHILS # BLD AUTO: 6.96 K/UL — SIGNIFICANT CHANGE UP (ref 1.8–7.4)
NEUTROPHILS NFR BLD AUTO: 79.1 % — HIGH (ref 43–77)
NRBC # BLD: 0 /100 WBCS — SIGNIFICANT CHANGE UP (ref 0–0)
PHOSPHATE SERPL-MCNC: 3.9 MG/DL — SIGNIFICANT CHANGE UP (ref 2.5–4.5)
PLATELET # BLD AUTO: 173 K/UL — SIGNIFICANT CHANGE UP (ref 150–400)
POTASSIUM SERPL-MCNC: 4.5 MMOL/L — SIGNIFICANT CHANGE UP (ref 3.5–5.3)
POTASSIUM SERPL-SCNC: 4.5 MMOL/L — SIGNIFICANT CHANGE UP (ref 3.5–5.3)
PROT SERPL-MCNC: 5.4 G/DL — LOW (ref 6–8.3)
RBC # BLD: 3.12 M/UL — LOW (ref 4.2–5.8)
RBC # FLD: 14.6 % — HIGH (ref 10.3–14.5)
SODIUM SERPL-SCNC: 137 MMOL/L — SIGNIFICANT CHANGE UP (ref 135–145)
WBC # BLD: 8.78 K/UL — SIGNIFICANT CHANGE UP (ref 3.8–10.5)
WBC # FLD AUTO: 8.78 K/UL — SIGNIFICANT CHANGE UP (ref 3.8–10.5)

## 2023-07-18 PROCEDURE — 99233 SBSQ HOSP IP/OBS HIGH 50: CPT

## 2023-07-18 RX ORDER — CARVEDILOL PHOSPHATE 80 MG/1
12.5 CAPSULE, EXTENDED RELEASE ORAL AT BEDTIME
Refills: 0 | Status: DISCONTINUED | OUTPATIENT
Start: 2023-07-19 | End: 2023-07-19

## 2023-07-18 RX ADMIN — HEPARIN SODIUM 5000 UNIT(S): 5000 INJECTION INTRAVENOUS; SUBCUTANEOUS at 14:26

## 2023-07-18 RX ADMIN — PIPERACILLIN AND TAZOBACTAM 25 GRAM(S): 4; .5 INJECTION, POWDER, LYOPHILIZED, FOR SOLUTION INTRAVENOUS at 05:55

## 2023-07-18 RX ADMIN — HEPARIN SODIUM 5000 UNIT(S): 5000 INJECTION INTRAVENOUS; SUBCUTANEOUS at 21:28

## 2023-07-18 RX ADMIN — PIPERACILLIN AND TAZOBACTAM 25 GRAM(S): 4; .5 INJECTION, POWDER, LYOPHILIZED, FOR SOLUTION INTRAVENOUS at 18:16

## 2023-07-18 RX ADMIN — HEPARIN SODIUM 5000 UNIT(S): 5000 INJECTION INTRAVENOUS; SUBCUTANEOUS at 05:55

## 2023-07-18 RX ADMIN — CARVEDILOL PHOSPHATE 25 MILLIGRAM(S): 80 CAPSULE, EXTENDED RELEASE ORAL at 14:26

## 2023-07-18 RX ADMIN — Medication 325 MILLIGRAM(S): at 14:26

## 2023-07-18 RX ADMIN — TAMSULOSIN HYDROCHLORIDE 0.4 MILLIGRAM(S): 0.4 CAPSULE ORAL at 21:28

## 2023-07-18 NOTE — PROGRESS NOTE ADULT - ASSESSMENT
Mr Hawthorne is a 73 yo M presenting with fevers and abdominal pain concerning for biliary leak after recent partial cholecystectomy. PMH recent admission for Klebsiella bacteremia related to acute cholecystitis in June 2023, polycystic kidney disease s/p L nephrectomy on ESRD MWF, hx of Hep C treated, HTN, Polio.     Biliary Leak: concern for biliary leak:  Had laparoscopic cholecystectomy on 6/21 showing necrotic GB with bile leak afterward so underwent ERCP on 6/22 with stent placement.   Was on antibiotics for about 7-8days and was discharged and later drain was removed.   Per IR eval no drainable collection  -CT scan results reviewed  -GI and surgery  following  -There is possibility of intra-abdominal collections due to recent surgery or bile leakage.   -Will cover abdominal yoel until work up is back.   -CT result with collection in abdomen, as per IR not drainable   -HIDA negative for Bile leak  -continue IV abx given recent Klebsiella bacteremia and patient with fevers and chills  -Po diet advance as tolerated and IVF for hydration.     ESRD on HD 2/2 pCKD:   -Dr Wayne consulted. HD today    HTN: BP Stable and at goal  -continue Coreg and Novasc    Anemia of chronic disease: no signs or symptoms of active bleeding. Continue to monitor hgb.     DVT ppx:  lovenox    Case discussed with surgery

## 2023-07-18 NOTE — PROGRESS NOTE ADULT - SUBJECTIVE AND OBJECTIVE BOX
[facility]  INFECTIOUS DISEASES   [address] [place] [address]  Tel: [phone]     Fax: [phone]  ========================================================  MD Charles Fernandez Kaushal, MD Cho, Michelle, MD Sunjit, Jaspal, MD  ========================================================    MRN-[id number]  CLEO COLERADHAA     Follow up; Abdominal pain     Doing better, less pain. No fever.   HIDA negative for bile leakage.     PAST MEDICAL & SURGICAL HISTORY:  HTN  HCV  polio  PCKD s/p L nephrectomy  ESRD on HD     Social Hx: No smoking, EtOH or drugs     FAMILY HISTORY: Noncontributory     Allergies  Demerol (Faint)    Antibiotics:  MEDICATIONS  (STANDING):  amLODIPine   Tablet 10 milliGRAM(s) Oral daily  carvedilol 25 milliGRAM(s) Oral daily  ferrous    sulfate 325 milliGRAM(s) Oral daily  piperacillin/tazobactam IVPB.. 3.375 Gram(s) IV Intermittent every 12 hours  sodium chloride 0.9%. 1000 milliLiter(s) (100 mL/Hr) IV Continuous <Continuous>  tamsulosin 0.4 milliGRAM(s) Oral at bedtime    MEDICATIONS  (PRN):  acetaminophen     Tablet .. 650 milliGRAM(s) Oral every 6 hours PRN Temp greater or equal to 38C (100.4F), Mild Pain (1 - 3)  aluminum hydroxide/magnesium hydroxide/simethicone Suspension 30 milliLiter(s) Oral every 4 hours PRN Dyspepsia  melatonin 3 milliGRAM(s) Oral at bedtime PRN Insomnia  ondansetron Injectable 4 milliGRAM(s) IV Push every 8 hours PRN Nausea and/or Vomiting     REVIEW OF SYSTEMS:  CONSTITUTIONAL:  + Fever   HEENT:  No diplopia or blurred vision.  No sore throat or runny nose.  CARDIOVASCULAR:  No chest pain or SOB.  RESPIRATORY:  No cough, shortness of breath, PND or orthopnea.  GASTROINTESTINAL:  No nausea, vomiting or diarrhea. + abdominal pain   GENITOURINARY:  No dysuria, frequency or urgency. No Blood in urine  MUSCULOSKELETAL:  no joint aches, no muscle pain  SKIN:  No change in skin, hair or nails.  NEUROLOGIC:  No paresthesias or weakness.  PSYCHIATRIC:  No disorder of thought or mood.  ENDOCRINE:  No heat or cold intolerance, polyuria or polydipsia.  HEMATOLOGICAL:  No easy bruising or bleeding.     Physical Exam:  Vital Signs Last 24 Hrs  T(C): 36.8 (18 Jul 2023 10:15), Max: 37.1 (17 Jul 2023 20:38)  T(F): 98.2 (18 Jul 2023 10:15), Max: 98.7 (17 Jul 2023 20:38)  HR: 82 (18 Jul 2023 10:20) (56 - 90)  BP: 97/61 (18 Jul 2023 10:20) (97/61 - 126/71)  BP(mean): --  RR: 18 (18 Jul 2023 10:15) (17 - 18)  SpO2: 99% (18 Jul 2023 10:15) (92% - 99%)  Parameters below as of 18 Jul 2023 10:15  Patient On (Oxygen Delivery Method): room air  GEN: NAD  HEENT: normocephalic and atraumatic. EOMI. PERRL.    NECK: Supple.  No lymphadenopathy   LUNGS: Clear to auscultation.  HEART: Regular rate and rhythm  ABDOMEN: Soft, mild lower abdominal tenderness  surgical wounds are healing well, no infection or dehiscence    : No CVA tenderness  EXTREMITIES: Without edema.  NEUROLOGIC: grossly intact.  PSYCHIATRIC: Appropriate affect .  SKIN: No rash     Labs:                        9.7    8.78  )-----------( 173      ( 18 Jul 2023 09:05 )             29.6     07-18    137  |  108  |  56<H>  ----------------------------<  139<H>  4.5   |  22  |  6.60<H>    Ca    8.5      18 Jul 2023 09:05  Phos  3.9     07-18    TPro  5.4<L>  /  Alb  2.1<L>  /  TBili  0.4  /  DBili  x   /  AST  8<L>  /  ALT  17  /  AlkPhos  69  07-18    Culture - Urine (collected 07-17-23 @ 06:40)  Source: Clean Catch Clean Catch (Midstream)  Final Report (07-18-23 @ 07:41):    >=3 organisms. Probable collection contamination.    WBC Count: 8.78 K/uL (07-18-23 @ 09:05)  WBC Count: 10.98 K/uL (07-17-23 @ 04:30)    Creatinine: 6.60 mg/dL (07-18-23 @ 09:05)  Creatinine: 6.60 mg/dL (07-17-23 @ 04:45)     SARS-CoV-2: NotDetec (07-17-23 @ 09:50)    All imaging and other data have been reviewed.  CT 6/27:   IMPRESSION:  Innumerable right renal cysts measuring up to 5.3 cm.  Left nephrectomy.  Complex fluid collection adjacent to the liver. Given the history of  recent cholecystectomy, postsurgical changes may have this appearance.  Infection not excluded.    CT 7/17:   IMPRESSION:  Postsurgical changes from partial cholecystectomy. There is a 4 cm air-fluid collection in the gallbladder fossa which likely represents the gallbladder remnant.   Small amount of free air and free fluid in the right upper quadrant is present, concerning for persistent biliary leak given the clinical history and recent surgical drain removal.     Assessment and Plan:   73 yo man with PMH of HTN, HCV, polio, PCKD s/p L nephrectomy, ESRD on HD through left forearm fistula, was admitted at [facility] 6/18 to 6/28 for Klebsiella bacteremia related to acute cholecystitis.  Had laparoscopic cholecystectomy ib 6/21 showing necrotic GB with bile leak afterward so underwent ERCP on 6/22 with stent placement.   Was on antibiotics for about 7-8days and was discharged and later drain was removed.   [facility] records reviewed MRN # [id number]   Admitted at [facility] on 7/16 with fever and lower abdominal pain.   There is possibility of intra-abdominal collections due to recent surgery or bile leakage.   Will cover abdominal yoel until work up is back.   CT result with collection in abdomen, as per IR not drainable   HIDA negative for Bile leak    # Intraabdominal infection   - Blood cultures x 2 testing, will follow  - UA negative and UC contamination   - GI consult noted   - Will monitor WBC, Tmax both normal today   - Will continue zosyn 3.375gm q8 for now     Will follow.    Branid Piedra MD  Division of Infectious Diseases   Please call ID service at [phone] with any question.    35 minutes spent on total encounter assessing patient, examination, chart review, counseling and coordinating care by the attending physician/nurse/care manager.

## 2023-07-18 NOTE — PROGRESS NOTE ADULT - SUBJECTIVE AND OBJECTIVE BOX
SURGERY PA NOTE ON BEHALF OF DR. SAL:    S: Patient seen and examined at bedside.  No acute events overnight.      MEDICATIONS:  acetaminophen     Tablet .. 650 milliGRAM(s) Oral every 6 hours PRN  aluminum hydroxide/magnesium hydroxide/simethicone Suspension 30 milliLiter(s) Oral every 4 hours PRN  carvedilol 25 milliGRAM(s) Oral daily  ferrous    sulfate 325 milliGRAM(s) Oral daily  heparin   Injectable 5000 Unit(s) SubCutaneous every 8 hours  melatonin 3 milliGRAM(s) Oral at bedtime PRN  ondansetron Injectable 4 milliGRAM(s) IV Push every 8 hours PRN  piperacillin/tazobactam IVPB.. 3.375 Gram(s) IV Intermittent every 12 hours  sodium chloride 0.9%. 1000 milliLiter(s) IV Continuous <Continuous>  tamsulosin 0.4 milliGRAM(s) Oral at bedtime      O:  Vital Signs Last 24 Hrs  T(C): 37.1 (18 Jul 2023 04:33), Max: 37.1 (17 Jul 2023 20:38)  T(F): 98.7 (18 Jul 2023 04:33), Max: 98.7 (17 Jul 2023 20:38)  HR: 83 (18 Jul 2023 04:33) (56 - 90)  BP: 110/62 (18 Jul 2023 04:33) (110/62 - 126/71)  BP(mean): --  RR: 17 (18 Jul 2023 04:33) (17 - 18)  SpO2: 92% (18 Jul 2023 04:33) (92% - 96%)    Parameters below as of 18 Jul 2023 04:33  Patient On (Oxygen Delivery Method): room air        I&O SUMMARY:    07-17-23 @ 07:01  -  07-18-23 @ 07:00  --------------------------------------------------------  IN: 0 mL / OUT: 300 mL / NET: -300 mL        PHYSICAL EXAM:  Lungs: CTA bilat without W/R/R  Card: S1S2  Abd: Soft, NT, ND.  +BS x 4.  No rebound/guarding.    Ext: Calves soft, NT, without edema bilat    LABS:                        11.8   10.98 )-----------( 237      ( 17 Jul 2023 04:30 )             36.8     07-17    137  |  105  |  54<H>  ----------------------------<  157<H>  4.9   |  26  |  6.60<H>    Ca    9.6      17 Jul 2023 04:45    TPro  6.3  /  Alb  2.6<L>  /  TBili  0.4  /  DBili  x   /  AST  18  /  ALT  24  /  AlkPhos  91  07-17              RADIOLOGY:    A:    P:       SURGERY PA NOTE ON BEHALF OF DR. HOLLEY:    S: Patient seen and examined at bedside.  No acute events overnight.  Patient reports improved abdominal pain since admission.  No BMs yet, minimal flatus.  Denies fevers, chills, chest pain, SOB, palpitations, calf pain.    MEDICATIONS:  acetaminophen     Tablet .. 650 milliGRAM(s) Oral every 6 hours PRN  aluminum hydroxide/magnesium hydroxide/simethicone Suspension 30 milliLiter(s) Oral every 4 hours PRN  carvedilol 25 milliGRAM(s) Oral daily  ferrous    sulfate 325 milliGRAM(s) Oral daily  heparin   Injectable 5000 Unit(s) SubCutaneous every 8 hours  melatonin 3 milliGRAM(s) Oral at bedtime PRN  ondansetron Injectable 4 milliGRAM(s) IV Push every 8 hours PRN  piperacillin/tazobactam IVPB.. 3.375 Gram(s) IV Intermittent every 12 hours  sodium chloride 0.9%. 1000 milliLiter(s) IV Continuous <Continuous>  tamsulosin 0.4 milliGRAM(s) Oral at bedtime      O:  Vital Signs Last 24 Hrs  T(C): 37.1 (18 Jul 2023 04:33), Max: 37.1 (17 Jul 2023 20:38)  T(F): 98.7 (18 Jul 2023 04:33), Max: 98.7 (17 Jul 2023 20:38)  HR: 83 (18 Jul 2023 04:33) (56 - 90)  BP: 110/62 (18 Jul 2023 04:33) (110/62 - 126/71)  BP(mean): --  RR: 17 (18 Jul 2023 04:33) (17 - 18)  SpO2: 92% (18 Jul 2023 04:33) (92% - 96%)    Parameters below as of 18 Jul 2023 04:33  Patient On (Oxygen Delivery Method): room air        I&O SUMMARY:    07-17-23 @ 07:01  -  07-18-23 @ 07:00  --------------------------------------------------------  IN: 0 mL / OUT: 300 mL / NET: -300 mL        PHYSICAL EXAM:  Lungs: CTA bilat without W/R/R  Card: S1S2  Abd: Soft, NT, ND.  +BS x 4.  No rebound/guarding.  Abdominal incisions c/d/I.    Ext: Calves soft, NT, without edema bilat    LABS:                        11.8   10.98 )-----------( 237      ( 17 Jul 2023 04:30 )             36.8     07-17    137  |  105  |  54<H>  ----------------------------<  157<H>  4.9   |  26  |  6.60<H>    Ca    9.6      17 Jul 2023 04:45    TPro  6.3  /  Alb  2.6<L>  /  TBili  0.4  /  DBili  x   /  AST  18  /  ALT  24  /  AlkPhos  91  07-17              RADIOLOGY:    ACC: [id number] EXAM:  NM HEPATOBILIARY IMG   ORDERED BY: RAFAEL EASON     PROCEDURE DATE:  07/17/2023          INTERPRETATION:  CLINICAL INFORMATION: 72-year-old gentleman with a   complicated course status post partial cholecystectomy and biliary leak.    DURATION of DYNAMIC SERIES: 120 minutes    RADIOPHARMACEUTICAL: 2.9 mCi Tc-99m-Mebrofenin, I.V.    PHARMACOLOGIC INTERVENTION: None.    TECHNIQUE: Dynamic imaging of the anterior abdomen was performed   following radiopharmaceutical injection. Static images of the abdomen in   the anterior, right anterior oblique, and right lateral views were   obtained immediately thereafter.    COMPARISON: None    OTHER STUDIES USED FOR CORRELATION: None    FINDINGS: Prompt radiotracer uptake by the liver and prompt blood pool   clearance with bowel clearance noted within the first 15 minutes. There   is no definite evidence of a biliary leak.    IMPRESSION: No scintigraphic evidence of a biliary leak.       JOANNE BRANHAM MD; Attending Nuclear Medicine  This document has been electronically signed. Jul 17 2023  2:06PM        Assessment:  71 yo male post partial antonio complicated by biliary leak, post ERCP and stent placement at [place],  post iv antibiotics treatment for bacteriemia presented with fever and abdominal pain.  HIDA negative 7/18    Plan:  - No surgical contraindication to diet advancement   - No surgical intervention at this time  - Surg to follow  - Discussed with Dr. Holley

## 2023-07-18 NOTE — CARE COORDINATION ASSESSMENT. - OTHER PERTINENT REFERRAL INFORMATION
Met with patient at the bedside. Explained the role of case management/discharge planning. Patient verbalized understanding. Provided contact information and discharge planning packet. Met with patient at the bedside. Explained the role of case management/discharge planning. Patient verbalized understanding. Provided contact information and discharge planning packet. Patient resides with his daughter in a pvt home with 2 steps to enter. Patient ambulated with crutches PTA. Patient receives HD at [facility] in [place] M, W, F @ 3pm.  Patient admitted with abd pain. DX Biliary drain.  Patient stated he had [facility] for VN, PT and HHA PTA. Anticipated discharge is for patient to return home with re-establsihed home care services with [facility]. Patient agreeable to discharge plan. Will remain available to patient and family throughout hospital stay

## 2023-07-18 NOTE — CARE COORDINATION ASSESSMENT. - NSPASTMEDSURGHISTORY_GEN_ALL_CORE_FT
PAST MEDICAL & SURGICAL HISTORY:  Cancer of kidney, left      Renal failure, chronic      Hypertension      History of left nephrectomy

## 2023-07-18 NOTE — PROGRESS NOTE ADULT - ASSESSMENT
abdominal pain  r/o bile leak  hx subtotal cholecystectomy  s/p ERCP with stent placement in June  s/p biliary drain removal 1 week ago    CT noted  LFTs noted, trend  HIDA noted  Per IR eval no drainable collection  Pain control  IVF  IV abx  D/w pt  Will follow    I reviewed the overnight course of events on the unit, re-confirming the patient history. I discussed the care with the patient and their family  Differential diagnosis and plan of care discussed with patient after the evaluation  40 minutes spent on total encounter of which more than fifty percent of the encounter was spent counseling and/or coordinating care by the attending physician.  Advanced care planning was discussed with patient and family.  Advanced care planning forms were reviewed and discussed.  Risks, benefits and alternatives of gastroenterologic procedures were discussed in detail and all questions were answered.

## 2023-07-18 NOTE — CARE COORDINATION ASSESSMENT. - NSCAREPROVIDERS_GEN_ALL_CORE_FT
CARE PROVIDERS:  Accepting Physician: Jersey Soto  Administration: Maria Ines Jimenes  Administration: Cosme Long  Admitting: Jersey Soto  Attending: Jersey Soto  Case Management: Khushboo Del Valle  Clinical Doc. Improvement: Bishnu Tiraod  Consultant: Geoffrey Montanez  Consultant: Norma Altman  Consultant: Mildred Cline  Consultant: Martha Rajan  Consultant: Willis Lara  Consultant: Elieser Holley  Consultant: Cierra Eagle  Consultant: Damian Raman  Consultant: Mireille Felix  Consultant: Phuc Philippe  Consultant: Brian Jordan  Consultant: Brandi Piedra  ED Attending: Nikkie Frank ED Nurse: Janice Saucedo  Nurse: Franklin Hartmann  Nurse: Chandra Luis  Nurse: Silvana Alvarenga  Nurse: Doreen Mccann  Ordered: ADM, User  Ordered: Conrad, Flower  Outpatient Provider: Adonis Lewis  Override: Cathy Harkins  Override: Jennifer Mccord  Override: Terra Najera  PCA/Nursing Assistant: Opal Bowden  PCA/Nursing Assistant: Ladonna Valderrama  Registered Dietitian: Linda Justice

## 2023-07-18 NOTE — PROGRESS NOTE ADULT - SUBJECTIVE AND OBJECTIVE BOX
Patient is a 72y old  Male who presents with a chief complaint of BIliary Leak, infection (18 Jul 2023 12:57)      INTERVAL HPI/OVERNIGHT EVENTS: Pt seen and examined bedside, has no complaints. His abd pain is better. still has tenderness of lower abd. No N/V. NO BM.    MEDICATIONS  (STANDING):  carvedilol 25 milliGRAM(s) Oral daily  ferrous    sulfate 325 milliGRAM(s) Oral daily  heparin   Injectable 5000 Unit(s) SubCutaneous every 8 hours  piperacillin/tazobactam IVPB.. 3.375 Gram(s) IV Intermittent every 12 hours  sodium chloride 0.9%. 1000 milliLiter(s) (50 mL/Hr) IV Continuous <Continuous>  tamsulosin 0.4 milliGRAM(s) Oral at bedtime    MEDICATIONS  (PRN):  acetaminophen     Tablet .. 650 milliGRAM(s) Oral every 6 hours PRN Temp greater or equal to 38C (100.4F), Mild Pain (1 - 3)  aluminum hydroxide/magnesium hydroxide/simethicone Suspension 30 milliLiter(s) Oral every 4 hours PRN Dyspepsia  melatonin 3 milliGRAM(s) Oral at bedtime PRN Insomnia  ondansetron Injectable 4 milliGRAM(s) IV Push every 8 hours PRN Nausea and/or Vomiting      Allergies    Demerol HCl (Unknown)  Demerol (Faint)    Intolerances        REVIEW OF SYSTEMS:  CONSTITUTIONAL: No fever or chills  HEENT:  No headache, no sore throat  RESPIRATORY: No cough, wheezing, or shortness of breath  CARDIOVASCULAR: No chest pain, palpitations, or leg swelling  GASTROINTESTINAL: No abd pain, nausea, vomiting, or diarrhea  GENITOURINARY: No dysuria, frequency, or hematuria  NEUROLOGICAL: no focal weakness or dizziness  MUSCULOSKELETAL: no myalgias     Vital Signs Last 24 Hrs  T(C): 36.7 (18 Jul 2023 15:00), Max: 37.1 (17 Jul 2023 20:38)  T(F): 98 (18 Jul 2023 15:00), Max: 98.7 (17 Jul 2023 20:38)  HR: 78 (18 Jul 2023 15:00) (56 - 90)  BP: 124/71 (18 Jul 2023 15:00) (97/61 - 126/71)  BP(mean): --  RR: 18 (18 Jul 2023 15:00) (17 - 18)  SpO2: 98% (18 Jul 2023 15:00) (92% - 99%)    Parameters below as of 18 Jul 2023 15:00  Patient On (Oxygen Delivery Method): room air        PHYSICAL EXAM:  GENERAL: NAD  HEENT:  EOMI, moist mucous membranes  CHEST/LUNG:  CTA b/l, no rales, wheezes, or rhonchi  HEART:  RRR, S1, S2  ABDOMEN:  BS+, soft, tender mid abd, nondistended  EXTREMITIES: no edema, cyanosis, or calf tenderness  NERVOUS SYSTEM: AA&Ox3    LABS:                        9.7    8.78  )-----------( 173      ( 18 Jul 2023 09:05 )             29.6     CBC Full  -  ( 18 Jul 2023 09:05 )  WBC Count : 8.78 K/uL  Hemoglobin : 9.7 g/dL  Hematocrit : 29.6 %  Platelet Count - Automated : 173 K/uL  Mean Cell Volume : 94.9 fl  Mean Cell Hemoglobin : 31.1 pg  Mean Cell Hemoglobin Concentration : 32.8 gm/dL  Auto Neutrophil # : 6.96 K/uL  Auto Lymphocyte # : 0.92 K/uL  Auto Monocyte # : 0.47 K/uL  Auto Eosinophil # : 0.35 K/uL  Auto Basophil # : 0.04 K/uL  Auto Neutrophil % : 79.1 %  Auto Lymphocyte % : 10.5 %  Auto Monocyte % : 5.4 %  Auto Eosinophil % : 4.0 %  Auto Basophil % : 0.5 %    18 Jul 2023 09:05    137    |  108    |  56     ----------------------------<  139    4.5     |  22     |  6.60     Ca    8.5        18 Jul 2023 09:05  Phos  3.9       18 Jul 2023 09:05    TPro  5.4    /  Alb  2.1    /  TBili  0.4    /  DBili  x      /  AST  8      /  ALT  17     /  AlkPhos  69     18 Jul 2023 09:05      Urinalysis Basic - ( 18 Jul 2023 09:05 )    Color: x / Appearance: x / SG: x / pH: x  Gluc: 139 mg/dL / Ketone: x  / Bili: x / Urobili: x   Blood: x / Protein: x / Nitrite: x   Leuk Esterase: x / RBC: x / WBC x   Sq Epi: x / Non Sq Epi: x / Bacteria: x      CAPILLARY BLOOD GLUCOSE            Culture - Blood (collected 07-17-23 @ 07:25)  Source: .Blood Blood-Venous  Preliminary Report (07-18-23 @ 14:01):    No growth at 24 hours    Culture - Blood (collected 07-17-23 @ 07:20)  Source: .Blood Blood-Venous  Preliminary Report (07-18-23 @ 14:01):    No growth at 24 hours    Culture - Urine (collected 07-17-23 @ 06:40)  Source: Clean Catch Clean Catch (Midstream)  Final Report (07-18-23 @ 07:41):    >=3 organisms. Probable collection contamination.        RADIOLOGY & ADDITIONAL TESTS:    Personally reviewed.     Consultant(s) Notes Reviewed:  [x] YES  [ ] NO    Care Discussed with [x] Consultants  [x] Patient  [ ] Family  [ ]      [ ] Other; RN  DVT ppx

## 2023-07-18 NOTE — PROVIDER CONTACT NOTE (CRITICAL VALUE NOTIFICATION) - TEST AND RESULT REPORTED:
Hep Constatnt observation continued to prevent patient from pulling out tubes. intrepretation reactive Hepatitis Constatnt observation continued to prevent patient from pulling out tubes.  Hepatitis c intrepretation reactive  Hepatitis c intrepretation reactive Hepatitis c intrepretation reactive  Hepatitis c intrepretation reactive

## 2023-07-18 NOTE — PROGRESS NOTE ADULT - SUBJECTIVE AND OBJECTIVE BOX
[facility]  Pastor Felix PA-C  [address] [place] [address]  [phone]      INTERVAL HPI/OVERNIGHT EVENTS:  Pt s/e  Abdominal pain improving  HIDA negative for bile leak  IR eval noted    MEDICATIONS  (STANDING):  carvedilol 25 milliGRAM(s) Oral daily  ferrous    sulfate 325 milliGRAM(s) Oral daily  heparin   Injectable 5000 Unit(s) SubCutaneous every 8 hours  piperacillin/tazobactam IVPB.. 3.375 Gram(s) IV Intermittent every 12 hours  sodium chloride 0.9%. 1000 milliLiter(s) (50 mL/Hr) IV Continuous <Continuous>  tamsulosin 0.4 milliGRAM(s) Oral at bedtime    MEDICATIONS  (PRN):  acetaminophen     Tablet .. 650 milliGRAM(s) Oral every 6 hours PRN Temp greater or equal to 38C (100.4F), Mild Pain (1 - 3)  aluminum hydroxide/magnesium hydroxide/simethicone Suspension 30 milliLiter(s) Oral every 4 hours PRN Dyspepsia  melatonin 3 milliGRAM(s) Oral at bedtime PRN Insomnia  ondansetron Injectable 4 milliGRAM(s) IV Push every 8 hours PRN Nausea and/or Vomiting      Allergies    Demerol HCl (Unknown)  Demerol (Faint)    PHYSICAL EXAM:   Vital Signs:  Vital Signs Last 24 Hrs  T(C): 37.1 (2023 04:33), Max: 37.1 (2023 20:38)  T(F): 98.7 (2023 04:33), Max: 98.7 (2023 20:38)  HR: 83 (2023 04:33) (56 - 90)  BP: 110/62 (2023 04:33) (110/62 - 126/71)  BP(mean): --  RR: 17 (2023 04:33) (17 - 18)  SpO2: 92% (2023 04:33) (92% - 96%)    Parameters below as of 2023 04:33  Patient On (Oxygen Delivery Method): room air      Daily     Daily Weight in k.5 (2023 04:33)    GENERAL:  Appears stated age  HEENT:  NC/AT  CHEST:  Full & symmetric excursion  HEART:  Regular rhythm  ABDOMEN:  Soft, non-tender, non-distended  EXTEREMITIES:  no cyanosis  SKIN:  No rash  NEURO:  Alert      LABS:                        9.7    8.78  )-----------( 173      ( 2023 09:05 )             29.6     07-18    137  |  108  |  56<H>  ----------------------------<  139<H>  4.5   |  22  |  6.60<H>    Ca    8.5      2023 09:05  Phos  3.9     07-18    TPro  5.4<L>  /  Alb  2.1<L>  /  TBili  0.4  /  DBili  x   /  AST  8<L>  /  ALT  17  /  AlkPhos  69  07-18      Urinalysis Basic - ( 2023 09:05 )    Color: x / Appearance: x / SG: x / pH: x  Gluc: 139 mg/dL / Ketone: x  / Bili: x / Urobili: x   Blood: x / Protein: x / Nitrite: x   Leuk Esterase: x / RBC: x / WBC x   Sq Epi: x / Non Sq Epi: x / Bacteria: x

## 2023-07-18 NOTE — PROGRESS NOTE ADULT - SUBJECTIVE AND OBJECTIVE BOX
S/p stable HD today    Vital Signs Last 24 Hrs  T(C): 36.7 (07-18-23 @ 15:00), Max: 37.1 (07-17-23 @ 20:38)  T(F): 98 (07-18-23 @ 15:00), Max: 98.7 (07-17-23 @ 20:38)  HR: 78 (07-18-23 @ 15:00) (56 - 90)  BP: 124/71 (07-18-23 @ 15:00) (97/61 - 130/75)  RR: 18 (07-18-23 @ 15:00) (17 - 18)  SpO2: 98% (07-18-23 @ 15:00) (92% - 99%)    s1s2  b/l air entry  soft, ND  no edema                         9.7    8.78  )-----------( 173      ( 18 Jul 2023 09:05 )             29.6     18 Jul 2023 09:05    137    |  108    |  56     ----------------------------<  139    4.5     |  22     |  6.60     Ca    8.5        18 Jul 2023 09:05  Phos  3.9       18 Jul 2023 09:05    TPro  5.4    /  Alb  2.1    /  TBili  0.4    /  DBili  x      /  AST  8      /  ALT  17     /  AlkPhos  69     18 Jul 2023 09:05    LIVER FUNCTIONS - ( 18 Jul 2023 09:05 )  Alb: 2.1 g/dL / Pro: 5.4 g/dL / ALK PHOS: 69 U/L / ALT: 17 U/L / AST: 8 U/L / GGT: x           Culture - Blood (collected 17 Jul 2023 07:25)  Source: .Blood Blood-Venous  Preliminary Report (18 Jul 2023 14:01):    No growth at 24 hours    Culture - Blood (collected 17 Jul 2023 07:20)  Source: .Blood Blood-Venous  Preliminary Report (18 Jul 2023 14:01):    No growth at 24 hours    Culture - Urine (collected 17 Jul 2023 06:40)  Source: Clean Catch Clean Catch (Midstream)  Final Report (18 Jul 2023 07:41):    >=3 organisms. Probable collection contamination.    acetaminophen     Tablet .. 650 milliGRAM(s) Oral every 6 hours PRN  aluminum hydroxide/magnesium hydroxide/simethicone Suspension 30 milliLiter(s) Oral every 4 hours PRN  carvedilol 25 milliGRAM(s) Oral daily  ferrous    sulfate 325 milliGRAM(s) Oral daily  heparin   Injectable 5000 Unit(s) SubCutaneous every 8 hours  melatonin 3 milliGRAM(s) Oral at bedtime PRN  ondansetron Injectable 4 milliGRAM(s) IV Push every 8 hours PRN  piperacillin/tazobactam IVPB 3.375 Gram(s) IV Intermittent every 12 hours  sodium chloride 0.9%. 1000 milliLiter(s) IV Continuous <Continuous>  tamsulosin 0.4 milliGRAM(s) Oral at bedtime    A/P:    S/p admission at [facility] for Klebsiella bacteremia due to acute cholecystitis in June 2023  S/p robotic subtotal cholecystectomy on 6/21/23  S/p ERCP with stent on 6/22/23  Surgical drain was recently removed  Adm w/abd pain   Surgery, GI following  HD today   Abx per ID  Will follow     [phone]

## 2023-07-19 ENCOUNTER — TRANSCRIPTION ENCOUNTER (OUTPATIENT)
Age: 73
End: 2023-07-19

## 2023-07-19 VITALS
HEART RATE: 81 BPM | RESPIRATION RATE: 18 BRPM | DIASTOLIC BLOOD PRESSURE: 76 MMHG | OXYGEN SATURATION: 95 % | TEMPERATURE: 98 F | SYSTOLIC BLOOD PRESSURE: 128 MMHG

## 2023-07-19 LAB
ALBUMIN SERPL ELPH-MCNC: 2.1 G/DL — LOW (ref 3.3–5)
ALP SERPL-CCNC: 69 U/L — SIGNIFICANT CHANGE UP (ref 40–120)
ALT FLD-CCNC: 16 U/L — SIGNIFICANT CHANGE UP (ref 12–78)
ANION GAP SERPL CALC-SCNC: 5 MMOL/L — SIGNIFICANT CHANGE UP (ref 5–17)
AST SERPL-CCNC: 10 U/L — LOW (ref 15–37)
BILIRUB SERPL-MCNC: 0.5 MG/DL — SIGNIFICANT CHANGE UP (ref 0.2–1.2)
BUN SERPL-MCNC: 31 MG/DL — HIGH (ref 7–23)
CALCIUM SERPL-MCNC: 8.6 MG/DL — SIGNIFICANT CHANGE UP (ref 8.5–10.1)
CHLORIDE SERPL-SCNC: 103 MMOL/L — SIGNIFICANT CHANGE UP (ref 96–108)
CO2 SERPL-SCNC: 29 MMOL/L — SIGNIFICANT CHANGE UP (ref 22–31)
CREAT SERPL-MCNC: 4.3 MG/DL — HIGH (ref 0.5–1.3)
EGFR: 14 ML/MIN/1.73M2 — LOW
GLUCOSE SERPL-MCNC: 94 MG/DL — SIGNIFICANT CHANGE UP (ref 70–99)
HCT VFR BLD CALC: 31.2 % — LOW (ref 39–50)
HGB BLD-MCNC: 10.3 G/DL — LOW (ref 13–17)
MCHC RBC-ENTMCNC: 30.8 PG — SIGNIFICANT CHANGE UP (ref 27–34)
MCHC RBC-ENTMCNC: 33 GM/DL — SIGNIFICANT CHANGE UP (ref 32–36)
MCV RBC AUTO: 93.4 FL — SIGNIFICANT CHANGE UP (ref 80–100)
NRBC # BLD: 0 /100 WBCS — SIGNIFICANT CHANGE UP (ref 0–0)
PLATELET # BLD AUTO: 187 K/UL — SIGNIFICANT CHANGE UP (ref 150–400)
POTASSIUM SERPL-MCNC: 3.9 MMOL/L — SIGNIFICANT CHANGE UP (ref 3.5–5.3)
POTASSIUM SERPL-SCNC: 3.9 MMOL/L — SIGNIFICANT CHANGE UP (ref 3.5–5.3)
PROT SERPL-MCNC: 5.9 G/DL — LOW (ref 6–8.3)
RBC # BLD: 3.34 M/UL — LOW (ref 4.2–5.8)
RBC # FLD: 14.3 % — SIGNIFICANT CHANGE UP (ref 10.3–14.5)
SODIUM SERPL-SCNC: 137 MMOL/L — SIGNIFICANT CHANGE UP (ref 135–145)
WBC # BLD: 6.99 K/UL — SIGNIFICANT CHANGE UP (ref 3.8–10.5)
WBC # FLD AUTO: 6.99 K/UL — SIGNIFICANT CHANGE UP (ref 3.8–10.5)

## 2023-07-19 PROCEDURE — 83605 ASSAY OF LACTIC ACID: CPT

## 2023-07-19 PROCEDURE — 81001 URINALYSIS AUTO W/SCOPE: CPT

## 2023-07-19 PROCEDURE — 86803 HEPATITIS C AB TEST: CPT

## 2023-07-19 PROCEDURE — 99231 SBSQ HOSP IP/OBS SF/LOW 25: CPT

## 2023-07-19 PROCEDURE — 80053 COMPREHEN METABOLIC PANEL: CPT

## 2023-07-19 PROCEDURE — 99261: CPT

## 2023-07-19 PROCEDURE — 78226 HEPATOBILIARY SYSTEM IMAGING: CPT

## 2023-07-19 PROCEDURE — 97162 PT EVAL MOD COMPLEX 30 MIN: CPT

## 2023-07-19 PROCEDURE — 83690 ASSAY OF LIPASE: CPT

## 2023-07-19 PROCEDURE — 74176 CT ABD & PELVIS W/O CONTRAST: CPT | Mod: MA

## 2023-07-19 PROCEDURE — 99233 SBSQ HOSP IP/OBS HIGH 50: CPT

## 2023-07-19 PROCEDURE — 99285 EMERGENCY DEPT VISIT HI MDM: CPT

## 2023-07-19 PROCEDURE — 36415 COLL VENOUS BLD VENIPUNCTURE: CPT

## 2023-07-19 PROCEDURE — 87086 URINE CULTURE/COLONY COUNT: CPT

## 2023-07-19 PROCEDURE — 87040 BLOOD CULTURE FOR BACTERIA: CPT

## 2023-07-19 PROCEDURE — A9537: CPT

## 2023-07-19 PROCEDURE — 71250 CT THORAX DX C-: CPT | Mod: MA

## 2023-07-19 PROCEDURE — 93005 ELECTROCARDIOGRAM TRACING: CPT

## 2023-07-19 PROCEDURE — 99239 HOSP IP/OBS DSCHRG MGMT >30: CPT

## 2023-07-19 PROCEDURE — 84100 ASSAY OF PHOSPHORUS: CPT

## 2023-07-19 PROCEDURE — 0225U NFCT DS DNA&RNA 21 SARSCOV2: CPT

## 2023-07-19 PROCEDURE — 87521 HEPATITIS C PROBE&RVRS TRNSC: CPT

## 2023-07-19 PROCEDURE — 85027 COMPLETE CBC AUTOMATED: CPT

## 2023-07-19 PROCEDURE — 96375 TX/PRO/DX INJ NEW DRUG ADDON: CPT

## 2023-07-19 PROCEDURE — 85025 COMPLETE CBC W/AUTO DIFF WBC: CPT

## 2023-07-19 PROCEDURE — 96365 THER/PROPH/DIAG IV INF INIT: CPT

## 2023-07-19 PROCEDURE — 82150 ASSAY OF AMYLASE: CPT

## 2023-07-19 RX ORDER — METRONIDAZOLE 500 MG
500 TABLET ORAL EVERY 8 HOURS
Refills: 0 | Status: DISCONTINUED | OUTPATIENT
Start: 2023-07-19 | End: 2023-07-19

## 2023-07-19 RX ORDER — CARVEDILOL PHOSPHATE 80 MG/1
1 CAPSULE, EXTENDED RELEASE ORAL
Refills: 0 | DISCHARGE

## 2023-07-19 RX ORDER — FERROUS SULFATE 325(65) MG
1 TABLET ORAL
Refills: 0 | DISCHARGE

## 2023-07-19 RX ORDER — ACETAMINOPHEN 500 MG
650 TABLET ORAL EVERY 6 HOURS
Refills: 0 | Status: DISCONTINUED | OUTPATIENT
Start: 2023-07-19 | End: 2023-07-19

## 2023-07-19 RX ORDER — FENTANYL CITRATE 50 UG/ML
1 INJECTION INTRAVENOUS
Refills: 0 | Status: DISCONTINUED | OUTPATIENT
Start: 2023-07-19 | End: 2023-07-19

## 2023-07-19 RX ORDER — TAMSULOSIN HYDROCHLORIDE 0.4 MG/1
1 CAPSULE ORAL
Refills: 0 | DISCHARGE

## 2023-07-19 RX ORDER — CEFEPIME 1 G/1
1000 INJECTION, POWDER, FOR SOLUTION INTRAMUSCULAR; INTRAVENOUS DAILY
Refills: 0 | Status: DISCONTINUED | OUTPATIENT
Start: 2023-07-19 | End: 2023-07-19

## 2023-07-19 RX ORDER — CEFEPIME 1 G/1
2 INJECTION, POWDER, FOR SOLUTION INTRAMUSCULAR; INTRAVENOUS
Qty: 6 | Refills: 0
Start: 2023-07-19 | End: 2023-07-25

## 2023-07-19 RX ORDER — AMLODIPINE BESYLATE 2.5 MG/1
1 TABLET ORAL
Refills: 0 | DISCHARGE

## 2023-07-19 RX ORDER — AMLODIPINE BESYLATE 2.5 MG/1
1 TABLET ORAL
Qty: 0 | Refills: 0 | DISCHARGE

## 2023-07-19 RX ORDER — METRONIDAZOLE 500 MG
1 TABLET ORAL
Qty: 21 | Refills: 0
Start: 2023-07-19 | End: 2023-07-25

## 2023-07-19 RX ADMIN — HEPARIN SODIUM 5000 UNIT(S): 5000 INJECTION INTRAVENOUS; SUBCUTANEOUS at 05:21

## 2023-07-19 RX ADMIN — HEPARIN SODIUM 5000 UNIT(S): 5000 INJECTION INTRAVENOUS; SUBCUTANEOUS at 14:16

## 2023-07-19 RX ADMIN — CEFEPIME 100 MILLIGRAM(S): 1 INJECTION, POWDER, FOR SOLUTION INTRAMUSCULAR; INTRAVENOUS at 13:47

## 2023-07-19 RX ADMIN — PIPERACILLIN AND TAZOBACTAM 25 GRAM(S): 4; .5 INJECTION, POWDER, LYOPHILIZED, FOR SOLUTION INTRAVENOUS at 05:20

## 2023-07-19 RX ADMIN — Medication 325 MILLIGRAM(S): at 11:25

## 2023-07-19 RX ADMIN — Medication 500 MILLIGRAM(S): at 14:16

## 2023-07-19 NOTE — DISCHARGE NOTE PROVIDER - NSDCFUSCHEDAPPT_GEN_ALL_CORE_FT
Ashish Spangler  [facility]  GASTRO R[address]RIGUEZ [address]  Scheduled Appointment: 08/24/2023    Bo Long  [facility]  UROLOGY [address] [facility]  Scheduled Appointment: 10/17/2023

## 2023-07-19 NOTE — PROGRESS NOTE ADULT - SUBJECTIVE AND OBJECTIVE BOX
pt seen  no abdominal pain  ICU Vital Signs Last 24 Hrs  T(C): 36.9 (18 Jul 2023 21:30), Max: 36.9 (18 Jul 2023 21:30)  T(F): 98.4 (18 Jul 2023 21:30), Max: 98.4 (18 Jul 2023 21:30)  HR: 82 (18 Jul 2023 21:30) (76 - 82)  BP: 119/69 (18 Jul 2023 21:30) (97/61 - 130/75)  BP(mean): --  ABP: --  ABP(mean): --  RR: 18 (18 Jul 2023 21:30) (18 - 18)  SpO2: 94% (18 Jul 2023 21:30) (94% - 99%)    O2 Parameters below as of 18 Jul 2023 15:00  Patient On (Oxygen Delivery Method): room air        soft NNT/ND  NAD  FROM x4    .                      10.3   6.99  )-----------( 187      ( 19 Jul 2023 08:08 )             31.2   07-19    137  |  103  |  31<H>  ----------------------------<  94  3.9   |  29  |  4.30<H>    Ca    8.6      19 Jul 2023 08:08  Phos  3.9     07-18    TPro  5.9<L>  /  Alb  2.1<L>  /  TBili  0.5  /  DBili  x   /  AST  10<L>  /  ALT  16  /  AlkPhos  69  07-19

## 2023-07-19 NOTE — PROGRESS NOTE ADULT - SUBJECTIVE AND OBJECTIVE BOX
[facility]  INFECTIOUS DISEASES   [address] [place] [address]  Tel: [phone]     Fax: [phone]  ========================================================  MD Charles Fernandez Kaushal, MD Cho, Michelle, MD Sunjit, Jaspal, MD  ========================================================    MRN-[id number]  CLEO LPUMMER     Follow up; Abdominal pain     Doing better, no pain or fever.   On regular diet tolerating well.   HIDA negative for bile leakage.     PAST MEDICAL & SURGICAL HISTORY:  HTN  HCV  polio  PCKD s/p L nephrectomy  ESRD on HD     Social Hx: No smoking, EtOH or drugs     FAMILY HISTORY: Noncontributory     Allergies  Demerol (Faint)    Antibiotics:  MEDICATIONS  (STANDING):  amLODIPine   Tablet 10 milliGRAM(s) Oral daily  carvedilol 25 milliGRAM(s) Oral daily  ferrous    sulfate 325 milliGRAM(s) Oral daily  piperacillin/tazobactam IVPB.. 3.375 Gram(s) IV Intermittent every 12 hours  sodium chloride 0.9%. 1000 milliLiter(s) (100 mL/Hr) IV Continuous <Continuous>  tamsulosin 0.4 milliGRAM(s) Oral at bedtime    MEDICATIONS  (PRN):  acetaminophen     Tablet .. 650 milliGRAM(s) Oral every 6 hours PRN Temp greater or equal to 38C (100.4F), Mild Pain (1 - 3)  aluminum hydroxide/magnesium hydroxide/simethicone Suspension 30 milliLiter(s) Oral every 4 hours PRN Dyspepsia  melatonin 3 milliGRAM(s) Oral at bedtime PRN Insomnia  ondansetron Injectable 4 milliGRAM(s) IV Push every 8 hours PRN Nausea and/or Vomiting     REVIEW OF SYSTEMS:  CONSTITUTIONAL:  + Fever   HEENT:  No diplopia or blurred vision.  No sore throat or runny nose.  CARDIOVASCULAR:  No chest pain or SOB.  RESPIRATORY:  No cough, shortness of breath, PND or orthopnea.  GASTROINTESTINAL:  No nausea, vomiting or diarrhea. + abdominal pain   GENITOURINARY:  No dysuria, frequency or urgency. No Blood in urine  MUSCULOSKELETAL:  no joint aches, no muscle pain  SKIN:  No change in skin, hair or nails.  NEUROLOGIC:  No paresthesias or weakness.  PSYCHIATRIC:  No disorder of thought or mood.  ENDOCRINE:  No heat or cold intolerance, polyuria or polydipsia.  HEMATOLOGICAL:  No easy bruising or bleeding.     Physical Exam:  Vital Signs Last 24 Hrs  T(C): 36.9 (18 Jul 2023 21:30), Max: 36.9 (18 Jul 2023 21:30)  T(F): 98.4 (18 Jul 2023 21:30), Max: 98.4 (18 Jul 2023 21:30)  HR: 82 (18 Jul 2023 21:30) (76 - 82)  BP: 119/69 (18 Jul 2023 21:30) (113/68 - 130/75)  BP(mean): --  RR: 18 (18 Jul 2023 21:30) (18 - 18)  SpO2: 94% (18 Jul 2023 21:30) (94% - 99%)  Parameters below as of 18 Jul 2023 15:00  Patient On (Oxygen Delivery Method): room air  GEN: NAD  HEENT: normocephalic and atraumatic. EOMI. PERRL.    NECK: Supple.  No lymphadenopathy   LUNGS: Clear to auscultation.  HEART: Regular rate and rhythm  ABDOMEN: Soft, mild lower abdominal tenderness  surgical wounds are healing well, no infection or dehiscence    : No CVA tenderness  EXTREMITIES: Without edema.  NEUROLOGIC: grossly intact.  PSYCHIATRIC: Appropriate affect .  SKIN: No rash     Labs:                        10.3   6.99  )-----------( 187      ( 19 Jul 2023 08:08 )             31.2     07-19    137  |  103  |  31<H>  ----------------------------<  94  3.9   |  29  |  4.30<H>    Ca    8.6      19 Jul 2023 08:08  Phos  3.9     07-18    TPro  5.9<L>  /  Alb  2.1<L>  /  TBili  0.5  /  DBili  x   /  AST  10<L>  /  ALT  16  /  AlkPhos  69  07-19    Culture - Blood (collected 07-17-23 @ 07:25)  Source: .Blood Blood-Venous    Culture - Blood (collected 07-17-23 @ 07:20)  Source: .Blood Blood-Venous    Culture - Urine (collected 07-17-23 @ 06:40)  Source: Clean Catch Clean Catch (Midstream)  Final Report (07-18-23 @ 07:41):    >=3 organisms. Probable collection contamination.    WBC Count: 6.99 K/uL (07-19-23 @ 08:08)  WBC Count: 8.78 K/uL (07-18-23 @ 09:05)  WBC Count: 10.98 K/uL (07-17-23 @ 04:30)    Creatinine: 4.30 mg/dL (07-19-23 @ 08:08)  Creatinine: 6.60 mg/dL (07-18-23 @ 09:05)  Creatinine: 6.60 mg/dL (07-17-23 @ 04:45)     SARS-CoV-2: NotDetec (07-17-23 @ 09:50)    All imaging and other data have been reviewed.  CT 6/27:   IMPRESSION:  Innumerable right renal cysts measuring up to 5.3 cm.  Left nephrectomy.  Complex fluid collection adjacent to the liver. Given the history of  recent cholecystectomy, postsurgical changes may have this appearance.  Infection not excluded.    CT 7/17:   IMPRESSION:  Postsurgical changes from partial cholecystectomy. There is a 4 cm air-fluid collection in the gallbladder fossa which likely represents the gallbladder remnant.   Small amount of free air and free fluid in the right upper quadrant is present, concerning for persistent biliary leak given the clinical history and recent surgical drain removal.     Assessment and Plan:   71 yo man with PMH of HTN, HCV, polio, PCKD s/p L nephrectomy, ESRD on HD through left forearm fistula, was admitted at [facility] 6/18 to 6/28 for Klebsiella bacteremia related to acute cholecystitis.  Had laparoscopic cholecystectomy in 6/21 showing necrotic GB with bile leak afterward so underwent ERCP on 6/22 with stent placement.   Was on antibiotics for about 7-8days and was discharged and later drain was removed.   [facility] records reviewed MRN # [id number]   Admitted at [facility] on 7/16 with fever and lower abdominal pain.   There is possibility of intra-abdominal collections due to recent surgery or bile leakage.   Will cover abdominal yoel until work up is back.   CT result with collection in abdomen, as per IR not drainable   HIDA negative for Bile leak    # Intraabdominal infection   - Blood cultures x 2 NGTD   - UA negative and UC contamination   - GI consult noted   - Will monitor WBC, Tmax both normal today   - Will continue zosyn 3.375gm q12   - When ready for discharge can switch to cefepime 2-2-3gm post HD and oral metronidazole for one week  - Needs follow up with surgery after discharge, possibly needs repeat CT to evaluate the size of the collection (unclear post op changes or infection??)    Will follow.    Brandi Piedra MD  Division of Infectious Diseases   Please call ID service at [phone] with any question.    35 minutes spent on total encounter assessing patient, examination, chart review, counseling and coordinating care by the attending physician/nurse/care manager.   [facility]  INFECTIOUS DISEASES   [address] [place] [address]  Tel: [phone]     Fax: [phone]  ========================================================  MD Charles Fernandez Kaushal, MD Cho, Michelle, MD Sunjit, Jaspal, MD  ========================================================    MRN-[id number]  CLEO PLUMMER     Follow up; Abdominal pain     Doing better, no pain or fever.   On regular diet tolerating well.   HIDA negative for bile leakage.     PAST MEDICAL & SURGICAL HISTORY:  HTN  HCV  polio  PCKD s/p L nephrectomy  ESRD on HD     Social Hx: No smoking, EtOH or drugs     FAMILY HISTORY: Noncontributory     Allergies  Demerol (Faint)    Antibiotics:  MEDICATIONS  (STANDING):  amLODIPine   Tablet 10 milliGRAM(s) Oral daily  carvedilol 25 milliGRAM(s) Oral daily  ferrous    sulfate 325 milliGRAM(s) Oral daily  piperacillin/tazobactam IVPB.. 3.375 Gram(s) IV Intermittent every 12 hours  sodium chloride 0.9%. 1000 milliLiter(s) (100 mL/Hr) IV Continuous <Continuous>  tamsulosin 0.4 milliGRAM(s) Oral at bedtime    MEDICATIONS  (PRN):  acetaminophen     Tablet .. 650 milliGRAM(s) Oral every 6 hours PRN Temp greater or equal to 38C (100.4F), Mild Pain (1 - 3)  aluminum hydroxide/magnesium hydroxide/simethicone Suspension 30 milliLiter(s) Oral every 4 hours PRN Dyspepsia  melatonin 3 milliGRAM(s) Oral at bedtime PRN Insomnia  ondansetron Injectable 4 milliGRAM(s) IV Push every 8 hours PRN Nausea and/or Vomiting     REVIEW OF SYSTEMS:  CONSTITUTIONAL:  + Fever   HEENT:  No diplopia or blurred vision.  No sore throat or runny nose.  CARDIOVASCULAR:  No chest pain or SOB.  RESPIRATORY:  No cough, shortness of breath, PND or orthopnea.  GASTROINTESTINAL:  No nausea, vomiting or diarrhea. + abdominal pain   GENITOURINARY:  No dysuria, frequency or urgency. No Blood in urine  MUSCULOSKELETAL:  no joint aches, no muscle pain  SKIN:  No change in skin, hair or nails.  NEUROLOGIC:  No paresthesias or weakness.  PSYCHIATRIC:  No disorder of thought or mood.  ENDOCRINE:  No heat or cold intolerance, polyuria or polydipsia.  HEMATOLOGICAL:  No easy bruising or bleeding.     Physical Exam:  Vital Signs Last 24 Hrs  T(C): 36.9 (18 Jul 2023 21:30), Max: 36.9 (18 Jul 2023 21:30)  T(F): 98.4 (18 Jul 2023 21:30), Max: 98.4 (18 Jul 2023 21:30)  HR: 82 (18 Jul 2023 21:30) (76 - 82)  BP: 119/69 (18 Jul 2023 21:30) (113/68 - 130/75)  BP(mean): --  RR: 18 (18 Jul 2023 21:30) (18 - 18)  SpO2: 94% (18 Jul 2023 21:30) (94% - 99%)  Parameters below as of 18 Jul 2023 15:00  Patient On (Oxygen Delivery Method): room air  GEN: NAD  HEENT: normocephalic and atraumatic. EOMI. PERRL.    NECK: Supple.  No lymphadenopathy   LUNGS: Clear to auscultation.  HEART: Regular rate and rhythm  ABDOMEN: Soft, mild lower abdominal tenderness  surgical wounds are healing well, no infection or dehiscence    : No CVA tenderness  EXTREMITIES: Without edema.  NEUROLOGIC: grossly intact.  PSYCHIATRIC: Appropriate affect .  SKIN: No rash     Labs:                        10.3   6.99  )-----------( 187      ( 19 Jul 2023 08:08 )             31.2     07-19    137  |  103  |  31<H>  ----------------------------<  94  3.9   |  29  |  4.30<H>    Ca    8.6      19 Jul 2023 08:08  Phos  3.9     07-18    TPro  5.9<L>  /  Alb  2.1<L>  /  TBili  0.5  /  DBili  x   /  AST  10<L>  /  ALT  16  /  AlkPhos  69  07-19    Culture - Blood (collected 07-17-23 @ 07:25)  Source: .Blood Blood-Venous    Culture - Blood (collected 07-17-23 @ 07:20)  Source: .Blood Blood-Venous    Culture - Urine (collected 07-17-23 @ 06:40)  Source: Clean Catch Clean Catch (Midstream)  Final Report (07-18-23 @ 07:41):    >=3 organisms. Probable collection contamination.    WBC Count: 6.99 K/uL (07-19-23 @ 08:08)  WBC Count: 8.78 K/uL (07-18-23 @ 09:05)  WBC Count: 10.98 K/uL (07-17-23 @ 04:30)    Creatinine: 4.30 mg/dL (07-19-23 @ 08:08)  Creatinine: 6.60 mg/dL (07-18-23 @ 09:05)  Creatinine: 6.60 mg/dL (07-17-23 @ 04:45)     SARS-CoV-2: NotDetec (07-17-23 @ 09:50)    All imaging and other data have been reviewed.  CT 6/27:   IMPRESSION:  Innumerable right renal cysts measuring up to 5.3 cm.  Left nephrectomy.  Complex fluid collection adjacent to the liver. Given the history of  recent cholecystectomy, postsurgical changes may have this appearance.  Infection not excluded.    CT 7/17:   IMPRESSION:  Postsurgical changes from partial cholecystectomy. There is a 4 cm air-fluid collection in the gallbladder fossa which likely represents the gallbladder remnant.   Small amount of free air and free fluid in the right upper quadrant is present, concerning for persistent biliary leak given the clinical history and recent surgical drain removal.     Assessment and Plan:   71 yo man with PMH of HTN, HCV, polio, PCKD s/p L nephrectomy, ESRD on HD through left forearm fistula, was admitted at [facility] 6/18 to 6/28 for Klebsiella bacteremia related to acute cholecystitis.  Had laparoscopic cholecystectomy in 6/21 showing necrotic GB with bile leak afterward so underwent ERCP on 6/22 with stent placement.   Was on antibiotics for about 7-8days and was discharged and later drain was removed.   [facility] records reviewed MRN # [id number]   Admitted at [facility] on 7/16 with fever and lower abdominal pain.   There is possibility of intra-abdominal collections due to recent surgery or bile leakage.   Will cover abdominal yoel until work up is back.   CT result with collection in abdomen, as per IR not drainable   HIDA negative for Bile leak    # Intraabdominal infection   - Blood cultures x 2 NGTD   - UA negative and UC contamination   - GI consult noted   - Will monitor WBC, Tmax both normal today   - Will switch zosyn to cefepime 1gm daily and metronidazole 500mg q8   - When ready for discharge can switch to cefepime 2-2-3gm post HD and oral metronidazole for one week  - Needs follow up with surgery after discharge, possibly needs repeat CT to evaluate the size of the collection (unclear post op changes or infection??)    Will follow.    Brandi Piedra MD  Division of Infectious Diseases   Please call ID service at [phone] with any question.    35 minutes spent on total encounter assessing patient, examination, chart review, counseling and coordinating care by the attending physician/nurse/care manager.

## 2023-07-19 NOTE — DISCHARGE NOTE PROVIDER - NSDCMRMEDTOKEN_GEN_ALL_CORE_FT
Coreg 25 mg oral tablet: 1 tab(s) orally once a day  Ferrousal 325 mg oral tablet: 1 tab(s) orally once a day  metroNIDAZOLE 500 mg oral tablet: 1 tab(s) orally every 8 hours  Multiple Vitamins oral capsule: 1 cap(s) orally once a day  tamsulosin 0.4 mg oral capsule: 1 cap(s) orally once a day

## 2023-07-19 NOTE — PROGRESS NOTE ADULT - REASON FOR ADMISSION
BIliary Leak, infection

## 2023-07-19 NOTE — PHYSICAL THERAPY INITIAL EVALUATION ADULT - PERTINENT HX OF CURRENT PROBLEM, REHAB EVAL
Mr Hawthorne is a 73 yo M presenting with fevers and abdominal pain. PMH recent admission for Klebsiella bacteremia related to acute cholecystitis in June 2023, polycystic kidney disease s/p L nephrectomy on ESRD MWF, hx of Hep C treated, HTN, Polio.   Patient seen and examined at bedside. He reports lower abdominal pain on both lower quadrants which started overnight and also fevers and chills. He does not recall how high is temperature was. He is on dialysis, still produces urine. Denies headaches, nausea, vomiting, chest pain, SOB, palpitations, constipation, diarrhea, melena, hematochezia, dysuria.

## 2023-07-19 NOTE — PROGRESS NOTE ADULT - SUBJECTIVE AND OBJECTIVE BOX
Subjective: abd pain has resolved. Tolerating diet      MEDICATIONS  (STANDING):  carvedilol 12.5 milliGRAM(s) Oral at bedtime  ferrous    sulfate 325 milliGRAM(s) Oral daily  heparin   Injectable 5000 Unit(s) SubCutaneous every 8 hours  piperacillin/tazobactam IVPB.. 3.375 Gram(s) IV Intermittent every 12 hours  tamsulosin 0.4 milliGRAM(s) Oral at bedtime    MEDICATIONS  (PRN):  acetaminophen     Tablet .. 650 milliGRAM(s) Oral every 6 hours PRN Temp greater or equal to 38C (100.4F), Mild Pain (1 - 3)  aluminum hydroxide/magnesium hydroxide/simethicone Suspension 30 milliLiter(s) Oral every 4 hours PRN Dyspepsia  melatonin 3 milliGRAM(s) Oral at bedtime PRN Insomnia  ondansetron Injectable 4 milliGRAM(s) IV Push every 8 hours PRN Nausea and/or Vomiting          T(C): 36.9 (07-18-23 @ 21:30), Max: 36.9 (07-18-23 @ 21:30)  HR: 82 (07-18-23 @ 21:30) (76 - 82)  BP: 119/69 (07-18-23 @ 21:30) (113/68 - 130/75)  RR: 18 (07-18-23 @ 21:30) (18 - 18)  SpO2: 94% (07-18-23 @ 21:30) (94% - 99%)  Wt(kg): --        I&O's Detail    18 Jul 2023 07:01  -  19 Jul 2023 07:00  --------------------------------------------------------  IN:  Total IN: 0 mL    OUT:    Other (mL): 0 mL    Voided (mL): 200 mL  Total OUT: 200 mL    Total NET: -200 mL               PHYSICAL EXAM:    GENERAL: NAD  NECK: Supple, no inc in JVP  CHEST/LUNG: Clear  HEART: S1S2  ABDOMEN: Soft  EXTREMITIES:  no edema  L AVF t/b      LABS:  CBC Full  -  ( 19 Jul 2023 08:08 )  WBC Count : 6.99 K/uL  RBC Count : 3.34 M/uL  Hemoglobin : 10.3 g/dL  Hematocrit : 31.2 %  Platelet Count - Automated : 187 K/uL  Mean Cell Volume : 93.4 fl  Mean Cell Hemoglobin : 30.8 pg  Mean Cell Hemoglobin Concentration : 33.0 gm/dL  Auto Neutrophil # : x  Auto Lymphocyte # : x  Auto Monocyte # : x  Auto Eosinophil # : x  Auto Basophil # : x  Auto Neutrophil % : x  Auto Lymphocyte % : x  Auto Monocyte % : x  Auto Eosinophil % : x  Auto Basophil % : x    07-19    137  |  103  |  31<H>  ----------------------------<  94  3.9   |  29  |  4.30<H>    Ca    8.6      19 Jul 2023 08:08  Phos  3.9     07-18    TPro  5.9<L>  /  Alb  2.1<L>  /  TBili  0.5  /  DBili  x   /  AST  10<L>  /  ALT  16  /  AlkPhos  69  07-19      Urinalysis Basic - ( 19 Jul 2023 08:08 )        Impression:  1.  ESRD on hemodialysis. MWF is his outpt schedule  2.  Recent Klebsiella bacteremia secondary to biliary sepsis  3.  S/p robotic subtotal CCK at [facility] in late June followed by ERCP and biliary stent.   4. Admitted to [facility] with abd pain that has resolved.     Recommend:  No objection to dc  Next dialysis as outpt on Fri 7/21

## 2023-07-19 NOTE — DISCHARGE NOTE NURSING/CASE MANAGEMENT/SOCIAL WORK - PATIENT PORTAL LINK FT
You can access the FollowMyHealth Patient Portal offered by [facility] by registering at the following website: [email or web address]. By joining [employer]’s FollowMyHealth portal, you will also be able to view your health information using other applications (apps) compatible with our system.

## 2023-07-19 NOTE — DISCHARGE NOTE PROVIDER - PROVIDER TOKENS
PROVIDER:[TOKEN:[[id number]],FOLLOWUP:[1 week]],FREE:[LAST:[your primary surgeon],PHONE:[(   )    -],FAX:[(   )    -],FOLLOWUP:[2 weeks]],FREE:[LAST:[your primary care doctor],PHONE:[(   )    -],FAX:[(   )    -],FOLLOWUP:[1 week]]

## 2023-07-19 NOTE — DISCHARGE NOTE PROVIDER - HOSPITAL COURSE
ADMISSION DATE:  07-17-23    ---  FROM ADMISSION H+P:   HPI:  Mr Hawthorne is a 71 yo M presenting with fevers and abdominal pain. PMH recent admission for Klebsiella bacteremia related to acute cholecystitis in June 2023, polycystic kidney disease s/p L nephrectomy on ESRD MWF, hx of Hep C treated, HTN, Polio.   Patient seen and examined at bedside. He reports lower abdominal pain on both lower quadrants which started overnight and also fevers and chills. He does not recall how high is temperature was. He is on dialysis, still produces urine. Denies headaches, nausea, vomiting, chest pain, SOB, palpitations, constipation, diarrhea, melena, hematochezia, dysuria.     Of note: patient was admitted to [facility] in June 2023 with klebsiella bacteremia found with acute cholecystitis s/p robotic subtotal cholecystectomy on 6/21/23 with surgical team. He was found to have necrotic gallbladder. He underwent ERCP with stent on 6/22/23. He completed IV abx for bacteremia. On last admission he did have a surgical drain placed which was recently removed. Surgery was performed by Dr. Elma Brar    [facility] records reviewed MRN # [id number] (17 Jul 2023 09:41)      ---  HOSPITAL COURSE/PERTINENT LABS/PROCEDURES PERFORMED/PENDING TESTS:   Pt was admitted for abd  pain and fever, ID consulted, started on IV antibiotic for intra abd infection.   CT abd :  Postsurgical changes from partial cholecystectomy. There is a 4 cm air-fluid collection in the gallbladder fossa which likely represents the gallbladder remnant. Small amount of free air and free fluid in the right upper quadrant is present, concerning for persistent biliary leak given   the clinical history and recent surgical drain removal.   Surgery consulted: no acute surgical intervention.   GI consulted: as per IR, no drainable collection. IVF, Hep C noted, RNA neg no active infection, hx Hep C treated years ago, no further workup required.   nephrology consulted, HD in hospital provided, will c/w out patient HD with Antibiotic arranged by nephro and SW.   PT->no skilled PT needs       Patient is stable for discharge as per primary medical team and consultants.    PT consulted, recommends discharge ______home with VNS     Patient showed improvement throughout hospitalization. Patient was seen and examined on day of discharge. Patient was medically optimized for discharge with close outpatient follow up.    ---  PATIENT CONDITION:  - stable    --  VITALS:   T(C): 36.6 (07-19-23 @ 11:30), Max: 36.9 (07-18-23 @ 21:30)  HR: 81 (07-19-23 @ 11:30) (78 - 82)  BP: 128/76 (07-19-23 @ 11:30) (119/69 - 128/76)  RR: 18 (07-19-23 @ 11:30) (18 - 18)  SpO2: 95% (07-19-23 @ 11:30) (94% - 98%)    PHYSICAL EXAM ON DAY OF DISCHARGE:  GENERAL: NAD  HEENT:  EOMI, moist mucous membranes  CHEST/LUNG:  CTA b/l, no rales, wheezes, or rhonchi  HEART:  RRR, S1, S2  ABDOMEN:  BS+, soft, nontender, nondistended  EXTREMITIES: no edema, cyanosis, or calf tenderness  NERVOUS SYSTEM: AA&Ox3

## 2023-07-19 NOTE — PROGRESS NOTE ADULT - PROVIDER SPECIALTY LIST ADULT
Infectious Disease
Infectious Disease
Surgery
Nephrology
Surgery
Gastroenterology
Nephrology
Gastroenterology
Hospitalist

## 2023-07-19 NOTE — DISCHARGE NOTE PROVIDER - NSDCCPCAREPLAN_GEN_ALL_CORE_FT
PRINCIPAL DISCHARGE DIAGNOSIS  Diagnosis: Abdominal pain  Assessment and Plan of Treatment: was admitted for abd  pain and fever, ID consulted, started on IV antibiotic for intra abd infection.   CT abd :  Postsurgical changes from partial cholecystectomy. There is a 4 cm air-fluid collection in the gallbladder fossa which likely represents the gallbladder remnant. Small amount of free air and free fluid in the right upper quadrant is present, concerning for persistent biliary leak given   the clinical history and recent surgical drain removal.   Surgery consulted: no acute surgical intervention.   GI consulted: as per IR, no drainable collection. IVF, Hep C noted, RNA neg no active infection, hx Hep C treated years ago, no further workup required.   to complete cefepime with HD as scheduled and flagyl po for one week   follow up with your surgeon and ID , PCP      SECONDARY DISCHARGE DIAGNOSES  Diagnosis: Benign essential HTN  Assessment and Plan of Treatment: LOW     PRINCIPAL DISCHARGE DIAGNOSIS  Diagnosis: Abdominal pain  Assessment and Plan of Treatment: was admitted for abd  pain and fever, ID consulted, started on IV antibiotic for intra abd infection.   CT abd :  Postsurgical changes from partial cholecystectomy. There is a 4 cm air-fluid collection in the gallbladder fossa which likely represents the gallbladder remnant. Small amount of free air and free fluid in the right upper quadrant is present, concerning for persistent biliary leak given   the clinical history and recent surgical drain removal.   Surgery consulted: no acute surgical intervention.   GI consulted: as per IR, no drainable collection. IVF, Hep C noted, RNA neg no active infection, hx Hep C treated years ago, no further workup required.   to complete cefepime with HD as scheduled and flagyl po for one week   follow up with your surgeon and ID , PCP      SECONDARY DISCHARGE DIAGNOSES  Diagnosis: Benign essential HTN  Assessment and Plan of Treatment: Low BP reading in hospital , to stop norvasc for now. home BP monitor and follwo up with PCP

## 2023-07-19 NOTE — DISCHARGE NOTE PROVIDER - CARE PROVIDERS DIRECT ADDRESSES
,[email or web address],DirectAddress_Unknown,DirectAddress_Unknown

## 2023-07-19 NOTE — DISCHARGE NOTE NURSING/CASE MANAGEMENT/SOCIAL WORK - NSDCPEFALRISK_GEN_ALL_CORE
For information on Fall & Injury Prevention, visit: [email or web address] OR  [email or web address] OR  https://www.cdc.gov/steadi/patient.html

## 2023-07-19 NOTE — PROGRESS NOTE ADULT - ASSESSMENT
abdominal pain  r/o bile leak  hx subtotal cholecystectomy  s/p ERCP with stent placement in June  s/p biliary drain removal 1 week ago    CT noted  LFTs noted, trend  HIDA noted  Per IR eval no drainable collection  Pain control  IVF  Abx  D/c plan can follow with his primary GI  Will follow    I reviewed the overnight course of events on the unit, re-confirming the patient history. I discussed the care with the patient and their family  Differential diagnosis and plan of care discussed with patient after the evaluation  40 minutes spent on total encounter of which more than fifty percent of the encounter was spent counseling and/or coordinating care by the attending physician.  Advanced care planning was discussed with patient and family.  Advanced care planning forms were reviewed and discussed.  Risks, benefits and alternatives of gastroenterologic procedures were discussed in detail and all questions were answered. abdominal pain  r/o bile leak  hx subtotal cholecystectomy  s/p ERCP with stent placement in June  s/p biliary drain removal 1 week ago    CT noted  LFTs noted, trend  HIDA noted  Per IR eval no drainable collection  Pain control  IVF  Abx  Hep C noted, RNA neg no active infection  hx Hep C treated years ago, no further workup required  D/c plan can follow with his primary GI  Will follow    I reviewed the overnight course of events on the unit, re-confirming the patient history. I discussed the care with the patient and their family  Differential diagnosis and plan of care discussed with patient after the evaluation  40 minutes spent on total encounter of which more than fifty percent of the encounter was spent counseling and/or coordinating care by the attending physician.  Advanced care planning was discussed with patient and family.  Advanced care planning forms were reviewed and discussed.  Risks, benefits and alternatives of gastroenterologic procedures were discussed in detail and all questions were answered.

## 2023-07-19 NOTE — DISCHARGE NOTE PROVIDER - CARE PROVIDER_API CALL
Brandi Piedra  Infectious Disease  [address] [place] [address]  Phone: [phone]  Fax: [phone]  Follow Up Time: 1 week    your primary surgeon,   Phone: (   )    -  Fax: (   )    -  Follow Up Time: 2 weeks    your primary care doctor,   Phone: (   )    -  Fax: (   )    -  Follow Up Time: 1 week

## 2023-07-19 NOTE — PROGRESS NOTE ADULT - SUBJECTIVE AND OBJECTIVE BOX
[facility]  Pastor Felix PA-C  [address] [place] [address]  [phone]      INTERVAL HPI/OVERNIGHT EVENTS:  Pt s/e  Abdominal pain resolved  Tolerating diet  No GI complaints    MEDICATIONS  (STANDING):  carvedilol 12.5 milliGRAM(s) Oral at bedtime  ferrous    sulfate 325 milliGRAM(s) Oral daily  heparin   Injectable 5000 Unit(s) SubCutaneous every 8 hours  piperacillin/tazobactam IVPB.. 3.375 Gram(s) IV Intermittent every 12 hours  tamsulosin 0.4 milliGRAM(s) Oral at bedtime    MEDICATIONS  (PRN):  acetaminophen     Tablet .. 650 milliGRAM(s) Oral every 6 hours PRN Temp greater or equal to 38C (100.4F), Mild Pain (1 - 3)  aluminum hydroxide/magnesium hydroxide/simethicone Suspension 30 milliLiter(s) Oral every 4 hours PRN Dyspepsia  melatonin 3 milliGRAM(s) Oral at bedtime PRN Insomnia  ondansetron Injectable 4 milliGRAM(s) IV Push every 8 hours PRN Nausea and/or Vomiting      Allergies    Demerol HCl (Unknown)  Demerol (Faint)    PHYSICAL EXAM:   Vital Signs:  Vital Signs Last 24 Hrs  T(C): 36.9 (2023 21:30), Max: 36.9 (2023 21:30)  T(F): 98.4 (2023 21:30), Max: 98.4 (2023 21:30)  HR: 82 (2023 21:30) (76 - 82)  BP: 119/69 (2023 21:30) (113/68 - 130/75)  BP(mean): --  RR: 18 (2023 21:30) (18 - 18)  SpO2: 94% (2023 21:30) (94% - 99%)    Parameters below as of 2023 15:00  Patient On (Oxygen Delivery Method): room air      Daily     Daily Weight in k.5 (2023 13:25)    GENERAL:  Appears stated age  HEENT:  NC/AT  CHEST:  Full & symmetric excursion  HEART:  Regular rhythm  ABDOMEN:  Soft, non-tender, non-distended  EXTEREMITIES:  no cyanosis  SKIN:  No rash  NEURO:  Alert      LABS:                        10.3   6.99  )-----------( 187      ( 2023 08:08 )             31.2     07-    137  |  103  |  31<H>  ----------------------------<  94  3.9   |  29  |  4.30<H>    Ca    8.6      2023 08:08  Phos  3.9     07-18    TPro  5.9<L>  /  Alb  2.1<L>  /  TBili  0.5  /  DBili  x   /  AST  10<L>  /  ALT  16  /  AlkPhos  69  07-19      Urinalysis Basic - ( 2023 08:08 )    Color: x / Appearance: x / SG: x / pH: x  Gluc: 94 mg/dL / Ketone: x  / Bili: x / Urobili: x   Blood: x / Protein: x / Nitrite: x   Leuk Esterase: x / RBC: x / WBC x   Sq Epi: x / Non Sq Epi: x / Bacteria: x

## 2023-07-19 NOTE — SOCIAL WORK PROGRESS NOTE - NSSWPROGRESSNOTE_GEN_ALL_CORE
MD cleared pt for d/c. PETE spoke with [facility], confirmed that pt will resume dialysis Friday 3pm. Sent clinicals F# [phone]. SW to follow and remain available for any needs.

## 2023-07-19 NOTE — CASE MANAGEMENT PROGRESS NOTE - NSCMPROGRESSNOTE_GEN_ALL_CORE
Patient medically stable for discharge home. Referral sent to [facility] for VN. Patient to resume HD at [facility] [place] on Friday 7/21/23. Patient in agreement with discharge plan

## 2023-08-24 ENCOUNTER — APPOINTMENT (OUTPATIENT)
Dept: GASTROENTEROLOGY | Facility: HOSPITAL | Age: 73
End: 2023-08-24

## 2023-08-24 ENCOUNTER — OUTPATIENT (OUTPATIENT)
Dept: OUTPATIENT SERVICES | Facility: HOSPITAL | Age: 73
LOS: 1 days | End: 2023-08-24
Payer: MEDICARE

## 2023-08-24 DIAGNOSIS — Z90.5 ACQUIRED ABSENCE OF KIDNEY: Chronic | ICD-10-CM

## 2023-08-24 DIAGNOSIS — Z98.890 OTHER SPECIFIED POSTPROCEDURAL STATES: ICD-10-CM

## 2023-08-24 DIAGNOSIS — Z94.4 LIVER TRANSPLANT STATUS: ICD-10-CM

## 2023-08-24 PROCEDURE — 43273 ENDOSCOPIC PANCREATOSCOPY: CPT | Mod: 59

## 2023-08-24 PROCEDURE — C1748: CPT

## 2023-08-24 PROCEDURE — C1889: CPT

## 2023-08-24 PROCEDURE — 43265 ERCP LITHOTRIPSY CALCULI: CPT

## 2023-08-24 PROCEDURE — C1773: CPT

## 2023-08-24 PROCEDURE — 43265 ERCP LITHOTRIPSY CALCULI: CPT | Mod: 59

## 2023-08-24 PROCEDURE — 43262 ENDO CHOLANGIOPANCREATOGRAPH: CPT

## 2023-08-24 PROCEDURE — 74330 X-RAY BILE/PANC ENDOSCOPY: CPT

## 2023-08-24 PROCEDURE — 43275 ERCP REMOVE FORGN BODY DUCT: CPT

## 2023-08-24 PROCEDURE — C1769: CPT

## 2023-08-24 PROCEDURE — 43274 ERCP DUCT STENT PLACEMENT: CPT

## 2023-08-24 PROCEDURE — C9399: CPT

## 2023-08-24 DEVICE — DUODENOSCOPE EXALT MODEL D SINGLE USE: Type: IMPLANTABLE DEVICE | Status: FUNCTIONAL

## 2023-08-24 DEVICE — CATH BLLN BIL RX 9-12CM: Type: IMPLANTABLE DEVICE | Status: FUNCTIONAL

## 2023-08-24 DEVICE — HYDRATOME 44: Type: IMPLANTABLE DEVICE | Status: FUNCTIONAL

## 2023-08-24 DEVICE — PROBE EHL BILIARY 1.9FR 375CM: Type: IMPLANTABLE DEVICE | Status: FUNCTIONAL

## 2023-08-24 RX ORDER — SODIUM CHLORIDE 9 MG/ML
500 INJECTION INTRAMUSCULAR; INTRAVENOUS; SUBCUTANEOUS
Refills: 0 | Status: COMPLETED | OUTPATIENT
Start: 2023-08-24 | End: 2023-08-24

## 2023-08-24 RX ORDER — INDOMETHACIN 50 MG
100 CAPSULE ORAL ONCE
Refills: 0 | Status: COMPLETED | OUTPATIENT
Start: 2023-08-24 | End: 2023-08-24

## 2023-08-24 RX ADMIN — Medication 100 MILLIGRAM(S): at 13:40

## 2023-08-24 RX ADMIN — SODIUM CHLORIDE 75 MILLILITER(S): 9 INJECTION INTRAMUSCULAR; INTRAVENOUS; SUBCUTANEOUS at 12:29

## 2023-08-25 ENCOUNTER — INPATIENT (INPATIENT)
Facility: HOSPITAL | Age: 73
LOS: 3 days | Discharge: HOME | DRG: 862 | End: 2023-08-29
Attending: STUDENT IN AN ORGANIZED HEALTH CARE EDUCATION/TRAINING PROGRAM | Admitting: STUDENT IN AN ORGANIZED HEALTH CARE EDUCATION/TRAINING PROGRAM
Payer: MEDICARE

## 2023-08-25 VITALS
WEIGHT: 141.1 LBS | RESPIRATION RATE: 20 BRPM | HEART RATE: 117 BPM | OXYGEN SATURATION: 96 % | DIASTOLIC BLOOD PRESSURE: 81 MMHG | SYSTOLIC BLOOD PRESSURE: 133 MMHG | TEMPERATURE: 103 F | HEIGHT: 64 IN

## 2023-08-25 DIAGNOSIS — Z90.5 ACQUIRED ABSENCE OF KIDNEY: Chronic | ICD-10-CM

## 2023-08-25 LAB
ALBUMIN SERPL ELPH-MCNC: 2.9 G/DL — LOW (ref 3.3–5)
ALP SERPL-CCNC: 107 U/L — SIGNIFICANT CHANGE UP (ref 40–120)
ALT FLD-CCNC: 126 U/L — HIGH (ref 12–78)
ANION GAP SERPL CALC-SCNC: 7 MMOL/L — SIGNIFICANT CHANGE UP (ref 5–17)
APPEARANCE UR: CLEAR — SIGNIFICANT CHANGE UP
APTT BLD: 26.4 SEC — SIGNIFICANT CHANGE UP (ref 24.5–35.6)
AST SERPL-CCNC: 90 U/L — HIGH (ref 15–37)
BASOPHILS # BLD AUTO: 0 K/UL — SIGNIFICANT CHANGE UP (ref 0–0.2)
BASOPHILS NFR BLD AUTO: 0 % — SIGNIFICANT CHANGE UP (ref 0–2)
BILIRUB SERPL-MCNC: 0.6 MG/DL — SIGNIFICANT CHANGE UP (ref 0.2–1.2)
BILIRUB UR-MCNC: NEGATIVE — SIGNIFICANT CHANGE UP
BUN SERPL-MCNC: 30 MG/DL — HIGH (ref 7–23)
CALCIUM SERPL-MCNC: 8.7 MG/DL — SIGNIFICANT CHANGE UP (ref 8.5–10.1)
CHLORIDE SERPL-SCNC: 100 MMOL/L — SIGNIFICANT CHANGE UP (ref 96–108)
CO2 SERPL-SCNC: 27 MMOL/L — SIGNIFICANT CHANGE UP (ref 22–31)
COLOR SPEC: YELLOW — SIGNIFICANT CHANGE UP
CREAT SERPL-MCNC: 3.4 MG/DL — HIGH (ref 0.5–1.3)
DIFF PNL FLD: NEGATIVE — SIGNIFICANT CHANGE UP
EGFR: 18 ML/MIN/1.73M2 — LOW
EOSINOPHIL # BLD AUTO: 0 K/UL — SIGNIFICANT CHANGE UP (ref 0–0.5)
EOSINOPHIL NFR BLD AUTO: 0 % — SIGNIFICANT CHANGE UP (ref 0–6)
EPI CELLS # UR: PRESENT
FLUAV AG NPH QL: SIGNIFICANT CHANGE UP
FLUBV AG NPH QL: SIGNIFICANT CHANGE UP
GLUCOSE SERPL-MCNC: 111 MG/DL — HIGH (ref 70–99)
GLUCOSE UR QL: NEGATIVE MG/DL — SIGNIFICANT CHANGE UP
HCT VFR BLD CALC: 37.5 % — LOW (ref 39–50)
HGB BLD-MCNC: 12.5 G/DL — LOW (ref 13–17)
INR BLD: 1.16 RATIO — SIGNIFICANT CHANGE UP (ref 0.85–1.18)
KETONES UR-MCNC: NEGATIVE MG/DL — SIGNIFICANT CHANGE UP
LACTATE SERPL-SCNC: 0.8 MMOL/L — SIGNIFICANT CHANGE UP (ref 0.7–2)
LEUKOCYTE ESTERASE UR-ACNC: ABNORMAL
LYMPHOCYTES # BLD AUTO: 0.5 K/UL — LOW (ref 1–3.3)
LYMPHOCYTES # BLD AUTO: 7 % — LOW (ref 13–44)
MANUAL SMEAR VERIFICATION: SIGNIFICANT CHANGE UP
MCHC RBC-ENTMCNC: 30.3 PG — SIGNIFICANT CHANGE UP (ref 27–34)
MCHC RBC-ENTMCNC: 33.3 GM/DL — SIGNIFICANT CHANGE UP (ref 32–36)
MCV RBC AUTO: 90.8 FL — SIGNIFICANT CHANGE UP (ref 80–100)
MONOCYTES # BLD AUTO: 0.57 K/UL — SIGNIFICANT CHANGE UP (ref 0–0.9)
MONOCYTES NFR BLD AUTO: 8 % — SIGNIFICANT CHANGE UP (ref 2–14)
NEUTROPHILS # BLD AUTO: 6.02 K/UL — SIGNIFICANT CHANGE UP (ref 1.8–7.4)
NEUTROPHILS NFR BLD AUTO: 82 % — HIGH (ref 43–77)
NEUTS BAND # BLD: 3 % — SIGNIFICANT CHANGE UP (ref 0–8)
NITRITE UR-MCNC: NEGATIVE — SIGNIFICANT CHANGE UP
NRBC # BLD: 0 /100 — SIGNIFICANT CHANGE UP (ref 0–0)
NRBC # BLD: SIGNIFICANT CHANGE UP /100 WBCS (ref 0–0)
PH UR: 8.5 (ref 5–8)
PLAT MORPH BLD: NORMAL — SIGNIFICANT CHANGE UP
PLATELET # BLD AUTO: 107 K/UL — LOW (ref 150–400)
POTASSIUM SERPL-MCNC: 4.2 MMOL/L — SIGNIFICANT CHANGE UP (ref 3.5–5.3)
POTASSIUM SERPL-SCNC: 4.2 MMOL/L — SIGNIFICANT CHANGE UP (ref 3.5–5.3)
PROT SERPL-MCNC: 6.4 G/DL — SIGNIFICANT CHANGE UP (ref 6–8.3)
PROT UR-MCNC: 100 MG/DL
PROTHROM AB SERPL-ACNC: 13.5 SEC — HIGH (ref 9.5–13)
RBC # BLD: 4.13 M/UL — LOW (ref 4.2–5.8)
RBC # FLD: 14.7 % — HIGH (ref 10.3–14.5)
RBC BLD AUTO: NORMAL — SIGNIFICANT CHANGE UP
RBC CASTS # UR COMP ASSIST: 1 /HPF — SIGNIFICANT CHANGE UP (ref 0–4)
RSV RNA NPH QL NAA+NON-PROBE: SIGNIFICANT CHANGE UP
SARS-COV-2 RNA SPEC QL NAA+PROBE: SIGNIFICANT CHANGE UP
SODIUM SERPL-SCNC: 134 MMOL/L — LOW (ref 135–145)
SP GR SPEC: 1.01 — SIGNIFICANT CHANGE UP (ref 1–1.03)
UROBILINOGEN FLD QL: 1 MG/DL — SIGNIFICANT CHANGE UP (ref 0.2–1)
WBC # BLD: 7.08 K/UL — SIGNIFICANT CHANGE UP (ref 3.8–10.5)
WBC # FLD AUTO: 7.08 K/UL — SIGNIFICANT CHANGE UP (ref 3.8–10.5)
WBC UR QL: 14 /HPF — HIGH (ref 0–5)

## 2023-08-25 PROCEDURE — 99285 EMERGENCY DEPT VISIT HI MDM: CPT

## 2023-08-25 PROCEDURE — 93010 ELECTROCARDIOGRAM REPORT: CPT

## 2023-08-25 PROCEDURE — 71045 X-RAY EXAM CHEST 1 VIEW: CPT | Mod: 26

## 2023-08-25 RX ORDER — PIPERACILLIN AND TAZOBACTAM 4; .5 G/20ML; G/20ML
3.38 INJECTION, POWDER, LYOPHILIZED, FOR SOLUTION INTRAVENOUS ONCE
Refills: 0 | Status: COMPLETED | OUTPATIENT
Start: 2023-08-25 | End: 2023-08-25

## 2023-08-25 RX ORDER — PIPERACILLIN AND TAZOBACTAM 4; .5 G/20ML; G/20ML
3.38 INJECTION, POWDER, LYOPHILIZED, FOR SOLUTION INTRAVENOUS ONCE
Refills: 0 | Status: COMPLETED | OUTPATIENT
Start: 2023-08-26 | End: 2023-08-26

## 2023-08-25 RX ORDER — SODIUM CHLORIDE 9 MG/ML
500 INJECTION INTRAMUSCULAR; INTRAVENOUS; SUBCUTANEOUS ONCE
Refills: 0 | Status: COMPLETED | OUTPATIENT
Start: 2023-08-25 | End: 2023-08-25

## 2023-08-25 RX ORDER — ACETAMINOPHEN 500 MG
1000 TABLET ORAL ONCE
Refills: 0 | Status: COMPLETED | OUTPATIENT
Start: 2023-08-25 | End: 2023-08-25

## 2023-08-25 RX ADMIN — Medication 400 MILLIGRAM(S): at 21:01

## 2023-08-25 RX ADMIN — SODIUM CHLORIDE 500 MILLILITER(S): 9 INJECTION INTRAMUSCULAR; INTRAVENOUS; SUBCUTANEOUS at 21:00

## 2023-08-25 RX ADMIN — PIPERACILLIN AND TAZOBACTAM 200 GRAM(S): 4; .5 INJECTION, POWDER, LYOPHILIZED, FOR SOLUTION INTRAVENOUS at 21:01

## 2023-08-25 NOTE — ED PROVIDER NOTE - ENMT, MLM
Airway patent, Nasal mucosa clear. Mouth with palatal petechi on the mucosa. Throat has no vesicles, no oropharyngeal exudates and uvula is midline. no thrush

## 2023-08-25 NOTE — ED PROVIDER NOTE - CLINICAL SUMMARY MEDICAL DECISION MAKING FREE TEXT BOX
72 male history of renal disease on dialysis status post recent ERCP with stent and stent removal by Dr. Spangler at [facility] discharge 24 hours ago presents to the emergency room today with a fever to 104.4 orally at home.  Shaking chills.  Given his recent hospitalization must exclude sepsis, rule out COVID or pneumonia or flu.  Rule out UTI.  Rule out biliary or bowel infection.  Although unlikely because his abdomen is soft and nontender he has no CVA tenderness.  Patient has petechiae on his palate rule out thrombocytopenia.  Plan of care includes empiric antibiotic therapy viral testing chest x-ray urinalysis IV fluids antipyretics and likely admission to the hospital for continued care and possible sepsis.  Disposition accordingly.  We will obtain advanced imaging as needed.  Dragon end of dictation this chart was made with dictation software and may contain typographical errors.

## 2023-08-25 NOTE — ED ADULT TRIAGE NOTE - CHIEF COMPLAINT QUOTE
Pt states he developed shaking rigors while being dialyzed, family picked him up and checked temp, 104 at home

## 2023-08-25 NOTE — ED PROVIDER NOTE - PROGRESS NOTE DETAILS
Reevaluated patient at bedside.  Patient feeling much improved.  Discussed the results of all diagnostic testing in ED and copies of all reports given. Reevaluated patient at bedside.  Patient feeling much improved.  Discussed the results of all diagnostic testing in ED and copies of all reports given.  pt feels better   but given recent surg procedure yest and fever will do ct imaging. pt and daughter awre of iv contrasted studies and need for hd in am.

## 2023-08-25 NOTE — ED ADULT NURSE NOTE - OBJECTIVE STATEMENT
Pt arrived in ED from home, AOx3 and breathing unlabored on room air with c/o fever. As per pt, he was at dialysis today when he called for his daughter to pick him up early because he wasn't feeling well. At home, oral temp of 104F noted. Pt denies pain or discomfort, endorses generalized weakness. AV fistula noted to LFA. Pt is wheelchair bound s/p polio. Skin is hot and dry to touch. Pending labs and imaging, will continue to monitor.

## 2023-08-25 NOTE — ED PROVIDER NOTE - OBJECTIVE STATEMENT
Patient is a 72-year-old male who has a history of polio with left lower extremity weakness, renal insufficiency renal disease on hemodialysis.  He underwent dialysis today.  Status post nephrectomy for renal mass.  Status post cholecystectomy 1 month ago, and underwent a recent ERCP with stent and then stent removal just yesterday.  The ERCP and stent and stent removal were done at [facility].  He still makes urine.  His primary care physician is Dr. Garcia, his nephrologist is Dr. Wayne.  Otho through his dialysis today after his discharge from the hospital yesterday he began to get shaking chills.  His daughter took him home after the completion of dialysis and he was found to have an fever.  Rather than go to the closest hospital that came to [facility] which is where his primary care physician is affiliated.  His GI specialist is Dr. Ashish Spangler.  He denies stomach pain, cough, runny nose, back pain.  He denies any foul-smelling urine or discharge.  He denies any thrush or skin rash.

## 2023-08-25 NOTE — ED ADULT NURSE NOTE - NSFALLRISKINTERV_ED_ALL_ED

## 2023-08-25 NOTE — ED ADULT NURSE NOTE - NSICDXPASTMEDICALHX_GEN_ALL_CORE_FT
PAST MEDICAL HISTORY:  Cancer of kidney, left     Hypertension     Renal failure, chronic      Mirvaso Counseling: Mirvaso is a topical medication which can decrease superficial blood flow where applied. Side effects are uncommon and include stinging, redness and allergic reactions.

## 2023-08-26 ENCOUNTER — TRANSCRIPTION ENCOUNTER (OUTPATIENT)
Age: 73
End: 2023-08-26

## 2023-08-26 DIAGNOSIS — I10 ESSENTIAL (PRIMARY) HYPERTENSION: ICD-10-CM

## 2023-08-26 DIAGNOSIS — Z29.9 ENCOUNTER FOR PROPHYLACTIC MEASURES, UNSPECIFIED: ICD-10-CM

## 2023-08-26 DIAGNOSIS — A80.9 ACUTE POLIOMYELITIS, UNSPECIFIED: ICD-10-CM

## 2023-08-26 DIAGNOSIS — N18.6 END STAGE RENAL DISEASE: ICD-10-CM

## 2023-08-26 DIAGNOSIS — D69.6 THROMBOCYTOPENIA, UNSPECIFIED: ICD-10-CM

## 2023-08-26 DIAGNOSIS — K83.09 OTHER CHOLANGITIS: ICD-10-CM

## 2023-08-26 LAB
A1C WITH ESTIMATED AVERAGE GLUCOSE RESULT: 4.8 % — SIGNIFICANT CHANGE UP (ref 4–5.6)
ALBUMIN SERPL ELPH-MCNC: 2.4 G/DL — LOW (ref 3.3–5)
ALP SERPL-CCNC: 104 U/L — SIGNIFICANT CHANGE UP (ref 40–120)
ALT FLD-CCNC: 95 U/L — HIGH (ref 12–78)
ANION GAP SERPL CALC-SCNC: 11 MMOL/L — SIGNIFICANT CHANGE UP (ref 5–17)
AST SERPL-CCNC: 57 U/L — HIGH (ref 15–37)
BASOPHILS # BLD AUTO: 0.02 K/UL — SIGNIFICANT CHANGE UP (ref 0–0.2)
BASOPHILS NFR BLD AUTO: 0.3 % — SIGNIFICANT CHANGE UP (ref 0–2)
BILIRUB SERPL-MCNC: 0.4 MG/DL — SIGNIFICANT CHANGE UP (ref 0.2–1.2)
BUN SERPL-MCNC: 37 MG/DL — HIGH (ref 7–23)
CALCIUM SERPL-MCNC: 8.6 MG/DL — SIGNIFICANT CHANGE UP (ref 8.5–10.1)
CHLORIDE SERPL-SCNC: 102 MMOL/L — SIGNIFICANT CHANGE UP (ref 96–108)
CHOLEST SERPL-MCNC: 133 MG/DL — SIGNIFICANT CHANGE UP
CO2 SERPL-SCNC: 24 MMOL/L — SIGNIFICANT CHANGE UP (ref 22–31)
CREAT SERPL-MCNC: 4.6 MG/DL — HIGH (ref 0.5–1.3)
EGFR: 13 ML/MIN/1.73M2 — LOW
EOSINOPHIL # BLD AUTO: 0.02 K/UL — SIGNIFICANT CHANGE UP (ref 0–0.5)
EOSINOPHIL NFR BLD AUTO: 0.3 % — SIGNIFICANT CHANGE UP (ref 0–6)
ESTIMATED AVERAGE GLUCOSE: 91 MG/DL — SIGNIFICANT CHANGE UP (ref 68–114)
GLUCOSE SERPL-MCNC: 119 MG/DL — HIGH (ref 70–99)
HCT VFR BLD CALC: 37 % — LOW (ref 39–50)
HDLC SERPL-MCNC: 34 MG/DL — LOW
HGB BLD-MCNC: 12 G/DL — LOW (ref 13–17)
IMM GRANULOCYTES NFR BLD AUTO: 0.4 % — SIGNIFICANT CHANGE UP (ref 0–0.9)
LIPID PNL WITH DIRECT LDL SERPL: 79 MG/DL — SIGNIFICANT CHANGE UP
LYMPHOCYTES # BLD AUTO: 0.71 K/UL — LOW (ref 1–3.3)
LYMPHOCYTES # BLD AUTO: 10.5 % — LOW (ref 13–44)
MAGNESIUM SERPL-MCNC: 2 MG/DL — SIGNIFICANT CHANGE UP (ref 1.6–2.6)
MCHC RBC-ENTMCNC: 30.4 PG — SIGNIFICANT CHANGE UP (ref 27–34)
MCHC RBC-ENTMCNC: 32.4 GM/DL — SIGNIFICANT CHANGE UP (ref 32–36)
MCV RBC AUTO: 93.7 FL — SIGNIFICANT CHANGE UP (ref 80–100)
MONOCYTES # BLD AUTO: 0.58 K/UL — SIGNIFICANT CHANGE UP (ref 0–0.9)
MONOCYTES NFR BLD AUTO: 8.6 % — SIGNIFICANT CHANGE UP (ref 2–14)
NEUTROPHILS # BLD AUTO: 5.41 K/UL — SIGNIFICANT CHANGE UP (ref 1.8–7.4)
NEUTROPHILS NFR BLD AUTO: 79.9 % — HIGH (ref 43–77)
NON HDL CHOLESTEROL: 99 MG/DL — SIGNIFICANT CHANGE UP
NRBC # BLD: 0 /100 WBCS — SIGNIFICANT CHANGE UP (ref 0–0)
PHOSPHATE SERPL-MCNC: 4.1 MG/DL — SIGNIFICANT CHANGE UP (ref 2.5–4.5)
PLATELET # BLD AUTO: 106 K/UL — LOW (ref 150–400)
POTASSIUM SERPL-MCNC: 4.2 MMOL/L — SIGNIFICANT CHANGE UP (ref 3.5–5.3)
POTASSIUM SERPL-SCNC: 4.2 MMOL/L — SIGNIFICANT CHANGE UP (ref 3.5–5.3)
PROT SERPL-MCNC: 5.9 G/DL — LOW (ref 6–8.3)
RBC # BLD: 3.95 M/UL — LOW (ref 4.2–5.8)
RBC # FLD: 15.2 % — HIGH (ref 10.3–14.5)
SODIUM SERPL-SCNC: 137 MMOL/L — SIGNIFICANT CHANGE UP (ref 135–145)
TRIGL SERPL-MCNC: 109 MG/DL — SIGNIFICANT CHANGE UP
WBC # BLD: 6.77 K/UL — SIGNIFICANT CHANGE UP (ref 3.8–10.5)
WBC # FLD AUTO: 6.77 K/UL — SIGNIFICANT CHANGE UP (ref 3.8–10.5)

## 2023-08-26 PROCEDURE — 74177 CT ABD & PELVIS W/CONTRAST: CPT | Mod: 26,MA

## 2023-08-26 PROCEDURE — 99223 1ST HOSP IP/OBS HIGH 75: CPT | Mod: GC,AI

## 2023-08-26 PROCEDURE — 71260 CT THORAX DX C+: CPT | Mod: 26,MA

## 2023-08-26 RX ORDER — CARVEDILOL PHOSPHATE 80 MG/1
25 CAPSULE, EXTENDED RELEASE ORAL DAILY
Refills: 0 | Status: DISCONTINUED | OUTPATIENT
Start: 2023-08-26 | End: 2023-08-29

## 2023-08-26 RX ORDER — SODIUM CHLORIDE 9 MG/ML
1000 INJECTION, SOLUTION INTRAVENOUS
Refills: 0 | Status: DISCONTINUED | OUTPATIENT
Start: 2023-08-26 | End: 2023-08-29

## 2023-08-26 RX ORDER — HEPARIN SODIUM 5000 [USP'U]/ML
5000 INJECTION INTRAVENOUS; SUBCUTANEOUS EVERY 12 HOURS
Refills: 0 | Status: DISCONTINUED | OUTPATIENT
Start: 2023-08-26 | End: 2023-08-29

## 2023-08-26 RX ORDER — TAMSULOSIN HYDROCHLORIDE 0.4 MG/1
0.4 CAPSULE ORAL AT BEDTIME
Refills: 0 | Status: DISCONTINUED | OUTPATIENT
Start: 2023-08-26 | End: 2023-08-29

## 2023-08-26 RX ORDER — PIPERACILLIN AND TAZOBACTAM 4; .5 G/20ML; G/20ML
3.38 INJECTION, POWDER, LYOPHILIZED, FOR SOLUTION INTRAVENOUS EVERY 12 HOURS
Refills: 0 | Status: DISCONTINUED | OUTPATIENT
Start: 2023-08-26 | End: 2023-08-29

## 2023-08-26 RX ORDER — FERROUS SULFATE 325(65) MG
325 TABLET ORAL DAILY
Refills: 0 | Status: DISCONTINUED | OUTPATIENT
Start: 2023-08-26 | End: 2023-08-29

## 2023-08-26 RX ORDER — ACETAMINOPHEN 500 MG
650 TABLET ORAL EVERY 6 HOURS
Refills: 0 | Status: DISCONTINUED | OUTPATIENT
Start: 2023-08-26 | End: 2023-08-29

## 2023-08-26 RX ADMIN — PIPERACILLIN AND TAZOBACTAM 25 GRAM(S): 4; .5 INJECTION, POWDER, LYOPHILIZED, FOR SOLUTION INTRAVENOUS at 04:56

## 2023-08-26 RX ADMIN — Medication 325 MILLIGRAM(S): at 11:34

## 2023-08-26 RX ADMIN — TAMSULOSIN HYDROCHLORIDE 0.4 MILLIGRAM(S): 0.4 CAPSULE ORAL at 22:02

## 2023-08-26 RX ADMIN — SODIUM CHLORIDE 50 MILLILITER(S): 9 INJECTION, SOLUTION INTRAVENOUS at 11:45

## 2023-08-26 RX ADMIN — HEPARIN SODIUM 5000 UNIT(S): 5000 INJECTION INTRAVENOUS; SUBCUTANEOUS at 08:00

## 2023-08-26 RX ADMIN — Medication 1 TABLET(S): at 11:34

## 2023-08-26 RX ADMIN — Medication 650 MILLIGRAM(S): at 12:44

## 2023-08-26 RX ADMIN — HEPARIN SODIUM 5000 UNIT(S): 5000 INJECTION INTRAVENOUS; SUBCUTANEOUS at 18:42

## 2023-08-26 RX ADMIN — CARVEDILOL PHOSPHATE 25 MILLIGRAM(S): 80 CAPSULE, EXTENDED RELEASE ORAL at 04:57

## 2023-08-26 RX ADMIN — PIPERACILLIN AND TAZOBACTAM 25 GRAM(S): 4; .5 INJECTION, POWDER, LYOPHILIZED, FOR SOLUTION INTRAVENOUS at 18:42

## 2023-08-26 NOTE — H&P ADULT - PROBLEM SELECTOR PLAN 3
- ESRD on HD 2/2 pCKD:   - BUN /CR 30/ 3.40 on admission   - Dr Wayne nephro - out patient  - Patient  received IV contrast over night, will need emergent HD today   -consulted Dr JOSE  - Renal  diet - ESRD on HD 2/2 pCKD:   - BUN /CR 30/ 3.40 on admission   - Dr Wayne nephro - out patient  - Patient  received IV contrast over night, will need HD today   -consulted Dr JOSE  - Renal  diet

## 2023-08-26 NOTE — DISCHARGE NOTE PROVIDER - HOSPITAL COURSE
ADMISSION DATE:  08-26-23    ---  FROM ADMISSION H+P:   HPI:  73 yo M with PMHx of s/p L nephrectomy 2/2 renal mass on ESRD MWF, hx of Hep C treated, HTN, Polio ( on clutches) hx of  Klebsiella bacteremia related to acute cholecystitis in June 2023 s/p robotic subtotal cholecystectomy on 6/21/23. He underwent ERCP with stent on 6/22/23, stent was removed on Thursday 8 /24/23. ERCP and stent and stent removal were done at [facility]. Presented with chills, rigors and fever during HD today,  [place] through his dialysis today, daughter took him home after the completion of dialysis and he was found to have an fever. Reports he still have  chills, and fever, no acute abdominal pain, HA, weakness, blood in urine.    IN ED   VITALS /81  RR 20 temp 102.5 F on RA   PERTINNET LABS: rbc 4.13 H/H 12.5/37.5  rdw index elevated Platelet count 107   Na 134 BUN /CR 30/ 3.40 AST 90    UA protein 100 LE trace WBC 14 PH 8.5   CT chest A/P with IV contrast: No obvious intraperitoneal free air. Cholecystectomy. Focus of air at the  distended cystic duct remnant, reflecting recent post procedural changes.  Nonspecific biliary enhancement along the common bile duct and distended  cystic duct remnant with surrounding stranding and trace fluid. These  findings are nonspecific and can be seen in infection/inflammation  (cholangitis) although neoplasm is not excluded. Suggest punctate stones  at the cystic duct remnant.  s/p 500cc BOLUS NS, 1x zosyn  in ER    (26 Aug 2023 04:10)      ---  HOSPITAL COURSE/PERTINENT LABS/PROCEDURES PERFORMED/PENDING TESTS:  Patient admitted for further workup and treatment of suspected acute cholangitis. Continued on Zosyn. Cultures showed __. ID consulted. Patient received IV contrast load so was dialyzed early. Patient had thrombocytopenia which was monitored, possibly reactive to acute infection. Nephro Dr Wayne was consulted.      ---  PATIENT CONDITION:  - stable    ---  PHYSICAL EXAM ON DAY OF DISCHARGE:    ---  CONSULTANTS:     ---  ADVANCED CARE PLANNING:  - Code status:      - [facility]:      [  ] NO     [  ] YES    ---  TIME SPENT:  I, the attending physician, was physically present for the key portions of the evaluation and management (E/M) service provided. The total amount of time spent reviewing the hospital notes, laboratory values, imaging findings, assessing/counseling the patient, discussing with consultant physicians, social work, nursing staff was -- minutes ADMISSION DATE:  08-26-23    ---  FROM ADMISSION H+P:   HPI:  73 yo M with PMHx of s/p L nephrectomy 2/2 renal mass on ESRD MWF, hx of Hep C treated, HTN, Polio ( on clutches) hx of  Klebsiella bacteremia related to acute cholecystitis in June 2023 s/p robotic subtotal cholecystectomy on 6/21/23. He underwent ERCP with stent on 6/22/23, stent was removed on Thursday 8 /24/23. ERCP and stent and stent removal were done at [facility]. Presented with chills, rigors and fever during HD today,  [place] through his dialysis today, daughter took him home after the completion of dialysis and he was found to have an fever. Reports he still have  chills, and fever, no acute abdominal pain, HA, weakness, blood in urine.    IN ED   VITALS /81  RR 20 temp 102.5 F on RA   PERTINNET LABS: rbc 4.13 H/H 12.5/37.5  rdw index elevated Platelet count 107   Na 134 BUN /CR 30/ 3.40 AST 90    UA protein 100 LE trace WBC 14 PH 8.5   CT chest A/P with IV contrast: No obvious intraperitoneal free air. Cholecystectomy. Focus of air at the  distended cystic duct remnant, reflecting recent post procedural changes.  Nonspecific biliary enhancement along the common bile duct and distended  cystic duct remnant with surrounding stranding and trace fluid. These  findings are nonspecific and can be seen in infection/inflammation  (cholangitis) although neoplasm is not excluded. Suggest punctate stones  at the cystic duct remnant.  s/p 500cc BOLUS NS, 1x zosyn  in ER    (26 Aug 2023 04:10)      ---  HOSPITAL COURSE/PERTINENT LABS/PROCEDURES PERFORMED/PENDING TESTS:  Patient admitted for suspected sepsis possibly due to acute cholangitis. ID and nephro was consulted. Patient started on empiric IV zosyn. Pt had ERCP and stent removel on 8/24. Blood cultures showed NGTD after 48 hours. Urine culture shows >3 organisms, but likely contaminated. Patient found to be thrombocytopenic likely reactive to sepsis. Monitored w/ no active signs of bleed. Pt has ESRD on dialysis (M/W/F), schedule was continued inpatient. ID recommended changing zosyn to **** Pt symptoms improved. Patient medically optimized and stable to discharge.    PT recommends ***    ---  PATIENT CONDITION:  - stable    ---  PHYSICAL EXAM ON DAY OF DISCHARGE:  T(C): 36.7 (08-28-23 @ 09:55), Max: 37.7 (08-27-23 @ 20:44)  HR: 75 (08-28-23 @ 10:00) (75 - 86)  BP: 125/73 (08-28-23 @ 10:00) (122/69 - 137/82)  RR: 18 (08-28-23 @ 09:55) (16 - 18)  SpO2: 94% (08-28-23 @ 09:55) (94% - 94%)    GENERAL: patient appears well, no acute distress, appropriate, pleasant  EYES: sclera clear, no exudates  ENMT: oropharynx clear without erythema, no exudates, moist mucous membranes  NECK: supple, soft  LUNGS: good air entry bilaterally, clear to auscultation, symmetric breath sounds, no wheezing or rhonchi appreciated  HEART: soft S1/S2, regular rate and rhythm, no murmurs noted, no lower extremity edema  GASTROINTESTINAL: abdomen is soft, nontender, nondistended, normoactive bowel sounds  INTEGUMENT: good skin turgor, no lesions noted  MUSCULOSKELETAL: +L calf muscle atrophy 2/2 Hx of polio. 0/5 LLE muscle strength. no clubbing or cyanosis  NEUROLOGIC: awake, alert, oriented x3, no obvious sensory deficits  PSYCHIATRIC: mood is good, affect is congruent, linear and logical thought process  HEME/LYMPH: no palpable supraclavicular nodules   ---  CONSULTANTS:   ID - Dr. Tadeo Harmon - Dr. Wanye   GI - Dr. Kearney  ---  ADVANCED CARE PLANNING:  - Code status: Full Code   - MOLST completed:      [ x ] NO     [  ] YES    ---  TIME SPENT:  I, the attending physician, was physically present for the key portions of the evaluation and management (E/M) service provided. The total amount of time spent reviewing the hospital notes, laboratory values, imaging findings, assessing/counseling the patient, discussing with consultant physicians, social work, nursing staff was -- minutes ADMISSION DATE:  08-26-23    ---  FROM ADMISSION H+P:   HPI:  71 yo M with PMHx of s/p L nephrectomy 2/2 renal mass on ESRD MWF, hx of Hep C treated, HTN, Polio ( on clutches) hx of  Klebsiella bacteremia related to acute cholecystitis in June 2023 s/p robotic subtotal cholecystectomy on 6/21/23. He underwent ERCP with stent on 6/22/23, stent was removed on Thursday 8 /24/23. ERCP and stent and stent removal were done at [facility]. Presented with chills, rigors and fever during HD today,  [place] through his dialysis today, daughter took him home after the completion of dialysis and he was found to have an fever. Reports he still have  chills, and fever, no acute abdominal pain, HA, weakness, blood in urine.    IN ED   VITALS /81  RR 20 temp 102.5 F on RA   PERTINNET LABS: rbc 4.13 H/H 12.5/37.5  rdw index elevated Platelet count 107   Na 134 BUN /CR 30/ 3.40 AST 90    UA protein 100 LE trace WBC 14 PH 8.5   CT chest A/P with IV contrast: No obvious intraperitoneal free air. Cholecystectomy. Focus of air at the  distended cystic duct remnant, reflecting recent post procedural changes.  Nonspecific biliary enhancement along the common bile duct and distended  cystic duct remnant with surrounding stranding and trace fluid. These  findings are nonspecific and can be seen in infection/inflammation  (cholangitis) although neoplasm is not excluded. Suggest punctate stones  at the cystic duct remnant.  s/p 500cc BOLUS NS, 1x zosyn  in ER    (26 Aug 2023 04:10)      ---  HOSPITAL COURSE/PERTINENT LABS/PROCEDURES PERFORMED/PENDING TESTS:  Patient admitted for suspected sepsis possibly due to acute cholangitis. ID and nephro was consulted. Patient started on empiric IV zosyn. Pt had ERCP and stent removel on 8/24. Blood cultures showed NGTD after 48 hours. Urine culture shows >3 organisms, but likely contaminated. Patient found to be thrombocytopenic likely reactive to sepsis. Monitored w/ no active signs of bleed. Pt has ESRD on dialysis (M/W/F), schedule was continued inpatient. Infectious Disease was consulted and initiated IV Zosyn. Blood cultures remained negative with no growth after 72 hours. Pt symptoms improved and remained stable, completing Zosyn 3-5 day post procedure course. Patient medically optimized and stable to discharge.    PT recommends ***    ---  PATIENT CONDITION:  - stable    ---  PHYSICAL EXAM ON DAY OF DISCHARGE:  T(C): 36.7 (08-28-23 @ 09:55), Max: 37.7 (08-27-23 @ 20:44)  HR: 75 (08-28-23 @ 10:00) (75 - 86)  BP: 125/73 (08-28-23 @ 10:00) (122/69 - 137/82)  RR: 18 (08-28-23 @ 09:55) (16 - 18)  SpO2: 94% (08-28-23 @ 09:55) (94% - 94%)    GENERAL: patient appears well, no acute distress, appropriate, pleasant  EYES: sclera clear, no exudates  ENMT: oropharynx clear without erythema, no exudates, moist mucous membranes  NECK: supple, soft  LUNGS: good air entry bilaterally, clear to auscultation, symmetric breath sounds, no wheezing or rhonchi appreciated  HEART: soft S1/S2, regular rate and rhythm, no murmurs noted, no lower extremity edema  GASTROINTESTINAL: abdomen is soft, nontender, nondistended, normoactive bowel sounds  INTEGUMENT: good skin turgor, no lesions noted  MUSCULOSKELETAL: +L calf muscle atrophy 2/2 Hx of polio. 0/5 LLE muscle strength. no clubbing or cyanosis  NEUROLOGIC: awake, alert, oriented x3, no obvious sensory deficits  PSYCHIATRIC: mood is good, affect is congruent, linear and logical thought process  HEME/LYMPH: no palpable supraclavicular nodules   ---  CONSULTANTS:   ID - Dr. Piedra  Nephcecilia - Dr. Wayne   GI - Dr. Kearney  ---  ADVANCED CARE PLANNING:  - Code status: Full Code   - MOLST completed:      [ x ] NO     [  ] YES    ---  TIME SPENT:  I, the attending physician, was physically present for the key portions of the evaluation and management (E/M) service provided. The total amount of time spent reviewing the hospital notes, laboratory values, imaging findings, assessing/counseling the patient, discussing with consultant physicians, social work, nursing staff was -- minutes ADMISSION DATE:  08-26-23    ---  FROM ADMISSION H+P:   HPI:  73 yo M with PMHx of s/p L nephrectomy 2/2 renal mass on ESRD MWF, hx of Hep C treated, HTN, Polio ( on clutches) hx of  Klebsiella bacteremia related to acute cholecystitis in June 2023 s/p robotic subtotal cholecystectomy on 6/21/23. He underwent ERCP with stent on 6/22/23, stent was removed on Thursday 8 /24/23. ERCP and stent and stent removal were done at [facility]. Presented with chills, rigors and fever during HD today,  [place] through his dialysis today, daughter took him home after the completion of dialysis and he was found to have an fever. Reports he still have  chills, and fever, no acute abdominal pain, HA, weakness, blood in urine.    IN ED   VITALS /81  RR 20 temp 102.5 F on RA   PERTINNET LABS: rbc 4.13 H/H 12.5/37.5  rdw index elevated Platelet count 107   Na 134 BUN /CR 30/ 3.40 AST 90    UA protein 100 LE trace WBC 14 PH 8.5   CT chest A/P with IV contrast: No obvious intraperitoneal free air. Cholecystectomy. Focus of air at the  distended cystic duct remnant, reflecting recent post procedural changes.  Nonspecific biliary enhancement along the common bile duct and distended  cystic duct remnant with surrounding stranding and trace fluid. These  findings are nonspecific and can be seen in infection/inflammation  (cholangitis) although neoplasm is not excluded. Suggest punctate stones  at the cystic duct remnant.  s/p 500cc BOLUS NS, 1x zosyn  in ER    (26 Aug 2023 04:10)      ---  HOSPITAL COURSE/PERTINENT LABS/PROCEDURES PERFORMED/PENDING TESTS:  Patient admitted for suspected sepsis possibly due to acute cholangitis. ID and nephro was consulted. Patient started on empiric IV zosyn. Pt had ERCP and stent removel on 8/24. Blood cultures showed NGTD after 48 hours. Urine culture shows >3 organisms, but likely contaminated. Patient found to be thrombocytopenic likely reactive to sepsis. Monitored w/ no active signs of bleed. Pt has ESRD on dialysis (M/W/F), schedule was continued inpatient. Infectious Disease was consulted and initiated IV Zosyn. Blood cultures remained negative with no growth after 72 hours. Pt symptoms improved and remained stable, completing Zosyn 3-5 day post procedure course. Physical Therapy evaluation deemed no services required upon discharge. Patient medically optimized and stable to discharge.      ---  PATIENT CONDITION:  - stable    ---  PHYSICAL EXAM ON DAY OF DISCHARGE:  T(C): 36.7 (08-28-23 @ 09:55), Max: 37.7 (08-27-23 @ 20:44)  HR: 75 (08-28-23 @ 10:00) (75 - 86)  BP: 125/73 (08-28-23 @ 10:00) (122/69 - 137/82)  RR: 18 (08-28-23 @ 09:55) (16 - 18)  SpO2: 94% (08-28-23 @ 09:55) (94% - 94%)    GENERAL: patient appears well, no acute distress, appropriate, pleasant  EYES: sclera clear, no exudates  ENMT: oropharynx clear without erythema, no exudates, moist mucous membranes  NECK: supple, soft  LUNGS: good air entry bilaterally, clear to auscultation, symmetric breath sounds, no wheezing or rhonchi appreciated  HEART: soft S1/S2, regular rate and rhythm, no murmurs noted, no lower extremity edema  GASTROINTESTINAL: abdomen is soft, nontender, nondistended, normoactive bowel sounds  INTEGUMENT: good skin turgor, no lesions noted  MUSCULOSKELETAL: +L calf muscle atrophy 2/2 Hx of polio. 0/5 LLE muscle strength. no clubbing or cyanosis  NEUROLOGIC: awake, alert, oriented x3, no obvious sensory deficits  PSYCHIATRIC: mood is good, affect is congruent, linear and logical thought process  HEME/LYMPH: no palpable supraclavicular nodules   ---  CONSULTANTS:   ID - Dr. Piedra  Nephcecilia - Dr. Wayne   GI - Dr. Kearney  ---  ADVANCED CARE PLANNING:  - Code status: Full Code   - MOLST completed:      [ x ] NO     [  ] YES    ---  TIME SPENT:  I, the attending physician, was physically present for the key portions of the evaluation and management (E/M) service provided. The total amount of time spent reviewing the hospital notes, laboratory values, imaging findings, assessing/counseling the patient, discussing with consultant physicians, social work, nursing staff was -- minutes ADMISSION DATE:  08-26-23    ---  FROM ADMISSION H+P:   HPI:  71 yo M with PMHx of s/p L nephrectomy 2/2 renal mass on ESRD MWF, hx of Hep C treated, HTN, Polio ( on clutches) hx of  Klebsiella bacteremia related to acute cholecystitis in June 2023 s/p robotic subtotal cholecystectomy on 6/21/23. He underwent ERCP with stent on 6/22/23, stent was removed on Thursday 8 /24/23. ERCP and stent and stent removal were done at [facility]. Presented with chills, rigors and fever during HD today,  [place] through his dialysis today, daughter took him home after the completion of dialysis and he was found to have an fever. Reports he still have  chills, and fever, no acute abdominal pain, HA, weakness, blood in urine.    IN ED   VITALS /81  RR 20 temp 102.5 F on RA   PERTINNET LABS: rbc 4.13 H/H 12.5/37.5  rdw index elevated Platelet count 107   Na 134 BUN /CR 30/ 3.40 AST 90    UA protein 100 LE trace WBC 14 PH 8.5   CT chest A/P with IV contrast: No obvious intraperitoneal free air. Cholecystectomy. Focus of air at the  distended cystic duct remnant, reflecting recent post procedural changes.  Nonspecific biliary enhancement along the common bile duct and distended  cystic duct remnant with surrounding stranding and trace fluid. These  findings are nonspecific and can be seen in infection/inflammation  (cholangitis) although neoplasm is not excluded. Suggest punctate stones  at the cystic duct remnant.  s/p 500cc BOLUS NS, 1x zosyn  in ER    (26 Aug 2023 04:10)      ---  HOSPITAL COURSE/PERTINENT LABS/PROCEDURES PERFORMED/PENDING TESTS:  Patient admitted for suspected sepsis possibly due to acute cholangitis. ID and nephro was consulted. Patient started on empiric IV zosyn. Pt had ERCP and stent removel on 8/24. Blood cultures showed NGTD after 48 hours. Urine culture shows >3 organisms, but likely contaminated. Patient found to be thrombocytopenic likely reactive to sepsis. Monitored w/ no active signs of bleed. Pt has ESRD on dialysis (M/W/F), schedule was continued inpatient. Infectious Disease was consulted and initiated IV Zosyn. Blood cultures remained negative with no growth after 72 hours. Pt symptoms improved and remained stable, completing Zosyn 3-5 day post procedure course. Physical Therapy evaluation deemed no services required upon discharge. Patient medically optimized and stable to discharge.      ---  PATIENT CONDITION:  - stable    ---  PHYSICAL EXAM ON DAY OF DISCHARGE:  T(C): 36.7 (08-28-23 @ 09:55), Max: 37.7 (08-27-23 @ 20:44)  HR: 75 (08-28-23 @ 10:00) (75 - 86)  BP: 125/73 (08-28-23 @ 10:00) (122/69 - 137/82)  RR: 18 (08-28-23 @ 09:55) (16 - 18)  SpO2: 94% (08-28-23 @ 09:55) (94% - 94%)    GENERAL: patient appears well, no acute distress, appropriate, pleasant  EYES: sclera clear, no exudates  ENMT: oropharynx clear without erythema, no exudates, moist mucous membranes  NECK: supple, soft  LUNGS: good air entry bilaterally, clear to auscultation, symmetric breath sounds, no wheezing or rhonchi appreciated  HEART: soft S1/S2, regular rate and rhythm, no murmurs noted, no lower extremity edema  GASTROINTESTINAL: abdomen is soft, nontender, nondistended, normoactive bowel sounds  INTEGUMENT: good skin turgor  MUSCULOSKELETAL: +L calf muscle atrophy 2/2 Hx of polio. 0/5 LLE muscle strength. no clubbing or cyanosis  NEUROLOGIC: awake, alert, oriented x3, no obvious sensory deficits  PSYCHIATRIC: mood is good, affect is congruent, linear and logical thought process    ---  CONSULTANTS:   ID - Dr. Piedra  Nephro - Dr. Wayne   GI - Dr. Kearney  ---  ADVANCED CARE PLANNING:  - Code status: Full Code   - MOLST completed:      [ x ] NO     [  ] YES    ---  TIME SPENT:  I, the attending physician, was physically present for the key portions of the evaluation and management (E/M) service provided. The total amount of time spent reviewing the hospital notes, laboratory values, imaging findings, assessing/counseling the patient, discussing with consultant physicians, social work, nursing staff was -- minutes

## 2023-08-26 NOTE — DISCHARGE NOTE PROVIDER - NSDCFUSCHEDAPPT_GEN_ALL_CORE_FT
Juan Husain Abel  [facility] Physician [facility]  UROLOGY [facility]  Scheduled Appointment: 10/09/2023

## 2023-08-26 NOTE — DISCHARGE NOTE PROVIDER - NSDCCPCAREPLAN_GEN_ALL_CORE_FT
PRINCIPAL DISCHARGE DIAGNOSIS  Diagnosis: Acute cholangitis  Assessment and Plan of Treatment: You came to the hospital with chills, fever, abdominal pain. We believe this was due to inflammation along your liver bile ducts. This could be due to an infection, but we believe this occured due to your recent procedure for a stent removal. We had you on IV antibiotics. Your symptoms improved.  Please continue ***  Please see your gastroenterologist within 2 weeks following discharge from the hospital.      SECONDARY DISCHARGE DIAGNOSES  Diagnosis: ESRD on dialysis  Assessment and Plan of Treatment: You are on dialysis Monday, Wednesday, and Friday. We continued your dialysis while you were in the hospital. Our kidney doctor was following you closely.  Please continue dialysis outside on Monday, Wednesday, and Friday.      Diagnosis: Thrombocytopenia  Assessment and Plan of Treatment: You were found to have low platelets in the hospital. This could increase your risk of bleeding. We believe this was a reactive response by the body from your liver bile duct inflammation. We monitored you and you had no signs of bleeding.   Please see your primary care physician within 2 weeks for further blood work and management.     PRINCIPAL DISCHARGE DIAGNOSIS  Diagnosis: Acute cholangitis  Assessment and Plan of Treatment: You came to the hospital with chills, fever, abdominal pain. We believe this was due to inflammation along your liver bile ducts. This could be due to an infection, but we believe this occured due to your recent procedure for a stent removal. We had you on empiric IV antibiotics and your symptoms improved.  Please see your gastroenterologist within 2 weeks following discharge from the hospital.      SECONDARY DISCHARGE DIAGNOSES  Diagnosis: ESRD on dialysis  Assessment and Plan of Treatment: You are on dialysis Monday, Wednesday, and Friday. We continued your dialysis while you were in the hospital. Our kidney doctor was following you closely.  Please continue dialysis outside on Monday, Wednesday, and Friday.      Diagnosis: Thrombocytopenia  Assessment and Plan of Treatment: You were found to have low platelets in the hospital. This could increase your risk of bleeding. We believe this was a reactive response by the body from your liver bile duct inflammation. We monitored you and you had no signs of bleeding.   Please see your primary care physician within 2 weeks for further blood work and management.

## 2023-08-26 NOTE — H&P ADULT - HISTORY OF PRESENT ILLNESS
73 yo M with PMHx of s/p L nephrectomy 2/2 renal mass on ESRD MWF, hx of Hep C treated, HTN, Polio ( on clutches) hx of  Klebsiella bacteremia related to acute cholecystitis in June 2023 s/p robotic subtotal cholecystectomy on 6/21/23. He underwent ERCP with stent on 6/22/23, stent was removed on Thursday 8 /24/23. ERCP and stent and stent removal were done at [facility]. Presented with chills, rigors and fever during HD today,  [place] through his dialysis today, daughter took him home after the completion of dialysis and he was found to have an fever. Reports he still have  chills, and fever, no acute abdominal pain, HA, weakness, blood in urine.    IN ED   VITALS /81  RR 20 temp 102.5 F on RA   PERTINNET LABS: rbc 4.13 H/H 12.5/37.5  rdw index elevated Platelet count 107   Na 134 BUN /CR 30/ 3.40 AST 90    UA protein 100 LE trace WBC 14 PH 8.5     CT chest A/P with IV contrast: No obvious intraperitoneal free air. Cholecystectomy. Focus of air at the  distended cystic duct remnant, reflecting recent post procedural changes.  Nonspecific biliary enhancement along the common bile duct and distended  cystic duct remnant with surrounding stranding and trace fluid. These  findings are nonspecific and can be seen in infection/inflammation  (cholangitis) although neoplasm is not excluded. Suggest punctate stones  at the cystic duct remnant.    s/p 500cc BOLUS NS, 1x zosyn  in ER    71 yo M with PMHx of s/p L nephrectomy 2/2 renal mass on ESRD MWF, hx of Hep C treated, HTN, Polio ( on clutches) hx of  Klebsiella bacteremia related to acute cholecystitis in June 2023 s/p robotic subtotal cholecystectomy on 6/21/23. He underwent ERCP with stent on 6/22/23, stent was removed on Thursday 8 /24/23. ERCP and stent and stent removal were done at [facility]. Presented with chills, rigors and fever during HD today,  [place] through his dialysis today, daughter took him home after the completion of dialysis and he was found to have an fever. Reports he still have  chills, and fever, no acute abdominal pain, HA, weakness, blood in urine.    IN ED   VITALS /81  RR 20 temp 102.5 F on RA   PERTINNET LABS: rbc 4.13 H/H 12.5/37.5  rdw index elevated Platelet count 107   Na 134 BUN /CR 30/ 3.40 AST 90    UA protein 100 LE trace WBC 14 PH 8.5   CT chest A/P with IV contrast: No obvious intraperitoneal free air. Cholecystectomy. Focus of air at the  distended cystic duct remnant, reflecting recent post procedural changes.  Nonspecific biliary enhancement along the common bile duct and distended  cystic duct remnant with surrounding stranding and trace fluid. These  findings are nonspecific and can be seen in infection/inflammation  (cholangitis) although neoplasm is not excluded. Suggest punctate stones  at the cystic duct remnant.  s/p 500cc BOLUS NS, 1x zosyn  in ER

## 2023-08-26 NOTE — H&P ADULT - ASSESSMENT
73 yo M with PMHx of s/p L nephrectomy 2/2 renal mass on ESRD MWF, hx of Hep C treated, HTN, Polio ( on clutches) hx of  Klebsiella bacteremia related to acute cholecystitis in June 2023 s/p robotic subtotal cholecystectomy on 6/21/23. He underwent ERCP with stent on 6/22/23, stent was removed on Thursday 8 /24/23. ERCP and stent and stent removal were done at [facility]. - CT chest A/P with IV contrast: No obvious intraperitoneal free air. Cholecystectomy. Focus of air at the  distended cystic duct remnant, reflecting recent post procedural changes.  Nonspecific biliary enhancement along the common bile duct and distended  cystic duct remnant with surrounding stranding and trace fluid. These  findings are nonspecific and can be seen in infection/inflammation  (cholangitis) although neoplasm is not excluded. Suggest punctate stones  at the cystic duct remnant. Admitted for cholangitis and emergent HD  73 yo M with PMHx of s/p L nephrectomy 2/2 renal mass on ESRD MWF, hx of Hep C treated, HTN, Polio ( on clutches) hx of  Klebsiella bacteremia related to acute cholecystitis in June 2023 s/p robotic subtotal cholecystectomy on 6/21/23. He underwent ERCP with stent on 6/22/23, stent was removed on Thursday 8 /24/23. ERCP and stent and stent removal were done at [facility]. - CT chest A/P with IV contrast: No obvious intraperitoneal free air. Cholecystectomy. Focus of air at the  distended cystic duct remnant, reflecting recent post procedural changes.  Nonspecific biliary enhancement along the common bile duct and distended  cystic duct remnant with surrounding stranding and trace fluid. These  findings are nonspecific and can be seen in infection/inflammation  (cholangitis) although neoplasm is not excluded. Suggest punctate stones  at the cystic duct remnant. Admitted for cholangitis, sepsis

## 2023-08-26 NOTE — PROGRESS NOTE ADULT - PROBLEM SELECTOR PLAN 4
stable   - c/w home coreg w/ holding parameters  - will monitor cmp am  - renal diet   - BP this am on the lower side in patient w/ sepsis; will give 50cc/hr LR for now and reeval  - monitor BP & titrate BP meds PRN. stable   - c/w home coreg w/ holding parameters  - will monitor cmp am  - renal diet   - BP this am on the lower side in patient w/ sepsis; will give 50cc/hr LR for now and reeval. caution given esrd  - monitor BP & titrate BP meds PRN.

## 2023-08-26 NOTE — DISCHARGE NOTE PROVIDER - NSDCMRMEDTOKEN_GEN_ALL_CORE_FT
Coreg 25 mg oral tablet: 1 tab(s) orally once a day  Ferrousal 325 mg oral tablet: 1 tab(s) orally once a day  Multiple Vitamins oral capsule: 1 cap(s) orally once a day  tamsulosin 0.4 mg oral capsule: 1 cap(s) orally once a day

## 2023-08-26 NOTE — CONSULT NOTE ADULT - ASSESSMENT
Cholangitis   likely from recent ERCP  IVAB  diet as tolerated  no plans for intervention at this time  will monitor clinically   Cholangitis   likely from recent ERCP  IVAB  diet as tolerated  no plans for intervention at this time  if clinically stable tomorrow can dc home from GI standpoint and fu with Dr Spangler  will monitor clinically

## 2023-08-26 NOTE — DISCHARGE NOTE PROVIDER - ATTENDING DISCHARGE PHYSICAL EXAMINATION:
PHYSICAL EXAM ON DAY OF DISCHARGE:  T(C): 36.7 (08-28-23 @ 09:55), Max: 37.7 (08-27-23 @ 20:44)  HR: 75 (08-28-23 @ 10:00) (75 - 86)  BP: 125/73 (08-28-23 @ 10:00) (122/69 - 137/82)  RR: 18 (08-28-23 @ 09:55) (16 - 18)  SpO2: 94% (08-28-23 @ 09:55) (94% - 94%)    GENERAL: patient appears well, no acute distress, appropriate, pleasant  EYES: sclera clear, no exudates  ENMT: oropharynx clear without erythema, no exudates, moist mucous membranes  NECK: supple, soft  LUNGS: good air entry bilaterally, clear to auscultation, symmetric breath sounds, no wheezing or rhonchi appreciated  HEART: soft S1/S2, regular rate and rhythm, no murmurs noted, no lower extremity edema  GASTROINTESTINAL: abdomen is soft, nontender, nondistended, normoactive bowel sounds  INTEGUMENT: good skin turgor  MUSCULOSKELETAL: +L calf muscle atrophy 2/2 Hx of polio. 0/5 LLE muscle strength. no clubbing or cyanosis  NEUROLOGIC: awake, alert, oriented x3, no obvious sensory deficits  PSYCHIATRIC: mood is good, affect is congruent, linear and logical thought process

## 2023-08-26 NOTE — H&P ADULT - NSHPREVIEWOFSYSTEMS_GEN_ALL_CORE
Review of Systems:   CONSTITUTIONAL: ++ fever, + Chills + rigors   EYES: No eye pain, visual disturbances, or discharge  ENMT:   No ear pain, No nose bleed; No sinus or throat pain  RESPIRATORY: No cough, wheezing, or hemoptysis, no shortness of breath  CARDIOVASCULAR: No chest pain, palpitations, no leg swelling  GASTROINTESTINAL: No abdominal or epigastric pain. No nausea, vomiting, or hematemesis; No diarrhea or constipation. No melena or hematochezia.  GENITOURINARY: No dysuria, frequency, hematuria, or incontinence  NEUROLOGICAL: No headaches, memory loss, loss of strength, numbness; No dizziness  SKIN: No itching, burning, rashes, or lesions   MUSCULOSKELETAL: No joint pain or swelling; No muscle, back, or extremity pain  PSYCHIATRIC: No depression, anxiety, no mood swings CONSTITUTIONAL: ++ fever, + Chills + rigors   EYES: No eye pain, visual disturbances, or discharge  ENMT:   No ear pain, No nose bleed; No sinus or throat pain  RESPIRATORY: No cough, wheezing, or hemoptysis, no shortness of breath  CARDIOVASCULAR: No chest pain, palpitations, no leg swelling  GASTROINTESTINAL: No abdominal or epigastric pain. No nausea, vomiting, or hematemesis; No diarrhea or constipation. No melena or hematochezia.  GENITOURINARY: No dysuria, frequency, hematuria, or incontinence  NEUROLOGICAL: No headaches, memory loss, loss of strength, numbness; No dizziness  SKIN: No itching, burning, rashes, or lesions   MUSCULOSKELETAL: No joint pain or swelling; No muscle, back, or extremity pain  PSYCHIATRIC: No depression, anxiety, no mood swings

## 2023-08-26 NOTE — PROGRESS NOTE ADULT - ASSESSMENT
73 yo M with PMHx of s/p L nephrectomy 2/2 renal mass on ESRD MWF, hx of Hep C treated, HTN hx of  Klebsiella bacteremia related to acute cholecystitis in June 2023 s/p antonio, w/ subsequent ercp w/ stent, removed 8/24 admitted for suspected sepsis 2/2 cholangitis.

## 2023-08-26 NOTE — H&P ADULT - PROBLEM SELECTOR PLAN 5
stable   - c/w home coreg w/ holding parameters  - will monitor cmp am  - renal diet   - monitor BP & titrate BP meds PRN.

## 2023-08-26 NOTE — PROGRESS NOTE ADULT - PROBLEM SELECTOR PLAN 1
- Patient presented with fever , chills rigors, meeting sepsis criteria.  -  s/p robotic subtotal cholecystectomy on 6/21/23. He underwent ERCP with stent on 6/22/23, stent was removed on Thursday 8 /24/23. ERCP and stent and stent removal were done at [facility].  - will continue Zosyn for now, f/u ID recs, culture data  - CT chest A/P with IV contrast w/ nonspecific findings however w/ findings possibly explained by inflammation/infection (such as cholangitis)  - Monitor routine cbc  - Tylenol prn for mild pain and fever  - F/U blood cx  urine cx  - ID, Tadeo consulted, follow rec

## 2023-08-26 NOTE — H&P ADULT - NSHPPHYSICALEXAM_GEN_ALL_CORE
Vital Signs Last 24 Hrs  T(C): 37 (25 Aug 2023 22:53), Max: 39.2 (25 Aug 2023 19:28)  T(F): 98.6 (25 Aug 2023 22:53), Max: 102.5 (25 Aug 2023 19:28)  HR: 88 (25 Aug 2023 22:53) (88 - 117)  BP: 128/82 (25 Aug 2023 22:53) (128/82 - 133/81)  BP(mean): --  RR: 20 (25 Aug 2023 22:53) (20 - 20)  SpO2: 98% (25 Aug 2023 22:53) (96% - 98%)    Parameters below as of 25 Aug 2023 22:53  Patient On (Oxygen Delivery Method): room air    GENERAL:  no acute distress, + chills + rigors   EYES: sclera clear ,EOM intact, PERRLA, no exudates  ENMT: normocephalic, atraumatic, moist mucous membranes  NECK: supple, soft,  LUNGS: good air entry bilaterally, clear to auscultation, symmetric breath sounds, no wheezing or rhonchi appreciated  HEART: soft S1/S2, regular rate and rhythm, no murmurs noted, no lower extremity edema  GASTROINTESTINAL: abdomen is soft, nontender, nondistended, normoactive bowel sounds, no palpable masses  INTEGUMENT: good skin turgor, no lesions noted  MUSCULOSKELETAL: no cyanosis, clubbing, edema, calves nontender to palpation b/l  NEUROLOGIC: awake, alert, oriented x3, good muscle tone in 4 extremities, no obvious sensory deficits Vital Signs Last 24 Hrs  T(C): 37 (25 Aug 2023 22:53), Max: 39.2 (25 Aug 2023 19:28)  T(F): 98.6 (25 Aug 2023 22:53), Max: 102.5 (25 Aug 2023 19:28)  HR: 88 (25 Aug 2023 22:53) (88 - 117)  BP: 128/82 (25 Aug 2023 22:53) (128/82 - 133/81)  BP(mean): --  RR: 20 (25 Aug 2023 22:53) (20 - 20)  SpO2: 98% (25 Aug 2023 22:53) (96% - 98%)    Parameters below as of 25 Aug 2023 22:53  Patient On (Oxygen Delivery Method): room air  GENERAL:  no acute distress, + chills + rigors   EYES: sclera clear ,EOM intact, PERRLA, no exudates  ENMT: normocephalic, atraumatic, moist mucous membranes  NECK: supple, soft,  LUNGS: good air entry bilaterally, clear to auscultation, symmetric breath sounds, no wheezing or rhonchi appreciated  HEART: soft S1/S2, regular rate and rhythm, no murmurs noted, no lower extremity edema  GASTROINTESTINAL: abdomen is soft, nontender, nondistended, normoactive bowel sounds, no palpable masses  INTEGUMENT: good skin turgor, no lesions noted  MUSCULOSKELETAL: no cyanosis, clubbing, edema, calves nontender to palpation b/l  NEUROLOGIC: awake, alert, oriented x3, good muscle tone in 4 extremities, no obvious sensory deficits

## 2023-08-26 NOTE — ED ADULT NURSE REASSESSMENT NOTE - NSFALLHARMRISKINTERV_ED_ALL_ED

## 2023-08-26 NOTE — ED ADULT NURSE REASSESSMENT NOTE - NS ED NURSE REASSESS COMMENT FT1
pt received awake and alert x 4 speaking in full sentences. resting at this time. in no acute distress. admitted MED/SURG for acute cholangitis. awaiting bed assignment. safety maintained.

## 2023-08-26 NOTE — H&P ADULT - ATTENDING COMMENTS
73 yo M with PMHx of s/p L nephrectomy 2/2 renal mass on ESRD MWF, hx of Hep C treated, HTN, Polio ( on clutches) hx of  Klebsiella bacteremia related to acute cholecystitis in June 2023 s/p robotic subtotal cholecystectomy on 6/21/23. He underwent ERCP with stent on 6/22/23, stent was removed on Thursday 8 /24/23. ERCP and stent and stent removal were done at [facility]. - CT chest A/P with IV contrast: No obvious intraperitoneal free air. Cholecystectomy. Focus of air at the  distended cystic duct remnant, reflecting recent post procedural changes.  Nonspecific biliary enhancement along the common bile duct and distended  cystic duct remnant with surrounding stranding and trace fluid. These  findings are nonspecific and can be seen in infection/inflammation  (cholangitis) although neoplasm is not excluded. Suggest punctate stones  at the cystic duct remnant. Admitted for cholangitis, sepsis     Agree with above. Edited where appropriate

## 2023-08-26 NOTE — H&P ADULT - PROBLEM SELECTOR PLAN 1
- Patient presented with fever , chills rigors   - Meet sepsis criteria POA  ( fever , tachy ,tachypnea- source  infection/inflammation  (cholangitis)   -  s/p robotic subtotal cholecystectomy on 6/21/23. He underwent ERCP with stent on 6/22/23, stent was removed on Thursday 8 /24/23. ERCP and stent and stent removal were done at [facility].  - s/p 500cc BOLUS NS, 1x zosyn  in ER , will continue Zosyn switch according to sensitivity.   - CT chest A/P with IV contrast: No obvious intraperitoneal free air. Cholecystectomy. Focus of air at the  distended cystic duct remnant, reflecting recent post procedural changes.  Nonspecific biliary enhancement along the common bile duct and distended  cystic duct remnant with surrounding stranding and trace fluid. These  findings are nonspecific and can be seen in infection/inflammation  (cholangitis) although neoplasm is not excluded. Suggest punctate stones  at the cystic duct remnant.  - Monitor cbc am   - Tylenol prn for mild pain and fever  - F/U blood cx  urine cx  - ID, Tadeo consulted, follow rec

## 2023-08-26 NOTE — DISCHARGE NOTE PROVIDER - PROVIDER TOKENS
PROVIDER:[TOKEN:[[id number]],FOLLOWUP:[2 weeks]] PROVIDER:[TOKEN:[[id number]],FOLLOWUP:[2 weeks]],PROVIDER:[TOKEN:[[id number]]]

## 2023-08-26 NOTE — DISCHARGE NOTE PROVIDER - CARE PROVIDER_API CALL
Foreign Garcia.  [address], [address]  [place] [address]  Phone: [phone]  Fax: [phone]  Follow Up Time: 2 weeks   [facility]  [address], [address]  [place] [address]  Phone: [phone]  Fax: [phone]  Follow Up Time: 2 weeks    Enrique Wayne  Nephrology  [address], [address]  [place], [place] [id number]  Phone: [phone]  Fax: [phone]  Follow Up Time:

## 2023-08-27 LAB
ALBUMIN SERPL ELPH-MCNC: 2.3 G/DL — LOW (ref 3.3–5)
ALP SERPL-CCNC: 86 U/L — SIGNIFICANT CHANGE UP (ref 40–120)
ALT FLD-CCNC: 72 U/L — SIGNIFICANT CHANGE UP (ref 12–78)
ANION GAP SERPL CALC-SCNC: 6 MMOL/L — SIGNIFICANT CHANGE UP (ref 5–17)
AST SERPL-CCNC: 33 U/L — SIGNIFICANT CHANGE UP (ref 15–37)
BASOPHILS # BLD AUTO: 0.01 K/UL — SIGNIFICANT CHANGE UP (ref 0–0.2)
BASOPHILS NFR BLD AUTO: 0.2 % — SIGNIFICANT CHANGE UP (ref 0–2)
BILIRUB SERPL-MCNC: 0.5 MG/DL — SIGNIFICANT CHANGE UP (ref 0.2–1.2)
BUN SERPL-MCNC: 29 MG/DL — HIGH (ref 7–23)
CALCIUM SERPL-MCNC: 8.9 MG/DL — SIGNIFICANT CHANGE UP (ref 8.5–10.1)
CHLORIDE SERPL-SCNC: 100 MMOL/L — SIGNIFICANT CHANGE UP (ref 96–108)
CO2 SERPL-SCNC: 30 MMOL/L — SIGNIFICANT CHANGE UP (ref 22–31)
CREAT SERPL-MCNC: 4.1 MG/DL — HIGH (ref 0.5–1.3)
CULTURE RESULTS: SIGNIFICANT CHANGE UP
EGFR: 15 ML/MIN/1.73M2 — LOW
EOSINOPHIL # BLD AUTO: 0.08 K/UL — SIGNIFICANT CHANGE UP (ref 0–0.5)
EOSINOPHIL NFR BLD AUTO: 1.2 % — SIGNIFICANT CHANGE UP (ref 0–6)
GLUCOSE SERPL-MCNC: 92 MG/DL — SIGNIFICANT CHANGE UP (ref 70–99)
HBV SURFACE AB SER-ACNC: 58.2 MIU/ML — SIGNIFICANT CHANGE UP
HBV SURFACE AB SER-ACNC: REACTIVE
HBV SURFACE AG SER-ACNC: SIGNIFICANT CHANGE UP
HCT VFR BLD CALC: 35.7 % — LOW (ref 39–50)
HGB BLD-MCNC: 11.6 G/DL — LOW (ref 13–17)
IMM GRANULOCYTES NFR BLD AUTO: 0.3 % — SIGNIFICANT CHANGE UP (ref 0–0.9)
LYMPHOCYTES # BLD AUTO: 1.07 K/UL — SIGNIFICANT CHANGE UP (ref 1–3.3)
LYMPHOCYTES # BLD AUTO: 16.3 % — SIGNIFICANT CHANGE UP (ref 13–44)
MCHC RBC-ENTMCNC: 30.3 PG — SIGNIFICANT CHANGE UP (ref 27–34)
MCHC RBC-ENTMCNC: 32.5 GM/DL — SIGNIFICANT CHANGE UP (ref 32–36)
MCV RBC AUTO: 93.2 FL — SIGNIFICANT CHANGE UP (ref 80–100)
MONOCYTES # BLD AUTO: 0.59 K/UL — SIGNIFICANT CHANGE UP (ref 0–0.9)
MONOCYTES NFR BLD AUTO: 9 % — SIGNIFICANT CHANGE UP (ref 2–14)
NEUTROPHILS # BLD AUTO: 4.78 K/UL — SIGNIFICANT CHANGE UP (ref 1.8–7.4)
NEUTROPHILS NFR BLD AUTO: 73 % — SIGNIFICANT CHANGE UP (ref 43–77)
NRBC # BLD: 0 /100 WBCS — SIGNIFICANT CHANGE UP (ref 0–0)
PLATELET # BLD AUTO: 101 K/UL — LOW (ref 150–400)
POTASSIUM SERPL-MCNC: 3.9 MMOL/L — SIGNIFICANT CHANGE UP (ref 3.5–5.3)
POTASSIUM SERPL-SCNC: 3.9 MMOL/L — SIGNIFICANT CHANGE UP (ref 3.5–5.3)
PROT SERPL-MCNC: 5.8 G/DL — LOW (ref 6–8.3)
RBC # BLD: 3.83 M/UL — LOW (ref 4.2–5.8)
RBC # FLD: 15.3 % — HIGH (ref 10.3–14.5)
SODIUM SERPL-SCNC: 136 MMOL/L — SIGNIFICANT CHANGE UP (ref 135–145)
SPECIMEN SOURCE: SIGNIFICANT CHANGE UP
WBC # BLD: 6.55 K/UL — SIGNIFICANT CHANGE UP (ref 3.8–10.5)
WBC # FLD AUTO: 6.55 K/UL — SIGNIFICANT CHANGE UP (ref 3.8–10.5)

## 2023-08-27 PROCEDURE — 99232 SBSQ HOSP IP/OBS MODERATE 35: CPT

## 2023-08-27 RX ADMIN — HEPARIN SODIUM 5000 UNIT(S): 5000 INJECTION INTRAVENOUS; SUBCUTANEOUS at 05:13

## 2023-08-27 RX ADMIN — CARVEDILOL PHOSPHATE 25 MILLIGRAM(S): 80 CAPSULE, EXTENDED RELEASE ORAL at 05:14

## 2023-08-27 RX ADMIN — Medication 650 MILLIGRAM(S): at 01:01

## 2023-08-27 RX ADMIN — Medication 650 MILLIGRAM(S): at 01:31

## 2023-08-27 RX ADMIN — HEPARIN SODIUM 5000 UNIT(S): 5000 INJECTION INTRAVENOUS; SUBCUTANEOUS at 17:42

## 2023-08-27 RX ADMIN — Medication 325 MILLIGRAM(S): at 12:52

## 2023-08-27 RX ADMIN — PIPERACILLIN AND TAZOBACTAM 25 GRAM(S): 4; .5 INJECTION, POWDER, LYOPHILIZED, FOR SOLUTION INTRAVENOUS at 05:14

## 2023-08-27 RX ADMIN — TAMSULOSIN HYDROCHLORIDE 0.4 MILLIGRAM(S): 0.4 CAPSULE ORAL at 21:01

## 2023-08-27 RX ADMIN — Medication 1 TABLET(S): at 12:52

## 2023-08-27 RX ADMIN — PIPERACILLIN AND TAZOBACTAM 25 GRAM(S): 4; .5 INJECTION, POWDER, LYOPHILIZED, FOR SOLUTION INTRAVENOUS at 17:43

## 2023-08-27 NOTE — PROGRESS NOTE ADULT - PROBLEM SELECTOR PLAN 4
stable   - c/w home coreg w/ holding parameters  - will monitor cmp am  - renal diet   - per nephro, avoid continuous IVF, give boluses as needed for hypotension  - monitor BP & titrate BP meds PRN.

## 2023-08-27 NOTE — CONSULT NOTE ADULT - SUBJECTIVE AND OBJECTIVE BOX
Chief Complaint:  Patient is a 72y old  Male who presents with a chief complaint of cholangitis (26 Aug 2023 04:10)    Hypertension    Renal failure, chronic    Cancer of kidney, left    History of left nephrectomy       HPI:  73 yo M with PMHx of s/p L nephrectomy 2/2 renal mass on ESRD MWF, hx of Hep C treated, HTN, Polio ( on clutches) hx of  Klebsiella bacteremia related to acute cholecystitis in 2023 s/p robotic subtotal cholecystectomy on 23. He underwent ERCP with stent on 23, stent was removed on . ERCP and stent and stent removal were done at [facility]. Presented with chills, rigors and fever during HD today,  [place] through his dialysis today, daughter took him home after the completion of dialysis and he was found to have an fever. Reports he still have  chills, and fever, no acute abdominal pain, HA, weakness, blood in urine.    IN ED   VITALS /81  RR 20 temp 102.5 F on RA   PERTINNET LABS: rbc 4.13 H/H 12.5/37.5  rdw index elevated Platelet count 107   Na 134 BUN /CR 30/ 3.40 AST 90    UA protein 100 LE trace WBC 14 PH 8.5   CT chest A/P with IV contrast: No obvious intraperitoneal free air. Cholecystectomy. Focus of air at the  distended cystic duct remnant, reflecting recent post procedural changes.  Nonspecific biliary enhancement along the common bile duct and distended  cystic duct remnant with surrounding stranding and trace fluid. These  findings are nonspecific and can be seen in infection/inflammation  (cholangitis) although neoplasm is not excluded. Suggest punctate stones  at the cystic duct remnant.  s/p 500cc BOLUS NS, 1x zosyn  in ER    (26 Aug 2023 04:10)      Demerol (Faint)  Demerol HCl (Unknown)      acetaminophen     Tablet .. 650 milliGRAM(s) Oral every 6 hours PRN  carvedilol 25 milliGRAM(s) Oral daily  ferrous    sulfate 325 milliGRAM(s) Oral daily  heparin   Injectable 5000 Unit(s) SubCutaneous every 12 hours  multivitamin 1 Tablet(s) Oral daily  piperacillin/tazobactam IVPB.. 3.375 Gram(s) IV Intermittent every 12 hours  tamsulosin 0.4 milliGRAM(s) Oral at bedtime        FAMILY HISTORY:  FHx: renal failure (Mother)          Review of Systems:    General:  No wt loss, fevers, chills, night sweats,fatigue,   Eyes:  Good vision, no reported pain  ENT:  No sore throat, pain, runny nose, dysphagia  CV:  No pain, palpitatioins, hypo/hypertension  Resp:  No dyspnea, cough, tachypnea, wheezing  :  No pain, bleeding, incontinence, nocturia  Muscle:  No pain, weakness  Neuro:  No weakness, tingling, memory problems  Psych:  No fatigue, insomnia, mood problems, depression  Endocrine:  No polyuria, polydypsia, cold/heat intolerance  Heme:  No petechiae, ecchymosis, easy bruisability  Skin:  No rash, tattoos, scars, edema    Relevant Family History:       Relevant Social History:       Physical Exam:    Vital Signs:  Vital Signs Last 24 Hrs  T(C): 37.7 (26 Aug 2023 06:17), Max: 39.2 (25 Aug 2023 19:28)  T(F): 99.8 (26 Aug 2023 06:17), Max: 102.5 (25 Aug 2023 19:28)  HR: 86 (26 Aug 2023 06:17) (82 - 117)  BP: 120/68 (26 Aug 2023 06:17) (120/68 - 133/81)  BP(mean): --  RR: 18 (26 Aug 2023 06:17) (18 - 20)  SpO2: 93% (26 Aug 2023 06:17) (93% - 98%)    Parameters below as of 26 Aug 2023 06:17  Patient On (Oxygen Delivery Method): room air      Daily Height in cm: 162.56 (25 Aug 2023 19:28)    Daily     General:  Appears stated age, well-groomed, well-nourished, no distress  HEENT:  NC/AT,  conjunctivae clear and pink, no thyromegaly, nodules, adenopathy, no JVD  Chest:  Full & symmetric excursion, no increased effort, breath sounds clear  Cardiovascular:  Regular rhythm, S1, S2, no murmur/rub/S3/S4, no abdominal bruit, no edema  Abdomen:  Soft, non-tender, non-distended, normoactive bowel sounds,  no masses ,no hepatosplenomeagaly, no signs of chronic liver disease  Extremities:  no cyanosis,clubbing or edema  Skin:  No rash/erythema/ecchymoses/petechiae/wounds/abscess/warm/dry  Neuro/Psych:  Alert, oriented, no asterixis, no tremor, no encephalopathy    Laboratory:                            12.5   7.08  )-----------( 107      ( 25 Aug 2023 20:20 )             37.5         134<L>  |  100  |  30<H>  ----------------------------<  111<H>  4.2   |  27  |  3.40<H>    Ca    8.7      25 Aug 2023 20:20    TPro  6.4  /  Alb  2.9<L>  /  TBili  0.6  /  DBili  x   /  AST  90<H>  /  ALT  126<H>  /  AlkPhos  107      LIVER FUNCTIONS - ( 25 Aug 2023 20:20 )  Alb: 2.9 g/dL / Pro: 6.4 g/dL / ALK PHOS: 107 U/L / ALT: 126 U/L / AST: 90 U/L / GGT: x           PT/INR - ( 25 Aug 2023 20:20 )   PT: 13.5 sec;   INR: 1.16 ratio         PTT - ( 25 Aug 2023 20:20 )  PTT:26.4 sec  Urinalysis Basic - ( 25 Aug 2023 21:10 )    Color: Yellow / Appearance: Clear / S.008 / pH: x  Gluc: x / Ketone: Negative mg/dL  / Bili: Negative / Urobili: 1.0 mg/dL   Blood: x / Protein: 100 mg/dL / Nitrite: Negative   Leuk Esterase: Trace / RBC: 1 /HPF / WBC 14 /HPF   Sq Epi: x / Non Sq Epi: x / Bacteria: x        Imaging:          
Infectious Diseases Consult by Jamilah Trammell MD  [facility] INFECTIOUS DISEASES   MD Mahesh Fernandez Michelle, MD     HPI:  73 yo M with PMHx of s/p L nephrectomy 2/2 renal mass on ESRD MWF, hx of Hep C treated, HTN, Polio ( on clutches) hx of  Klebsiella bacteremia related to acute cholecystitis in June 2023 s/p robotic subtotal cholecystectomy on 6/21/23. He underwent ERCP with stent on 6/22/23, stent was removed on Thursday 8 /24/23. ERCP and stent and stent removal were done at [facility] who presented with chills, rigors and fever during HD. IN ED tmax 102.5 CT Ap concerning for cholangitis. Denies n/v/d CP SOB abd pain urinary symptoms.     Infectious Disease consult was called to evaluate pt and for antibiotic management.      Past Medical & Surgical Hx:  PAST MEDICAL & SURGICAL HISTORY:  Hypertension  Renal failure, chronic  Cancer of kidney, left  History of left nephrectomy      Social History--  EtOH: denies  Tobacco: denies   Drug Use: denies    FAMILY HISTORY:  FHx: renal failure (Mother)      Allergies  Demerol (Faint)  Demerol HCl (Unknown)    Intolerances  NONE      Home Medications:  Coreg 25 mg oral tablet: 1 tab(s) orally once a day (26 Aug 2023 04:00)  [place] 325 mg oral tablet: 1 tab(s) orally once a day (26 Aug 2023 04:00)  Multiple Vitamins oral capsule: 1 cap(s) orally once a day (26 Aug 2023 04:00)  tamsulosin 0.4 mg oral capsule: 1 cap(s) orally once a day (26 Aug 2023 04:00)      Current Inpatient Medications :    ANTIBIOTICS:   piperacillin/tazobactam IVPB.. 3.375 Gram(s) IV Intermittent every 12 hours      OTHER RELEVANT MEDICATIONS :  acetaminophen     Tablet .. 650 milliGRAM(s) Oral every 6 hours PRN  carvedilol 25 milliGRAM(s) Oral daily  ferrous    sulfate 325 milliGRAM(s) Oral daily  heparin   Injectable 5000 Unit(s) SubCutaneous every 12 hours  lactated ringers. 1000 milliLiter(s) IV Continuous <Continuous>  multivitamin 1 Tablet(s) Oral daily  tamsulosin 0.4 milliGRAM(s) Oral at bedtim    ROS:  CONSTITUTIONAL: + fever or chills  EYES:  Negative  blurry vision or double vision  CARDIOVASCULAR:  Negative for chest pain or palpitations  RESPIRATORY:  Negative for cough, wheezing, or SOB   GASTROINTESTINAL:  Negative for nausea, vomiting, diarrhea, constipation, or abdominal pain  GENITOURINARY:  Negative frequency, urgency , dysuria or hematuria   NEUROLOGIC:  No headache, confusion, dizziness, lightheadedness  All other systems were reviewed and are negative        I&O's Detail    26 Aug 2023 07:01  -  27 Aug 2023 07:00  --------------------------------------------------------  IN:  Total IN: 0 mL    OUT:    Other (mL): 0 mL    Voided (mL): 600 mL  Total OUT: 600 mL    Total NET: -600 mL      Physical Exam:  Vital Signs Last 24 Hrs  T(C): 36.6 (27 Aug 2023 11:57), Max: 36.9 (27 Aug 2023 04:30)  T(F): 97.9 (27 Aug 2023 11:57), Max: 98.4 (27 Aug 2023 04:30)  HR: 80 (27 Aug 2023 11:57) (77 - 86)  BP: 114/70 (27 Aug 2023 11:57) (92/58 - 118/64)  RR: 18 (27 Aug 2023 11:57) (17 - 18)  SpO2: 92% (27 Aug 2023 11:57) (92% - 94%)    Parameters below as of 27 Aug 2023 11:57  Patient On (Oxygen Delivery Method): room air      General: no acute distress  Neck: supple, trachea midline  Lungs: clear, no wheeze/rhonchi  Cardiovascular: regular rate and rhythm, S1 S2  Abdomen: soft, nontender, ND, bowel sounds normal  Neurological:  alert and oriented x3  Skin: no rash  Extremities: no edema    Labs:                         11.6   6.55  )-----------( 101      ( 27 Aug 2023 07:50 )             35.7     08-27    136  |  100  |  29<H>  ----------------------------<  92  3.9   |  30  |  4.10<H>    Ca    8.9      27 Aug 2023 07:50  Phos  4.1     08-26  Mg     2.0     08-26    TPro  5.8<L>  /  Alb  2.3<L>  /  TBili  0.5  /  DBili  x   /  AST  33  /  ALT  72  /  AlkPhos  86  08-27    RECENT CULTURES:    Culture - Urine (collected 25 Aug 2023 21:10)  Source: Clean Catch Clean Catch (Midstream)  Final Report (27 Aug 2023 17:41):    >=3 organisms. Probable collection contamination.    Culture - Blood (collected 25 Aug 2023 20:20)  Source: .Blood Blood-Peripheral  Preliminary Report (27 Aug 2023 02:02):    No growth at 24 hours    Culture - Blood (collected 25 Aug 2023 20:15)  Source: .Blood Blood-Peripheral  Preliminary Report (27 Aug 2023 02:02):    No growth at 24 hours        RADIOLOGY & ADDITIONAL STUDIES:    ACC: [id number] EXAM:  CT ABDOMEN AND PELVIS IC   ORDERED BY: MOMO GUTIERREZ     ACC: [id number] EXAM:  CT CHEST IC   ORDERED BY: MOMO GUTIERREZ     PROCEDURE DATE:  08/26/2023          INTERPRETATION:  CLINICAL INDICATION: Fever, shaking rigors, sepsis, sp   ercp and cholangeogram yesterday ro perforation.    PROCEDURE:  CT of the chest, abdomen and pelvis was performed with intravenous   contrast. Coronal and sagittal reconstruction images were obtained.    CONTRAST/COMPLICATIONS:  IV Contrast: Omnipaque 350 (accession [id number]), IV contrast documented   in unlinked concurrent exam (accession [id number])  80 cc administered   20   cc discarded  Oral Contrast: NONE  Complications: None reported at time of study completion    COMPARISON: 7/17/2023. 8/24/2023.    FINDINGS:    CHEST:    LUNGS AND AIRWAYS: Patent central airways. Atelectasis. Paraseptal   emphysema. Scattered calcified granulomata. Stable small nodule floor   intrafissural lymph node along the right major fissure.  PLEURA: No pleural effusion or pneumothorax.  HEART: Normal size. Trace pericardial effusion.  VESSELS: Atherosclerosis. Normal caliber of the thoracic aorta.  MEDIASTINUM AND NALLELY: No lymphadenopathy.  CHEST WALL AND LOWER NECK: Bilateral gynecomastia.    ABDOMEN/PELVIS:    LIVER: Unremarkable.  BILE DUCTS/GALLBLADDER: Cholecystectomy. Focus of air at the distended   cystic duct remnant, reflecting recent post procedural changes. Biliary   enhancement along the common bile duct and cystic duct remnant with   surrounding stranding and trace fluid. Again noted, punctate densities at   the distended cystic duct, suggesting stones.  PANCREAS: Mild fatty atrophy. Prominent common bile duct at the head   again noted.  SPLEEN: Unremarkable.    ADRENALS: Unremarkable.  KIDNEYS/URETERS: Left nephrectomy. Enlarged left kidney with multiple   cysts, calcifications and subcentimeter hypodensities. No right   hydroureteronephrosis.  BLADDER: Partially distended.  REPRODUCTIVE ORGANS: Enlarged prostate.    BOWEL: No bowel obstruction. Normal caliber of the appendix containing   fecalith without inflammatory change.  Periampullary duodenal   diverticulum. Underdistended stomach. Colon diverticulosis.  PERITONEUM: No organized fluid collection or free air. Trace fluid at the   perihepatic region and pelvis.  VESSELS: Atherosclerosis. Normal caliber of the abdominal aorta.  RETROPERITONEUM/LYMPH NODE: Subcentimeter lymph nodes.  ABDOMINAL WALL/SOFT TISSUES: Small fat-containing umbilical and inguinal   hernias. Again noted, marked muscle bulk loss and fatty replacement at   the left pelvis and visualized proximal left lower extremity  BONES: Degenerative changes of the spine. Stable endplate depression of   the spine.    IMPRESSION:    No obvious intraperitoneal free air. Cholecystectomy. Focus of air at the   distended cystic duct remnant, reflecting recent post procedural changes.   Nonspecific biliary enhancement along the common bile duct and distended   cystic duct remnant with surrounding stranding and trace fluid. These   findings are nonspecific and can be seen in infection/inflammation   (cholangitis) although neoplasm is not excluded. Suggest punctate stones   at the cystic duct remnant.    Assessment :   73 yo M with PMHx of s/p L nephrectomy 2/2 renal mass on ESRD MWF, hx of Hep C treated, HTN, Polio ( on clutches) hx of  Klebsiella bacteremia related to acute cholecystitis in June 2023 s/p robotic subtotal cholecystectomy on 6/21/23. He underwent ERCP with stent on 6/22/23, stent was removed on Thursday 8 /24/23. ERCP and stent and stent removal were done at [facility] who presented with chills, rigors and fever during HD. IN ED tmax 102.5 CT Ap concerning for cholangitis  Likely sequela from recent ERCP and stent removal  LFTs mildly elevated Tbili is wnl  Cultures neg  Abd exam benign  Fevers resolved WBC wnl      Plan :   On empiric Zosyn  Fu final cultures  Trend temps and cbc  Serial abd exams  Stable from ID standpoint      Continue with present regiment .  Approptiate use of antibiotics and adverse effects reviewed.      I have discussed the above plan of care with patient in detail. He expressed understanding of the treatment plan . Risks, benefits and alternatives discussed in detail. I have asked if he has any questions or concerns and appropriately addressed them to the best of my ability .      > 45 minutes spent in direct patient care reviewing  the notes, lab data/ imaging , discussion with multidisciplinary team. All questions were addressed and answered to the best of my capacity .    Thank you for allowing me to participate in the care of your patient .      Lisa Trammell MD  Infectious Disease  [phone]
HPI:  Patient is a 72y old  Male with HD dependent ESRD, dialyzes in [facility], recent acute cholecystitis associated with Kleb bacteremia, robotic subtotal cholecystectomy in [facility] followed by ERCP, CBD stent who became acutely ill whithin 24h of CBD stent removal on 8/24. Admitted yest for management of presumed cholangitis. C/o shivers this afternoon.   His last outpt HD was yest. He had CTA A/p with IV contrast last night.       HPI:  73 yo M with PMHx of s/p L nephrectomy 2/2 renal mass on ESRD MWF, hx of Hep C treated, HTN, Polio ( on clutches) hx of  Klebsiella bacteremia related to acute cholecystitis in June 2023 s/p robotic subtotal cholecystectomy on 6/21/23. He underwent ERCP with stent on 6/22/23, stent was removed on Thursday 8 /24/23. ERCP and stent and stent removal were done at [facility]. Presented with chills, rigors and fever during HD today,  [place] through his dialysis today, daughter took him home after the completion of dialysis and he was found to have an fever. Reports he still have  chills, and fever, no acute abdominal pain, HA, weakness, blood in urine.    IN ED   VITALS /81  RR 20 temp 102.5 F on RA   PERTINNET LABS: rbc 4.13 H/H 12.5/37.5  rdw index elevated Platelet count 107   Na 134 BUN /CR 30/ 3.40 AST 90    UA protein 100 LE trace WBC 14 PH 8.5   CT chest A/P with IV contrast: No obvious intraperitoneal free air. Cholecystectomy. Focus of air at the  distended cystic duct remnant, reflecting recent post procedural changes.  Nonspecific biliary enhancement along the common bile duct and distended  cystic duct remnant with surrounding stranding and trace fluid. These  findings are nonspecific and can be seen in infection/inflammation  (cholangitis) although neoplasm is not excluded. Suggest punctate stones  at the cystic duct remnant.  s/p 500cc BOLUS NS, 1x zosyn  in ER    (26 Aug 2023 04:10)      PAST MEDICAL & SURGICAL HISTORY:  Hypertension  ESRD  Cancer of kidney, left  History of left nephrectomy          Social Hx:     FAMILY HISTORY:  FHx: renal failure (Mother)        Allergies    Demerol (Faint)  Demerol HCl (Unknown)    Intolerances        MEDICATIONS  (STANDING):  carvedilol 25 milliGRAM(s) Oral daily  ferrous    sulfate 325 milliGRAM(s) Oral daily  heparin   Injectable 5000 Unit(s) SubCutaneous every 12 hours  lactated ringers. 1000 milliLiter(s) (50 mL/Hr) IV Continuous <Continuous>  multivitamin 1 Tablet(s) Oral daily  piperacillin/tazobactam IVPB.. 3.375 Gram(s) IV Intermittent every 12 hours  tamsulosin 0.4 milliGRAM(s) Oral at bedtime    MEDICATIONS  (PRN):  acetaminophen     Tablet .. 650 milliGRAM(s) Oral every 6 hours PRN Temp greater or equal to 38C (100.4F), Mild Pain (1 - 3)      Daily Height in cm: 162.56 (25 Aug 2023 19:28)    Daily     Drug Dosing Weight  Height (cm): 162.6 (25 Aug 2023 19:28)  Weight (kg): 63.73 (26 Aug 2023 06:17)  BMI (kg/m2): 24.1 (26 Aug 2023 06:17)  BSA (m2): 1.68 (26 Aug 2023 06:17)      REVIEW OF SYSTEMS:    Per HPI        PHYSICAL EXAM:    GENERAL: anxious, shivering.   HEAD:  Atraumatic, normocephalic  CHEST/LUNG: Clear to auscultation bilaterally  HEART: Regular rate and rhythm. No murmurs, rubs, or gallops  ABDOMEN: Soft, Nontender, Nondistended. POS BS  EXTREMITIES:  no edema    LABS:  CBC Full  -  ( 26 Aug 2023 11:30 )  WBC Count : 6.77 K/uL  RBC Count : 3.95 M/uL  Hemoglobin : 12.0 g/dL  Hematocrit : 37.0 %  Platelet Count - Automated : 106 K/uL  Mean Cell Volume : 93.7 fl  Mean Cell Hemoglobin : 30.4 pg  Mean Cell Hemoglobin Concentration : 32.4 gm/dL  Auto Neutrophil # : 5.41 K/uL  Auto Lymphocyte # : 0.71 K/uL  Auto Monocyte # : 0.58 K/uL  Auto Eosinophil # : 0.02 K/uL  Auto Basophil # : 0.02 K/uL  Auto Neutrophil % : 79.9 %  Auto Lymphocyte % : 10.5 %  Auto Monocyte % : 8.6 %  Auto Eosinophil % : 0.3 %  Auto Basophil % : 0.3 %    08-26    137  |  102  |  37<H>  ----------------------------<  119<H>  4.2   |  24  |  4.60<H>    Ca    8.6      26 Aug 2023 11:30  Phos  4.1     08-26  Mg     2.0     08-26    TPro  5.9<L>  /  Alb  2.4<L>  /  TBili  0.4  /  DBili  x   /  AST  57<H>  /  ALT  95<H>  /  AlkPhos  104  08-26      Impression:  * HD dependent ESRD. MWF. Last outpt HD was yest  * Cholangitis related to CBD stent removal on 8/24  * Recent robotic subtotal cholecystectomy.     Recommendations:   Will arranged for 2H HD today post IV dye last night  Full HD on Mon per outpt schedule  Avoid continuous IVFs in HD pt. May give boluses as needed for hypotension.

## 2023-08-27 NOTE — PROGRESS NOTE ADULT - ASSESSMENT
Cholangitis       likely from recent ERCP  IVAB  diet as tolerated  no plans for intervention at this time  if clinically stable tomorrow can dc home from GI standpoint and fu with Dr Spangler  will monitor clinically    I reviewed the overnight course of events on the unit, re-confirming the patient history. I discussed the care with the patient and their family  Differential diagnosis and plan of care discussed with patient after the evaluation  35 minutes spent on total encounter of which more than fifty percent of the encounter was spent counseling and/or coordinating care by the attending physician.  Advanced care planning was discussed with patient and family.  Advanced care planning forms were reviewed and discussed.  Risks, benefits and alternatives of gastroenterologic procedures were discussed in detail and all questions were answered.

## 2023-08-27 NOTE — PROGRESS NOTE ADULT - PROBLEM SELECTOR PLAN 1
Patient presented with fever , chills rigors, meeting sepsis criteria. s/p robotic subtotal cholecystectomy on 6/21/23. underwent ERCP with stent on 6/22/23, stent was removed on Thursday 8 /24/23. ERCP and stent and stent removal were done at [facility]. CT chest A/P with IV contrast w/ nonspecific findings however w/ findings possibly explained by inflammation/infection (such as cholangitis)    - will continue Zosyn for now, f/u ID recs, culture data  - Monitor routine cbc  - Tylenol prn for mild pain and fever  - F/U blood cx (no growth at 24hrs)  - f/u urine cx (specimen received)  - Tadeo EMANUEL consulted, follow rec

## 2023-08-28 LAB
ALBUMIN SERPL ELPH-MCNC: 2.1 G/DL — LOW (ref 3.3–5)
ALP SERPL-CCNC: 83 U/L — SIGNIFICANT CHANGE UP (ref 40–120)
ALT FLD-CCNC: 51 U/L — SIGNIFICANT CHANGE UP (ref 12–78)
ANION GAP SERPL CALC-SCNC: 10 MMOL/L — SIGNIFICANT CHANGE UP (ref 5–17)
AST SERPL-CCNC: 20 U/L — SIGNIFICANT CHANGE UP (ref 15–37)
BILIRUB SERPL-MCNC: 0.5 MG/DL — SIGNIFICANT CHANGE UP (ref 0.2–1.2)
BUN SERPL-MCNC: 46 MG/DL — HIGH (ref 7–23)
CALCIUM SERPL-MCNC: 8.7 MG/DL — SIGNIFICANT CHANGE UP (ref 8.5–10.1)
CHLORIDE SERPL-SCNC: 103 MMOL/L — SIGNIFICANT CHANGE UP (ref 96–108)
CO2 SERPL-SCNC: 25 MMOL/L — SIGNIFICANT CHANGE UP (ref 22–31)
CREAT SERPL-MCNC: 5.3 MG/DL — HIGH (ref 0.5–1.3)
EGFR: 11 ML/MIN/1.73M2 — LOW
GLUCOSE SERPL-MCNC: 83 MG/DL — SIGNIFICANT CHANGE UP (ref 70–99)
HCT VFR BLD CALC: 33.7 % — LOW (ref 39–50)
HGB BLD-MCNC: 10.9 G/DL — LOW (ref 13–17)
MAGNESIUM SERPL-MCNC: 2.2 MG/DL — SIGNIFICANT CHANGE UP (ref 1.6–2.6)
MCHC RBC-ENTMCNC: 30.3 PG — SIGNIFICANT CHANGE UP (ref 27–34)
MCHC RBC-ENTMCNC: 32.3 GM/DL — SIGNIFICANT CHANGE UP (ref 32–36)
MCV RBC AUTO: 93.6 FL — SIGNIFICANT CHANGE UP (ref 80–100)
NRBC # BLD: 0 /100 WBCS — SIGNIFICANT CHANGE UP (ref 0–0)
PHOSPHATE SERPL-MCNC: 4.7 MG/DL — HIGH (ref 2.5–4.5)
PLATELET # BLD AUTO: 118 K/UL — LOW (ref 150–400)
POTASSIUM SERPL-MCNC: 3.8 MMOL/L — SIGNIFICANT CHANGE UP (ref 3.5–5.3)
POTASSIUM SERPL-SCNC: 3.8 MMOL/L — SIGNIFICANT CHANGE UP (ref 3.5–5.3)
PROT SERPL-MCNC: 5.8 G/DL — LOW (ref 6–8.3)
RBC # BLD: 3.6 M/UL — LOW (ref 4.2–5.8)
RBC # FLD: 15.2 % — HIGH (ref 10.3–14.5)
SODIUM SERPL-SCNC: 138 MMOL/L — SIGNIFICANT CHANGE UP (ref 135–145)
WBC # BLD: 6.35 K/UL — SIGNIFICANT CHANGE UP (ref 3.8–10.5)
WBC # FLD AUTO: 6.35 K/UL — SIGNIFICANT CHANGE UP (ref 3.8–10.5)

## 2023-08-28 PROCEDURE — 99233 SBSQ HOSP IP/OBS HIGH 50: CPT

## 2023-08-28 PROCEDURE — 99232 SBSQ HOSP IP/OBS MODERATE 35: CPT | Mod: GC

## 2023-08-28 RX ADMIN — PIPERACILLIN AND TAZOBACTAM 25 GRAM(S): 4; .5 INJECTION, POWDER, LYOPHILIZED, FOR SOLUTION INTRAVENOUS at 18:06

## 2023-08-28 RX ADMIN — PIPERACILLIN AND TAZOBACTAM 25 GRAM(S): 4; .5 INJECTION, POWDER, LYOPHILIZED, FOR SOLUTION INTRAVENOUS at 05:50

## 2023-08-28 RX ADMIN — Medication 1 TABLET(S): at 18:06

## 2023-08-28 RX ADMIN — HEPARIN SODIUM 5000 UNIT(S): 5000 INJECTION INTRAVENOUS; SUBCUTANEOUS at 05:50

## 2023-08-28 RX ADMIN — Medication 325 MILLIGRAM(S): at 18:06

## 2023-08-28 RX ADMIN — TAMSULOSIN HYDROCHLORIDE 0.4 MILLIGRAM(S): 0.4 CAPSULE ORAL at 21:15

## 2023-08-28 RX ADMIN — HEPARIN SODIUM 5000 UNIT(S): 5000 INJECTION INTRAVENOUS; SUBCUTANEOUS at 18:06

## 2023-08-28 RX ADMIN — CARVEDILOL PHOSPHATE 25 MILLIGRAM(S): 80 CAPSULE, EXTENDED RELEASE ORAL at 05:50

## 2023-08-28 NOTE — PROGRESS NOTE ADULT - ASSESSMENT
HD dependent ESRD. MWF  Cholangitis related to CBD stent removal on 8/24  Hypertension    HD today. Fluid removal as tolerated by BP. IV abx, ID follow up. Monitor BP trend. Titrate BP meds as needed.  To continue current meds.

## 2023-08-28 NOTE — PROGRESS NOTE ADULT - ASSESSMENT
71 yo M with PMHx of s/p L nephrectomy 2/2 renal mass on ESRD MWF, hx of Hep C treated, HTN hx of  Klebsiella bacteremia related to acute cholecystitis in June 2023 s/p antonio, w/ subsequent ercp w/ stent, removed 8/24 admitted for suspected sepsis 2/2 cholangitis.

## 2023-08-28 NOTE — PROGRESS NOTE ADULT - PROBLEM SELECTOR PLAN 2
thrombocytopenic to 107 on admission, no active signs of bleeding. reactive 2/2 sepsis?    - Trend PLT daily : 107 -> 106 -> 101  - monitor for signs of bleeding
thrombocytopenic to 107 on admission, no active signs of bleeding. likely reactive 2/2 sepsis?  - Trend PLT daily today 118   - continue monitoring for signs of bleeding
Platelet count 107   no active signs of bleeding  reactive 2/2 sepsis?  monitor cbc daily

## 2023-08-28 NOTE — CARE COORDINATION ASSESSMENT. - OTHER PERTINENT DISCHARGE PLANNING INFORMATION:
SW met with this pt at bedside- discussed name role elos and dc planning. Pt from home, lives with his daughter was independent prior to admission. Pt goes to [facility] dialysis in [place] goes MWF at 230pm, drives himself. Plan will be to return when ready, pt reports his family will transport home upon DC. SW to follow.

## 2023-08-28 NOTE — PROGRESS NOTE ADULT - ASSESSMENT
Cholangitis       likely from recent ERCP  IVAB  diet as tolerated  no plans for intervention at this time  if clinically stable and bcx neg can dc home from GI standpoint and fu with Dr Spangler  will monitor clinically    I reviewed the overnight course of events on the unit, re-confirming the patient history. I discussed the care with the patient and their family  Differential diagnosis and plan of care discussed with patient after the evaluation  35 minutes spent on total encounter of which more than fifty percent of the encounter was spent counseling and/or coordinating care by the attending physician.  Advanced care planning was discussed with patient and family.  Advanced care planning forms were reviewed and discussed.  Risks, benefits and alternatives of gastroenterologic procedures were discussed in detail and all questions were answered.

## 2023-08-28 NOTE — PATIENT PROFILE ADULT - FALL HARM RISK - HARM RISK INTERVENTIONS
Assistance with ambulation/Assistance OOB with selected safe patient handling equipment/Communicate Risk of Fall with Harm to all staff/Discuss with provider need for PT consult/Monitor gait and stability/Provide patient with walking aids - walker, cane, crutches/Reinforce activity limits and safety measures with patient and family/Reorient to person, place and time as needed/Review medications for side effects contributing to fall risk/Sit up slowly, dangle for a short time, stand at bedside before walking/Tailored Fall Risk Interventions/Toileting schedule using arm’s reach rule for commode and bathroom/Use of alarms - bed, chair and/or voice tab/Visual Cue: Yellow wristband and red socks/Bed in lowest position, wheels locked, appropriate side rails in place/Call bell, personal items and telephone in reach/Instruct patient to call for assistance before getting out of bed or chair/Non-slip footwear when patient is out of bed/[employer] to call system/Physically safe environment - no spills, clutter or unnecessary equipment/Purposeful Proactive Rounding/Room/bathroom lighting operational, light cord in reach

## 2023-08-28 NOTE — CARE COORDINATION ASSESSMENT. - NSCAREPROVIDERS_GEN_ALL_CORE_FT
CARE PROVIDERS:  Accepting Physician: Mireille Shaikh  Administration: Carolina Lowery  Administration: Carson Florence  Administration: James Castellanos  Admitting: Mireille Shaikh  Attending: Mireille Shaikh  Case Management: Michael Rahman  Consultant: Pastor Kearney  Consultant: Enrique Wayne  Consultant: Matheus Prabhakar  Consultant: Brandi Piedra  Consultant: Mireille Felix  ED Attending: Lyle Rai  ED Nurse: Miriam Whiteside  Nurse: Lilli Callahan  Nurse: Sherri Agustin  Nurse: Kenny Hatch  Nurse: Franklin Hartmann  Nurse: Kristen Mckenzie  Ordered: ADM, User  Ordered: ServiceAccount, [facility] HospitalMLM  Outpatient Provider: Adonis Lewis  Outpatient Provider: Matheus Prabhakar  Override: Sharonda Luevano  Override: Lilli Callahan  Physical Therapy: Shahrzad Fermin  Primary Team: Jersey Soto  Primary Team: Juan Miguel Stephens  Primary Team: Nas Hall  Primary Team: Bo Young  Primary Team: Lyle Kinsey  Primary Team: Chelo Francisco  Primary Team: Jd De La Fuente  Primary Team: Saud Doss  Primary Team: Elizabeth Israel  Primary Team: Mireille Shaikh  Primary Team: Elvira Brennan  Primary Team: Maria Ines Jimenes  : Millie Milton

## 2023-08-28 NOTE — PROGRESS NOTE ADULT - ATTENDING COMMENTS
71 yo M with PMHx of s/p L nephrectomy 2/2 renal mass on ESRD MWF, hx of Hep C treated, HTN hx of  Klebsiella bacteremia related to acute cholecystitis in June 2023 s/p antonio, w/ subsequent ercp w/ stent, removed 8/24 admitted for suspected sepsis 2/2 cholangitis.     Patient seen and examined at bedside. Continue IV Zosyn. Advance diet as tolerated. Dc planning tomorrow. PT/OT. Treatment will be dependent on clinical course.
Agree with above, edited where appropriate.
Agree with above, edited where appropriate.

## 2023-08-28 NOTE — PROGRESS NOTE ADULT - PROBLEM SELECTOR PLAN 4
stable   - c/w home coreg w/ holding parameters  - continue renal diet   - per nephro, avoid continuous IVF, give boluses as needed for hypotension  - continue monitoring BP & titrate BP meds PRN.

## 2023-08-28 NOTE — PROGRESS NOTE ADULT - PROBLEM SELECTOR PLAN 3
- ESRD on HD   - BUN /CR 30/ 3.40 on admission   - Dr Wayne nephro - out patient  - Patient  received IV contrast bolus - for dialysis today   - Nephro consult (Dr Wayne group), recs appreciated  - Renal  diet
ESRD on HD, BUN /CR 30/ 3.40 on admission (Dr Wayne nephro - out patient). s/p HDx1 on 8/26 i/s/o IV constrast    - Will continue MWF HD schedule while inpatient  - Renal  diet  - Nephro consult (Dr Wayne group), recs appreciated
ESRD on HD, BUN /CR 30/ 3.40 on admission (Dr Wayne nephro - out patient).  - Continue MWF HD schedule while inpatient  - continue Renal diet  - Nephro consult (Dr Wayne group), recs appreciated

## 2023-08-28 NOTE — PROGRESS NOTE ADULT - PROBLEM SELECTOR PLAN 1
Patient presented with fever, chills, rigors, meeting sepsis criteria. - RESOLVED s/p robotic subtotal cholecystectomy on 6/21/23. underwent ERCP with stent on 6/22/23, stent was removed on Thursday 8 /24/23. ERCP and stent and stent removal were done at [facility]. CT chest A/P with IV contrast w/ nonspecific findings however w/ findings possibly explained by inflammation/infection (such as cholangitis)  - will continue Zosyn for now, if cultures negative tomorrow, will dc zosyn.  - contineu monitoring cbc  - Tylenol prn for mild pain and fever  - blood cx - NGTD 48 hours  - urine cx - >3 organisms, likely contaminated  - Tadoe EMANUEL consulted, recs appreciated Patient presented with fever, chills, rigors, meeting sepsis criteria. - RESOLVED   -s/p robotic subtotal cholecystectomy on 6/21/23. underwent ERCP with stent on 6/22/23, stent was removed on Thursday 8 /24/23. ERCP and stent and stent removal were done at [facility]. CT chest A/P with IV contrast w/ nonspecific findings however w/ findings possibly explained by inflammation/infection (such as cholangitis)  - will continue Zosyn for now, if cultures negative tomorrow, will dc zosyn.  - contineu monitoring cbc  - Tylenol prn for mild pain and fever  - blood cx - NGTD 48 hours  - urine cx - >3 organisms, likely contaminated  - Tadeo EMANUEL consulted, recs appreciated

## 2023-08-28 NOTE — PROGRESS NOTE ADULT - TIME BILLING
Pt seen + examined. Case discussed with resident. Agree with assessment and plan above (edited by me in detail):  Time spent: 45min. More than 50% of the visit was spent counseling the patient on medical condition and coordination of care, excluding teaching time.
Pt seen + examined. Case discussed with resident. Agree with assessment and plan above (edited by me in detail):  Time spent: 40min. More than 50% of the visit was spent counseling the patient on medical condition and coordination of care, excluding teaching time.

## 2023-08-29 ENCOUNTER — TRANSCRIPTION ENCOUNTER (OUTPATIENT)
Age: 73
End: 2023-08-29

## 2023-08-29 VITALS
SYSTOLIC BLOOD PRESSURE: 134 MMHG | HEART RATE: 70 BPM | RESPIRATION RATE: 16 BRPM | TEMPERATURE: 98 F | DIASTOLIC BLOOD PRESSURE: 83 MMHG | OXYGEN SATURATION: 98 %

## 2023-08-29 LAB
ALBUMIN SERPL ELPH-MCNC: 2.1 G/DL — LOW (ref 3.3–5)
ALP SERPL-CCNC: 86 U/L — SIGNIFICANT CHANGE UP (ref 40–120)
ALT FLD-CCNC: 43 U/L — SIGNIFICANT CHANGE UP (ref 12–78)
ANION GAP SERPL CALC-SCNC: 8 MMOL/L — SIGNIFICANT CHANGE UP (ref 5–17)
AST SERPL-CCNC: 17 U/L — SIGNIFICANT CHANGE UP (ref 15–37)
BILIRUB SERPL-MCNC: 0.3 MG/DL — SIGNIFICANT CHANGE UP (ref 0.2–1.2)
BUN SERPL-MCNC: 28 MG/DL — HIGH (ref 7–23)
CALCIUM SERPL-MCNC: 8.6 MG/DL — SIGNIFICANT CHANGE UP (ref 8.5–10.1)
CHLORIDE SERPL-SCNC: 107 MMOL/L — SIGNIFICANT CHANGE UP (ref 96–108)
CO2 SERPL-SCNC: 24 MMOL/L — SIGNIFICANT CHANGE UP (ref 22–31)
CREAT SERPL-MCNC: 4.3 MG/DL — HIGH (ref 0.5–1.3)
EGFR: 14 ML/MIN/1.73M2 — LOW
GLUCOSE SERPL-MCNC: 94 MG/DL — SIGNIFICANT CHANGE UP (ref 70–99)
HCT VFR BLD CALC: 34.8 % — LOW (ref 39–50)
HGB BLD-MCNC: 11.1 G/DL — LOW (ref 13–17)
MAGNESIUM SERPL-MCNC: 2.3 MG/DL — SIGNIFICANT CHANGE UP (ref 1.6–2.6)
MCHC RBC-ENTMCNC: 29.9 PG — SIGNIFICANT CHANGE UP (ref 27–34)
MCHC RBC-ENTMCNC: 31.9 GM/DL — LOW (ref 32–36)
MCV RBC AUTO: 93.8 FL — SIGNIFICANT CHANGE UP (ref 80–100)
NRBC # BLD: 0 /100 WBCS — SIGNIFICANT CHANGE UP (ref 0–0)
PHOSPHATE SERPL-MCNC: 4.1 MG/DL — SIGNIFICANT CHANGE UP (ref 2.5–4.5)
PLATELET # BLD AUTO: 113 K/UL — LOW (ref 150–400)
POTASSIUM SERPL-MCNC: 4 MMOL/L — SIGNIFICANT CHANGE UP (ref 3.5–5.3)
POTASSIUM SERPL-SCNC: 4 MMOL/L — SIGNIFICANT CHANGE UP (ref 3.5–5.3)
PROT SERPL-MCNC: 6 G/DL — SIGNIFICANT CHANGE UP (ref 6–8.3)
RBC # BLD: 3.71 M/UL — LOW (ref 4.2–5.8)
RBC # FLD: 15.1 % — HIGH (ref 10.3–14.5)
SODIUM SERPL-SCNC: 139 MMOL/L — SIGNIFICANT CHANGE UP (ref 135–145)
WBC # BLD: 5.08 K/UL — SIGNIFICANT CHANGE UP (ref 3.8–10.5)
WBC # FLD AUTO: 5.08 K/UL — SIGNIFICANT CHANGE UP (ref 3.8–10.5)

## 2023-08-29 PROCEDURE — 96374 THER/PROPH/DIAG INJ IV PUSH: CPT

## 2023-08-29 PROCEDURE — 85027 COMPLETE CBC AUTOMATED: CPT

## 2023-08-29 PROCEDURE — 81001 URINALYSIS AUTO W/SCOPE: CPT

## 2023-08-29 PROCEDURE — 99285 EMERGENCY DEPT VISIT HI MDM: CPT

## 2023-08-29 PROCEDURE — 99261: CPT

## 2023-08-29 PROCEDURE — 87637 SARSCOV2&INF A&B&RSV AMP PRB: CPT

## 2023-08-29 PROCEDURE — 83036 HEMOGLOBIN GLYCOSYLATED A1C: CPT

## 2023-08-29 PROCEDURE — 80061 LIPID PANEL: CPT

## 2023-08-29 PROCEDURE — 86706 HEP B SURFACE ANTIBODY: CPT

## 2023-08-29 PROCEDURE — 83735 ASSAY OF MAGNESIUM: CPT

## 2023-08-29 PROCEDURE — 85610 PROTHROMBIN TIME: CPT

## 2023-08-29 PROCEDURE — 96372 THER/PROPH/DIAG INJ SC/IM: CPT

## 2023-08-29 PROCEDURE — 87086 URINE CULTURE/COLONY COUNT: CPT

## 2023-08-29 PROCEDURE — 96375 TX/PRO/DX INJ NEW DRUG ADDON: CPT

## 2023-08-29 PROCEDURE — 85025 COMPLETE CBC W/AUTO DIFF WBC: CPT

## 2023-08-29 PROCEDURE — 84100 ASSAY OF PHOSPHORUS: CPT

## 2023-08-29 PROCEDURE — 83605 ASSAY OF LACTIC ACID: CPT

## 2023-08-29 PROCEDURE — 97162 PT EVAL MOD COMPLEX 30 MIN: CPT

## 2023-08-29 PROCEDURE — 80053 COMPREHEN METABOLIC PANEL: CPT

## 2023-08-29 PROCEDURE — 87040 BLOOD CULTURE FOR BACTERIA: CPT

## 2023-08-29 PROCEDURE — 99239 HOSP IP/OBS DSCHRG MGMT >30: CPT

## 2023-08-29 PROCEDURE — 99232 SBSQ HOSP IP/OBS MODERATE 35: CPT

## 2023-08-29 PROCEDURE — 85730 THROMBOPLASTIN TIME PARTIAL: CPT

## 2023-08-29 PROCEDURE — 71260 CT THORAX DX C+: CPT | Mod: MA

## 2023-08-29 PROCEDURE — 93005 ELECTROCARDIOGRAM TRACING: CPT

## 2023-08-29 PROCEDURE — 36415 COLL VENOUS BLD VENIPUNCTURE: CPT

## 2023-08-29 PROCEDURE — 71045 X-RAY EXAM CHEST 1 VIEW: CPT

## 2023-08-29 PROCEDURE — 74177 CT ABD & PELVIS W/CONTRAST: CPT | Mod: MA

## 2023-08-29 PROCEDURE — 87340 HEPATITIS B SURFACE AG IA: CPT

## 2023-08-29 RX ADMIN — CARVEDILOL PHOSPHATE 25 MILLIGRAM(S): 80 CAPSULE, EXTENDED RELEASE ORAL at 05:10

## 2023-08-29 RX ADMIN — PIPERACILLIN AND TAZOBACTAM 25 GRAM(S): 4; .5 INJECTION, POWDER, LYOPHILIZED, FOR SOLUTION INTRAVENOUS at 05:10

## 2023-08-29 RX ADMIN — HEPARIN SODIUM 5000 UNIT(S): 5000 INJECTION INTRAVENOUS; SUBCUTANEOUS at 05:10

## 2023-08-29 RX ADMIN — Medication 325 MILLIGRAM(S): at 11:11

## 2023-08-29 RX ADMIN — Medication 1 TABLET(S): at 11:11

## 2023-08-29 NOTE — PROGRESS NOTE ADULT - PROVIDER SPECIALTY LIST ADULT
Gastroenterology
Infectious Disease
Nephrology
Nephrology
Hospitalist
Infectious Disease
Gastroenterology
Gastroenterology
Hospitalist
Hospitalist

## 2023-08-29 NOTE — DISCHARGE NOTE NURSING/CASE MANAGEMENT/SOCIAL WORK - PATIENT PORTAL LINK FT
You can access the FollowMyHealth Patient Portal offered by [facility] by registering at the following website: [email or web address]. By joining [facility]’s FollowMyHealth portal, you will also be able to view your health information using other applications (apps) compatible with our system.

## 2023-08-29 NOTE — PROGRESS NOTE ADULT - SUBJECTIVE AND OBJECTIVE BOX
[facility]  Pastor Felix PA-C  [address] [place] [id number] [phone]      INTERVAL HPI/OVERNIGHT EVENTS:  seen and examined  no complaints of abdominal pain no n/v  LFTs improved   febrile yesterday; reports sweats overnight       MEDICATIONS  (STANDING):  carvedilol 25 milliGRAM(s) Oral daily  ferrous    sulfate 325 milliGRAM(s) Oral daily  heparin   Injectable 5000 Unit(s) SubCutaneous every 12 hours  lactated ringers. 1000 milliLiter(s) (50 mL/Hr) IV Continuous <Continuous>  multivitamin 1 Tablet(s) Oral daily  piperacillin/tazobactam IVPB.. 3.375 Gram(s) IV Intermittent every 12 hours  tamsulosin 0.4 milliGRAM(s) Oral at bedtime    MEDICATIONS  (PRN):  acetaminophen     Tablet .. 650 milliGRAM(s) Oral every 6 hours PRN Temp greater or equal to 38C (100.4F), Mild Pain (1 - 3)      Allergies    Demerol (Faint)  Demerol HCl (Unknown)    Intolerances            PHYSICAL EXAM:   Vital Signs:  Vital Signs Last 24 Hrs  T(C): 36.9 (27 Aug 2023 04:30), Max: 37.9 (26 Aug 2023 15:00)  T(F): 98.4 (27 Aug 2023 04:30), Max: 100.2 (26 Aug 2023 15:00)  HR: 77 (27 Aug 2023 04:30) (71 - 101)  BP: 118/64 (27 Aug 2023 04:30) (88/49 - 118/64)  BP(mean): --  RR: 17 (27 Aug 2023 04:30) (16 - 18)  SpO2: 93% (27 Aug 2023 04:30) (93% - 97%)    Parameters below as of 27 Aug 2023 04:30  Patient On (Oxygen Delivery Method): room air      Daily     Daily Weight in k.7 (26 Aug 2023 17:15)        GENERAL:  Appears stated age  HEENT:  NC/AT  CHEST:  Full & symmetric excursion  HEART:  Regular rhythm  ABDOMEN:  Soft, non tender non distended   EXTEREMITIES:  no cyanosis  SKIN:  No rash  NEURO:  Alert          LABS:                        11.6   6.55  )-----------( 101      ( 27 Aug 2023 07:50 )             35.7     08-    136  |  100  |  29<H>  ----------------------------<  92  3.9   |  30  |  4.10<H>    Ca    8.9      27 Aug 2023 07:50  Phos  4.1     08-  Mg     2.0     -    TPro  5.8<L>  /  Alb  2.3<L>  /  TBili  0.5  /  DBili  x   /  AST  33  /  ALT  72  /  AlkPhos  86  08    PT/INR - ( 25 Aug 2023 20:20 )   PT: 13.5 sec;   INR: 1.16 ratio         PTT - ( 25 Aug 2023 20:20 )  PTT:26.4 sec  Urinalysis Basic - ( 27 Aug 2023 07:50 )    Color: x / Appearance: x / SG: x / pH: x  Gluc: 92 mg/dL / Ketone: x  / Bili: x / Urobili: x   Blood: x / Protein: x / Nitrite: x   Leuk Esterase: x / RBC: x / WBC x   Sq Epi: x / Non Sq Epi: x / Bacteria: x        RADIOLOGY & ADDITIONAL TESTS:  
Name: CLEO PLUMMER  MRN: [id number]  LOCATION: \[facility]\"" 3WES 361 D1    ----  Patient is a 72y old  Male who presents with a chief complaint of cholangitis (27 Aug 2023 11:22)      FROM ADMISSION H+P:   HPI:  71 yo M with PMHx of s/p L nephrectomy 2/2 renal mass on ESRD MWF, hx of Hep C treated, HTN, Polio ( on clutches) hx of  Klebsiella bacteremia related to acute cholecystitis in June 2023 s/p robotic subtotal cholecystectomy on 6/21/23. He underwent ERCP with stent on 6/22/23, stent was removed on Thursday 8 /24/23. ERCP and stent and stent removal were done at [facility]. Presented with chills, rigors and fever during HD today,  [place] through his dialysis today, daughter took him home after the completion of dialysis and he was found to have an fever. Reports he still have  chills, and fever, no acute abdominal pain, HA, weakness, blood in urine.    IN ED   VITALS /81  RR 20 temp 102.5 F on RA   PERTINNET LABS: rbc 4.13 H/H 12.5/37.5  rdw index elevated Platelet count 107   Na 134 BUN /CR 30/ 3.40 AST 90    UA protein 100 LE trace WBC 14 PH 8.5   CT chest A/P with IV contrast: No obvious intraperitoneal free air. Cholecystectomy. Focus of air at the  distended cystic duct remnant, reflecting recent post procedural changes.  Nonspecific biliary enhancement along the common bile duct and distended  cystic duct remnant with surrounding stranding and trace fluid. These  findings are nonspecific and can be seen in infection/inflammation  (cholangitis) although neoplasm is not excluded. Suggest punctate stones  at the cystic duct remnant.  s/p 500cc BOLUS NS, 1x zosyn  in ER    (26 Aug 2023 04:10)      ----  INTERVAL HPI/OVERNIGHT EVENTS: Pt seen and evaluated at the bedside. No acute overnight events occurred. Daughter at bedside with patient. Endorses overnight chills/night sweats. Has good appetite, ambulating normally with his crutches. Denies abdominal pain, nausea.     ----  PAST MEDICAL & SURGICAL HISTORY:  Hypertension      Renal failure, chronic      Cancer of kidney, left      History of left nephrectomy          FAMILY HISTORY:  FHx: renal failure (Mother)        Allergies    Demerol (Faint)  Demerol HCl (Unknown)    Intolerances        ----  ANTIMICROBIALS:  piperacillin/tazobactam IVPB.. 3.375 Gram(s) IV Intermittent every 12 hours    CARDIOVASCULAR:  carvedilol 25 milliGRAM(s) Oral daily    GASTROINTESTINAL:    PULMONARY:      ----  REVIEW OF SYSTEMS:  CONSTITUTIONAL: +chills, +night sweats, denies fever, fatigue, weakness  HEENT: denies blurred vision, sore throat  CARDIOVASCULAR: denies chest pain, chest pressure, palpitations  RESPIRATORY: denies shortness of breath, sputum production  GASTROINTESTINAL: denies nausea, vomiting, diarrhea, abdominal pain  NEUROLOGICAL: denies numbness, headache, focal weakness  MUSCULOSKELETAL: denies new joint pain, muscle aches    ----  PHYSICAL EXAM:  GENERAL: patient appears well, no acute distress  ENMT: oropharynx clear without erythema, moist mucous membranes  LUNGS: good air entry bilaterally, clear to auscultation, symmetric breath sounds, no wheezing or rhonchi appreciated  HEART: soft S1/S2, regular rate and rhythm, no murmurs noted, no noted edema to b/l LE  GASTROINTESTINAL: abdomen is soft, nontender, nondistended, normoactive bowel sounds, no palpable masses  MUSCULOSKELETAL: no clubbing or cyanosis, no obvious deformity  NEUROLOGIC: awake, alert, readily interactive, good muscle tone in 4 extremities    T(C): 36.6 (08-27-23 @ 11:57), Max: 37.9 (08-26-23 @ 15:00)  HR: 80 (08-27-23 @ 11:57) (71 - 101)  BP: 114/70 (08-27-23 @ 11:57) (88/49 - 118/64)  RR: 18 (08-27-23 @ 11:57) (16 - 18)  SpO2: 92% (08-27-23 @ 11:57) (92% - 97%)  Wt(kg): --    ----  INTAKE & OUTPUT:  I&O's Summary    26 Aug 2023 07:01  -  27 Aug 2023 07:00  --------------------------------------------------------  IN: 0 mL / OUT: 600 mL / NET: -600 mL        LABS:                        11.6   6.55  )-----------( 101      ( 27 Aug 2023 07:50 )             35.7     08-27    136  |  100  |  29<H>  ----------------------------<  92  3.9   |  30  |  4.10<H>    Ca    8.9      27 Aug 2023 07:50  Phos  4.1     08-26  Mg     2.0     08-26    TPro  5.8<L>  /  Alb  2.3<L>  /  TBili  0.5  /  DBili  x   /  AST  33  /  ALT  72  /  AlkPhos  86  08-27    PT/INR - ( 25 Aug 2023 20:20 )   PT: 13.5 sec;   INR: 1.16 ratio         PTT - ( 25 Aug 2023 20:20 )  PTT:26.4 sec  Urinalysis Basic - ( 27 Aug 2023 07:50 )    Color: x / Appearance: x / SG: x / pH: x  Gluc: 92 mg/dL / Ketone: x  / Bili: x / Urobili: x   Blood: x / Protein: x / Nitrite: x   Leuk Esterase: x / RBC: x / WBC x   Sq Epi: x / Non Sq Epi: x / Bacteria: x      CAPILLARY BLOOD GLUCOSE            Culture - Blood (collected 08-25-23 @ 20:20)  Source: .Blood Blood-Peripheral  Preliminary Report (08-27-23 @ 02:02):    No growth at 24 hours    Culture - Blood (collected 08-25-23 @ 20:15)  Source: .Blood Blood-Peripheral  Preliminary Report (08-27-23 @ 02:02):    No growth at 24 hours        ----  Personally reviewed:  Vital sign trends: [ x ] yes    [  ] no     [  ] n/a  Laboratory results: [x  ] yes    [  ] no     [  ] n/a  Radiology results: [x  ] yes    [  ] no     [  ] n/a  Microbio results: [ x ] yes    [  ] no     [  ] n/a  Consultant recommendations: [x  ] yes    [  ] no     [  ] n/a        
[facility]  Pastor Felix PA-C  [address] [place], [place] [address] [phone]      INTERVAL HPI/OVERNIGHT EVENTS:  Pt s/e  Pt feels well no GI complaints    MEDICATIONS  (STANDING):  carvedilol 25 milliGRAM(s) Oral daily  ferrous    sulfate 325 milliGRAM(s) Oral daily  heparin   Injectable 5000 Unit(s) SubCutaneous every 12 hours  lactated ringers. 1000 milliLiter(s) (50 mL/Hr) IV Continuous <Continuous>  multivitamin 1 Tablet(s) Oral daily  tamsulosin 0.4 milliGRAM(s) Oral at bedtime    MEDICATIONS  (PRN):  acetaminophen     Tablet .. 650 milliGRAM(s) Oral every 6 hours PRN Temp greater or equal to 38C (100.4F), Mild Pain (1 - 3)      Allergies    Demerol (Faint)  Demerol HCl (Unknown)      PHYSICAL EXAM:   Vital Signs:  Vital Signs Last 24 Hrs  T(C): 36.9 (29 Aug 2023 12:23), Max: 37.5 (28 Aug 2023 19:38)  T(F): 98.4 (29 Aug 2023 12:23), Max: 99.5 (28 Aug 2023 19:38)  HR: 70 (29 Aug 2023 12:23) (69 - 89)  BP: 134/83 (29 Aug 2023 12:23) (121/76 - 155/93)  BP(mean): --  RR: 16 (29 Aug 2023 12:23) (16 - 18)  SpO2: 98% (29 Aug 2023 12:23) (94% - 98%)    Parameters below as of 29 Aug 2023 12:23  Patient On (Oxygen Delivery Method): room air      Daily     Daily Weight in k.9 (28 Aug 2023 13:05)    GENERAL:  Appears stated age  HEENT:  NC/AT  CHEST:  Full & symmetric excursion  HEART:  Regular rhythm  ABDOMEN:  Soft, non-tender, non-distended  EXTEREMITIES:  no cyanosis  SKIN:  No rash  NEURO:  Alert      LABS:                        11.1   5.08  )-----------( 113      ( 29 Aug 2023 06:43 )             34.8         139  |  107  |  28<H>  ----------------------------<  94  4.0   |  24  |  4.30<H>    Ca    8.6      29 Aug 2023 06:43  Phos  4.1       Mg     2.3         TPro  6.0  /  Alb  2.1<L>  /  TBili  0.3  /  DBili  x   /  AST  17  /  ALT  43  /  AlkPhos  86        Urinalysis Basic - ( 29 Aug 2023 06:43 )    Color: x / Appearance: x / SG: x / pH: x  Gluc: 94 mg/dL / Ketone: x  / Bili: x / Urobili: x   Blood: x / Protein: x / Nitrite: x   Leuk Esterase: x / RBC: x / WBC x   Sq Epi: x / Non Sq Epi: x / Bacteria: x    
[facility]   INFECTIOUS DISEASES   [address] [place] [address]  Tel: [phone]     Fax: [phone]  =========================================================  MD Charles Fernandez Kaushal, MD Cho, Michelle, MD Sunjit, Jaspal, MD  =========================================================    CLEO PLUMMER [id number]    Follow up: Fever and chills during HD    No more fever, no new complaint, in HD.     Allergies:  Demerol (Faint)  Demerol HCl (Unknown)    Antibiotics:  acetaminophen     Tablet .. 650 milliGRAM(s) Oral every 6 hours PRN  carvedilol 25 milliGRAM(s) Oral daily  ferrous    sulfate 325 milliGRAM(s) Oral daily  heparin   Injectable 5000 Unit(s) SubCutaneous every 12 hours  lactated ringers. 1000 milliLiter(s) IV Continuous <Continuous>  multivitamin 1 Tablet(s) Oral daily  piperacillin/tazobactam IVPB.. 3.375 Gram(s) IV Intermittent every 12 hours  tamsulosin 0.4 milliGRAM(s) Oral at bedtime    REVIEW OF SYSTEMS:  CONSTITUTIONAL:  No Fever or chills  HEENT:   No diplopia or blurred vision.  No earache, sore throat or runny nose.  CARDIOVASCULAR:  No chest pain or SOB  RESPIRATORY:  No cough, shortness of breath, PND or orthopnea.  GASTROINTESTINAL:  No nausea, vomiting or diarrhea.  GENITOURINARY:  No dysuria, frequency or urgency. No Blood in urine  MUSCULOSKELETAL:  no joint aches, no muscle pain  SKIN:  No change in skin, hair or nails.  NEUROLOGIC:  No paresthesias or weakness.  PSYCHIATRIC:  No disorder of thought or mood.  ENDOCRINE:  No heat or cold intolerance, polyuria or polydipsia.  HEMATOLOGICAL:  No easy bruising or bleeding.      Physical Exam:  ICU Vital Signs Last 24 Hrs  T(C): 36.7 (28 Aug 2023 09:55), Max: 37.7 (27 Aug 2023 20:44)  T(F): 98.1 (28 Aug 2023 09:55), Max: 99.8 (27 Aug 2023 20:44)  HR: 75 (28 Aug 2023 10:00) (75 - 86)  BP: 125/73 (28 Aug 2023 10:00) (122/69 - 137/82)  RR: 18 (28 Aug 2023 09:55) (16 - 18)  SpO2: 94% (28 Aug 2023 09:55) (94% - 94%)  O2 Parameters below as of 28 Aug 2023 09:55  Patient On (Oxygen Delivery Method): room air  GEN: NAD  HEENT: normocephalic and atraumatic. EOMI. MORAIMA.    NECK: Supple.  No lymphadenopathy   LUNGS: Clear to auscultation.  HEART: Regular rate and rhythm  ABDOMEN: Soft, nontender, and nondistended.  Positive bowel sounds.    : No CVA tenderness  EXTREMITIES: Without edema.  MSK: no joint swelling  NEUROLOGIC: grossly intact.  PSYCHIATRIC: Appropriate affect .  SKIN: No rash     Labs:  08-28    138  |  103  |  46<H>  ----------------------------<  83  3.8   |  25  |  5.30<H>    Ca    8.7      28 Aug 2023 06:22  Phos  4.7     08-28  Mg     2.2     08-28    TPro  5.8<L>  /  Alb  2.1<L>  /  TBili  0.5  /  DBili  x   /  AST  20  /  ALT  51  /  AlkPhos  83  08-28                        10.9   6.35  )-----------( 118      ( 28 Aug 2023 06:22 )             33.7     Urinalysis Basic - ( 28 Aug 2023 06:22 )    Color: x / Appearance: x / SG: x / pH: x  Gluc: 83 mg/dL / Ketone: x  / Bili: x / Urobili: x   Blood: x / Protein: x / Nitrite: x   Leuk Esterase: x / RBC: x / WBC x   Sq Epi: x / Non Sq Epi: x / Bacteria: x    LIVER FUNCTIONS - ( 28 Aug 2023 06:22 )  Alb: 2.1 g/dL / Pro: 5.8 g/dL / ALK PHOS: 83 U/L / ALT: 51 U/L / AST: 20 U/L / GGT: x           RECENT CULTURES:  08-25 @ 21:10 Clean Catch Clean Catch (Midstream)     >=3 organisms. Probable collection contamination.    08-25 @ 20:20 .Blood Blood-Peripheral     No growth at 48 Hours    08-25 @ 20:15 .Blood Blood-Peripheral     No growth at 48 Hours      All imaging and data are reviewed.   RADIOLOGY & ADDITIONAL STUDIES:  ACC: [id number] EXAM:  CT ABDOMEN AND PELVIS IC   ORDERED BY: MOMO GUTIERREZ   ACC: [id number] EXAM:  CT CHEST IC   ORDERED BY: MOMO GUTIERREZ   PROCEDURE DATE:  08/26/2023    INTERPRETATION:  CLINICAL INDICATION: Fever, shaking rigors, sepsis, sp   ercp and cholangeogram yesterday ro perforation.  IMPRESSION:  No obvious intraperitoneal free air. Cholecystectomy. Focus of air at the   distended cystic duct remnant, reflecting recent post procedural changes.   Nonspecific biliary enhancement along the common bile duct and distended   cystic duct remnant with surrounding stranding and trace fluid. These   findings are nonspecific and can be seen in infection/inflammation   (cholangitis) although neoplasm is not excluded. Suggest punctate stones   at the cystic duct remnant.    Assessment and plan:   73 yo M with PMHx of s/p L nephrectomy 2/2 renal mass on ESRD MWF, hx of Hep C treated, HTN, Polio ( on clutches) hx of  Klebsiella bacteremia related to acute cholecystitis in June 2023 s/p robotic subtotal cholecystectomy on 6/21/23. He underwent ERCP with stent on 6/22/23, stent was removed on Thursday 8 /24/23. ERCP and stent and stent removal were done at [facility] who presented with chills, rigors and fever during HD. IN ED tmax 102.5 CT Ap concerning for cholangitis  Likely sequela from recent ERCP and stent removal  LFTs normalized   Cultures neg  Abd exam benign  Fevers resolved WBC wnl    Plan :   - Will follow cultures to completion   - Monitor Tmax and WBC   - Continue empiric Zosyn   - Serial abd exams  - If stable and cultures remain negative, will stop zosyn tomorrow (3-5 days post procedure will be completed)    Will follow.    Brandi Piedra MD  Division of Infectious Diseases   Please call ID service at [phone] with any question.      35 minutes spent on total encounter assessing patient, examination, chart review, counseling and coordinating care by the attending physician/nurse/care manager.  
Subjective: no chills since yest. Brief HD was tolerated well.       MEDICATIONS  (STANDING):  carvedilol 25 milliGRAM(s) Oral daily  ferrous    sulfate 325 milliGRAM(s) Oral daily  heparin   Injectable 5000 Unit(s) SubCutaneous every 12 hours  lactated ringers. 1000 milliLiter(s) (50 mL/Hr) IV Continuous <Continuous>  multivitamin 1 Tablet(s) Oral daily  piperacillin/tazobactam IVPB.. 3.375 Gram(s) IV Intermittent every 12 hours  tamsulosin 0.4 milliGRAM(s) Oral at bedtime    MEDICATIONS  (PRN):  acetaminophen     Tablet .. 650 milliGRAM(s) Oral every 6 hours PRN Temp greater or equal to 38C (100.4F), Mild Pain (1 - 3)          T(C): 36.9 (08-27-23 @ 04:30), Max: 37.9 (08-26-23 @ 15:00)  HR: 77 (08-27-23 @ 04:30) (71 - 101)  BP: 118/64 (08-27-23 @ 04:30) (88/49 - 118/64)  RR: 17 (08-27-23 @ 04:30) (16 - 18)  SpO2: 93% (08-27-23 @ 04:30) (93% - 97%)  Wt(kg): --        I&O's Detail    26 Aug 2023 07:01  -  27 Aug 2023 07:00  --------------------------------------------------------  IN:  Total IN: 0 mL    OUT:    Other (mL): 0 mL    Voided (mL): 600 mL  Total OUT: 600 mL    Total NET: -600 mL               PHYSICAL EXAM:    GENERAL: NAD  NECK: Supple, no inc in JVP  CHEST/LUNG: Clear  HEART: S1S2  ABDOMEN: Soft, Nontender, Nondistended; Bowel sounds present  EXTREMITIES:  no edema.       LABS:  CBC Full  -  ( 27 Aug 2023 07:50 )  WBC Count : 6.55 K/uL  RBC Count : 3.83 M/uL  Hemoglobin : 11.6 g/dL  Hematocrit : 35.7 %  Platelet Count - Automated : 101 K/uL  Mean Cell Volume : 93.2 fl  Mean Cell Hemoglobin : 30.3 pg  Mean Cell Hemoglobin Concentration : 32.5 gm/dL  Auto Neutrophil # : 4.78 K/uL  Auto Lymphocyte # : 1.07 K/uL  Auto Monocyte # : 0.59 K/uL  Auto Eosinophil # : 0.08 K/uL  Auto Basophil # : 0.01 K/uL  Auto Neutrophil % : 73.0 %  Auto Lymphocyte % : 16.3 %  Auto Monocyte % : 9.0 %  Auto Eosinophil % : 1.2 %  Auto Basophil % : 0.2 %    08-27    136  |  100  |  29<H>  ----------------------------<  92  3.9   |  30  |  4.10<H>    Ca    8.9      27 Aug 2023 07:50  Phos  4.1     08-26  Mg     2.0     08-26    TPro  5.8<L>  /  Alb  2.3<L>  /  TBili  0.5  /  DBili  x   /  AST  33  /  ALT  72  /  AlkPhos  86  08-27    PT/INR - ( 25 Aug 2023 20:20 )   PT: 13.5 sec;   INR: 1.16 ratio           Impression:  * HD dependent ESRD. MWF. Last outpt HD was yest  * Cholangitis related to CBD stent removal on 8/24  * Recent robotic subtotal cholecystectomy.     Recommendations:   Will arrange for HD tomorow to return to outpt schedule  Avoid continuous IVFs in HD pt. May give boluses as needed for hypotension.             
[facility]   INFECTIOUS DISEASES   [address] [place] [address]  Tel: [phone]     Fax: [phone]  =========================================================  MD Charles Fernandez Kaushal, MD Cho, Michelle, MD Sunjit, Jaspal, MD  =========================================================    CLEO PLUMMER [id number]    Follow up: Fever and chills during HD    No more fever, no new complaint. No abdominal pain, on solid food, no nausea, vomiting or diarrhea.     Allergies:  Demerol (Faint)  Demerol HCl (Unknown)    Antibiotics:  acetaminophen     Tablet .. 650 milliGRAM(s) Oral every 6 hours PRN  carvedilol 25 milliGRAM(s) Oral daily  ferrous    sulfate 325 milliGRAM(s) Oral daily  heparin   Injectable 5000 Unit(s) SubCutaneous every 12 hours  lactated ringers. 1000 milliLiter(s) IV Continuous <Continuous>  multivitamin 1 Tablet(s) Oral daily  piperacillin/tazobactam IVPB.. 3.375 Gram(s) IV Intermittent every 12 hours  tamsulosin 0.4 milliGRAM(s) Oral at bedtime    REVIEW OF SYSTEMS:  CONSTITUTIONAL:  No Fever or chills  HEENT:   No diplopia or blurred vision.  No earache, sore throat or runny nose.  CARDIOVASCULAR:  No chest pain or SOB  RESPIRATORY:  No cough, shortness of breath, PND or orthopnea.  GASTROINTESTINAL:  No nausea, vomiting or diarrhea.  GENITOURINARY:  No dysuria, frequency or urgency. No Blood in urine  MUSCULOSKELETAL:  no joint aches, no muscle pain  SKIN:  No change in skin, hair or nails.  NEUROLOGIC:  No paresthesias or weakness.  PSYCHIATRIC:  No disorder of thought or mood.  ENDOCRINE:  No heat or cold intolerance, polyuria or polydipsia.  HEMATOLOGICAL:  No easy bruising or bleeding.      Physical Exam:  Vital Signs Last 24 Hrs  T(C): 36.9 (29 Aug 2023 04:53), Max: 37.5 (28 Aug 2023 19:38)  T(F): 98.5 (29 Aug 2023 04:53), Max: 99.5 (28 Aug 2023 19:38)  HR: 69 (29 Aug 2023 04:53) (69 - 89)  BP: 143/79 (29 Aug 2023 04:53) (121/76 - 155/93)  BP(mean): --  RR: 18 (29 Aug 2023 04:53) (17 - 18)  SpO2: 95% (29 Aug 2023 04:53) (94% - 97%)  Parameters below as of 29 Aug 2023 04:53  Patient On (Oxygen Delivery Method): room air  GEN: NAD  HEENT: normocephalic and atraumatic. EOMI. MORAIMA.    NECK: Supple.  No lymphadenopathy   LUNGS: Clear to auscultation.  HEART: Regular rate and rhythm  ABDOMEN: Soft, nontender, and nondistended.  Positive bowel sounds.    : No CVA tenderness  EXTREMITIES: Without edema.  MSK: no joint swelling  NEUROLOGIC: grossly intact.  PSYCHIATRIC: Appropriate affect .  SKIN: No rash       Labs:                        11.1   5.08  )-----------( 113      ( 29 Aug 2023 06:43 )             34.8     08-29    139  |  107  |  28<H>  ----------------------------<  94  4.0   |  24  |  4.30<H>    Ca    8.6      29 Aug 2023 06:43  Phos  4.1     08-29  Mg     2.3     08-29    TPro  6.0  /  Alb  2.1<L>  /  TBili  0.3  /  DBili  x   /  AST  17  /  ALT  43  /  AlkPhos  86  08-29    Culture - Urine (collected 08-25-23 @ 21:10)  Source: Clean Catch Clean Catch (Midstream)  Final Report (08-27-23 @ 17:41):    >=3 organisms. Probable collection contamination.    Culture - Blood (collected 08-25-23 @ 20:20)  Source: .Blood Blood-Peripheral    Culture - Blood (collected 08-25-23 @ 20:15)  Source: .Blood Blood-Peripheral    WBC Count: 5.08 K/uL (08-29-23 @ 06:43)  WBC Count: 6.35 K/uL (08-28-23 @ 06:22)  WBC Count: 6.55 K/uL (08-27-23 @ 07:50)  WBC Count: 6.77 K/uL (08-26-23 @ 11:30)  WBC Count: 7.08 K/uL (08-25-23 @ 20:20)    Creatinine: 4.30 mg/dL (08-29-23 @ 06:43)  Creatinine: 5.30 mg/dL (08-28-23 @ 06:22)  Creatinine: 4.10 mg/dL (08-27-23 @ 07:50)  Creatinine: 4.60 mg/dL (08-26-23 @ 11:30)  Creatinine: 3.40 mg/dL (08-25-23 @ 20:20)     SARS-CoV-2 Result: NotDetec (08-25-23 @ 21:00)    All imaging and data are reviewed.   RADIOLOGY & ADDITIONAL STUDIES:  ACC: [id number] EXAM:  CT ABDOMEN AND PELVIS IC   ORDERED BY: MOMO GUTIERREZ   ACC: [id number] EXAM:  CT CHEST IC   ORDERED BY: MOMO GUTIERREZ   PROCEDURE DATE:  08/26/2023    INTERPRETATION:  CLINICAL INDICATION: Fever, shaking rigors, sepsis, sp   ercp and cholangeogram yesterday ro perforation.  IMPRESSION:  No obvious intraperitoneal free air. Cholecystectomy. Focus of air at the   distended cystic duct remnant, reflecting recent post procedural changes.   Nonspecific biliary enhancement along the common bile duct and distended   cystic duct remnant with surrounding stranding and trace fluid. These   findings are nonspecific and can be seen in infection/inflammation   (cholangitis) although neoplasm is not excluded. Suggest punctate stones   at the cystic duct remnant.    Assessment and plan:   73 yo M with PMHx of s/p L nephrectomy 2/2 renal mass on ESRD MWF, hx of Hep C treated, HTN, Polio ( on clutches) hx of  Klebsiella bacteremia related to acute cholecystitis in June 2023 s/p robotic subtotal cholecystectomy on 6/21/23. He underwent ERCP with stent on 6/22/23, stent was removed on Thursday 8 /24/23. ERCP and stent and stent removal were done at [facility] who presented with chills, rigors and fever during HD. IN ED tmax 102.5 CT Ap concerning for cholangitis  Likely sequela from recent ERCP and stent removal  LFTs normalized   Cultures neg  Abd exam benign  Fevers resolved WBC wnl    Plan :   - Cultures are negative   - Tmax and WBC normal   - Can stop Zosyn   - Follow up with GI after discharge     Will sign off please call with any question.     Brandi Piedra MD  Division of Infectious Diseases   Please call ID service at [phone] with any question.      35 minutes spent on total encounter assessing patient, examination, chart review, counseling and coordinating care by the attending physician/nurse/care manager.  
[facility]  Pastor Felix PA-C  [address] [place] [address]  [phone]      INTERVAL HPI/OVERNIGHT EVENTS:  Pt s/e during HD  Abdominal pain improving  LFTs wnl    MEDICATIONS  (STANDING):  carvedilol 25 milliGRAM(s) Oral daily  ferrous    sulfate 325 milliGRAM(s) Oral daily  heparin   Injectable 5000 Unit(s) SubCutaneous every 12 hours  lactated ringers. 1000 milliLiter(s) (50 mL/Hr) IV Continuous <Continuous>  multivitamin 1 Tablet(s) Oral daily  piperacillin/tazobactam IVPB.. 3.375 Gram(s) IV Intermittent every 12 hours  tamsulosin 0.4 milliGRAM(s) Oral at bedtime    MEDICATIONS  (PRN):  acetaminophen     Tablet .. 650 milliGRAM(s) Oral every 6 hours PRN Temp greater or equal to 38C (100.4F), Mild Pain (1 - 3)      Allergies    Demerol (Faint)  Demerol HCl (Unknown)    PHYSICAL EXAM:   Vital Signs:  Vital Signs Last 24 Hrs  T(C): 36.7 (28 Aug 2023 09:55), Max: 37.7 (27 Aug 2023 20:44)  T(F): 98.1 (28 Aug 2023 09:55), Max: 99.8 (27 Aug 2023 20:44)  HR: 75 (28 Aug 2023 10:00) (75 - 86)  BP: 125/73 (28 Aug 2023 10:00) (122/69 - 137/82)  BP(mean): --  RR: 18 (28 Aug 2023 09:55) (16 - 18)  SpO2: 94% (28 Aug 2023 09:55) (94% - 94%)    Parameters below as of 28 Aug 2023 09:55  Patient On (Oxygen Delivery Method): room air      Daily     Daily Weight in k.9 (28 Aug 2023 09:55)    GENERAL:  Appears stated age  HEENT:  NC/AT  CHEST:  Full & symmetric excursion  HEART:  Regular rhythm  ABDOMEN:  Soft, non-tender, non-distended  EXTEREMITIES:  no cyanosis  SKIN:  No rash  NEURO:  Alert      LABS:                        10.9   6.35  )-----------( 118      ( 28 Aug 2023 06:22 )             33.7         138  |  103  |  46<H>  ----------------------------<  83  3.8   |  25  |  5.30<H>    Ca    8.7      28 Aug 2023 06:22  Phos  4.7       Mg     2.2         TPro  5.8<L>  /  Alb  2.1<L>  /  TBili  0.5  /  DBili  x   /  AST  20  /  ALT  51  /  AlkPhos  83        Urinalysis Basic - ( 28 Aug 2023 06:22 )    Color: x / Appearance: x / SG: x / pH: x  Gluc: 83 mg/dL / Ketone: x  / Bili: x / Urobili: x   Blood: x / Protein: x / Nitrite: x   Leuk Esterase: x / RBC: x / WBC x   Sq Epi: x / Non Sq Epi: x / Bacteria: x    
Patient is a 72y old  Male who presents with a chief complaint of cholangitis (28 Aug 2023 12:59)    Patient seen in follow up for ESRD on HD.        PAST MEDICAL HISTORY:  Hypertension    Renal failure, chronic    Cancer of kidney, left      MEDICATIONS  (STANDING):  carvedilol 25 milliGRAM(s) Oral daily  ferrous    sulfate 325 milliGRAM(s) Oral daily  heparin   Injectable 5000 Unit(s) SubCutaneous every 12 hours  lactated ringers. 1000 milliLiter(s) (50 mL/Hr) IV Continuous <Continuous>  multivitamin 1 Tablet(s) Oral daily  piperacillin/tazobactam IVPB.. 3.375 Gram(s) IV Intermittent every 12 hours  tamsulosin 0.4 milliGRAM(s) Oral at bedtime    MEDICATIONS  (PRN):  acetaminophen     Tablet .. 650 milliGRAM(s) Oral every 6 hours PRN Temp greater or equal to 38C (100.4F), Mild Pain (1 - 3)    T(C): 36.7 (08-28-23 @ 09:55), Max: 37.7 (08-27-23 @ 20:44)  HR: 75 (08-28-23 @ 10:00) (75 - 86)  BP: 125/73 (08-28-23 @ 10:00) (114/70 - 137/82)  RR: 18 (08-28-23 @ 09:55) (16 - 18)  SpO2: 94% (08-28-23 @ 09:55) (92% - 94%)  Wt(kg): --  I&O's Detail    27 Aug 2023 07:01  -  28 Aug 2023 07:00  --------------------------------------------------------  IN:  Total IN: 0 mL    OUT:    Voided (mL): 400 mL  Total OUT: 400 mL    Total NET: -400 mL          PHYSICAL EXAM:  General: No distress  Respiratory: b/l air entry  Cardiovascular: S1 S2  Gastrointestinal: soft  Extremities:  no edema, left AVF                              10.9   6.35  )-----------( 118      ( 28 Aug 2023 06:22 )             33.7     08-28    138  |  103  |  46<H>  ----------------------------<  83  3.8   |  25  |  5.30<H>    Ca    8.7      28 Aug 2023 06:22  Phos  4.7     08-28  Mg     2.2     08-28    TPro  5.8<L>  /  Alb  2.1<L>  /  TBili  0.5  /  DBili  x   /  AST  20  /  ALT  51  /  AlkPhos  83  08-28        LIVER FUNCTIONS - ( 28 Aug 2023 06:22 )  Alb: 2.1 g/dL / Pro: 5.8 g/dL / ALK PHOS: 83 U/L / ALT: 51 U/L / AST: 20 U/L / GGT: x           Urinalysis Basic - ( 28 Aug 2023 06:22 )    Color: x / Appearance: x / SG: x / pH: x  Gluc: 83 mg/dL / Ketone: x  / Bili: x / Urobili: x   Blood: x / Protein: x / Nitrite: x   Leuk Esterase: x / RBC: x / WBC x   Sq Epi: x / Non Sq Epi: x / Bacteria: x        Sodium, Serum: 138 (08-28 @ 06:22)  Sodium, Serum: 136 (08-27 @ 07:50)  Sodium, Serum: 137 (08-26 @ 11:30)  Sodium, Serum: 134 (08-25 @ 20:20)    Creatinine, Serum: 5.30 (08-28 @ 06:22)  Creatinine, Serum: 4.10 (08-27 @ 07:50)  Creatinine, Serum: 4.60 (08-26 @ 11:30)  Creatinine, Serum: 3.40 (08-25 @ 20:20)    Potassium, Serum: 3.8 (08-28 @ 06:22)  Potassium, Serum: 3.9 (08-27 @ 07:50)  Potassium, Serum: 4.2 (08-26 @ 11:30)  Potassium, Serum: 4.2 (08-25 @ 20:20)    Hemoglobin: 10.9 (08-28 @ 06:22)  Hemoglobin: 11.6 (08-27 @ 07:50)  Hemoglobin: 12.0 (08-26 @ 11:30)  Hemoglobin: 12.5 (08-25 @ 20:20)    
Patient is a 72y old  Male who presents with a chief complaint of cholangitis (26 Aug 2023 12:45)      INTERVAL HPI/OVERNIGHT EVENTS: Patient seen and examined at the bedside. Daughter who is an RN updated at bedside. Patient states he feels significantly better than previously however still has intermittent chills. Denies chest pain, sob, abdominal pain, n/v/d. Still produces some urine, denies urinary difficulty.    MEDICATIONS  (STANDING):  carvedilol 25 milliGRAM(s) Oral daily  ferrous    sulfate 325 milliGRAM(s) Oral daily  heparin   Injectable 5000 Unit(s) SubCutaneous every 12 hours  lactated ringers. 1000 milliLiter(s) (50 mL/Hr) IV Continuous <Continuous>  multivitamin 1 Tablet(s) Oral daily  piperacillin/tazobactam IVPB.. 3.375 Gram(s) IV Intermittent every 12 hours  tamsulosin 0.4 milliGRAM(s) Oral at bedtime    MEDICATIONS  (PRN):  acetaminophen     Tablet .. 650 milliGRAM(s) Oral every 6 hours PRN Temp greater or equal to 38C (100.4F), Mild Pain (1 - 3)      Allergies    Demerol (Faint)  Demerol HCl (Unknown)    Intolerances        REVIEW OF SYSTEMS:  CONSTITUTIONAL: +fevers / chills  HEENT:  No headache, no sore throat  RESPIRATORY: No cough, wheezing, or shortness of breath  CARDIOVASCULAR: No chest pain, palpitations  GASTROINTESTINAL: No abd pain, nausea, vomiting, or diarrhea  GENITOURINARY: No dysuria, frequency, or hematuria  NEUROLOGICAL: no focal weakness or dizziness  MUSCULOSKELETAL: no myalgias     Vital Signs Last 24 Hrs  T(C): 36.9 (26 Aug 2023 12:39), Max: 39.2 (25 Aug 2023 19:28)  T(F): 98.4 (26 Aug 2023 12:39), Max: 102.5 (25 Aug 2023 19:28)  HR: 93 (26 Aug 2023 11:39) (82 - 117)  BP: 91/50 (26 Aug 2023 11:39) (91/50 - 133/81)  RR: 17 (26 Aug 2023 11:39) (17 - 20)  SpO2: 96% (26 Aug 2023 11:39) (93% - 98%)    Parameters below as of 26 Aug 2023 11:39  Patient On (Oxygen Delivery Method): room air        PHYSICAL EXAM:  GENERAL: NAD, non-toxic appearing  HEENT:  anicteric, moist mucous membranes  CHEST/LUNG:  CTA b/l, no rales, wheezes, or rhonchi  HEART:  RRR, S1, S2  ABDOMEN:  BS+, soft, nontender, nondistended  EXTREMITIES: LUE fistula w/ palpable thrill, no edema, cyanosis, or calf tenderness  NERVOUS SYSTEM: answers questions and follows commands appropriately    LABS:                        12.0   6.77  )-----------( 106      ( 26 Aug 2023 11:30 )             37.0     CBC Full  -  ( 26 Aug 2023 11:30 )  WBC Count : 6.77 K/uL  Hemoglobin : 12.0 g/dL  Hematocrit : 37.0 %  Platelet Count - Automated : 106 K/uL  Mean Cell Volume : 93.7 fl  Mean Cell Hemoglobin : 30.4 pg  Mean Cell Hemoglobin Concentration : 32.4 gm/dL  Auto Neutrophil # : 5.41 K/uL  Auto Lymphocyte # : 0.71 K/uL  Auto Monocyte # : 0.58 K/uL  Auto Eosinophil # : 0.02 K/uL  Auto Basophil # : 0.02 K/uL  Auto Neutrophil % : 79.9 %  Auto Lymphocyte % : 10.5 %  Auto Monocyte % : 8.6 %  Auto Eosinophil % : 0.3 %  Auto Basophil % : 0.3 %    26 Aug 2023 11:30    137    |  102    |  37     ----------------------------<  119    4.2     |  24     |  4.60     Ca    8.6        26 Aug 2023 11:30  Phos  4.1       26 Aug 2023 11:30  Mg     2.0       26 Aug 2023 11:30    TPro  5.9    /  Alb  2.4    /  TBili  0.4    /  DBili  x      /  AST  57     /  ALT  95     /  AlkPhos  104    26 Aug 2023 11:30    PT/INR - ( 25 Aug 2023 20:20 )   PT: 13.5 sec;   INR: 1.16 ratio         PTT - ( 25 Aug 2023 20:20 )  PTT:26.4 sec  Urinalysis Basic - ( 26 Aug 2023 11:30 )    Color: x / Appearance: x / SG: x / pH: x  Gluc: 119 mg/dL / Ketone: x  / Bili: x / Urobili: x   Blood: x / Protein: x / Nitrite: x   Leuk Esterase: x / RBC: x / WBC x   Sq Epi: x / Non Sq Epi: x / Bacteria: x      CAPILLARY BLOOD GLUCOSE              RADIOLOGY & ADDITIONAL TESTS:    Personally reviewed.     Consultant(s) Notes Reviewed:  [x] YES  [ ] NO    
Patient is a 72y old  Male who presents with a chief complaint of cholangitis (28 Aug 2023 13:55)      INTERVAL HPI/OVERNIGHT EVENTS: Patient was seen and examined at bedside. No acute overnight events. Patient reports no pain. States he wants to go home. Denies HA, chest pain, SOB, abdominal pain, numbness/tingling.    MEDICATIONS  (STANDING):  carvedilol 25 milliGRAM(s) Oral daily  ferrous    sulfate 325 milliGRAM(s) Oral daily  heparin   Injectable 5000 Unit(s) SubCutaneous every 12 hours  lactated ringers. 1000 milliLiter(s) (50 mL/Hr) IV Continuous <Continuous>  multivitamin 1 Tablet(s) Oral daily  piperacillin/tazobactam IVPB.. 3.375 Gram(s) IV Intermittent every 12 hours  tamsulosin 0.4 milliGRAM(s) Oral at bedtime    MEDICATIONS  (PRN):  acetaminophen     Tablet .. 650 milliGRAM(s) Oral every 6 hours PRN Temp greater or equal to 38C (100.4F), Mild Pain (1 - 3)      Allergies    Demerol (Faint)  Demerol HCl (Unknown)    Intolerances        REVIEW OF SYSTEMS:  CONSTITUTIONAL: denies fever, fatigue, weakness  HEENT: denies blurred vision, sore throat  CARDIOVASCULAR: denies chest pain, chest pressure, palpitations  RESPIRATORY: denies shortness of breath, sputum production  GASTROINTESTINAL: denies nausea, vomiting, diarrhea, abdominal pain  NEUROLOGICAL: denies numbness, headache, focal weakness  MUSCULOSKELETAL: denies new joint pain, muscle aches    Vital Signs Last 24 Hrs  T(C): 36.7 (28 Aug 2023 13:54), Max: 37.7 (27 Aug 2023 20:44)  T(F): 98.1 (28 Aug 2023 13:54), Max: 99.8 (27 Aug 2023 20:44)  HR: 82 (28 Aug 2023 13:54) (70 - 86)  BP: 155/93 (28 Aug 2023 13:54) (121/76 - 155/93)  BP(mean): --  RR: 17 (28 Aug 2023 13:54) (16 - 18)  SpO2: 97% (28 Aug 2023 13:54) (94% - 97%)    Parameters below as of 28 Aug 2023 13:54  Patient On (Oxygen Delivery Method): room air  O2 Flow (L/min): 9      PHYSICAL EXAM:  GENERAL: patient appears well, no acute distress  ENMT: oropharynx clear without erythema, moist mucous membranes  LUNGS: good air entry bilaterally, clear to auscultation, symmetric breath sounds, no wheezing or rhonchi appreciated  HEART: soft S1/S2, regular rate and rhythm, no murmurs noted, no noted edema to b/l LE  GASTROINTESTINAL: abdomen is soft, nontender, nondistended, normoactive bowel sounds  MUSCULOSKELETAL: + chronic L leg atrophy 2/2 polio. 0/5 strength. no clubbing or cyanosis  NEUROLOGIC: awake, alert, readily interactive    LABS:                        10.9   6.35  )-----------( 118      ( 28 Aug 2023 06:22 )             33.7     CBC Full  -  ( 28 Aug 2023 06:22 )  WBC Count : 6.35 K/uL  Hemoglobin : 10.9 g/dL  Hematocrit : 33.7 %  Platelet Count - Automated : 118 K/uL  Mean Cell Volume : 93.6 fl  Mean Cell Hemoglobin : 30.3 pg  Mean Cell Hemoglobin Concentration : 32.3 gm/dL  Auto Neutrophil # : x  Auto Lymphocyte # : x  Auto Monocyte # : x  Auto Eosinophil # : x  Auto Basophil # : x  Auto Neutrophil % : x  Auto Lymphocyte % : x  Auto Monocyte % : x  Auto Eosinophil % : x  Auto Basophil % : x    28 Aug 2023 06:22    138    |  103    |  46     ----------------------------<  83     3.8     |  25     |  5.30     Ca    8.7        28 Aug 2023 06:22  Phos  4.7       28 Aug 2023 06:22  Mg     2.2       28 Aug 2023 06:22    TPro  5.8    /  Alb  2.1    /  TBili  0.5    /  DBili  x      /  AST  20     /  ALT  51     /  AlkPhos  83     28 Aug 2023 06:22      Urinalysis Basic - ( 28 Aug 2023 06:22 )    Color: x / Appearance: x / SG: x / pH: x  Gluc: 83 mg/dL / Ketone: x  / Bili: x / Urobili: x   Blood: x / Protein: x / Nitrite: x   Leuk Esterase: x / RBC: x / WBC x   Sq Epi: x / Non Sq Epi: x / Bacteria: x      CAPILLARY BLOOD GLUCOSE            Culture - Urine (collected 08-25-23 @ 21:10)  Source: Clean Catch Clean Catch (Midstream)  Final Report (08-27-23 @ 17:41):    >=3 organisms. Probable collection contamination.    Culture - Blood (collected 08-25-23 @ 20:20)  Source: .Blood Blood-Peripheral  Preliminary Report (08-28-23 @ 02:02):    No growth at 48 Hours    Culture - Blood (collected 08-25-23 @ 20:15)  Source: .Blood Blood-Peripheral  Preliminary Report (08-28-23 @ 02:02):    No growth at 48 Hours        RADIOLOGY & ADDITIONAL TESTS:  None.   Personally reviewed.     Consultant(s) Notes Reviewed:  [x] YES  [ ] NO

## 2023-08-29 NOTE — PHYSICAL THERAPY INITIAL EVALUATION ADULT - PERTINENT HX OF CURRENT PROBLEM, REHAB EVAL
73 yo M with PMHx of s/p L nephrectomy 2/2 renal mass on ESRD MWF, hx of Hep C treated, HTN, Polio ( on clutches) hx of  Klebsiella bacteremia related to acute cholecystitis in June 2023 s/p robotic subtotal cholecystectomy on 6/21/23. He underwent ERCP with stent on 6/22/23, stent was removed on Thursday 8 /24/23. ERCP and stent and stent removal were done at [facility]. Presented with chills, rigors and fever during HD today,  [place] through his dialysis today, daughter took him home after the completion of dialysis and he was found to have an fever.

## 2023-08-29 NOTE — PROGRESS NOTE ADULT - ASSESSMENT
Cholangitis       likely from recent ERCP  abx per ID  diet as tolerated  no plans for intervention at this time  blood cultures neg 72h  dc home from GI standpoint and fu with Dr Spangler  d/w ot    I reviewed the overnight course of events on the unit, re-confirming the patient history. I discussed the care with the patient and their family  Differential diagnosis and plan of care discussed with patient after the evaluation  35 minutes spent on total encounter of which more than fifty percent of the encounter was spent counseling and/or coordinating care by the attending physician.  Advanced care planning was discussed with patient and family.  Advanced care planning forms were reviewed and discussed.  Risks, benefits and alternatives of gastroenterologic procedures were discussed in detail and all questions were answered.

## 2023-08-29 NOTE — DISCHARGE NOTE NURSING/CASE MANAGEMENT/SOCIAL WORK - NSDCPEFALRISK_GEN_ALL_CORE
For information on Fall & Injury Prevention, visit: [email or web address] OR  [email or web address] OR  https://www.cdc.gov/steadi/patient.html

## 2023-08-29 NOTE — PROGRESS NOTE ADULT - REASON FOR ADMISSION
cholangitis

## 2023-08-29 NOTE — SOCIAL WORK PROGRESS NOTE - NSSWPROGRESSNOTE_GEN_ALL_CORE
PETE spoke with [facility] , reported to them that pt will be dc today and resume outpatient dialysis tomorrow, faxed them dc summary clinicals and flow sheets to . PETE remains available.

## 2023-09-07 ENCOUNTER — APPOINTMENT (OUTPATIENT)
Dept: GASTROENTEROLOGY | Facility: CLINIC | Age: 73
End: 2023-09-07
Payer: MEDICARE

## 2023-09-07 VITALS
WEIGHT: 138 LBS | HEART RATE: 74 BPM | SYSTOLIC BLOOD PRESSURE: 162 MMHG | DIASTOLIC BLOOD PRESSURE: 83 MMHG | TEMPERATURE: 98 F | RESPIRATION RATE: 16 BRPM | HEIGHT: 64 IN | BODY MASS INDEX: 23.56 KG/M2 | OXYGEN SATURATION: 96 %

## 2023-09-07 PROCEDURE — 99214 OFFICE O/P EST MOD 30 MIN: CPT

## 2023-09-07 RX ORDER — AMLODIPINE BESYLATE 10 MG/1
10 TABLET ORAL
Refills: 0 | Status: COMPLETED | COMMUNITY
End: 2023-09-07

## 2023-09-07 NOTE — CONSULT LETTER
[Dear  ___] : Dear  [unfilled], [Consult Letter:] : I had the pleasure of evaluating your patient, [unfilled]. [Please see my note below.] : Please see my note below. [Consult Closing:] : Thank you very much for allowing me to participate in the care of this patient.  If you have any questions, please do not hesitate to contact me. [Sincerely,] : Sincerely, [FreeTextEntry3] : Dr. Spangler

## 2023-09-07 NOTE — ASSESSMENT
[FreeTextEntry1] : My impression is that of patient doing largely well after cholecystectomy with bile leak requiring ERCP with stenting, then lithotripsy and stent removal with ?cystic duct stone on CT.  Plan:  Abd US Labs obtain prior col records

## 2023-09-07 NOTE — HISTORY OF PRESENT ILLNESS
[FreeTextEntry1] : Per pt 2-3 years in [place] [de-identified] : 6/22/2023 for leak, pd and cbd stented [de-identified] : ACC: [id number]     EXAM:  CT ABDOMEN AND PELVIS   ORDERED BY: YUKI LICEA  ACC: [id number]     EXAM:  CT CHEST   ORDERED BY: YUKI LICEA  PROCEDURE DATE:  06/20/2023    INTERPRETATION:  CLINICAL INFORMATION: Klebsiella bacteremia  COMPARISON: CT abdomen pelvis 7/11/2019  CONTRAST/COMPLICATIONS: IV Contrast: Oral Contrast: Complications:  PROCEDURE: CT of the Chest, Abdomen and Pelvis was performed. Sagittal and coronal reformats were performed.  FINDINGS: CHEST: LUNGS AND LARGE AIRWAYS: Patent central airways. Scattered calcified granulomas. No pulmonary consolidation. Trace bibasilar dependent atelectasis. PLEURA: No pleural effusion. VESSELS: Atherosclerotic changes of the aorta and coronary arteries. HEART: Heart size is normal. No pericardial effusion. MEDIASTINUM AND NALLELY: No lymphadenopathy. CHEST WALL AND LOWER NECK: Within normal limits.  ABDOMEN AND PELVIS: LIVER: Within normal limits. BILE DUCTS: Normal caliber. GALLBLADDER: Cholelithiasis gallbladder wall thickening and pericholecystic inflammation is noted. SPLEEN: Within normal limits. PANCREAS: Within normal limits. ADRENALS: Within normal limits. KIDNEYS/URETERS: Left nephrectomy. Polycystic enlarged right kidney. Multiple hyperdense lesions are again noted, incompletely characterized without intravenous contrast. No hydronephrosis.  BLADDER: Within normal limits. REPRODUCTIVE ORGANS: Prostate is enlarged.  BOWEL: No bowel obstruction. Appendix is normal. PERITONEUM: No ascites. VESSELS: Atherosclerotic changes. RETROPERITONEUM/LYMPH NODES: No lymphadenopathy. ABDOMINAL WALL: Atrophic left psoas and pelvic girdle musculature. BONES: Degenerative changes.  IMPRESSION: No acute pulmonary pathology.  CT findings  [de-identified] : Progress Note:\par  - Provider Specialty Gastroenterology\par  \par  Reason for Admission:\par  Reason for Admission:\par  - Reason for Admission Klebsiella bacteremia\par  \par  \par  - Subjective and Objective:\par  INTERVAL HPI/OVERNIGHT EVENTS:\par  HPI:\par  \par  71 y/o male seen and examined.\par  \par  \par  MEDICATIONS (STANDING):\par  heparin Injectable 5000 Unit(s) SubCutaneous every 12 hours\par  indomethacin Suppository 100 milliGRAM(s) Rectal once\par  pantoprazole Tablet 40 milliGRAM(s) Oral before breakfast\par  piperacillin/tazobactam IVPB.. 3.375 Gram(s) IV Intermittent every 12 hours\par  tamsulosin 0.4 milliGRAM(s) Oral at bedtime\par  \par  MEDICATIONS (PRN):\par  acetaminophen Suppository .. 650 milliGRAM(s) Rectal every 6 hours PRN Temp\par  greater or equal to 38C (100.4F)\par  HYDROmorphone Injectable 2 milliGRAM(s) IV Push every 4 hours PRN Severe Pain\par  (7 - 10)\par  HYDROmorphone Injectable 0.5 milliGRAM(s) IV Push every 4 hours PRN Mild Pain\par  (1 - 3)\par  HYDROmorphone Injectable 1 milliGRAM(s) IV Push every 4 hours PRN Moderate\par  Pain (4 - 6)\par  senna 2 Tablet(s) Oral daily PRN Constipation\par  simethicone 80 milliGRAM(s) Chew two times a day PRN Gas\par  \par  \par  Allergies\par  \par  Demerol HCl (Unknown)\par  \par  Intolerances\par  \par  \par  \par  PAST MEDICAL & SURGICAL HISTORY:\par  Hypertension\par  \par  \par  Renal failure, chronic\par  \par  \par  Cancer of kidney, left\par  \par  \par  History of left nephrectomy\par  \par  PHYSICAL EXAM:\par  Vital Signs: Vital Signs Last 24 Hrs\par  T(C): 37 (2023 05:20), Max: 37.7 (2023 12:00)\par  T(F): 98.6 (2023 05:20), Max: 99.9 (2023 12:00)\par  HR: 73 (2023 05:20) (73 - 86)\par  BP: 130/75 (2023 05:20) (115/70 - 130/75)\par  BP(mean): --\par  RR: 19 (2023 05:20) (19 - 21)\par  SpO2: 97% (2023 05:20) (94% - 97%)\par  \par  Parameters below as of 2023 05:20\par  Patient On (Oxygen Delivery Method): room air\par  \par  \par  Daily\par  Daily Weight in k.5 (2023 05:20)I&O's Summary\par  \par  2023 07:01 - 2023 07:00\par  --------------------------------------------------------\par  IN: 350 mL / OUT: 1633 mL / NET: -1283 mL\par  \par  GENERAL: Appears stated age,\par  HEENT: NC/AT, conjunctivae clear and pink, sclera -anicteric\par  CHEST: Full & symmetric excursion, no increased effort, breath sounds clear\par  HEART: Regular rhythm, S1, S2, no murmur\par  ABDOMEN: Soft, non-tender, non-distended, normoactive bowel sounds,\par  EXTEREMITIES: no edema\par  SKIN: No rash/warm/dry\par  NEURO: Alert, oriented,\par  \par  \par  LABS:\par  \par  10.4\par  9.57 )-----------( 235 ( 2023 07:14 )\par  30.3\par  \par  \par  \par  \par  \par  \par  amylase\par  lipase\par  RADIOLOGY & ADDITIONAL TESTS:\par  \par  \par  \par  Assessment and Plan:\par  - Assessment \par  71 y/o male POD #3 from robotic sub-total cholecystectomy with 19F kiesha drain,\par  complicated by bile leak, GI consulted, now s/p ERCP with stent on 23.\par  \par  \par  \par  Problem/Plan - 1:\par  - Problem: Bile duct leak.\par  - Plan: S/P ERCP with stent placement on 2023\par  Advance diet\par  Surgical follow up noted\par  No GI contraindication to discharge, if remains stable and tolerating diet\par  Outpatient follow up with Dr. Spangler for stent retrieval timing TBD.\par  \par  Problem/Plan - 2:\par  - Problem: Bacteremia.\par  - Plan: Klebsiella bacteremia\par  Day 8/?, unknown duration\par  ID following.\par  \par  Problem/Plan - 3:\par  - Problem: ESRD on dialysis.\par  - Plan: Nephrology recommendations reviewed\par  HD Today.\par  \par  Attestation Statements:\par  Attestation Statements:\par  Attending and PA/NP shared services statement (NON-critical care):\par  \par  Attending to bill.\par  \par  I attest my time as attending is greater than 50% of the total combined time\par  spent on qualifying patient care activities by the PA/NP and attending.\par  \par  I have made amendments to the documentation where necessary. Additional\par  comments: Agree.\par  \par  \par  Electronic Signatures:\par  Ashish Spangler (MD) (Signed 2023 15:01)\par  Entered: Attestation Statements\par  Authored: Progress Note, Reason for Admission, Subjective and Objective,\par  Assessment and Plan, Attestation Statements\par  Rosa Florence (NP) (Entered 2023 09:36)\par  Entered: Progress Note, Reason for Admission, Subjective and Objective,\par  Assessment and Plan\par

## 2023-09-07 NOTE — PHYSICAL EXAM
[Alert] : alert [Normal Voice/Communication] : normal voice/communication [Healthy Appearing] : healthy appearing [No Acute Distress] : no acute distress [Sclera] : the sclera and conjunctiva were normal [Hearing Threshold Finger Rub Not [place]] : hearing was normal [Normal Lips/Gums] : the lips and gums were normal [Oropharynx] : the oropharynx was normal [Normal Appearance] : the appearance of the neck was normal [No Neck Mass] : no neck mass was observed [No Respiratory Distress] : no respiratory distress [No Acc Muscle Use] : no accessory muscle use [Respiration, Rhythm And Depth] : normal respiratory rhythm and effort [Auscultation Breath Sounds / Voice Sounds] : lungs were clear to auscultation bilaterally [Heart Rate And Rhythm] : heart rate was normal and rhythm regular [Normal S1, S2] : normal S1 and S2 [Murmurs] : no murmurs [Bowel Sounds] : normal bowel sounds [Abdomen Tenderness] : non-tender [No Masses] : no abdominal mass palpated [Abdomen Soft] : soft [] : no hepatosplenomegaly [Oriented To Time, Place, And Person] : oriented to person, place, and time [de-identified] : multiple healing scars.  drain in situ without fluid [de-identified] : in wheelchair, left leg atrophy (hx/o polio)

## 2023-09-08 LAB
ALBUMIN SERPL ELPH-MCNC: 3.8 G/DL
ALP BLD-CCNC: 121 U/L
ALT SERPL-CCNC: 21 U/L
ANION GAP SERPL CALC-SCNC: 14 MMOL/L
AST SERPL-CCNC: 19 U/L
BILIRUB SERPL-MCNC: 0.3 MG/DL
BUN SERPL-MCNC: 44 MG/DL
CALCIUM SERPL-MCNC: 9.6 MG/DL
CHLORIDE SERPL-SCNC: 101 MMOL/L
CO2 SERPL-SCNC: 27 MMOL/L
CREAT SERPL-MCNC: 4.44 MG/DL
EGFR: 13 ML/MIN/1.73M2
GLUCOSE SERPL-MCNC: 84 MG/DL
HCT VFR BLD CALC: 38.9 %
HGB BLD-MCNC: 12 G/DL
MCHC RBC-ENTMCNC: 29.4 PG
MCHC RBC-ENTMCNC: 30.8 GM/DL
MCV RBC AUTO: 95.3 FL
PLATELET # BLD AUTO: 270 K/UL
POTASSIUM SERPL-SCNC: 5.2 MMOL/L
PROT SERPL-MCNC: 6.6 G/DL
RBC # BLD: 4.08 M/UL
RBC # FLD: 15.2 %
SODIUM SERPL-SCNC: 142 MMOL/L
WBC # FLD AUTO: 7.3 K/UL

## 2023-10-09 ENCOUNTER — APPOINTMENT (OUTPATIENT)
Dept: ULTRASOUND IMAGING | Facility: CLINIC | Age: 73
End: 2023-10-09
Payer: MEDICARE

## 2023-10-09 ENCOUNTER — OUTPATIENT (OUTPATIENT)
Dept: OUTPATIENT SERVICES | Facility: HOSPITAL | Age: 73
LOS: 1 days | End: 2023-10-09
Payer: MEDICARE

## 2023-10-09 ENCOUNTER — APPOINTMENT (OUTPATIENT)
Dept: UROLOGY | Facility: CLINIC | Age: 73
End: 2023-10-09
Payer: MEDICARE

## 2023-10-09 DIAGNOSIS — Z90.5 ACQUIRED ABSENCE OF KIDNEY: Chronic | ICD-10-CM

## 2023-10-09 DIAGNOSIS — R79.89 OTHER SPECIFIED ABNORMAL FINDINGS OF BLOOD CHEMISTRY: ICD-10-CM

## 2023-10-09 PROCEDURE — 76700 US EXAM ABDOM COMPLETE: CPT | Mod: 26

## 2023-10-09 PROCEDURE — 76700 US EXAM ABDOM COMPLETE: CPT

## 2023-10-09 PROCEDURE — 51741 ELECTRO-UROFLOWMETRY FIRST: CPT

## 2023-10-09 PROCEDURE — 51798 US URINE CAPACITY MEASURE: CPT

## 2023-10-09 PROCEDURE — 99213 OFFICE O/P EST LOW 20 MIN: CPT

## 2023-10-10 LAB
PSA FREE FLD-MCNC: 61 %
PSA FREE SERPL-MCNC: 0.65 NG/ML
PSA SERPL-MCNC: 1.07 NG/ML

## 2023-10-17 ENCOUNTER — APPOINTMENT (OUTPATIENT)
Dept: UROLOGY | Facility: CLINIC | Age: 73
End: 2023-10-17

## 2023-12-29 ENCOUNTER — INPATIENT (INPATIENT)
Facility: HOSPITAL | Age: 73
LOS: 4 days | Discharge: HOME | DRG: 871 | End: 2024-01-03
Attending: STUDENT IN AN ORGANIZED HEALTH CARE EDUCATION/TRAINING PROGRAM | Admitting: INTERNAL MEDICINE
Payer: MEDICARE

## 2023-12-29 VITALS
DIASTOLIC BLOOD PRESSURE: 75 MMHG | RESPIRATION RATE: 20 BRPM | WEIGHT: 145.51 LBS | HEART RATE: 109 BPM | TEMPERATURE: 98 F | OXYGEN SATURATION: 96 % | SYSTOLIC BLOOD PRESSURE: 133 MMHG | HEIGHT: 64 IN

## 2023-12-29 DIAGNOSIS — I10 ESSENTIAL (PRIMARY) HYPERTENSION: ICD-10-CM

## 2023-12-29 DIAGNOSIS — Z29.9 ENCOUNTER FOR PROPHYLACTIC MEASURES, UNSPECIFIED: ICD-10-CM

## 2023-12-29 DIAGNOSIS — R50.9 FEVER, UNSPECIFIED: ICD-10-CM

## 2023-12-29 DIAGNOSIS — Z90.5 ACQUIRED ABSENCE OF KIDNEY: Chronic | ICD-10-CM

## 2023-12-29 DIAGNOSIS — Z87.438 PERSONAL HISTORY OF OTHER DISEASES OF MALE GENITAL ORGANS: ICD-10-CM

## 2023-12-29 DIAGNOSIS — N18.6 END STAGE RENAL DISEASE: ICD-10-CM

## 2023-12-29 LAB
ALBUMIN SERPL ELPH-MCNC: 3.2 G/DL — LOW (ref 3.3–5)
ALBUMIN SERPL ELPH-MCNC: 3.2 G/DL — LOW (ref 3.3–5)
ALP SERPL-CCNC: 82 U/L — SIGNIFICANT CHANGE UP (ref 40–120)
ALP SERPL-CCNC: 82 U/L — SIGNIFICANT CHANGE UP (ref 40–120)
ALT FLD-CCNC: 29 U/L — SIGNIFICANT CHANGE UP (ref 12–78)
ALT FLD-CCNC: 29 U/L — SIGNIFICANT CHANGE UP (ref 12–78)
ANION GAP SERPL CALC-SCNC: 11 MMOL/L — SIGNIFICANT CHANGE UP (ref 5–17)
ANION GAP SERPL CALC-SCNC: 11 MMOL/L — SIGNIFICANT CHANGE UP (ref 5–17)
APPEARANCE UR: CLEAR — SIGNIFICANT CHANGE UP
APPEARANCE UR: CLEAR — SIGNIFICANT CHANGE UP
APTT BLD: 25.3 SEC — SIGNIFICANT CHANGE UP (ref 24.5–35.6)
APTT BLD: 25.3 SEC — SIGNIFICANT CHANGE UP (ref 24.5–35.6)
AST SERPL-CCNC: 24 U/L — SIGNIFICANT CHANGE UP (ref 15–37)
AST SERPL-CCNC: 24 U/L — SIGNIFICANT CHANGE UP (ref 15–37)
BACTERIA # UR AUTO: ABNORMAL /HPF
BACTERIA # UR AUTO: ABNORMAL /HPF
BASOPHILS # BLD AUTO: 0 K/UL — SIGNIFICANT CHANGE UP (ref 0–0.2)
BASOPHILS # BLD AUTO: 0 K/UL — SIGNIFICANT CHANGE UP (ref 0–0.2)
BASOPHILS NFR BLD AUTO: 0 % — SIGNIFICANT CHANGE UP (ref 0–2)
BASOPHILS NFR BLD AUTO: 0 % — SIGNIFICANT CHANGE UP (ref 0–2)
BILIRUB SERPL-MCNC: 0.5 MG/DL — SIGNIFICANT CHANGE UP (ref 0.2–1.2)
BILIRUB SERPL-MCNC: 0.5 MG/DL — SIGNIFICANT CHANGE UP (ref 0.2–1.2)
BILIRUB UR-MCNC: NEGATIVE — SIGNIFICANT CHANGE UP
BILIRUB UR-MCNC: NEGATIVE — SIGNIFICANT CHANGE UP
BUN SERPL-MCNC: 48 MG/DL — HIGH (ref 7–23)
BUN SERPL-MCNC: 48 MG/DL — HIGH (ref 7–23)
CALCIUM SERPL-MCNC: 9 MG/DL — SIGNIFICANT CHANGE UP (ref 8.5–10.1)
CALCIUM SERPL-MCNC: 9 MG/DL — SIGNIFICANT CHANGE UP (ref 8.5–10.1)
CHLORIDE SERPL-SCNC: 103 MMOL/L — SIGNIFICANT CHANGE UP (ref 96–108)
CHLORIDE SERPL-SCNC: 103 MMOL/L — SIGNIFICANT CHANGE UP (ref 96–108)
CO2 SERPL-SCNC: 23 MMOL/L — SIGNIFICANT CHANGE UP (ref 22–31)
CO2 SERPL-SCNC: 23 MMOL/L — SIGNIFICANT CHANGE UP (ref 22–31)
COLOR SPEC: YELLOW — SIGNIFICANT CHANGE UP
COLOR SPEC: YELLOW — SIGNIFICANT CHANGE UP
CREAT SERPL-MCNC: 5.8 MG/DL — HIGH (ref 0.5–1.3)
CREAT SERPL-MCNC: 5.8 MG/DL — HIGH (ref 0.5–1.3)
DIFF PNL FLD: ABNORMAL
DIFF PNL FLD: ABNORMAL
EGFR: 10 ML/MIN/1.73M2 — LOW
EGFR: 10 ML/MIN/1.73M2 — LOW
EOSINOPHIL # BLD AUTO: 0 K/UL — SIGNIFICANT CHANGE UP (ref 0–0.5)
EOSINOPHIL # BLD AUTO: 0 K/UL — SIGNIFICANT CHANGE UP (ref 0–0.5)
EOSINOPHIL NFR BLD AUTO: 0 % — SIGNIFICANT CHANGE UP (ref 0–6)
EOSINOPHIL NFR BLD AUTO: 0 % — SIGNIFICANT CHANGE UP (ref 0–6)
EPI CELLS # UR: PRESENT
EPI CELLS # UR: PRESENT
FLUAV AG NPH QL: DETECTED
FLUAV AG NPH QL: DETECTED
FLUAV H3 RNA SPEC QL NAA+PROBE: DETECTED
FLUAV H3 RNA SPEC QL NAA+PROBE: DETECTED
FLUBV AG NPH QL: SIGNIFICANT CHANGE UP
FLUBV AG NPH QL: SIGNIFICANT CHANGE UP
GLUCOSE SERPL-MCNC: 95 MG/DL — SIGNIFICANT CHANGE UP (ref 70–99)
GLUCOSE SERPL-MCNC: 95 MG/DL — SIGNIFICANT CHANGE UP (ref 70–99)
GLUCOSE UR QL: NEGATIVE MG/DL — SIGNIFICANT CHANGE UP
GLUCOSE UR QL: NEGATIVE MG/DL — SIGNIFICANT CHANGE UP
HCT VFR BLD CALC: 35.5 % — LOW (ref 39–50)
HCT VFR BLD CALC: 35.5 % — LOW (ref 39–50)
HGB BLD-MCNC: 12.2 G/DL — LOW (ref 13–17)
HGB BLD-MCNC: 12.2 G/DL — LOW (ref 13–17)
INR BLD: 1.07 RATIO — SIGNIFICANT CHANGE UP (ref 0.85–1.18)
INR BLD: 1.07 RATIO — SIGNIFICANT CHANGE UP (ref 0.85–1.18)
KETONES UR-MCNC: ABNORMAL MG/DL
KETONES UR-MCNC: ABNORMAL MG/DL
LACTATE SERPL-SCNC: 1.1 MMOL/L — SIGNIFICANT CHANGE UP (ref 0.7–2)
LACTATE SERPL-SCNC: 1.1 MMOL/L — SIGNIFICANT CHANGE UP (ref 0.7–2)
LEUKOCYTE ESTERASE UR-ACNC: NEGATIVE — SIGNIFICANT CHANGE UP
LEUKOCYTE ESTERASE UR-ACNC: NEGATIVE — SIGNIFICANT CHANGE UP
LYMPHOCYTES # BLD AUTO: 0.41 K/UL — LOW (ref 1–3.3)
LYMPHOCYTES # BLD AUTO: 0.41 K/UL — LOW (ref 1–3.3)
LYMPHOCYTES # BLD AUTO: 6 % — LOW (ref 13–44)
LYMPHOCYTES # BLD AUTO: 6 % — LOW (ref 13–44)
MANUAL SMEAR VERIFICATION: SIGNIFICANT CHANGE UP
MANUAL SMEAR VERIFICATION: SIGNIFICANT CHANGE UP
MCHC RBC-ENTMCNC: 31.4 PG — SIGNIFICANT CHANGE UP (ref 27–34)
MCHC RBC-ENTMCNC: 31.4 PG — SIGNIFICANT CHANGE UP (ref 27–34)
MCHC RBC-ENTMCNC: 34.4 GM/DL — SIGNIFICANT CHANGE UP (ref 32–36)
MCHC RBC-ENTMCNC: 34.4 GM/DL — SIGNIFICANT CHANGE UP (ref 32–36)
MCV RBC AUTO: 91.5 FL — SIGNIFICANT CHANGE UP (ref 80–100)
MCV RBC AUTO: 91.5 FL — SIGNIFICANT CHANGE UP (ref 80–100)
MONOCYTES # BLD AUTO: 0.34 K/UL — SIGNIFICANT CHANGE UP (ref 0–0.9)
MONOCYTES # BLD AUTO: 0.34 K/UL — SIGNIFICANT CHANGE UP (ref 0–0.9)
MONOCYTES NFR BLD AUTO: 5 % — SIGNIFICANT CHANGE UP (ref 2–14)
MONOCYTES NFR BLD AUTO: 5 % — SIGNIFICANT CHANGE UP (ref 2–14)
NEUTROPHILS # BLD AUTO: 6.01 K/UL — SIGNIFICANT CHANGE UP (ref 1.8–7.4)
NEUTROPHILS # BLD AUTO: 6.01 K/UL — SIGNIFICANT CHANGE UP (ref 1.8–7.4)
NEUTROPHILS NFR BLD AUTO: 81 % — HIGH (ref 43–77)
NEUTROPHILS NFR BLD AUTO: 81 % — HIGH (ref 43–77)
NEUTS BAND # BLD: 8 % — SIGNIFICANT CHANGE UP (ref 0–8)
NEUTS BAND # BLD: 8 % — SIGNIFICANT CHANGE UP (ref 0–8)
NITRITE UR-MCNC: NEGATIVE — SIGNIFICANT CHANGE UP
NITRITE UR-MCNC: NEGATIVE — SIGNIFICANT CHANGE UP
NRBC # BLD: 0 /100 WBCS — SIGNIFICANT CHANGE UP (ref 0–0)
NRBC # BLD: 0 /100 WBCS — SIGNIFICANT CHANGE UP (ref 0–0)
NRBC # BLD: SIGNIFICANT CHANGE UP /100 WBCS (ref 0–0)
NRBC # BLD: SIGNIFICANT CHANGE UP /100 WBCS (ref 0–0)
PH UR: 7.5 — SIGNIFICANT CHANGE UP (ref 5–8)
PH UR: 7.5 — SIGNIFICANT CHANGE UP (ref 5–8)
PLAT MORPH BLD: NORMAL — SIGNIFICANT CHANGE UP
PLAT MORPH BLD: NORMAL — SIGNIFICANT CHANGE UP
PLATELET # BLD AUTO: 127 K/UL — LOW (ref 150–400)
PLATELET # BLD AUTO: 127 K/UL — LOW (ref 150–400)
POTASSIUM SERPL-MCNC: 3.9 MMOL/L — SIGNIFICANT CHANGE UP (ref 3.5–5.3)
POTASSIUM SERPL-MCNC: 3.9 MMOL/L — SIGNIFICANT CHANGE UP (ref 3.5–5.3)
POTASSIUM SERPL-SCNC: 3.9 MMOL/L — SIGNIFICANT CHANGE UP (ref 3.5–5.3)
POTASSIUM SERPL-SCNC: 3.9 MMOL/L — SIGNIFICANT CHANGE UP (ref 3.5–5.3)
PROT SERPL-MCNC: 7 G/DL — SIGNIFICANT CHANGE UP (ref 6–8.3)
PROT SERPL-MCNC: 7 G/DL — SIGNIFICANT CHANGE UP (ref 6–8.3)
PROT UR-MCNC: 300 MG/DL
PROT UR-MCNC: 300 MG/DL
PROTHROM AB SERPL-ACNC: 12.5 SEC — SIGNIFICANT CHANGE UP (ref 9.5–13)
PROTHROM AB SERPL-ACNC: 12.5 SEC — SIGNIFICANT CHANGE UP (ref 9.5–13)
RAPID RVP RESULT: DETECTED
RAPID RVP RESULT: DETECTED
RBC # BLD: 3.88 M/UL — LOW (ref 4.2–5.8)
RBC # BLD: 3.88 M/UL — LOW (ref 4.2–5.8)
RBC # FLD: 14.2 % — SIGNIFICANT CHANGE UP (ref 10.3–14.5)
RBC # FLD: 14.2 % — SIGNIFICANT CHANGE UP (ref 10.3–14.5)
RBC BLD AUTO: SIGNIFICANT CHANGE UP
RBC BLD AUTO: SIGNIFICANT CHANGE UP
RBC CASTS # UR COMP ASSIST: 5 /HPF — HIGH (ref 0–4)
RBC CASTS # UR COMP ASSIST: 5 /HPF — HIGH (ref 0–4)
RSV RNA NPH QL NAA+NON-PROBE: SIGNIFICANT CHANGE UP
RSV RNA NPH QL NAA+NON-PROBE: SIGNIFICANT CHANGE UP
SARS-COV-2 RNA SPEC QL NAA+PROBE: SIGNIFICANT CHANGE UP
SODIUM SERPL-SCNC: 137 MMOL/L — SIGNIFICANT CHANGE UP (ref 135–145)
SODIUM SERPL-SCNC: 137 MMOL/L — SIGNIFICANT CHANGE UP (ref 135–145)
SP GR SPEC: 1.01 — SIGNIFICANT CHANGE UP (ref 1–1.03)
SP GR SPEC: 1.01 — SIGNIFICANT CHANGE UP (ref 1–1.03)
UROBILINOGEN FLD QL: 1 MG/DL — SIGNIFICANT CHANGE UP (ref 0.2–1)
UROBILINOGEN FLD QL: 1 MG/DL — SIGNIFICANT CHANGE UP (ref 0.2–1)
WBC # BLD: 6.75 K/UL — SIGNIFICANT CHANGE UP (ref 3.8–10.5)
WBC # BLD: 6.75 K/UL — SIGNIFICANT CHANGE UP (ref 3.8–10.5)
WBC # FLD AUTO: 6.75 K/UL — SIGNIFICANT CHANGE UP (ref 3.8–10.5)
WBC # FLD AUTO: 6.75 K/UL — SIGNIFICANT CHANGE UP (ref 3.8–10.5)
WBC UR QL: 3 /HPF — SIGNIFICANT CHANGE UP (ref 0–5)
WBC UR QL: 3 /HPF — SIGNIFICANT CHANGE UP (ref 0–5)

## 2023-12-29 PROCEDURE — 71045 X-RAY EXAM CHEST 1 VIEW: CPT | Mod: 26

## 2023-12-29 PROCEDURE — 99223 1ST HOSP IP/OBS HIGH 75: CPT | Mod: GC

## 2023-12-29 PROCEDURE — 99285 EMERGENCY DEPT VISIT HI MDM: CPT

## 2023-12-29 PROCEDURE — 93010 ELECTROCARDIOGRAM REPORT: CPT

## 2023-12-29 RX ORDER — FERROUS SULFATE 325(65) MG
1 TABLET ORAL
Refills: 0 | DISCHARGE

## 2023-12-29 RX ORDER — TAMSULOSIN HYDROCHLORIDE 0.4 MG/1
0.4 CAPSULE ORAL AT BEDTIME
Refills: 0 | Status: DISCONTINUED | OUTPATIENT
Start: 2023-12-29 | End: 2024-01-03

## 2023-12-29 RX ORDER — TAMSULOSIN HYDROCHLORIDE 0.4 MG/1
1 CAPSULE ORAL
Refills: 0 | DISCHARGE

## 2023-12-29 RX ORDER — CARVEDILOL PHOSPHATE 80 MG/1
25 CAPSULE, EXTENDED RELEASE ORAL EVERY 24 HOURS
Refills: 0 | Status: DISCONTINUED | OUTPATIENT
Start: 2023-12-29 | End: 2024-01-02

## 2023-12-29 RX ORDER — SODIUM CHLORIDE 9 MG/ML
1850 INJECTION INTRAMUSCULAR; INTRAVENOUS; SUBCUTANEOUS ONCE
Refills: 0 | Status: COMPLETED | OUTPATIENT
Start: 2023-12-29 | End: 2023-12-29

## 2023-12-29 RX ORDER — HEPARIN SODIUM 5000 [USP'U]/ML
5000 INJECTION INTRAVENOUS; SUBCUTANEOUS EVERY 8 HOURS
Refills: 0 | Status: DISCONTINUED | OUTPATIENT
Start: 2023-12-29 | End: 2024-01-03

## 2023-12-29 RX ORDER — ACETAMINOPHEN 500 MG
650 TABLET ORAL EVERY 6 HOURS
Refills: 0 | Status: DISCONTINUED | OUTPATIENT
Start: 2023-12-29 | End: 2024-01-03

## 2023-12-29 RX ORDER — FERROUS SULFATE 325(65) MG
325 TABLET ORAL DAILY
Refills: 0 | Status: DISCONTINUED | OUTPATIENT
Start: 2023-12-29 | End: 2024-01-03

## 2023-12-29 RX ORDER — ACETAMINOPHEN 500 MG
650 TABLET ORAL ONCE
Refills: 0 | Status: COMPLETED | OUTPATIENT
Start: 2023-12-29 | End: 2023-12-29

## 2023-12-29 RX ORDER — LANOLIN ALCOHOL/MO/W.PET/CERES
3 CREAM (GRAM) TOPICAL AT BEDTIME
Refills: 0 | Status: DISCONTINUED | OUTPATIENT
Start: 2023-12-29 | End: 2024-01-03

## 2023-12-29 RX ORDER — ONDANSETRON 8 MG/1
4 TABLET, FILM COATED ORAL EVERY 8 HOURS
Refills: 0 | Status: DISCONTINUED | OUTPATIENT
Start: 2023-12-29 | End: 2024-01-03

## 2023-12-29 RX ADMIN — HEPARIN SODIUM 5000 UNIT(S): 5000 INJECTION INTRAVENOUS; SUBCUTANEOUS at 22:04

## 2023-12-29 RX ADMIN — SODIUM CHLORIDE 1850 MILLILITER(S): 9 INJECTION INTRAMUSCULAR; INTRAVENOUS; SUBCUTANEOUS at 15:58

## 2023-12-29 RX ADMIN — Medication 1 TABLET(S): at 22:04

## 2023-12-29 RX ADMIN — Medication 650 MILLIGRAM(S): at 15:47

## 2023-12-29 RX ADMIN — TAMSULOSIN HYDROCHLORIDE 0.4 MILLIGRAM(S): 0.4 CAPSULE ORAL at 22:04

## 2023-12-29 NOTE — H&P ADULT - NSHPREVIEWOFSYSTEMS_GEN_ALL_CORE
CONSTITUTIONAL: denies fever, chills, fatigue, weakness  HEENT: denies blurred vision, sore throat  SKIN: denies new lesions, rash  CARDIOVASCULAR: denies chest pain, chest pressure, palpitations  RESPIRATORY: denies shortness of breath, sputum production  GASTROINTESTINAL: denies nausea, vomiting, diarrhea, abdominal pain  GENITOURINARY: denies dysuria, discharge  NEUROLOGICAL: denies numbness, headache, focal weakness  MUSCULOSKELETAL: denies new joint pain, muscle aches  HEMATOLOGIC: denies gross bleeding, bruising  LYMPHATICS: denies enlarged lymph nodes, extremity swelling  PSYCHIATRIC: denies recent changes in anxiety, depression  ENDOCRINOLOGIC: denies sweating, cold or heat intolerance CONSTITUTIONAL: denies fever, chills, fatigue, weakness  HEENT: denies blurred vision, sore throat  SKIN: denies new lesions, rash  CARDIOVASCULAR: denies chest pain, chest pressure, palpitations  RESPIRATORY: denies shortness of breath, sputum production  GASTROINTESTINAL: denies nausea, vomiting, diarrhea, abdominal pain, melena, hematochezia  GENITOURINARY: denies dysuria, discharge. Still produces urine  NEUROLOGICAL: denies numbness, headache, focal weakness  MUSCULOSKELETAL: denies new joint pain, muscle aches  HEMATOLOGIC: denies gross bleeding, bruising  LYMPHATICS: denies enlarged lymph nodes, extremity swelling  PSYCHIATRIC: denies recent changes in anxiety, depression  ENDOCRINOLOGIC: denies sweating, cold or heat intolerance

## 2023-12-29 NOTE — H&P ADULT - PROBLEM SELECTOR PLAN 5
DVT PPX: Heparin Subq    DISPO: Full code DVT PPX: Heparin Subq    DISPO: Full code    Anemia: no signs or symptoms of active bleeding. Continue to monitor hgb.

## 2023-12-29 NOTE — ED ADULT NURSE NOTE - NSFALLHARMRISKINTERV_ED_ALL_ED
Assistance OOB with selected safe patient handling equipment if applicable/Assistance with ambulation/Communicate risk of Fall with Harm to all staff, patient, and family/Provide visual cue: red socks, yellow wristband, yellow gown, etc/Reinforce activity limits and safety measures with patient and family/Bed in lowest position, wheels locked, appropriate side rails in place/Call bell, personal items and telephone in reach/Instruct patient to call for assistance before getting out of bed/chair/stretcher/Non-slip footwear applied when patient is off stretcher/[place] to call system/Physically safe environment - no spills, clutter or unnecessary equipment/Purposeful Proactive Rounding/Room/bathroom lighting operational, light cord in reach Assistance OOB with selected safe patient handling equipment if applicable/Assistance with ambulation/Communicate risk of Fall with Harm to all staff, patient, and family/Provide visual cue: red socks, yellow wristband, yellow gown, etc/Reinforce activity limits and safety measures with patient and family/Bed in lowest position, wheels locked, appropriate side rails in place/Call bell, personal items and telephone in reach/Instruct patient to call for assistance before getting out of bed/chair/stretcher/Non-slip footwear applied when patient is off stretcher/[place] to call system/Physically safe environment - no spills, clutter or unnecessary equipment/Purposeful Proactive Rounding/Room/bathroom lighting operational, light cord in reach

## 2023-12-29 NOTE — H&P ADULT - NSHPPHYSICALEXAM_GEN_ALL_CORE
T(C): 36.7 (12-29-23 @ 17:19), Max: 36.7 (12-29-23 @ 17:19)  HR: 88 (12-29-23 @ 17:19) (88 - 109)  BP: 120/70 (12-29-23 @ 17:19) (120/70 - 133/75)  RR: 20 (12-29-23 @ 17:19) (20 - 20)  SpO2: 97% (12-29-23 @ 17:19) (96% - 97%)    GENERAL: patient appears well, no acute distress, appropriate, pleasant  EYES: sclera clear, no exudates  ENMT: oropharynx clear without erythema, no exudates, moist mucous membranes  NECK: supple, soft, no thyromegaly noted  LUNGS: good air entry bilaterally, clear to auscultation, symmetric breath sounds, no wheezing or rhonchi appreciated  HEART: soft S1/S2, regular rate and rhythm, no murmurs noted, no lower extremity edema  GASTROINTESTINAL: abdomen is soft, nontender, nondistended, normoactive bowel sounds, no palpable masses  INTEGUMENT: good skin turgor, no lesions noted  MUSCULOSKELETAL: no clubbing or cyanosis, no obvious deformity  NEUROLOGIC: awake, alert, oriented x3, good muscle tone in 4 extremities, no obvious sensory deficits  PSYCHIATRIC: mood is good, affect is congruent, linear and logical thought process  HEME/LYMPH: no palpable supraclavicular nodules, no obvious ecchymosis or petechiae

## 2023-12-29 NOTE — ED ADULT NURSE REASSESSMENT NOTE - NS ED NURSE REASSESS COMMENT FT1
Pt received from (Otilia   ) nurse at this time. Pt A&Ox4 on room air. No complaints of pain or discomfort. No acute distress. Vitals remain stable. pt appears diaphoretic Tele monitor in place, visualized on monitor with tech. Oriented to room. Call bell within reach. Pt resting comfortably, will continue to monitor.

## 2023-12-29 NOTE — CONSULT NOTE ADULT - ASSESSMENT
ESRD on HD, DANAE @ [facility]  Weakness  Hypertension   ESRD on HD, DANAE @ [facility]  Weakness  Hypertension   ESRD on HD, DANAE @ [facility]  h/p PCKD, Nephrectomy  Fever, Weakness  Hypertension    Pt had 2 hours HD today and missed HD on wednesday. Fever work up. No need for HD today.   Monitor BP trend. Titrate BP meds as needed. D/w pt's daughter at bedside.     Further recommendations pending clinical course. Thank you for the courtesy of this referral.  ESRD on HD, DAANE @ [facility]  h/p PCKD, Nephrectomy  Fever, Weakness  Hypertension    Pt had 2 hours HD today and missed HD on wednesday. Fever work up. No need for HD today.   Monitor BP trend. Titrate BP meds as needed. D/w pt's daughter at bedside.     Further recommendations pending clinical course. Thank you for the courtesy of this referral.

## 2023-12-29 NOTE — H&P ADULT - PROBLEM SELECTOR PLAN 2
- history of ESRD  - HD on MWF schedule  - missed wednesday treatment, friday treatment cancelled during  - Neprhology consulted, HD treatments as per recs  - Cr 5.8 on admisison  - normal K  - DASH diet w/ renal restriction ordered  - c/w home iron supplementation

## 2023-12-29 NOTE — H&P ADULT - ASSESSMENT
Patient is a 73-year-old male with PMHx for polycystic kidney disease, status post nephrectomy, ESRD (HD on MWF), HTN, polio, status post cholecystectomy complicated by bacteremia presenting today for fevers and chills, admitted for further workup.

## 2023-12-29 NOTE — ED ADULT NURSE NOTE - ED STAT RN HANDOFF DETAILS
pt endorsed to LARY tomas patient is A&Ox3. vital signs within normal limits for patient. patient denies chest pain, or shortness of breath.   safety precautions maintained.  will continue to assess and monitor for safety. pt being transported to the floor

## 2023-12-29 NOTE — H&P ADULT - NSHPSOCIALHISTORY_GEN_ALL_CORE
Lives at home, retired  Denies alcohol use  Denies tobacco use  Denies illicit drug use  Ambulates with aislinn

## 2023-12-29 NOTE — PATIENT PROFILE ADULT - FUNCTIONAL ASSESSMENT - BASIC MOBILITY 6.
3-calculated by average/Not able to assess (calculate score using [facility] averaging method) 3-calculated by average/Not able to assess (calculate score using [facility] averaging method)

## 2023-12-29 NOTE — ED PROVIDER NOTE - ENMT, MLM
Airway patent, Nasal mucosa clear. Mouth with normal mucosa. Throat has no vesicles, no oropharyngeal exudates and uvula is midline. nog f r

## 2023-12-29 NOTE — H&P ADULT - TIME-BASED BILLING (NON-CRITICAL CARE)
if this call serves to triage the patient into an appointment for the relevant concern      Kaylee Solano         Pursuant to the emergency declaration under the [address] [place] [address], [facility] waiver authority and the [employer] Act, this Virtual  Visit was conducted, with patient's consent, to reduce the patient's risk of exposure to COVID-19 and provide continuity of care for an established patient. Services were provided through a telephone discussion  to substitute for in-person clinic visit. Parties involved during telephone call include myself, KATHRYN Mcarthur and patient listed. Time-based billing (NON-critical care)

## 2023-12-29 NOTE — ED PROVIDER NOTE - PROGRESS NOTE DETAILS
dr bakari gomez dr villegas nephro paged- dw dr Prabhakar at 16:00 aware will admit once labs are resulted. shirlene hospitalist dr twila abraham Results reviewed at the bedside, copies provided to the patient.  They are aware of the admission and the pending dialysis tomorrow.  Need for gentle hydration.

## 2023-12-29 NOTE — ED ADULT NURSE NOTE - OBJECTIVE STATEMENT
Pt is a 72 y/o male came in for increased weakness. Found to be febrile during outpatient dialysis went home to rest, came in for lethargy. Pt is A&Ox4, sinus on tele, has normal unlabored respirations, not diaphoretic or clammy. Does appear to have body chills. R20 placed and flushes without difficulty. Pt resting comfortably, labs sent, bolus and meds given as per MD order. Daughter at the bedside, personal belongings within reach. Bed locked in lowest position, call bell in reach and knows to call for help.

## 2023-12-29 NOTE — PATIENT PROFILE ADULT - FALL HARM RISK - HARM RISK INTERVENTIONS
Assistance with ambulation/Assistance OOB with selected safe patient handling equipment/Communicate Risk of Fall with Harm to all staff/Discuss with provider need for PT consult/Monitor gait and stability/Provide patient with walking aids - walker, cane, crutches/Reinforce activity limits and safety measures with patient and family/Tailored Fall Risk Interventions/Visual Cue: Yellow wristband and red socks/Bed in lowest position, wheels locked, appropriate side rails in place/Call bell, personal items and telephone in reach/Instruct patient to call for assistance before getting out of bed or chair/Non-slip footwear when patient is out of bed/[place] to call system/Physically safe environment - no spills, clutter or unnecessary equipment/Purposeful Proactive Rounding/Room/bathroom lighting operational, light cord in reach Assistance with ambulation/Assistance OOB with selected safe patient handling equipment/Communicate Risk of Fall with Harm to all staff/Discuss with provider need for PT consult/Monitor gait and stability/Provide patient with walking aids - walker, cane, crutches/Reinforce activity limits and safety measures with patient and family/Tailored Fall Risk Interventions/Visual Cue: Yellow wristband and red socks/Bed in lowest position, wheels locked, appropriate side rails in place/Call bell, personal items and telephone in reach/Instruct patient to call for assistance before getting out of bed or chair/Non-slip footwear when patient is out of bed/[place] to call system/Physically safe environment - no spills, clutter or unnecessary equipment/Purposeful Proactive Rounding/Room/bathroom lighting operational, light cord in reach

## 2023-12-29 NOTE — H&P ADULT - ATTENDING COMMENTS
Patient is a 73-year-old male with PMHx for polycystic kidney disease, status post nephrectomy, ESRD (HD on MWF), HTN, polio, status post cholecystectomy complicated by bacteremia presenting today for fevers and chills, admitted for further workup.    HPI as above.     T(C): 36.7 (12-29-23 @ 17:19), Max: 36.7 (12-29-23 @ 17:19)  HR: 88 (12-29-23 @ 17:19) (88 - 109)  BP: 120/70 (12-29-23 @ 17:19) (120/70 - 133/75)  RR: 20 (12-29-23 @ 17:19) (20 - 20)  SpO2: 97% (12-29-23 @ 17:19) (96% - 97%)  Wt(kg): --    Physical Exam:   GENERAL: well-groomed, well-developed, NAD  HEENT: head NC/AT; EOM intact, PERRLA, conjunctiva & sclera clear; hearing grossly intact, moist mucous membranes  NECK: supple, no JVD  RESPIRATORY: CTA B/L, no wheezing, rales, rhonchi or rubs  CARDIOVASCULAR: S1&S2, RRR, no murmurs or gallops  ABDOMEN: soft, non-tender, non-distended, + Bowel sounds x4 quadrants, no guarding, rebound or rigidity  MUSCULOSKELETAL:  no clubbing, cyanosis or edema of all 4 extremities. LUE fistula with palpable thrill  LYMPH: no cervical lymphadenopathy  VASCULAR: Radial pulses 2+ bilaterally, no varicose veins   SKIN: warm and dry, color normal  NEUROLOGIC: AA&O X3, MAEx4  Psych: Normal mood and affect, normal behavior      Plan:  Admit to medicine service  Completed only 2 hours of HD today due to flu like symptoms dialysis was ended early  -f/u RVP test  -f/u culture results  -monitor off abx  has hx of polio, walks with crutches. PT eval ordered    DVT ppx: heparin    remainder as above

## 2023-12-29 NOTE — ED ADULT TRIAGE NOTE - CHIEF COMPLAINT QUOTE
fever since Tuesday.  refused to go to the doctor.  He is  a dialysis patient.  skipped dialysis on Wednesday because "I wasn't feeling good".  I had dialysis today but they could not complete it due to severel weakness.  family concerned since he has been hospitalized with bacteremia  before.  family states he has not been voiding much (he does still make urine).  this is concerning because she states he has had to have a moody in the past due to retention.  patient states he has no pain but is just very lethargic and tired.

## 2023-12-29 NOTE — H&P ADULT - PROBLEM SELECTOR PLAN 1
- febrile outpatient  - VSS on admission  - s/p acetaminophen, fluid bolus in ED  - normal leukocyte count, normal lactate  - RUQ U/S shows Status post cholecystectomy. Calcification and surrounding cystic structure in the region of the gallbladder fossa. It is unclear if this is a residual stone within the gallbladder fossa / gallbladder remnant, a stone within the cystic duct or elsewhere within the biliary system.   - CXR shows no acute findings  - UA unconvincing for UTI  - monitor off abx  - likely viral illness  - f/u RVP  - f/u blood cultures  - symptomatic treatment - febrile outpatient  - VSS on admission  - s/p acetaminophen, fluid bolus in ED  - normal leukocyte count, normal lactate  - CXR shows no acute findings  - UA unconvincing for UTI  - monitor off abx  - likely viral illness  - f/u COVID/Flu/RVP  - f/u blood cultures  - symptomatic treatment

## 2023-12-29 NOTE — ED ADULT NURSE NOTE - CHIEF COMPLAINT
Follow up with dentist as soon as possible for possible tooth extraction.    The patient is a 73y Male complaining of fever.

## 2023-12-29 NOTE — H&P ADULT - HISTORY OF PRESENT ILLNESS
Patient is a 73-year-old male who has a history of polycystic kidney disease, status post nephrectomy, has end-stage renal failure undergoes hemodialysis .  He has a history of hypertension polio walks with crutches, and status post cholecystectomy complicated by bacteremia.   Patient reports that starting on Monday, he began feeling sick and developed a fever.  Patient refused to visit a doctor at the time.  Patient skipped dialysis on Wednesday and went today but it was cancelled during the treatment for chills and sweating.  Patient was confused by his daughter to visit the ED.  Patient reports a temperature of around 101.4 fahrenheit today by ear thermometer.  Patient took tylenol at dialysis.  Patient denies pain, SOB, dysuria, new rashes, neuropathy.  Patient endorses cough, sputum production, and sick contacts.  Patients other daughter was sick but was not evaluated and patients granddaughter was found to be positive for Influenza A.      In the ED pts VS were T(C): 36.7  T(F): 98.1  HR: 88  BP: 120/70  RR: 20  SpO2: 97%  Labs show: H.2  WBC: 6.75  Plt: 127  Creatinine:5.80  CXR shows no acute finding  f/u EKG    s/p acetaminophen, fluid bolus in ED      Pt is admitted for further workup and management       Patient is a 73-year-old male who has a history of polycystic kidney disease, status post nephrectomy, has end-stage renal failure undergoes hemodialysis .  He has a history of hypertension polio walks with crutches, and status post cholecystectomy complicated by bacteremia [2023].   Patient reports that starting on Monday, he began feeling sick and developed a fever.  Patient refused to visit a doctor at the time.  Patient skipped dialysis on Wednesday and went today but it was cancelled during the treatment for chills and sweating.  Patient was confused by his daughter to visit the ED.  Patient reports a temperature of around 101.4 fahrenheit today by ear thermometer.  Patient took tylenol at dialysis.  Patient denies pain, SOB, dysuria, new rashes, neuropathy.  Patient endorses cough, sputum production, and sick contacts.  Patients other daughter was sick but was not evaluated and patients granddaughter was found to be positive for Influenza A.      In the ED pts VS were T(C): 36.7  T(F): 98.1  HR: 88  BP: 120/70  RR: 20  SpO2: 97%  Labs show: H.2  WBC: 6.75  Plt: 127  Creatinine:5.80  CXR shows no acute finding    s/p acetaminophen, fluid bolus in ED      Pt is admitted for further workup and management

## 2023-12-29 NOTE — ED PROVIDER NOTE - OBJECTIVE STATEMENT
pmd Dr. Garcia, Renal Dr. Wayne  Patient is a 73-year-old male who has a history of polycystic kidney disease, status post nephrectomy, has end-stage renal failure undergoes hemodialysis Monday Wednesday Friday.  He has a history of hypertension polio walks with crutches, and status post cholecystectomy complicated by bacteremia.  Beginning 4 days ago he began to feel unwell with fever chills and malaise.  He missed his Wednesday dialysis because of generalized weakness and fever.  He went to his Friday dialysis, and it was terminated early because the patient began to get shaking chills and a fever.  He was taken home.  At which point his daughter contacted his nephrologist who recommended he come to the hospital for evaluation.  Patient has not been eating or drinking well.  He has no nausea vomiting or diarrhea.  He has a stuffy nose and a cough.  His daughter with whom he lives is also sick with a viral illness of some sort.  Patient denies any flank or back pain.  He denies any rash.  He denies any headache or stiff neck or photophobia.  He denies any trauma or travel.

## 2023-12-29 NOTE — ED PROVIDER NOTE - CARE PLAN
1 Principal Discharge DX:	Dehydration fever  Secondary Diagnosis:	Near syncope  Secondary Diagnosis:	Acute viral syndrome  Secondary Diagnosis:	CKD (chronic kidney disease)

## 2023-12-29 NOTE — CONSULT NOTE ADULT - SUBJECTIVE AND OBJECTIVE BOX
Patient is a 73y old  Male who presents with a chief complaint of fever.    HPI:  Patient is a 73-year-old male who has a history of polycystic kidney disease, status post nephrectomy, has end-stage renal failure undergoes hemodialysis Monday Wednesday Friday.  He has a history of hypertension polio walks with crutches, and status post cholecystectomy complicated by bacteremia.  Beginning 4 days ago he began to feel unwell with fever chills and malaise.  He missed his Wednesday dialysis because of generalized weakness and fever.  He went to his Friday dialysis, and it was terminated early because the patient began to get shaking chills and a fever.  He was taken home.  At which point his daughter contacted his nephrologist who recommended he come to the hospital for evaluation.  Patient has not been eating or drinking well.  He has no nausea vomiting or diarrhea.  He has a stuffy nose and a cough.  His daughter with whom he lives is also sick with a viral illness of some sort.  Patient denies any flank or back pain.  He denies any rash.  He denies any headache or stiff neck or photophobia.  He denies any trauma or travel.    Renal consult called for ESRD on hemodialysis. History obtained from chart and patient.     PAST MEDICAL HISTORY:  Hypertension  Renal failure, chronic  Cancer of kidney, left        PAST SURGICAL HISTORY:  History of left nephrectomy        FAMILY HISTORY:  FHx: renal failure (Mother)        SOCIAL HISTORY: No smoking or alcohol use     Allergies    Demerol HCl (Unknown)  Demerol (Faint)    Intolerances      Home Medications:  Coreg 25 mg oral tablet: 1 tab(s) orally once a day (26 Aug 2023 04:00)  Ferrousal 325 mg oral tablet: 1 tab(s) orally once a day (26 Aug 2023 04:00)  Multiple Vitamins oral capsule: 1 cap(s) orally once a day (26 Aug 2023 04:00)  tamsulosin 0.4 mg oral capsule: 1 cap(s) orally once a day (26 Aug 2023 04:00)    MEDICATIONS  (STANDING):      REVIEW OF SYSTEMS:  General: weak  Respiratory: No cough, SOB  Cardiovascular: No CP or Palpitations	  Gastrointestinal: No nausea, Vomiting. No diarrhea  Genitourinary: No urinary complaints	  Musculoskeletal: No new rash or lesions	  all other systems negative    T(F): 97.9 (12-29-23 @ 14:37), Max: 97.9 (12-29-23 @ 14:37)  HR: 109 (12-29-23 @ 14:37) (109 - 109)  BP: 133/75 (12-29-23 @ 14:37) (133/75 - 133/75)  RR: 20 (12-29-23 @ 14:37) (20 - 20)  SpO2: 96% (12-29-23 @ 14:37) (96% - 96%)  Wt(kg): --      PHYSICAL EXAM:  General: NAD  Respiratory: b/l air entry  Cardiovascular: S1 S2  Gastrointestinal: soft  Extremities: edema        12-29    137  |  103  |  48<H>  ----------------------------<  95  3.9   |  23  |  5.80<H>    Ca    9.0      29 Dec 2023 15:45    TPro  7.0  /  Alb  3.2<L>  /  T Bili  0.5  /  D Bili  x   /  AST  24  /  ALT  29  /  Alk Phos  82  12-29                          12.2   6.75  )-----------( 127      ( 29 Dec 2023 15:45 )             35.5       Hemoglobin: 12.2 g/dL (12-29 @ 15:45)  Hematocrit: 35.5 % (12-29 @ 15:45)  Potassium: 3.9 mmol/L (12-29 @ 15:45)  Blood Urea Nitrogen: 48 mg/dL (12-29 @ 15:45)      Creatinine, Serum: 5.80 (12-29 @ 15:45)      Urinalysis Basic - ( 29 Dec 2023 15:45 )    Color: x / Appearance: x / SG: x / pH: x  Gluc: 95 mg/dL / Ketone: x  / Bili: x / Urobili: x   Blood: x / Protein: x / Nitrite: x   Leuk Esterase: x / RBC: x / WBC x   Sq Epi: x / Non Sq Epi: x / Bacteria: x      LIVER FUNCTIONS - ( 29 Dec 2023 15:45 )  Alb: 3.2 g/dL / Pro: 7.0 g/dL / ALK PHOS: 82 U/L / ALT: 29 U/L / AST: 24 U/L / GGT: x               < from: Xray Chest 1 View-PORTABLE IMMEDIATE (12.29.23 @ 15:58) >    ACC: [id number] EXAM:  XR CHEST PORTABLE IMMED 1V   ORDERED BY: MOMO GUTIERREZ     PROCEDURE DATE:  12/29/2023          INTERPRETATION:  AP chest on December 29, 2023 at 3:39 PM. Patient has   sepsis.    Heart magnified by technique.    No lung consolidation or layering effusion is evident.    Chest is similar to August 25, 2023.    IMPRESSION: No acute finding or change.    --- End of Report ---            NICOLAS CHOWDHURY MD; Attending Radiologist  This document has been electronically signed. Dec29 2023  4:05PM    < end of copied text >          I&O's Detail         Patient is a 73y old  Male who presents with a chief complaint of fever.    HPI:  Patient is a 73-year-old male who has a history of polycystic kidney disease, status post nephrectomy, has end-stage renal failure undergoes hemodialysis Monday Wednesday Friday.  He has a history of hypertension polio walks with crutches, and status post cholecystectomy complicated by bacteremia.  Beginning 4 days ago he began to feel unwell with fever chills and malaise.  He missed his Wednesday dialysis because of generalized weakness and fever.  He went to his Friday dialysis, and it was terminated early because the patient began to get shaking chills and a fever.  He was taken home.  At which point his daughter contacted his nephrologist who recommended he come to the hospital for evaluation.  Patient has not been eating or drinking well.  He has no nausea vomiting or diarrhea.  He has a stuffy nose and a cough.  His daughter with whom he lives is also sick with a viral illness of some sort.  Patient denies any flank or back pain.  He denies any rash.  He denies any headache or stiff neck or photophobia.  He denies any trauma or travel.    Renal consult called for ESRD on hemodialysis. History obtained from chart and patient.     PAST MEDICAL HISTORY:  Hypertension  Renal failure, chronic  Cancer of kidney, left        PAST SURGICAL HISTORY:  History of left nephrectomy        FAMILY HISTORY:  FHx: renal failure (Mother)        SOCIAL HISTORY: No smoking or alcohol use     Allergies    Demerol HCl (Unknown)  Demerol (Faint)    Intolerances      Home Medications:  Coreg 25 mg oral tablet: 1 tab(s) orally once a day (26 Aug 2023 04:00)  Ferrousal 325 mg oral tablet: 1 tab(s) orally once a day (26 Aug 2023 04:00)  Multiple Vitamins oral capsule: 1 cap(s) orally once a day (26 Aug 2023 04:00)  tamsulosin 0.4 mg oral capsule: 1 cap(s) orally once a day (26 Aug 2023 04:00)    MEDICATIONS  (STANDING):      REVIEW OF SYSTEMS:  General: weak  Respiratory: No cough, SOB  Cardiovascular: No CP or Palpitations	  Gastrointestinal: No nausea, Vomiting. No diarrhea  Genitourinary: No urinary complaints	  Musculoskeletal: No new rash or lesions	  all other systems negative    T(F): 97.9 (12-29-23 @ 14:37), Max: 97.9 (12-29-23 @ 14:37)  HR: 109 (12-29-23 @ 14:37) (109 - 109)  BP: 133/75 (12-29-23 @ 14:37) (133/75 - 133/75)  RR: 20 (12-29-23 @ 14:37) (20 - 20)  SpO2: 96% (12-29-23 @ 14:37) (96% - 96%)  Wt(kg): --      PHYSICAL EXAM:  General: NAD  Respiratory: b/l air entry  Cardiovascular: S1 S2  Gastrointestinal: soft  Extremities: edema        12-29    137  |  103  |  48<H>  ----------------------------<  95  3.9   |  23  |  5.80<H>    Ca    9.0      29 Dec 2023 15:45    TPro  7.0  /  Alb  3.2<L>  /  T Bili  0.5  /  D Bili  x   /  AST  24  /  ALT  29  /  Alk Phos  82  12-29                          12.2   6.75  )-----------( 127      ( 29 Dec 2023 15:45 )             35.5       Hemoglobin: 12.2 g/dL (12-29 @ 15:45)  Hematocrit: 35.5 % (12-29 @ 15:45)  Potassium: 3.9 mmol/L (12-29 @ 15:45)  Blood Urea Nitrogen: 48 mg/dL (12-29 @ 15:45)      Creatinine, Serum: 5.80 (12-29 @ 15:45)      Urinalysis Basic - ( 29 Dec 2023 15:45 )    Color: x / Appearance: x / SG: x / pH: x  Gluc: 95 mg/dL / Ketone: x  / Bili: x / Urobili: x   Blood: x / Protein: x / Nitrite: x   Leuk Esterase: x / RBC: x / WBC x   Sq Epi: x / Non Sq Epi: x / Bacteria: x      LIVER FUNCTIONS - ( 29 Dec 2023 15:45 )  Alb: 3.2 g/dL / Pro: 7.0 g/dL / ALK PHOS: 82 U/L / ALT: 29 U/L / AST: 24 U/L / GGT: x               < from: Xray Chest 1 View-PORTABLE IMMEDIATE (12.29.23 @ 15:58) >    ACC: [id number] EXAM:  XR CHEST PORTABLE IMMED 1V   ORDERED BY: MOMO GUTIERREZ     PROCEDURE DATE:  12/29/2023          INTERPRETATION:  AP chest on December 29, 2023 at 3:39 PM. Patient has   sepsis.    Heart magnified by technique.    No lung consolidation or layering effusion is evident.    Chest is similar to August 25, 2023.    IMPRESSION: No acute finding or change.    --- End of Report ---            NICOLAS CHOWDHURY MD; Attending Radiologist  This document has been electronically signed. Dec29 2023  4:05PM    < end of copied text >          I&O's Detail

## 2023-12-29 NOTE — ED PROVIDER NOTE - CLINICAL SUMMARY MEDICAL DECISION MAKING FREE TEXT BOX
73 male history of end-stage renal disease on dialysis.  Missed Wednesday dialysis 6 get or terminated early his today's dialysis due to fever chills and a dry cough.  Not eating or drinking.  Appeared ill to the dialysis staff.  Plan of care includes rule out flu COVID or infectious etiology.  Chest x-ray EKG cardiac monitor oxygen if needed.  IV fluid resuscitation with gentle hydration boluses.  Antipyretic therapy.  CBC BMP including potassium and electrolytes.  Blood cultures and a lactate.  Patient would likely need to be admitted to the hospital for continued dialysis.  Will consult with nephrology, primary care.  This chart was made with dictation software and may contain typographical errors.

## 2023-12-29 NOTE — ED PROVIDER NOTE - MUSCULOSKELETAL, MLM
Spine appears normal, range of motion is not limited, no muscle or joint tenderness has chronic weakness of left leg due to polio

## 2023-12-30 LAB
ALBUMIN SERPL ELPH-MCNC: 3.2 G/DL — LOW (ref 3.3–5)
ALBUMIN SERPL ELPH-MCNC: 3.2 G/DL — LOW (ref 3.3–5)
ALP SERPL-CCNC: 66 U/L — SIGNIFICANT CHANGE UP (ref 40–120)
ALP SERPL-CCNC: 66 U/L — SIGNIFICANT CHANGE UP (ref 40–120)
ALT FLD-CCNC: 28 U/L — SIGNIFICANT CHANGE UP (ref 12–78)
ALT FLD-CCNC: 28 U/L — SIGNIFICANT CHANGE UP (ref 12–78)
ANION GAP SERPL CALC-SCNC: 5 MMOL/L — SIGNIFICANT CHANGE UP (ref 5–17)
ANION GAP SERPL CALC-SCNC: 5 MMOL/L — SIGNIFICANT CHANGE UP (ref 5–17)
AST SERPL-CCNC: 40 U/L — HIGH (ref 15–37)
AST SERPL-CCNC: 40 U/L — HIGH (ref 15–37)
BASOPHILS # BLD AUTO: 0.01 K/UL — SIGNIFICANT CHANGE UP (ref 0–0.2)
BASOPHILS # BLD AUTO: 0.01 K/UL — SIGNIFICANT CHANGE UP (ref 0–0.2)
BASOPHILS NFR BLD AUTO: 0.2 % — SIGNIFICANT CHANGE UP (ref 0–2)
BASOPHILS NFR BLD AUTO: 0.2 % — SIGNIFICANT CHANGE UP (ref 0–2)
BILIRUB SERPL-MCNC: 0.3 MG/DL — SIGNIFICANT CHANGE UP (ref 0.2–1.2)
BILIRUB SERPL-MCNC: 0.3 MG/DL — SIGNIFICANT CHANGE UP (ref 0.2–1.2)
BUN SERPL-MCNC: 52 MG/DL — HIGH (ref 7–23)
BUN SERPL-MCNC: 52 MG/DL — HIGH (ref 7–23)
CALCIUM SERPL-MCNC: 9 MG/DL — SIGNIFICANT CHANGE UP (ref 8.5–10.1)
CALCIUM SERPL-MCNC: 9 MG/DL — SIGNIFICANT CHANGE UP (ref 8.5–10.1)
CHLORIDE SERPL-SCNC: 107 MMOL/L — SIGNIFICANT CHANGE UP (ref 96–108)
CHLORIDE SERPL-SCNC: 107 MMOL/L — SIGNIFICANT CHANGE UP (ref 96–108)
CO2 SERPL-SCNC: 26 MMOL/L — SIGNIFICANT CHANGE UP (ref 22–31)
CO2 SERPL-SCNC: 26 MMOL/L — SIGNIFICANT CHANGE UP (ref 22–31)
CREAT SERPL-MCNC: 6.2 MG/DL — HIGH (ref 0.5–1.3)
CREAT SERPL-MCNC: 6.2 MG/DL — HIGH (ref 0.5–1.3)
EGFR: 9 ML/MIN/1.73M2 — LOW
EGFR: 9 ML/MIN/1.73M2 — LOW
EOSINOPHIL # BLD AUTO: 0.14 K/UL — SIGNIFICANT CHANGE UP (ref 0–0.5)
EOSINOPHIL # BLD AUTO: 0.14 K/UL — SIGNIFICANT CHANGE UP (ref 0–0.5)
EOSINOPHIL NFR BLD AUTO: 2.8 % — SIGNIFICANT CHANGE UP (ref 0–6)
EOSINOPHIL NFR BLD AUTO: 2.8 % — SIGNIFICANT CHANGE UP (ref 0–6)
GLUCOSE SERPL-MCNC: 89 MG/DL — SIGNIFICANT CHANGE UP (ref 70–99)
GLUCOSE SERPL-MCNC: 89 MG/DL — SIGNIFICANT CHANGE UP (ref 70–99)
HCT VFR BLD CALC: 34.9 % — LOW (ref 39–50)
HCT VFR BLD CALC: 34.9 % — LOW (ref 39–50)
HGB BLD-MCNC: 11.8 G/DL — LOW (ref 13–17)
HGB BLD-MCNC: 11.8 G/DL — LOW (ref 13–17)
IMM GRANULOCYTES NFR BLD AUTO: 0.6 % — SIGNIFICANT CHANGE UP (ref 0–0.9)
IMM GRANULOCYTES NFR BLD AUTO: 0.6 % — SIGNIFICANT CHANGE UP (ref 0–0.9)
LYMPHOCYTES # BLD AUTO: 0.81 K/UL — LOW (ref 1–3.3)
LYMPHOCYTES # BLD AUTO: 0.81 K/UL — LOW (ref 1–3.3)
LYMPHOCYTES # BLD AUTO: 16.3 % — SIGNIFICANT CHANGE UP (ref 13–44)
LYMPHOCYTES # BLD AUTO: 16.3 % — SIGNIFICANT CHANGE UP (ref 13–44)
MCHC RBC-ENTMCNC: 31.5 PG — SIGNIFICANT CHANGE UP (ref 27–34)
MCHC RBC-ENTMCNC: 31.5 PG — SIGNIFICANT CHANGE UP (ref 27–34)
MCHC RBC-ENTMCNC: 33.8 GM/DL — SIGNIFICANT CHANGE UP (ref 32–36)
MCHC RBC-ENTMCNC: 33.8 GM/DL — SIGNIFICANT CHANGE UP (ref 32–36)
MCV RBC AUTO: 93.1 FL — SIGNIFICANT CHANGE UP (ref 80–100)
MCV RBC AUTO: 93.1 FL — SIGNIFICANT CHANGE UP (ref 80–100)
MONOCYTES # BLD AUTO: 0.59 K/UL — SIGNIFICANT CHANGE UP (ref 0–0.9)
MONOCYTES # BLD AUTO: 0.59 K/UL — SIGNIFICANT CHANGE UP (ref 0–0.9)
MONOCYTES NFR BLD AUTO: 11.9 % — SIGNIFICANT CHANGE UP (ref 2–14)
MONOCYTES NFR BLD AUTO: 11.9 % — SIGNIFICANT CHANGE UP (ref 2–14)
NEUTROPHILS # BLD AUTO: 3.39 K/UL — SIGNIFICANT CHANGE UP (ref 1.8–7.4)
NEUTROPHILS # BLD AUTO: 3.39 K/UL — SIGNIFICANT CHANGE UP (ref 1.8–7.4)
NEUTROPHILS NFR BLD AUTO: 68.2 % — SIGNIFICANT CHANGE UP (ref 43–77)
NEUTROPHILS NFR BLD AUTO: 68.2 % — SIGNIFICANT CHANGE UP (ref 43–77)
NRBC # BLD: 0 /100 WBCS — SIGNIFICANT CHANGE UP (ref 0–0)
NRBC # BLD: 0 /100 WBCS — SIGNIFICANT CHANGE UP (ref 0–0)
PLATELET # BLD AUTO: 129 K/UL — LOW (ref 150–400)
PLATELET # BLD AUTO: 129 K/UL — LOW (ref 150–400)
POTASSIUM SERPL-MCNC: 4.1 MMOL/L — SIGNIFICANT CHANGE UP (ref 3.5–5.3)
POTASSIUM SERPL-MCNC: 4.1 MMOL/L — SIGNIFICANT CHANGE UP (ref 3.5–5.3)
POTASSIUM SERPL-SCNC: 4.1 MMOL/L — SIGNIFICANT CHANGE UP (ref 3.5–5.3)
POTASSIUM SERPL-SCNC: 4.1 MMOL/L — SIGNIFICANT CHANGE UP (ref 3.5–5.3)
PROT SERPL-MCNC: 6.6 G/DL — SIGNIFICANT CHANGE UP (ref 6–8.3)
PROT SERPL-MCNC: 6.6 G/DL — SIGNIFICANT CHANGE UP (ref 6–8.3)
RBC # BLD: 3.75 M/UL — LOW (ref 4.2–5.8)
RBC # BLD: 3.75 M/UL — LOW (ref 4.2–5.8)
RBC # FLD: 14.3 % — SIGNIFICANT CHANGE UP (ref 10.3–14.5)
RBC # FLD: 14.3 % — SIGNIFICANT CHANGE UP (ref 10.3–14.5)
SODIUM SERPL-SCNC: 138 MMOL/L — SIGNIFICANT CHANGE UP (ref 135–145)
SODIUM SERPL-SCNC: 138 MMOL/L — SIGNIFICANT CHANGE UP (ref 135–145)
WBC # BLD: 4.97 K/UL — SIGNIFICANT CHANGE UP (ref 3.8–10.5)
WBC # BLD: 4.97 K/UL — SIGNIFICANT CHANGE UP (ref 3.8–10.5)
WBC # FLD AUTO: 4.97 K/UL — SIGNIFICANT CHANGE UP (ref 3.8–10.5)
WBC # FLD AUTO: 4.97 K/UL — SIGNIFICANT CHANGE UP (ref 3.8–10.5)

## 2023-12-30 PROCEDURE — 93970 EXTREMITY STUDY: CPT | Mod: 26

## 2023-12-30 PROCEDURE — 99233 SBSQ HOSP IP/OBS HIGH 50: CPT

## 2023-12-30 RX ORDER — ALBUTEROL 90 UG/1
2 AEROSOL, METERED ORAL EVERY 6 HOURS
Refills: 0 | Status: DISCONTINUED | OUTPATIENT
Start: 2023-12-30 | End: 2024-01-03

## 2023-12-30 RX ORDER — ALBUTEROL 90 UG/1
2 AEROSOL, METERED ORAL ONCE
Refills: 0 | Status: DISCONTINUED | OUTPATIENT
Start: 2023-12-30 | End: 2024-01-01

## 2023-12-30 RX ADMIN — Medication 100 MILLIGRAM(S): at 21:12

## 2023-12-30 RX ADMIN — CARVEDILOL PHOSPHATE 25 MILLIGRAM(S): 80 CAPSULE, EXTENDED RELEASE ORAL at 05:05

## 2023-12-30 RX ADMIN — HEPARIN SODIUM 5000 UNIT(S): 5000 INJECTION INTRAVENOUS; SUBCUTANEOUS at 05:05

## 2023-12-30 RX ADMIN — Medication 1 TABLET(S): at 21:12

## 2023-12-30 RX ADMIN — Medication 325 MILLIGRAM(S): at 13:35

## 2023-12-30 RX ADMIN — HEPARIN SODIUM 5000 UNIT(S): 5000 INJECTION INTRAVENOUS; SUBCUTANEOUS at 13:35

## 2023-12-30 RX ADMIN — TAMSULOSIN HYDROCHLORIDE 0.4 MILLIGRAM(S): 0.4 CAPSULE ORAL at 21:12

## 2023-12-30 RX ADMIN — HEPARIN SODIUM 5000 UNIT(S): 5000 INJECTION INTRAVENOUS; SUBCUTANEOUS at 21:12

## 2023-12-30 NOTE — PROGRESS NOTE ADULT - ASSESSMENT
ESRD on HD, DANAE @ [facility]  h/p PCKD, Nephrectomy  Fever, Weakness  Hypertension    Next HD tomorrow. Clinically improved. Fluid removal as tolerated by BP. Monitor BP trend. Titrate BP meds as needed.   D/c planning post HD tomorrow.  ESRD on HD, DANAE @ [facility]  h/p PCKD, Nephrectomy  Fever, Weakness  Hypertension    Next HD tomorrow. Clinically improved. Fluid removal as tolerated by BP. Monitor BP trend. Titrate BP meds as needed.   D/c planning post HD tomorrow.

## 2023-12-30 NOTE — PROGRESS NOTE ADULT - SUBJECTIVE AND OBJECTIVE BOX
Patient is a 73y old  Male who presents with a chief complaint of fever (30 Dec 2023 13:34)    Patient seen in follow up for ESRD on HD.        PAST MEDICAL HISTORY:  Hypertension    Renal failure, chronic    Cancer of kidney, left      MEDICATIONS  (STANDING):  albuterol    90 MICROgram(s) HFA Inhaler 2 Puff(s) Inhalation once  carvedilol 25 milliGRAM(s) Oral every 24 hours  ferrous    sulfate 325 milliGRAM(s) Oral daily  heparin   Injectable 5000 Unit(s) SubCutaneous every 8 hours  multivitamin 1 Tablet(s) Oral at bedtime  tamsulosin 0.4 milliGRAM(s) Oral at bedtime    MEDICATIONS  (PRN):  acetaminophen     Tablet .. 650 milliGRAM(s) Oral every 6 hours PRN Temp greater or equal to 38C (100.4F), Mild Pain (1 - 3)  albuterol    90 MICROgram(s) HFA Inhaler 2 Puff(s) Inhalation every 6 hours PRN Bronchospasm  aluminum hydroxide/magnesium hydroxide/simethicone Suspension 30 milliLiter(s) Oral every 4 hours PRN Dyspepsia  benzonatate 100 milliGRAM(s) Oral every 8 hours PRN Cough  guaiFENesin Oral Liquid (Sugar-Free) 200 milliGRAM(s) Oral every 6 hours PRN Cough  melatonin 3 milliGRAM(s) Oral at bedtime PRN Insomnia  ondansetron Injectable 4 milliGRAM(s) IV Push every 8 hours PRN Nausea and/or Vomiting    T(C): 36.7 (12-30-23 @ 12:26), Max: 36.8 (12-29-23 @ 19:30)  HR: 70 (12-30-23 @ 12:26) (70 - 109)  BP: 122/74 (12-30-23 @ 12:26) (115/62 - 133/75)  RR: 18 (12-30-23 @ 12:26) (18 - 20)  SpO2: 95% (12-30-23 @ 12:26) (92% - 98%)  Wt(kg): --  I&O's Detail      PHYSICAL EXAM:  General: No distress  Respiratory: b/l air entry  Cardiovascular: S1 S2  Gastrointestinal: soft  Extremities:  no edema, AVF                              11.8   4.97  )-----------( 129      ( 30 Dec 2023 09:30 )             34.9     12-30    138  |  107  |  52<H>  ----------------------------<  89  4.1   |  26  |  6.20<H>    Ca    9.0      30 Dec 2023 09:30    TPro  6.6  /  Alb  3.2<L>  /  TBili  0.3  /  DBili  x   /  AST  40<H>  /  ALT  28  /  AlkPhos  66  12-30        LIVER FUNCTIONS - ( 30 Dec 2023 09:30 )  Alb: 3.2 g/dL / Pro: 6.6 g/dL / ALK PHOS: 66 U/L / ALT: 28 U/L / AST: 40 U/L / GGT: x           Urinalysis Basic - ( 30 Dec 2023 09:30 )    Color: x / Appearance: x / SG: x / pH: x  Gluc: 89 mg/dL / Ketone: x  / Bili: x / Urobili: x   Blood: x / Protein: x / Nitrite: x   Leuk Esterase: x / RBC: x / WBC x   Sq Epi: x / Non Sq Epi: x / Bacteria: x        Sodium, Serum: 138 (12-30 @ 09:30)  Sodium, Serum: 137 (12-29 @ 15:45)    Creatinine, Serum: 6.20 (12-30 @ 09:30)  Creatinine, Serum: 5.80 (12-29 @ 15:45)    Potassium, Serum: 4.1 (12-30 @ 09:30)  Potassium, Serum: 3.9 (12-29 @ 15:45)    Hemoglobin: 11.8 (12-30 @ 09:30)  Hemoglobin: 12.2 (12-29 @ 15:45)

## 2023-12-30 NOTE — PROGRESS NOTE ADULT - ASSESSMENT
73-year-old male with PMHx for polycystic kidney disease, status post nephrectomy, ESRD (HD on MWF), HTN, polio who was admitted for fever and chills. RVP + Influenza A.    Flu A +     -  albuterol ordered prn bronchospasm     -  tessalon pearles ordered    - discussed with daughter, patient was symptomatic since monday. outside of window for tamiflu (which will need to be dosed by HD). Will hold off on tamiflu at this time. daughter aware.     - monitor for fevers. CXR negative. UA neg.      PCKD on HD    - nephrology following. for HD tomorrow.     HTN    - cont home coreg    BPH     - cont flomax     DVT proph: heparin subc    daughter updated over the phone.   anticipate discharge after HD tomorrow if patient remains afebrile and clinically stable.

## 2023-12-30 NOTE — PROGRESS NOTE ADULT - SUBJECTIVE AND OBJECTIVE BOX
Patient is a 73y old  Male who presents with a chief complaint of fever (29 Dec 2023 18:11)    INTERVAL HPI/OVERNIGHT EVENTS: Patient was seen and examined. + cough. afebrile at this time. tolerating diet. on RA.     I&O's Summary      LABS:                        11.8   4.97  )-----------( 129      ( 30 Dec 2023 09:30 )             34.9     12-30    138  |  107  |  52<H>  ----------------------------<  89  4.1   |  26  |  6.20<H>    Ca    9.0      30 Dec 2023 09:30    TPro  6.6  /  Alb  3.2<L>  /  TBili  0.3  /  DBili  x   /  AST  40<H>  /  ALT  28  /  AlkPhos  66  12-30    PT/INR - ( 29 Dec 2023 15:45 )   PT: 12.5 sec;   INR: 1.07 ratio         PTT - ( 29 Dec 2023 15:45 )  PTT:25.3 sec  Urinalysis Basic - ( 30 Dec 2023 09:30 )    Color: x / Appearance: x / SG: x / pH: x  Gluc: 89 mg/dL / Ketone: x  / Bili: x / Urobili: x   Blood: x / Protein: x / Nitrite: x   Leuk Esterase: x / RBC: x / WBC x   Sq Epi: x / Non Sq Epi: x / Bacteria: x      CAPILLARY BLOOD GLUCOSE    Urinalysis Basic - ( 30 Dec 2023 09:30 )    Color: x / Appearance: x / SG: x / pH: x  Gluc: 89 mg/dL / Ketone: x  / Bili: x / Urobili: x   Blood: x / Protein: x / Nitrite: x   Leuk Esterase: x / RBC: x / WBC x   Sq Epi: x / Non Sq Epi: x / Bacteria: x        MEDICATIONS  (STANDING):  albuterol    90 MICROgram(s) HFA Inhaler 2 Puff(s) Inhalation once  carvedilol 25 milliGRAM(s) Oral every 24 hours  ferrous    sulfate 325 milliGRAM(s) Oral daily  heparin   Injectable 5000 Unit(s) SubCutaneous every 8 hours  multivitamin 1 Tablet(s) Oral at bedtime  tamsulosin 0.4 milliGRAM(s) Oral at bedtime    MEDICATIONS  (PRN):  acetaminophen     Tablet .. 650 milliGRAM(s) Oral every 6 hours PRN Temp greater or equal to 38C (100.4F), Mild Pain (1 - 3)  albuterol    90 MICROgram(s) HFA Inhaler 2 Puff(s) Inhalation every 6 hours PRN Bronchospasm  aluminum hydroxide/magnesium hydroxide/simethicone Suspension 30 milliLiter(s) Oral every 4 hours PRN Dyspepsia  benzonatate 100 milliGRAM(s) Oral every 8 hours PRN Cough  guaiFENesin Oral Liquid (Sugar-Free) 200 milliGRAM(s) Oral every 6 hours PRN Cough  melatonin 3 milliGRAM(s) Oral at bedtime PRN Insomnia  ondansetron Injectable 4 milliGRAM(s) IV Push every 8 hours PRN Nausea and/or Vomiting      REVIEW OF SYSTEMS:  CONSTITUTIONAL: No fever or chills  HEENT:  No headache, no sore throat  RESPIRATORY: No cough, wheezing, or shortness of breath  CARDIOVASCULAR: No chest pain, palpitations  GASTROINTESTINAL: No abdominal pain, nausea, vomiting, or diarrhea  GENITOURINARY: No dysuria, frequency, or hematuria  NEUROLOGICAL: no focal weakness or dizziness  MUSCULOSKELETAL: no myalgias  PSYCH: no recent changes in mood    RADIOLOGY & ADDITIONAL TESTS:    Imaging Personally Reviewed:  [x] YES  [ ] NO    Consultant(s) Notes Reviewed:  [x] YES  [ ] NO    PHYSICAL EXAM:  T(C): 36.7 (12-30-23 @ 12:26), Max: 36.8 (12-29-23 @ 19:30)  HR: 70 (12-30-23 @ 12:26) (70 - 109)  BP: 122/74 (12-30-23 @ 12:26) (115/62 - 133/75)  RR: 18 (12-30-23 @ 12:26) (18 - 20)  SpO2: 95% (12-30-23 @ 12:26) (92% - 98%)    GENERAL: NAD, well-developed, well-groomed  HEENT:  anicteric, moist mucous membranes  CHEST/LUNG:  CTA b/l, no rales, + wheezing   HEART:  RRR, S1, S2  ABDOMEN:  BS+, soft, nontender, nondistended  EXTREMITIES: no edema  NERVOUS SYSTEM: answers questions and follows commands appropriately  PSYCH: normal affect    Care Discussed with Consultants/Other Providers [x] YES  [ ] NO

## 2023-12-31 LAB
ALBUMIN SERPL ELPH-MCNC: 2.7 G/DL — LOW (ref 3.3–5)
ALBUMIN SERPL ELPH-MCNC: 2.7 G/DL — LOW (ref 3.3–5)
ALP SERPL-CCNC: 59 U/L — SIGNIFICANT CHANGE UP (ref 40–120)
ALP SERPL-CCNC: 59 U/L — SIGNIFICANT CHANGE UP (ref 40–120)
ALT FLD-CCNC: 29 U/L — SIGNIFICANT CHANGE UP (ref 12–78)
ALT FLD-CCNC: 29 U/L — SIGNIFICANT CHANGE UP (ref 12–78)
ANION GAP SERPL CALC-SCNC: 9 MMOL/L — SIGNIFICANT CHANGE UP (ref 5–17)
ANION GAP SERPL CALC-SCNC: 9 MMOL/L — SIGNIFICANT CHANGE UP (ref 5–17)
AST SERPL-CCNC: 37 U/L — SIGNIFICANT CHANGE UP (ref 15–37)
AST SERPL-CCNC: 37 U/L — SIGNIFICANT CHANGE UP (ref 15–37)
BILIRUB SERPL-MCNC: 0.4 MG/DL — SIGNIFICANT CHANGE UP (ref 0.2–1.2)
BILIRUB SERPL-MCNC: 0.4 MG/DL — SIGNIFICANT CHANGE UP (ref 0.2–1.2)
BUN SERPL-MCNC: 70 MG/DL — HIGH (ref 7–23)
BUN SERPL-MCNC: 70 MG/DL — HIGH (ref 7–23)
CALCIUM SERPL-MCNC: 8.3 MG/DL — LOW (ref 8.5–10.1)
CALCIUM SERPL-MCNC: 8.3 MG/DL — LOW (ref 8.5–10.1)
CHLORIDE SERPL-SCNC: 107 MMOL/L — SIGNIFICANT CHANGE UP (ref 96–108)
CHLORIDE SERPL-SCNC: 107 MMOL/L — SIGNIFICANT CHANGE UP (ref 96–108)
CO2 SERPL-SCNC: 21 MMOL/L — LOW (ref 22–31)
CO2 SERPL-SCNC: 21 MMOL/L — LOW (ref 22–31)
CREAT SERPL-MCNC: 6.6 MG/DL — HIGH (ref 0.5–1.3)
CREAT SERPL-MCNC: 6.6 MG/DL — HIGH (ref 0.5–1.3)
EGFR: 8 ML/MIN/1.73M2 — LOW
EGFR: 8 ML/MIN/1.73M2 — LOW
GLUCOSE SERPL-MCNC: 87 MG/DL — SIGNIFICANT CHANGE UP (ref 70–99)
GLUCOSE SERPL-MCNC: 87 MG/DL — SIGNIFICANT CHANGE UP (ref 70–99)
GRAM STN FLD: ABNORMAL
GRAM STN FLD: ABNORMAL
HCT VFR BLD CALC: 31.7 % — LOW (ref 39–50)
HCT VFR BLD CALC: 31.7 % — LOW (ref 39–50)
HGB BLD-MCNC: 10.6 G/DL — LOW (ref 13–17)
HGB BLD-MCNC: 10.6 G/DL — LOW (ref 13–17)
MCHC RBC-ENTMCNC: 30.8 PG — SIGNIFICANT CHANGE UP (ref 27–34)
MCHC RBC-ENTMCNC: 30.8 PG — SIGNIFICANT CHANGE UP (ref 27–34)
MCHC RBC-ENTMCNC: 33.4 GM/DL — SIGNIFICANT CHANGE UP (ref 32–36)
MCHC RBC-ENTMCNC: 33.4 GM/DL — SIGNIFICANT CHANGE UP (ref 32–36)
MCV RBC AUTO: 92.2 FL — SIGNIFICANT CHANGE UP (ref 80–100)
MCV RBC AUTO: 92.2 FL — SIGNIFICANT CHANGE UP (ref 80–100)
METHOD TYPE: SIGNIFICANT CHANGE UP
METHOD TYPE: SIGNIFICANT CHANGE UP
NRBC # BLD: 0 /100 WBCS — SIGNIFICANT CHANGE UP (ref 0–0)
NRBC # BLD: 0 /100 WBCS — SIGNIFICANT CHANGE UP (ref 0–0)
PLATELET # BLD AUTO: 111 K/UL — LOW (ref 150–400)
PLATELET # BLD AUTO: 111 K/UL — LOW (ref 150–400)
POTASSIUM SERPL-MCNC: 4 MMOL/L — SIGNIFICANT CHANGE UP (ref 3.5–5.3)
POTASSIUM SERPL-MCNC: 4 MMOL/L — SIGNIFICANT CHANGE UP (ref 3.5–5.3)
POTASSIUM SERPL-SCNC: 4 MMOL/L — SIGNIFICANT CHANGE UP (ref 3.5–5.3)
POTASSIUM SERPL-SCNC: 4 MMOL/L — SIGNIFICANT CHANGE UP (ref 3.5–5.3)
PROT SERPL-MCNC: 5.9 G/DL — LOW (ref 6–8.3)
PROT SERPL-MCNC: 5.9 G/DL — LOW (ref 6–8.3)
RBC # BLD: 3.44 M/UL — LOW (ref 4.2–5.8)
RBC # BLD: 3.44 M/UL — LOW (ref 4.2–5.8)
RBC # FLD: 14.1 % — SIGNIFICANT CHANGE UP (ref 10.3–14.5)
RBC # FLD: 14.1 % — SIGNIFICANT CHANGE UP (ref 10.3–14.5)
S MARCESCENS DNA BLD POS NAA+NON-PROBE: SIGNIFICANT CHANGE UP
S MARCESCENS DNA BLD POS NAA+NON-PROBE: SIGNIFICANT CHANGE UP
SODIUM SERPL-SCNC: 137 MMOL/L — SIGNIFICANT CHANGE UP (ref 135–145)
SODIUM SERPL-SCNC: 137 MMOL/L — SIGNIFICANT CHANGE UP (ref 135–145)
WBC # BLD: 3.72 K/UL — LOW (ref 3.8–10.5)
WBC # BLD: 3.72 K/UL — LOW (ref 3.8–10.5)
WBC # FLD AUTO: 3.72 K/UL — LOW (ref 3.8–10.5)
WBC # FLD AUTO: 3.72 K/UL — LOW (ref 3.8–10.5)

## 2023-12-31 PROCEDURE — 99233 SBSQ HOSP IP/OBS HIGH 50: CPT

## 2023-12-31 PROCEDURE — 99222 1ST HOSP IP/OBS MODERATE 55: CPT

## 2023-12-31 RX ORDER — CEFTRIAXONE 500 MG/1
2000 INJECTION, POWDER, FOR SOLUTION INTRAMUSCULAR; INTRAVENOUS EVERY 24 HOURS
Refills: 0 | Status: DISCONTINUED | OUTPATIENT
Start: 2023-12-31 | End: 2024-01-02

## 2023-12-31 RX ADMIN — CEFTRIAXONE 100 MILLIGRAM(S): 500 INJECTION, POWDER, FOR SOLUTION INTRAMUSCULAR; INTRAVENOUS at 13:51

## 2023-12-31 RX ADMIN — HEPARIN SODIUM 5000 UNIT(S): 5000 INJECTION INTRAVENOUS; SUBCUTANEOUS at 22:23

## 2023-12-31 RX ADMIN — TAMSULOSIN HYDROCHLORIDE 0.4 MILLIGRAM(S): 0.4 CAPSULE ORAL at 22:23

## 2023-12-31 RX ADMIN — Medication 200 MILLIGRAM(S): at 22:25

## 2023-12-31 RX ADMIN — CARVEDILOL PHOSPHATE 25 MILLIGRAM(S): 80 CAPSULE, EXTENDED RELEASE ORAL at 05:03

## 2023-12-31 RX ADMIN — Medication 200 MILLIGRAM(S): at 05:04

## 2023-12-31 RX ADMIN — Medication 325 MILLIGRAM(S): at 11:47

## 2023-12-31 RX ADMIN — HEPARIN SODIUM 5000 UNIT(S): 5000 INJECTION INTRAVENOUS; SUBCUTANEOUS at 05:04

## 2023-12-31 RX ADMIN — HEPARIN SODIUM 5000 UNIT(S): 5000 INJECTION INTRAVENOUS; SUBCUTANEOUS at 13:51

## 2023-12-31 RX ADMIN — Medication 1 TABLET(S): at 22:25

## 2023-12-31 NOTE — PROGRESS NOTE ADULT - ASSESSMENT
ESRD on HD, DANAE @ [facility]  h/p PCKD, Nephrectomy  Fever, Weakness  Hypertension    HD today. Fluid removal as tolerated by BP. Monitor BP trend. Titrate BP meds as needed. D/c planning.  ESRD on HD, DANAE @ [facility]  h/p PCKD, Nephrectomy  Fever, Weakness  Hypertension    HD today. Fluid removal as tolerated by BP. Monitor BP trend. Titrate BP meds as needed. D/c planning.

## 2023-12-31 NOTE — PROGRESS NOTE ADULT - SUBJECTIVE AND OBJECTIVE BOX
Patient is a 73y old  Male who presents with a chief complaint of fever (30 Dec 2023 13:34)    Patient seen in follow up for ESRD on HD.        PAST MEDICAL HISTORY:  Hypertension    Renal failure, chronic    Cancer of kidney, left      MEDICATIONS  (STANDING):  albuterol    90 MICROgram(s) HFA Inhaler 2 Puff(s) Inhalation once  carvedilol 25 milliGRAM(s) Oral every 24 hours  ferrous    sulfate 325 milliGRAM(s) Oral daily  heparin   Injectable 5000 Unit(s) SubCutaneous every 8 hours  multivitamin 1 Tablet(s) Oral at bedtime  tamsulosin 0.4 milliGRAM(s) Oral at bedtime    MEDICATIONS  (PRN):  acetaminophen     Tablet .. 650 milliGRAM(s) Oral every 6 hours PRN Temp greater or equal to 38C (100.4F), Mild Pain (1 - 3)  albuterol    90 MICROgram(s) HFA Inhaler 2 Puff(s) Inhalation every 6 hours PRN Bronchospasm  aluminum hydroxide/magnesium hydroxide/simethicone Suspension 30 milliLiter(s) Oral every 4 hours PRN Dyspepsia  benzonatate 100 milliGRAM(s) Oral every 8 hours PRN Cough  guaiFENesin Oral Liquid (Sugar-Free) 200 milliGRAM(s) Oral every 6 hours PRN Cough  melatonin 3 milliGRAM(s) Oral at bedtime PRN Insomnia  ondansetron Injectable 4 milliGRAM(s) IV Push every 8 hours PRN Nausea and/or Vomiting    T(C): 36.8 (12-31-23 @ 11:48), Max: 36.9 (12-31-23 @ 11:00)  HR: 69 (12-31-23 @ 11:48) (63 - 109)  BP: 157/83 (12-31-23 @ 11:48) (115/62 - 157/83)  RR: 18 (12-31-23 @ 11:48)  SpO2: 94% (12-31-23 @ 11:48)  Wt(kg): --  I&O's Detail    31 Dec 2023 07:01  -  31 Dec 2023 12:16  --------------------------------------------------------  IN:  Total IN: 0 mL    OUT:    Other (mL): 1000 mL  Total OUT: 1000 mL    Total NET: -1000 mL          PHYSICAL EXAM:  General: No distress  Respiratory: b/l air entry  Cardiovascular: S1 S2  Gastrointestinal: soft  Extremities:  no edema, AVF                        LABORATORY:                        10.6   3.72  )-----------( 111      ( 31 Dec 2023 06:38 )             31.7     12-31    137  |  107  |  70<H>  ----------------------------<  87  4.0   |  21<L>  |  6.60<H>    Ca    8.3<L>      31 Dec 2023 06:38    TPro  5.9<L>  /  Alb  2.7<L>  /  TBili  0.4  /  DBili  x   /  AST  37  /  ALT  29  /  AlkPhos  59  12-31    Sodium: 137 mmol/L (12-31 @ 06:38)  Sodium: 138 mmol/L (12-30 @ 09:30)  Sodium: 137 mmol/L (12-29 @ 15:45)    Potassium: 4.0 mmol/L (12-31 @ 06:38)  Potassium: 4.1 mmol/L (12-30 @ 09:30)  Potassium: 3.9 mmol/L (12-29 @ 15:45)    Hemoglobin: 10.6 g/dL (12-31 @ 06:38)  Hemoglobin: 11.8 g/dL (12-30 @ 09:30)  Hemoglobin: 12.2 g/dL (12-29 @ 15:45)    Creatinine, Serum 6.60 (12-31 @ 06:38)  Creatinine, Serum 6.20 (12-30 @ 09:30)  Creatinine, Serum 5.80 (12-29 @ 15:45)        LIVER FUNCTIONS - ( 31 Dec 2023 06:38 )  Alb: 2.7 g/dL / Pro: 5.9 g/dL / ALK PHOS: 59 U/L / ALT: 29 U/L / AST: 37 U/L / GGT: x           Urinalysis Basic - ( 31 Dec 2023 06:38 )    Color: x / Appearance: x / SG: x / pH: x  Gluc: 87 mg/dL / Ketone: x  / Bili: x / Urobili: x   Blood: x / Protein: x / Nitrite: x   Leuk Esterase: x / RBC: x / WBC x   Sq Epi: x / Non Sq Epi: x / Bacteria: x

## 2023-12-31 NOTE — CONSULT NOTE ADULT - SUBJECTIVE AND OBJECTIVE BOX
[facility]  INFECTIOUS DISEASES   [address] [place] [address]  Tel: [phone]     Fax: [phone]  ========================================================  MD Charles Fernandez Kaushal, MD Cho, Michelle, MD Sunjit, Jaspal, MD  ========================================================    [id number]  CLEO PLUMMER     CC: Patient is a 73y old  Male who presents with a chief complaint of fever (31 Dec 2023 12:15)    HPI:  Patient is a 73-year-old male who has a history of polycystic kidney disease, status post nephrectomy, has end-stage renal failure undergoes hemodialysis .  He has a history of hypertension polio walks with crutches, and status post cholecystectomy complicated by bacteremia [2023].   Patient reports that starting on Monday, he began feeling sick and developed a fever.  Patient refused to visit a doctor at the time.  Patient skipped dialysis on Wednesday and went today but it was cancelled during the treatment for chills and sweating.  Patient was confused by his daughter to visit the ED.  Patient reports a temperature of around 101.4 fahrenheit today by ear thermometer.  Patient took tylenol at dialysis.  Patient denies pain, SOB, dysuria, new rashes, neuropathy.  Patient endorses cough, sputum production, and sick contacts.  Patients other daughter was sick but was not evaluated and patients granddaughter was found to be positive for Influenza A.    In the ED pts VS were T(C): 36.7  T(F): 98.1  HR: 88  BP: 120/70  RR: 20  SpO2: 97%  Labs show: H.2  WBC: 6.75  Plt: 127  Creatinine:5.80  CXR shows no acute finding      PAST MEDICAL & SURGICAL HISTORY:  Hypertension  Renal failure, chronic  Cancer of kidney, left  History of left nephrectomy    Social Hx: No current smoking, EtOH or drugs     FAMILY HISTORY:  FHx: renal failure (Mother)    Allergies  Demerol HCl (Unknown)  Demerol (Faint)    MEDICATIONS  (STANDING):  albuterol    90 MICROgram(s) HFA Inhaler 2 Puff(s) Inhalation once  carvedilol 25 milliGRAM(s) Oral every 24 hours  cefTRIAXone   IVPB 2000 milliGRAM(s) IV Intermittent every 24 hours  ferrous    sulfate 325 milliGRAM(s) Oral daily  heparin   Injectable 5000 Unit(s) SubCutaneous every 8 hours  multivitamin 1 Tablet(s) Oral at bedtime  tamsulosin 0.4 milliGRAM(s) Oral at bedtime    MEDICATIONS  (PRN):  acetaminophen     Tablet .. 650 milliGRAM(s) Oral every 6 hours PRN Temp greater or equal to 38C (100.4F), Mild Pain (1 - 3)  albuterol    90 MICROgram(s) HFA Inhaler 2 Puff(s) Inhalation every 6 hours PRN Bronchospasm  aluminum hydroxide/magnesium hydroxide/simethicone Suspension 30 milliLiter(s) Oral every 4 hours PRN Dyspepsia  benzonatate 100 milliGRAM(s) Oral every 8 hours PRN Cough  guaiFENesin Oral Liquid (Sugar-Free) 200 milliGRAM(s) Oral every 6 hours PRN Cough  melatonin 3 milliGRAM(s) Oral at bedtime PRN Insomnia  ondansetron Injectable 4 milliGRAM(s) IV Push every 8 hours PRN Nausea and/or Vomiting     REVIEW OF SYSTEMS:  CONSTITUTIONAL:  No Fever or chills  HEENT:  No diplopia or blurred vision.  No sore throat or runny nose.  CARDIOVASCULAR:  No chest pain or SOB.  RESPIRATORY:  No cough, shortness of breath, PND or orthopnea.  GASTROINTESTINAL:  No nausea, vomiting or diarrhea.  GENITOURINARY:  No dysuria, frequency or urgency. No Blood in urine  MUSCULOSKELETAL:  no joint aches, no muscle pain  SKIN:  No change in skin, hair or nails.    Physical Exam:  Vital Signs Last 24 Hrs  T(C): 36.8 (31 Dec 2023 11:48), Max: 36.9 (31 Dec 2023 11:00)  T(F): 98.3 (31 Dec 2023 11:48), Max: 98.4 (31 Dec 2023 11:00)  HR: 69 (31 Dec 2023 11:48) (63 - 74)  BP: 157/83 (31 Dec 2023 11:48) (127/79 - 157/83)  BP(mean): --  RR: 18 (31 Dec 2023 11:48) (18 - 18)  SpO2: 94% (31 Dec 2023 11:48) (94% - 98%)  Parameters below as of 31 Dec 2023 11:48  Patient On (Oxygen Delivery Method): room air  GEN: NAD  HEENT: normocephalic and atraumatic. EOMI. PERRL.    NECK: Supple.  No lymphadenopathy   LUNGS: Clear to auscultation.  HEART: Regular rate and rhythm   ABDOMEN: Soft, nontender, and nondistended.  Positive bowel sounds.    : No CVA tenderness  EXTREMITIES: Without edema. left arm AV fistula looks fine  NEUROLOGIC: grossly intact.  PSYCHIATRIC: Appropriate affect .  SKIN: No rash     Labs:      137  |  107  |  70<H>  ----------------------------<  87  4.0   |  21<L>  |  6.60<H>    Ca    8.3<L>      31 Dec 2023 06:38    TPro  5.9<L>  /  Alb  2.7<L>  /  TBili  0.4  /  DBili  x   /  AST  37  /  ALT  29  /  AlkPhos  59                          10.6   3.72  )-----------( 111      ( 31 Dec 2023 06:38 )             31.7     PT/INR - ( 29 Dec 2023 15:45 )   PT: 12.5 sec;   INR: 1.07 ratio    PTT - ( 29 Dec 2023 15:45 )  PTT:25.3 sec  Urinalysis Basic - ( 31 Dec 2023 06:38 )    Color: x / Appearance: x / SG: x / pH: x  Gluc: 87 mg/dL / Ketone: x  / Bili: x / Urobili: x   Blood: x / Protein: x / Nitrite: x   Leuk Esterase: x / RBC: x / WBC x   Sq Epi: x / Non Sq Epi: x / Bacteria: x    LIVER FUNCTIONS - ( 31 Dec 2023 06:38 )  Alb: 2.7 g/dL / Pro: 5.9 g/dL / ALK PHOS: 59 U/L / ALT: 29 U/L / AST: 37 U/L / GGT: x           SARS-CoV-2 Result: NotDetec (23 @ 15:45)  SARS-CoV-2: NotDetec (23 @ 15:45)    RECENT CULTURES:   @ 18:05 Clean Catch Clean Catch (Midstream)     10,000 - 49,000 CFU/mL Gram Negative Rods     @ 15:45 .Blood Blood-Peripheral     No growth at 24 hours    Detected     @ 15:40 .Blood Blood-Peripheral Blood Culture PCR    Growth in aerobic bottle: Gram Negative Rods  Direct identification is available within approximately 3-5  hours either by Blood Panel Multiplexed PCR or Direct  MALDI-TOF. Details: [email or web address]    Growth in aerobic bottle: Gram Negative Rods    All imaging and other data have been reviewed.  < from: Xray Chest 1 View-PORTABLE IMMEDIATE (23 @ 15:58) >  IMPRESSION: No acute finding or change.      Assessment and Plan:   Patient is a 73-year-old male who has a history of polycystic kidney disease, status post nephrectomy, has end-stage renal failure undergoes hemodialysis .  He has a history of hypertension polio walks with crutches, and status post cholecystectomy complicated by bacteremia [2023].   Patient reports that starting on Monday, he began feeling sick and developed a fever, had flu PCr positive but due to HD, no treatment given.  Today his blood culture is positive for serratia and UC for low CFU GNR, so ID was called for recommendations. He doesn't have any symptoms.     # Bacteremia   # UTI??  # Influenza    - Will follow blood cultures for sensitivity  - Primary team has repeated blood culture   - Will start ceftriaxone 2gm daily for now  - For Flu will continue supportive care   - monitor Tmax and WBC     Thank you for courtesy of this consult.     Will follow.  Discussed with the primary team.     Brandi Piedra MD  Division of Infectious Diseases   Please call ID service at [phone] with any question.    75 minutes spent on total encounter assessing patient, examination, chart review, counseling and coordinating care by the attending physician/nurse/care manager.   [facility]  INFECTIOUS DISEASES   [address] [place] [address]  Tel: [phone]     Fax: [phone]  ========================================================  MD Charles Fernandez Kaushal, MD Cho, Michelle, MD Sunjit, Jaspal, MD  ========================================================    [id number]  CLEO PLUMMER     CC: Patient is a 73y old  Male who presents with a chief complaint of fever (31 Dec 2023 12:15)    HPI:  Patient is a 73-year-old male who has a history of polycystic kidney disease, status post nephrectomy, has end-stage renal failure undergoes hemodialysis .  He has a history of hypertension polio walks with crutches, and status post cholecystectomy complicated by bacteremia [2023].   Patient reports that starting on Monday, he began feeling sick and developed a fever.  Patient refused to visit a doctor at the time.  Patient skipped dialysis on Wednesday and went today but it was cancelled during the treatment for chills and sweating.  Patient was confused by his daughter to visit the ED.  Patient reports a temperature of around 101.4 fahrenheit today by ear thermometer.  Patient took tylenol at dialysis.  Patient denies pain, SOB, dysuria, new rashes, neuropathy.  Patient endorses cough, sputum production, and sick contacts.  Patients other daughter was sick but was not evaluated and patients granddaughter was found to be positive for Influenza A.    In the ED pts VS were T(C): 36.7  T(F): 98.1  HR: 88  BP: 120/70  RR: 20  SpO2: 97%  Labs show: H.2  WBC: 6.75  Plt: 127  Creatinine:5.80  CXR shows no acute finding      PAST MEDICAL & SURGICAL HISTORY:  Hypertension  Renal failure, chronic  Cancer of kidney, left  History of left nephrectomy    Social Hx: No current smoking, EtOH or drugs     FAMILY HISTORY:  FHx: renal failure (Mother)    Allergies  Demerol HCl (Unknown)  Demerol (Faint)    MEDICATIONS  (STANDING):  albuterol    90 MICROgram(s) HFA Inhaler 2 Puff(s) Inhalation once  carvedilol 25 milliGRAM(s) Oral every 24 hours  cefTRIAXone   IVPB 2000 milliGRAM(s) IV Intermittent every 24 hours  ferrous    sulfate 325 milliGRAM(s) Oral daily  heparin   Injectable 5000 Unit(s) SubCutaneous every 8 hours  multivitamin 1 Tablet(s) Oral at bedtime  tamsulosin 0.4 milliGRAM(s) Oral at bedtime    MEDICATIONS  (PRN):  acetaminophen     Tablet .. 650 milliGRAM(s) Oral every 6 hours PRN Temp greater or equal to 38C (100.4F), Mild Pain (1 - 3)  albuterol    90 MICROgram(s) HFA Inhaler 2 Puff(s) Inhalation every 6 hours PRN Bronchospasm  aluminum hydroxide/magnesium hydroxide/simethicone Suspension 30 milliLiter(s) Oral every 4 hours PRN Dyspepsia  benzonatate 100 milliGRAM(s) Oral every 8 hours PRN Cough  guaiFENesin Oral Liquid (Sugar-Free) 200 milliGRAM(s) Oral every 6 hours PRN Cough  melatonin 3 milliGRAM(s) Oral at bedtime PRN Insomnia  ondansetron Injectable 4 milliGRAM(s) IV Push every 8 hours PRN Nausea and/or Vomiting     REVIEW OF SYSTEMS:  CONSTITUTIONAL:  No Fever or chills  HEENT:  No diplopia or blurred vision.  No sore throat or runny nose.  CARDIOVASCULAR:  No chest pain or SOB.  RESPIRATORY:  No cough, shortness of breath, PND or orthopnea.  GASTROINTESTINAL:  No nausea, vomiting or diarrhea.  GENITOURINARY:  No dysuria, frequency or urgency. No Blood in urine  MUSCULOSKELETAL:  no joint aches, no muscle pain  SKIN:  No change in skin, hair or nails.    Physical Exam:  Vital Signs Last 24 Hrs  T(C): 36.8 (31 Dec 2023 11:48), Max: 36.9 (31 Dec 2023 11:00)  T(F): 98.3 (31 Dec 2023 11:48), Max: 98.4 (31 Dec 2023 11:00)  HR: 69 (31 Dec 2023 11:48) (63 - 74)  BP: 157/83 (31 Dec 2023 11:48) (127/79 - 157/83)  BP(mean): --  RR: 18 (31 Dec 2023 11:48) (18 - 18)  SpO2: 94% (31 Dec 2023 11:48) (94% - 98%)  Parameters below as of 31 Dec 2023 11:48  Patient On (Oxygen Delivery Method): room air  GEN: NAD  HEENT: normocephalic and atraumatic. EOMI. PERRL.    NECK: Supple.  No lymphadenopathy   LUNGS: Clear to auscultation.  HEART: Regular rate and rhythm   ABDOMEN: Soft, nontender, and nondistended.  Positive bowel sounds.    : No CVA tenderness  EXTREMITIES: Without edema. left arm AV fistula looks fine  NEUROLOGIC: grossly intact.  PSYCHIATRIC: Appropriate affect .  SKIN: No rash     Labs:      137  |  107  |  70<H>  ----------------------------<  87  4.0   |  21<L>  |  6.60<H>    Ca    8.3<L>      31 Dec 2023 06:38    TPro  5.9<L>  /  Alb  2.7<L>  /  TBili  0.4  /  DBili  x   /  AST  37  /  ALT  29  /  AlkPhos  59                          10.6   3.72  )-----------( 111      ( 31 Dec 2023 06:38 )             31.7     PT/INR - ( 29 Dec 2023 15:45 )   PT: 12.5 sec;   INR: 1.07 ratio    PTT - ( 29 Dec 2023 15:45 )  PTT:25.3 sec  Urinalysis Basic - ( 31 Dec 2023 06:38 )    Color: x / Appearance: x / SG: x / pH: x  Gluc: 87 mg/dL / Ketone: x  / Bili: x / Urobili: x   Blood: x / Protein: x / Nitrite: x   Leuk Esterase: x / RBC: x / WBC x   Sq Epi: x / Non Sq Epi: x / Bacteria: x    LIVER FUNCTIONS - ( 31 Dec 2023 06:38 )  Alb: 2.7 g/dL / Pro: 5.9 g/dL / ALK PHOS: 59 U/L / ALT: 29 U/L / AST: 37 U/L / GGT: x           SARS-CoV-2 Result: NotDetec (23 @ 15:45)  SARS-CoV-2: NotDetec (23 @ 15:45)    RECENT CULTURES:   @ 18:05 Clean Catch Clean Catch (Midstream)     10,000 - 49,000 CFU/mL Gram Negative Rods     @ 15:45 .Blood Blood-Peripheral     No growth at 24 hours    Detected     @ 15:40 .Blood Blood-Peripheral Blood Culture PCR    Growth in aerobic bottle: Gram Negative Rods  Direct identification is available within approximately 3-5  hours either by Blood Panel Multiplexed PCR or Direct  MALDI-TOF. Details: [email or web address]    Growth in aerobic bottle: Gram Negative Rods    All imaging and other data have been reviewed.  < from: Xray Chest 1 View-PORTABLE IMMEDIATE (23 @ 15:58) >  IMPRESSION: No acute finding or change.      Assessment and Plan:   Patient is a 73-year-old male who has a history of polycystic kidney disease, status post nephrectomy, has end-stage renal failure undergoes hemodialysis .  He has a history of hypertension polio walks with crutches, and status post cholecystectomy complicated by bacteremia [2023].   Patient reports that starting on Monday, he began feeling sick and developed a fever, had flu PCr positive but due to HD, no treatment given.  Today his blood culture is positive for serratia and UC for low CFU GNR, so ID was called for recommendations. He doesn't have any symptoms.     # Bacteremia   # UTI??  # Influenza    - Will follow blood cultures for sensitivity  - Primary team has repeated blood culture   - Will start ceftriaxone 2gm daily for now  - For Flu will continue supportive care   - monitor Tmax and WBC     Thank you for courtesy of this consult.     Will follow.  Discussed with the primary team.     Brandi Piedra MD  Division of Infectious Diseases   Please call ID service at [phone] with any question.    75 minutes spent on total encounter assessing patient, examination, chart review, counseling and coordinating care by the attending physician/nurse/care manager.

## 2023-12-31 NOTE — PROGRESS NOTE ADULT - SUBJECTIVE AND OBJECTIVE BOX
Patient is a 73y old  Male who presents with a chief complaint of fever (31 Dec 2023 12:31)    INTERVAL HPI/OVERNIGHT EVENTS: Patient was seen and examined. s/p HD. reports feeling well. wants to go home. afebrile.     I&O's Summary    31 Dec 2023 07:01  -  31 Dec 2023 12:53  --------------------------------------------------------  IN: 0 mL / OUT: 1000 mL / NET: -1000 mL        LABS:                        10.6   3.72  )-----------( 111      ( 31 Dec 2023 06:38 )             31.7     12-31    137  |  107  |  70<H>  ----------------------------<  87  4.0   |  21<L>  |  6.60<H>    Ca    8.3<L>      31 Dec 2023 06:38    TPro  5.9<L>  /  Alb  2.7<L>  /  TBili  0.4  /  DBili  x   /  AST  37  /  ALT  29  /  AlkPhos  59  12-31    PT/INR - ( 29 Dec 2023 15:45 )   PT: 12.5 sec;   INR: 1.07 ratio         PTT - ( 29 Dec 2023 15:45 )  PTT:25.3 sec  Urinalysis Basic - ( 31 Dec 2023 06:38 )    Color: x / Appearance: x / SG: x / pH: x  Gluc: 87 mg/dL / Ketone: x  / Bili: x / Urobili: x   Blood: x / Protein: x / Nitrite: x   Leuk Esterase: x / RBC: x / WBC x   Sq Epi: x / Non Sq Epi: x / Bacteria: x      CAPILLARY BLOOD GLUCOSE            Urinalysis Basic - ( 31 Dec 2023 06:38 )    Color: x / Appearance: x / SG: x / pH: x  Gluc: 87 mg/dL / Ketone: x  / Bili: x / Urobili: x   Blood: x / Protein: x / Nitrite: x   Leuk Esterase: x / RBC: x / WBC x   Sq Epi: x / Non Sq Epi: x / Bacteria: x        MEDICATIONS  (STANDING):  albuterol    90 MICROgram(s) HFA Inhaler 2 Puff(s) Inhalation once  carvedilol 25 milliGRAM(s) Oral every 24 hours  cefTRIAXone   IVPB 2000 milliGRAM(s) IV Intermittent every 24 hours  ferrous    sulfate 325 milliGRAM(s) Oral daily  heparin   Injectable 5000 Unit(s) SubCutaneous every 8 hours  multivitamin 1 Tablet(s) Oral at bedtime  tamsulosin 0.4 milliGRAM(s) Oral at bedtime    MEDICATIONS  (PRN):  acetaminophen     Tablet .. 650 milliGRAM(s) Oral every 6 hours PRN Temp greater or equal to 38C (100.4F), Mild Pain (1 - 3)  albuterol    90 MICROgram(s) HFA Inhaler 2 Puff(s) Inhalation every 6 hours PRN Bronchospasm  aluminum hydroxide/magnesium hydroxide/simethicone Suspension 30 milliLiter(s) Oral every 4 hours PRN Dyspepsia  benzonatate 100 milliGRAM(s) Oral every 8 hours PRN Cough  guaiFENesin Oral Liquid (Sugar-Free) 200 milliGRAM(s) Oral every 6 hours PRN Cough  melatonin 3 milliGRAM(s) Oral at bedtime PRN Insomnia  ondansetron Injectable 4 milliGRAM(s) IV Push every 8 hours PRN Nausea and/or Vomiting      REVIEW OF SYSTEMS:  CONSTITUTIONAL: No fever or chills  HEENT:  No headache, no sore throat  RESPIRATORY: No cough, wheezing, or shortness of breath  CARDIOVASCULAR: No chest pain, palpitations  GASTROINTESTINAL: No abdominal pain, nausea, vomiting, or diarrhea  GENITOURINARY: No dysuria, frequency, or hematuria  NEUROLOGICAL: no focal weakness or dizziness  MUSCULOSKELETAL: no myalgias  PSYCH: no recent changes in mood    RADIOLOGY & ADDITIONAL TESTS:    Imaging Personally Reviewed:  [x] YES  [ ] NO    Consultant(s) Notes Reviewed:  [x] YES  [ ] NO    PHYSICAL EXAM:  T(C): 36.8 (12-31-23 @ 11:48), Max: 36.9 (12-31-23 @ 11:00)  HR: 69 (12-31-23 @ 11:48) (63 - 74)  BP: 157/83 (12-31-23 @ 11:48) (127/79 - 157/83)  RR: 18 (12-31-23 @ 11:48) (18 - 18)  SpO2: 94% (12-31-23 @ 11:48) (94% - 98%)    GENERAL: NAD, well-developed, well-groomed  HEENT:  anicteric, moist mucous membranes  CHEST/LUNG:  CTA b/l, no rales, wheezes, or rhonchi  HEART:  RRR, S1, S2  ABDOMEN:  BS+, soft, nontender, nondistended  EXTREMITIES: no edema  NERVOUS SYSTEM: answers questions and follows commands appropriately  PSYCH: normal affect    Care Discussed with Consultants/Other Providers [x] YES  [ ] NO

## 2023-12-31 NOTE — PROGRESS NOTE ADULT - ASSESSMENT
73-year-old male with PMHx for polycystic kidney disease, status post nephrectomy, ESRD (HD on MWF), HTN, polio who was admitted for fever and chills. RVP + Influenza A.    Flu A +     -  albuterol ordered prn bronchospasm     -  tessalon pearles ordered    - discussed with daughter, patient was symptomatic since monday. outside of window for tamiflu (which will need to be dosed by HD). Will hold off on tamiflu at this time. daughter aware.     - monitor for fevers. CXR negative. UA neg. Ucx + GNR. Blood cultures prelim growing GNR. will empirically treat with rocephin 2gm daily and repeat cultures today. ID consulted.      PCKD on HD    - nephrology following. HD per nephro     HTN    - cont home coreg    BPH     - cont flomax     DVT proph: heparin subc    daughter updated over the phone.   notified of blood cultures and need for monitoring.

## 2023-12-31 NOTE — PROVIDER CONTACT NOTE (CRITICAL VALUE NOTIFICATION) - TEST AND RESULT REPORTED:
Preliminary results for Blood culture collected on 12/29/23: Growth in Aerobic bottle Gram negative rods.

## 2024-01-01 LAB
ALBUMIN SERPL ELPH-MCNC: 2.6 G/DL — LOW (ref 3.3–5)
ALBUMIN SERPL ELPH-MCNC: 2.6 G/DL — LOW (ref 3.3–5)
ALP SERPL-CCNC: 58 U/L — SIGNIFICANT CHANGE UP (ref 40–120)
ALP SERPL-CCNC: 58 U/L — SIGNIFICANT CHANGE UP (ref 40–120)
ALT FLD-CCNC: 29 U/L — SIGNIFICANT CHANGE UP (ref 12–78)
ALT FLD-CCNC: 29 U/L — SIGNIFICANT CHANGE UP (ref 12–78)
ANION GAP SERPL CALC-SCNC: 7 MMOL/L — SIGNIFICANT CHANGE UP (ref 5–17)
ANION GAP SERPL CALC-SCNC: 7 MMOL/L — SIGNIFICANT CHANGE UP (ref 5–17)
AST SERPL-CCNC: 31 U/L — SIGNIFICANT CHANGE UP (ref 15–37)
AST SERPL-CCNC: 31 U/L — SIGNIFICANT CHANGE UP (ref 15–37)
BILIRUB SERPL-MCNC: 0.3 MG/DL — SIGNIFICANT CHANGE UP (ref 0.2–1.2)
BILIRUB SERPL-MCNC: 0.3 MG/DL — SIGNIFICANT CHANGE UP (ref 0.2–1.2)
BUN SERPL-MCNC: 42 MG/DL — HIGH (ref 7–23)
BUN SERPL-MCNC: 42 MG/DL — HIGH (ref 7–23)
CALCIUM SERPL-MCNC: 8.1 MG/DL — LOW (ref 8.5–10.1)
CALCIUM SERPL-MCNC: 8.1 MG/DL — LOW (ref 8.5–10.1)
CHLORIDE SERPL-SCNC: 105 MMOL/L — SIGNIFICANT CHANGE UP (ref 96–108)
CHLORIDE SERPL-SCNC: 105 MMOL/L — SIGNIFICANT CHANGE UP (ref 96–108)
CO2 SERPL-SCNC: 26 MMOL/L — SIGNIFICANT CHANGE UP (ref 22–31)
CO2 SERPL-SCNC: 26 MMOL/L — SIGNIFICANT CHANGE UP (ref 22–31)
CREAT SERPL-MCNC: 4.8 MG/DL — HIGH (ref 0.5–1.3)
CREAT SERPL-MCNC: 4.8 MG/DL — HIGH (ref 0.5–1.3)
EGFR: 12 ML/MIN/1.73M2 — LOW
EGFR: 12 ML/MIN/1.73M2 — LOW
GLUCOSE SERPL-MCNC: 85 MG/DL — SIGNIFICANT CHANGE UP (ref 70–99)
GLUCOSE SERPL-MCNC: 85 MG/DL — SIGNIFICANT CHANGE UP (ref 70–99)
HCT VFR BLD CALC: 31.3 % — LOW (ref 39–50)
HCT VFR BLD CALC: 31.3 % — LOW (ref 39–50)
HGB BLD-MCNC: 10.5 G/DL — LOW (ref 13–17)
HGB BLD-MCNC: 10.5 G/DL — LOW (ref 13–17)
MCHC RBC-ENTMCNC: 30.8 PG — SIGNIFICANT CHANGE UP (ref 27–34)
MCHC RBC-ENTMCNC: 30.8 PG — SIGNIFICANT CHANGE UP (ref 27–34)
MCHC RBC-ENTMCNC: 33.5 GM/DL — SIGNIFICANT CHANGE UP (ref 32–36)
MCHC RBC-ENTMCNC: 33.5 GM/DL — SIGNIFICANT CHANGE UP (ref 32–36)
MCV RBC AUTO: 91.8 FL — SIGNIFICANT CHANGE UP (ref 80–100)
MCV RBC AUTO: 91.8 FL — SIGNIFICANT CHANGE UP (ref 80–100)
NRBC # BLD: 0 /100 WBCS — SIGNIFICANT CHANGE UP (ref 0–0)
NRBC # BLD: 0 /100 WBCS — SIGNIFICANT CHANGE UP (ref 0–0)
PLATELET # BLD AUTO: 119 K/UL — LOW (ref 150–400)
PLATELET # BLD AUTO: 119 K/UL — LOW (ref 150–400)
POTASSIUM SERPL-MCNC: 3.7 MMOL/L — SIGNIFICANT CHANGE UP (ref 3.5–5.3)
POTASSIUM SERPL-MCNC: 3.7 MMOL/L — SIGNIFICANT CHANGE UP (ref 3.5–5.3)
POTASSIUM SERPL-SCNC: 3.7 MMOL/L — SIGNIFICANT CHANGE UP (ref 3.5–5.3)
POTASSIUM SERPL-SCNC: 3.7 MMOL/L — SIGNIFICANT CHANGE UP (ref 3.5–5.3)
PROT SERPL-MCNC: 5.8 G/DL — LOW (ref 6–8.3)
PROT SERPL-MCNC: 5.8 G/DL — LOW (ref 6–8.3)
RBC # BLD: 3.41 M/UL — LOW (ref 4.2–5.8)
RBC # BLD: 3.41 M/UL — LOW (ref 4.2–5.8)
RBC # FLD: 13.9 % — SIGNIFICANT CHANGE UP (ref 10.3–14.5)
RBC # FLD: 13.9 % — SIGNIFICANT CHANGE UP (ref 10.3–14.5)
SODIUM SERPL-SCNC: 138 MMOL/L — SIGNIFICANT CHANGE UP (ref 135–145)
SODIUM SERPL-SCNC: 138 MMOL/L — SIGNIFICANT CHANGE UP (ref 135–145)
WBC # BLD: 2.83 K/UL — LOW (ref 3.8–10.5)
WBC # BLD: 2.83 K/UL — LOW (ref 3.8–10.5)
WBC # FLD AUTO: 2.83 K/UL — LOW (ref 3.8–10.5)
WBC # FLD AUTO: 2.83 K/UL — LOW (ref 3.8–10.5)

## 2024-01-01 PROCEDURE — 99233 SBSQ HOSP IP/OBS HIGH 50: CPT

## 2024-01-01 PROCEDURE — 99232 SBSQ HOSP IP/OBS MODERATE 35: CPT

## 2024-01-01 RX ORDER — ALBUTEROL 90 UG/1
2 AEROSOL, METERED ORAL ONCE
Refills: 0 | Status: DISCONTINUED | OUTPATIENT
Start: 2024-01-01 | End: 2024-01-03

## 2024-01-01 RX ADMIN — HEPARIN SODIUM 5000 UNIT(S): 5000 INJECTION INTRAVENOUS; SUBCUTANEOUS at 21:32

## 2024-01-01 RX ADMIN — Medication 1 TABLET(S): at 21:32

## 2024-01-01 RX ADMIN — Medication 325 MILLIGRAM(S): at 11:40

## 2024-01-01 RX ADMIN — ALBUTEROL 2 PUFF(S): 90 AEROSOL, METERED ORAL at 16:49

## 2024-01-01 RX ADMIN — HEPARIN SODIUM 5000 UNIT(S): 5000 INJECTION INTRAVENOUS; SUBCUTANEOUS at 15:27

## 2024-01-01 RX ADMIN — CEFTRIAXONE 100 MILLIGRAM(S): 500 INJECTION, POWDER, FOR SOLUTION INTRAMUSCULAR; INTRAVENOUS at 12:43

## 2024-01-01 RX ADMIN — CARVEDILOL PHOSPHATE 25 MILLIGRAM(S): 80 CAPSULE, EXTENDED RELEASE ORAL at 05:18

## 2024-01-01 RX ADMIN — TAMSULOSIN HYDROCHLORIDE 0.4 MILLIGRAM(S): 0.4 CAPSULE ORAL at 21:32

## 2024-01-01 RX ADMIN — HEPARIN SODIUM 5000 UNIT(S): 5000 INJECTION INTRAVENOUS; SUBCUTANEOUS at 05:18

## 2024-01-01 NOTE — PROGRESS NOTE ADULT - SUBJECTIVE AND OBJECTIVE BOX
Patient is a 73y old  Male who presents with a chief complaint of fever (31 Dec 2023 12:52)    INTERVAL HPI/OVERNIGHT EVENTS: Patient was seen and examined. feeling better. still with cough     I&O's Summary    31 Dec 2023 07:01  -  01 Jan 2024 07:00  --------------------------------------------------------  IN: 0 mL / OUT: 1400 mL / NET: -1400 mL        LABS:                        10.5   2.83  )-----------( 119      ( 01 Jan 2024 06:50 )             31.3     01-01    138  |  105  |  42<H>  ----------------------------<  85  3.7   |  26  |  4.80<H>    Ca    8.1<L>      01 Jan 2024 06:50    TPro  5.8<L>  /  Alb  2.6<L>  /  TBili  0.3  /  DBili  x   /  AST  31  /  ALT  29  /  AlkPhos  58  01-01      Urinalysis Basic - ( 01 Jan 2024 06:50 )    Color: x / Appearance: x / SG: x / pH: x  Gluc: 85 mg/dL / Ketone: x  / Bili: x / Urobili: x   Blood: x / Protein: x / Nitrite: x   Leuk Esterase: x / RBC: x / WBC x   Sq Epi: x / Non Sq Epi: x / Bacteria: x      CAPILLARY BLOOD GLUCOSE            Urinalysis Basic - ( 01 Jan 2024 06:50 )    Color: x / Appearance: x / SG: x / pH: x  Gluc: 85 mg/dL / Ketone: x  / Bili: x / Urobili: x   Blood: x / Protein: x / Nitrite: x   Leuk Esterase: x / RBC: x / WBC x   Sq Epi: x / Non Sq Epi: x / Bacteria: x        MEDICATIONS  (STANDING):  albuterol    90 MICROgram(s) HFA Inhaler 2 Puff(s) Inhalation once  carvedilol 25 milliGRAM(s) Oral every 24 hours  cefTRIAXone   IVPB 2000 milliGRAM(s) IV Intermittent every 24 hours  ferrous    sulfate 325 milliGRAM(s) Oral daily  heparin   Injectable 5000 Unit(s) SubCutaneous every 8 hours  multivitamin 1 Tablet(s) Oral at bedtime  tamsulosin 0.4 milliGRAM(s) Oral at bedtime    MEDICATIONS  (PRN):  acetaminophen     Tablet .. 650 milliGRAM(s) Oral every 6 hours PRN Temp greater or equal to 38C (100.4F), Mild Pain (1 - 3)  albuterol    90 MICROgram(s) HFA Inhaler 2 Puff(s) Inhalation every 6 hours PRN Bronchospasm  aluminum hydroxide/magnesium hydroxide/simethicone Suspension 30 milliLiter(s) Oral every 4 hours PRN Dyspepsia  benzonatate 100 milliGRAM(s) Oral every 8 hours PRN Cough  guaiFENesin Oral Liquid (Sugar-Free) 200 milliGRAM(s) Oral every 6 hours PRN Cough  melatonin 3 milliGRAM(s) Oral at bedtime PRN Insomnia  ondansetron Injectable 4 milliGRAM(s) IV Push every 8 hours PRN Nausea and/or Vomiting      REVIEW OF SYSTEMS:  CONSTITUTIONAL: No fever or chills  HEENT:  No headache, no sore throat  RESPIRATORY: +cough, No wheezing, or shortness of breath  CARDIOVASCULAR: No chest pain, palpitations  GASTROINTESTINAL: No abdominal pain, nausea, vomiting, or diarrhea  GENITOURINARY: No dysuria, frequency, or hematuria  NEUROLOGICAL: no focal weakness or dizziness  MUSCULOSKELETAL: no myalgias  PSYCH: no recent changes in mood    RADIOLOGY & ADDITIONAL TESTS:    Imaging Personally Reviewed:  [x] YES  [ ] NO    Consultant(s) Notes Reviewed:  [x] YES  [ ] NO    PHYSICAL EXAM:  T(C): 36.6 (01-01-24 @ 04:45), Max: 36.6 (01-01-24 @ 04:45)  HR: 60 (01-01-24 @ 07:11) (60 - 89)  BP: 152/82 (01-01-24 @ 04:45) (144/73 - 152/82)  RR: 17 (01-01-24 @ 04:45) (17 - 18)  SpO2: 93% (01-01-24 @ 04:45) (93% - 95%)    GENERAL: NAD, well-developed, well-groomed  HEENT:  anicteric, moist mucous membranes  CHEST/LUNG:  CTA b/l, no rales, wheezes, or rhonchi  HEART:  RRR, S1, S2  ABDOMEN:  BS+, soft, nontender, nondistended  EXTREMITIES: no edema  NERVOUS SYSTEM: answers questions and follows commands appropriately  PSYCH: normal affect    Care Discussed with Consultants/Other Providers [x] YES  [ ] NO

## 2024-01-01 NOTE — PROGRESS NOTE ADULT - ASSESSMENT
73-year-old male with PMHx for polycystic kidney disease, status post nephrectomy, ESRD (HD on MWF), HTN, polio who was admitted for fever and chills. RVP + Influenza A.    Flu A +     -  albuterol ordered prn bronchospasm     -  tessalon pearles ordered    - discussed with daughter, patient was symptomatic since monday. outside of window for tamiflu (which will need to be dosed by HD). Will hold off on tamiflu at this time. daughter aware.     - monitor for fevers. CXR negative. UA neg. Ucx + GNR. Blood cultures prelim growing Serratia. will empirically treat with rocephin 2gm daily and repeat cultures 12/31 pending. ID following.      PCKD on HD    - nephrology following. HD per nephro     HTN    - cont home coreg    BPH     - cont flomax     DVT proph: heparin subc    daughter updated over the phone.   notified of blood cultures and need for monitoring.   seen by PT, continue ambulation with crutches  73-year-old male with PMHx for polycystic kidney disease, status post nephrectomy, ESRD (HD on MWF), HTN, polio who was admitted for fever and chills. RVP + Influenza A.    Flu A +     -  albuterol ordered prn bronchospasm     -  tessalon pearles ordered    - discussed with daughter, patient was symptomatic since monday. outside of window for tamiflu (which will need to be dosed by HD). Will hold off on tamiflu at this time. daughter aware.     - monitor for fevers. CXR negative. UA neg. Ucx + GNR. Blood cultures prelim growing Serratia. will empirically treat with rocephin 2gm daily and repeat cultures 12/31 pending. ID following.     - leukopenia likely secondary to infection. will monitor     PCKD on HD    - nephrology following. HD per nephro     HTN    - cont home coreg    BPH     - cont flomax     DVT proph: heparin subc    daughter updated over the phone.   notified of blood cultures and need for monitoring.   seen by PT, continue ambulation with crutches

## 2024-01-01 NOTE — CHART NOTE - NSCHARTNOTEFT_GEN_A_CORE
Provider contacted ovn about utility of pt's remote telemetry box given conflicting admit orders. No mention of cardiac monitoring in primary team notes, however will leave order in place and allow day team to decide utility of tele box moving forward.

## 2024-01-01 NOTE — PHYSICAL THERAPY INITIAL EVALUATION ADULT - ADDITIONAL COMMENTS
Patient reports that he lives in a private house with daughter, independent with ADLs/ambulation using crutches at baseline. 3 DUC, bed/bath on main level.

## 2024-01-01 NOTE — PROGRESS NOTE ADULT - SUBJECTIVE AND OBJECTIVE BOX
[facility]  INFECTIOUS DISEASES   [address] [place] [address]  Tel: [phone]     Fax: [phone]  ========================================================  MD Charles Fernandez Kaushal, MD Cho, Michelle, MD Sunjit, Jaspal, MD  ========================================================    [id number]  CLEO PLUMMER     Follow up: bacteremia and possible UTI    Feels better, no fever, no overnight event.     PAST MEDICAL & SURGICAL HISTORY:  Hypertension  Renal failure, chronic  Cancer of kidney, left  History of left nephrectomy    Social Hx: No current smoking, EtOH or drugs     FAMILY HISTORY:  FHx: renal failure (Mother)    Allergies  Demerol HCl (Unknown)  Demerol (Faint)    MEDICATIONS  (STANDING):  albuterol    90 MICROgram(s) HFA Inhaler 2 Puff(s) Inhalation once  carvedilol 25 milliGRAM(s) Oral every 24 hours  cefTRIAXone   IVPB 2000 milliGRAM(s) IV Intermittent every 24 hours  ferrous    sulfate 325 milliGRAM(s) Oral daily  heparin   Injectable 5000 Unit(s) SubCutaneous every 8 hours  multivitamin 1 Tablet(s) Oral at bedtime  tamsulosin 0.4 milliGRAM(s) Oral at bedtime    MEDICATIONS  (PRN):  acetaminophen     Tablet .. 650 milliGRAM(s) Oral every 6 hours PRN Temp greater or equal to 38C (100.4F), Mild Pain (1 - 3)  albuterol    90 MICROgram(s) HFA Inhaler 2 Puff(s) Inhalation every 6 hours PRN Bronchospasm  aluminum hydroxide/magnesium hydroxide/simethicone Suspension 30 milliLiter(s) Oral every 4 hours PRN Dyspepsia  benzonatate 100 milliGRAM(s) Oral every 8 hours PRN Cough  guaiFENesin Oral Liquid (Sugar-Free) 200 milliGRAM(s) Oral every 6 hours PRN Cough  melatonin 3 milliGRAM(s) Oral at bedtime PRN Insomnia  ondansetron Injectable 4 milliGRAM(s) IV Push every 8 hours PRN Nausea and/or Vomiting     REVIEW OF SYSTEMS:  CONSTITUTIONAL:  No Fever or chills  HEENT:  No diplopia or blurred vision.  No sore throat or runny nose.  CARDIOVASCULAR:  No chest pain or SOB.  RESPIRATORY:  No cough, shortness of breath, PND or orthopnea.  GASTROINTESTINAL:  No nausea, vomiting or diarrhea.  GENITOURINARY:  No dysuria, frequency or urgency. No Blood in urine  MUSCULOSKELETAL:  no joint aches, no muscle pain  SKIN:  No change in skin, hair or nails.    Physical Exam:  Vital Signs Last 24 Hrs  T(C): 36.7 (01 Jan 2024 12:38), Max: 36.7 (01 Jan 2024 12:38)  T(F): 98 (01 Jan 2024 12:38), Max: 98 (01 Jan 2024 12:38)  HR: 70 (01 Jan 2024 12:38) (60 - 89)  BP: 138/82 (01 Jan 2024 12:38) (138/82 - 152/82)  BP(mean): --  RR: 17 (01 Jan 2024 12:38) (17 - 18)  SpO2: 95% (01 Jan 2024 12:38) (93% - 95%)  Parameters below as of 01 Jan 2024 12:38  Patient On (Oxygen Delivery Method): room air  GEN: NAD  HEENT: normocephalic and atraumatic. EOMI. PERRL.    NECK: Supple.  No lymphadenopathy   LUNGS: Clear to auscultation.  HEART: Regular rate and rhythm   ABDOMEN: Soft, nontender, and nondistended.  Positive bowel sounds.    : No CVA tenderness  EXTREMITIES: Without edema. left arm AV fistula looks fine  NEUROLOGIC: grossly intact.  PSYCHIATRIC: Appropriate affect .  SKIN: No rash       Labs:                        10.5   2.83  )-----------( 119      ( 01 Jan 2024 06:50 )             31.3     01-01    138  |  105  |  42<H>  ----------------------------<  85  3.7   |  26  |  4.80<H>    Ca    8.1<L>      01 Jan 2024 06:50    TPro  5.8<L>  /  Alb  2.6<L>  /  TBili  0.3  /  DBili  x   /  AST  31  /  ALT  29  /  AlkPhos  58  01-01      Culture - Urine (collected 12-29-23 @ 18:05)  Source: Clean Catch Clean Catch (Midstream)  Preliminary Report (12-30-23 @ 21:01):    10,000 - 49,000 CFU/mL Gram Negative Rods    Culture - Blood (collected 12-29-23 @ 15:45)  Source: .Blood Blood-Peripheral  Preliminary Report (12-31-23 @ 22:02):    No growth at 48 Hours    Culture - Blood (collected 12-29-23 @ 15:40)  Source: .Blood Blood-Peripheral  Gram Stain (12-31-23 @ 12:01):    Growth in aerobic bottle: Gram Negative Rods  Preliminary Report (01-01-24 @ 10:38):    Growth in aerobic bottle: Serratia marcescens    Direct identification is available within approximately 3-5    hours either by Blood Panel Multiplexed PCR or Direct    MALDI-TOF. Details: [email or web address]  Organism: Blood Culture PCR (12-31-23 @ 12:09)  Organism: Blood Culture PCR (12-31-23 @ 12:09)    Sensitivities:      Method Type: PCR      -  Serratia marcescens: Detec    Culture - Urine (collected 08-25-23 @ 21:10)  Source: Clean Catch Clean Catch (Midstream)  Final Report (08-27-23 @ 17:41):    >=3 organisms. Probable collection contamination.    Culture - Blood (collected 08-25-23 @ 20:20)  Source: .Blood Blood-Peripheral  Final Report (08-31-23 @ 02:00):    No growth at 5 days    Culture - Blood (collected 08-25-23 @ 20:15)  Source: .Blood Blood-Peripheral  Final Report (08-31-23 @ 02:00):    No growth at 5 days    Culture - Blood (collected 07-17-23 @ 07:25)  Source: .Blood Blood-Venous  Final Report (07-22-23 @ 14:00):    No growth at 5 days    Culture - Blood (collected 07-17-23 @ 07:20)  Source: .Blood Blood-Venous  Final Report (07-22-23 @ 14:00):    No growth at 5 days    Culture - Urine (collected 07-17-23 @ 06:40)  Source: Clean Catch Clean Catch (Midstream)  Final Report (07-18-23 @ 07:41):    >=3 organisms. Probable collection contamination.    Culture - Blood (collected 06-19-23 @ 22:10)  Source: .Blood Blood-Peripheral  Final Report (06-25-23 @ 04:01):    No Growth Final    Culture - Blood (collected 06-19-23 @ 22:10)  Source: .Blood Blood-Peripheral  Final Report (06-25-23 @ 04:01):    No Growth Final    Culture - Urine (collected 06-17-23 @ 19:03)  Source: Clean Catch Clean Catch (Midstream)  Final Report (06-19-23 @ 07:26):    <10,000 CFU/mL Normal Urogenital Carmen    Culture - Blood (collected 06-17-23 @ 19:03)  Source: .Blood Blood-Peripheral  Gram Stain (06-19-23 @ 07:57):    Growth in aerobic bottle: Gram Negative Rods  Final Report (06-20-23 @ 16:17):    Growth in aerobic bottle: Klebsiella pneumoniae    ***Blood Panel PCR results on this specimen are available    approximately 3 hours after the Gram stain result.***    Gram stain, PCR, and/or culture results may not always    correspond due to difference in methodologies.    ************************************************************    This PCR assay was performed by multiplex PCR. This    Assay tests for 66 bacterial and resistance gene targets.    Please refer to the [facility] Labs test directory    at [email or web address] for details.  Organism: Blood Culture PCR  Klebsiella pneumoniae (06-20-23 @ 16:17)  Organism: Klebsiella pneumoniae (06-20-23 @ 16:17)    Sensitivities:      Method Type: WALESKA      -  Amikacin: S <=16      -  Ampicillin: R >16 These ampicillin results predict results for amoxicillin      -  Ampicillin/Sulbactam: S <=4/2 Enterobacter, Klebsiella aerogenes, Citrobacter, and Serratia may develop resistance during prolonged therapy (3-4 days)      -  Aztreonam: S <=4      -  Cefazolin: S <=2 Enterobacter, Klebsiella aerogenes, Citrobacter, and Serratia may develop resistance during prolonged therapy (3-4 days)      -  Cefepime: S <=2      -  Cefoxitin: S <=8      -  Ceftriaxone: S <=1 Enterobacter, Klebsiella aerogenes, Citrobacter, and Serratia may develop resistance during prolonged therapy      -  Ciprofloxacin: S <=0.25      -  Ertapenem: S <=0.5      -  Gentamicin: S <=2      -  Imipenem: S <=1      -  Levofloxacin: S <=0.5      -  Meropenem: S <=1      -  Piperacillin/Tazobactam: S <=8      -  Tobramycin: S <=2      -  Trimethoprim/Sulfamethoxazole: S <=0.5/9.5  Organism: Blood Culture PCR (06-20-23 @ 16:17)    Sensitivities:      Method Type: PCR      -  K. pneumoniae group: Detec (K. pneumoniae, K. quasipneumoniae, K. variicola)    Culture - Blood (collected 06-17-23 @ 19:03)  Source: .Blood Blood-Peripheral  Final Report (06-23-23 @ 01:01):    No Growth Final    WBC Count: 2.83 K/uL (01-01-24 @ 06:50)  WBC Count: 3.72 K/uL (12-31-23 @ 06:38)  WBC Count: 4.97 K/uL (12-30-23 @ 09:30)  WBC Count: 6.75 K/uL (12-29-23 @ 15:45)    Creatinine: 4.80 mg/dL (01-01-24 @ 06:50)  Creatinine: 6.60 mg/dL (12-31-23 @ 06:38)  Creatinine: 6.20 mg/dL (12-30-23 @ 09:30)  Creatinine: 5.80 mg/dL (12-29-23 @ 15:45)     SARS-CoV-2 Result: NotDetec (12-29-23 @ 15:45)  SARS-CoV-2: NotDetec (12-29-23 @ 15:45)    All imaging and other data have been reviewed.  < from: Xray Chest 1 View-PORTABLE IMMEDIATE (12.29.23 @ 15:58) >  IMPRESSION: No acute finding or change.      Assessment and Plan:   Patient is a 73-year-old male who has a history of polycystic kidney disease, status post nephrectomy, has end-stage renal failure undergoes hemodialysis Monday Wednesday Friday.  He has a history of hypertension polio walks with crutches, and status post cholecystectomy complicated by bacteremia [June 2023].   Patient reports that starting on Monday, he began feeling sick and developed a fever, had flu PCr positive but due to HD, no treatment given.  Today his blood culture is positive for serratia and UC for low CFU GNR, so ID was called for recommendations. He doesn't have any symptoms.     # Bacteremia   # UTI??  # Influenza    - Will follow blood cultures for sensitivity, growing serratia  - Will follow repeat blood culture   - Continue ceftriaxone 2gm daily for now  - Based on results will decide about the treatement regimen  - For Flu will continue supportive care   - Low WBC possibly due to viral infection     Will follow.  Discussed with the primary team.     Brandi Piedra MD  Division of Infectious Diseases   Please call ID service at [phone] with any question.    50 minutes spent on total encounter assessing patient, examination, chart review, counseling and coordinating care by the attending physician/nurse/care manager.   [facility]  INFECTIOUS DISEASES   [address] [place] [address]  Tel: [phone]     Fax: [phone]  ========================================================  MD Charles Fernandez Kaushal, MD Cho, Michelle, MD Sunjit, Jaspal, MD  ========================================================    [id number]  CLEO PLUMMER     Follow up: bacteremia and possible UTI    Feels better, no fever, no overnight event.     PAST MEDICAL & SURGICAL HISTORY:  Hypertension  Renal failure, chronic  Cancer of kidney, left  History of left nephrectomy    Social Hx: No current smoking, EtOH or drugs     FAMILY HISTORY:  FHx: renal failure (Mother)    Allergies  Demerol HCl (Unknown)  Demerol (Faint)    MEDICATIONS  (STANDING):  albuterol    90 MICROgram(s) HFA Inhaler 2 Puff(s) Inhalation once  carvedilol 25 milliGRAM(s) Oral every 24 hours  cefTRIAXone   IVPB 2000 milliGRAM(s) IV Intermittent every 24 hours  ferrous    sulfate 325 milliGRAM(s) Oral daily  heparin   Injectable 5000 Unit(s) SubCutaneous every 8 hours  multivitamin 1 Tablet(s) Oral at bedtime  tamsulosin 0.4 milliGRAM(s) Oral at bedtime    MEDICATIONS  (PRN):  acetaminophen     Tablet .. 650 milliGRAM(s) Oral every 6 hours PRN Temp greater or equal to 38C (100.4F), Mild Pain (1 - 3)  albuterol    90 MICROgram(s) HFA Inhaler 2 Puff(s) Inhalation every 6 hours PRN Bronchospasm  aluminum hydroxide/magnesium hydroxide/simethicone Suspension 30 milliLiter(s) Oral every 4 hours PRN Dyspepsia  benzonatate 100 milliGRAM(s) Oral every 8 hours PRN Cough  guaiFENesin Oral Liquid (Sugar-Free) 200 milliGRAM(s) Oral every 6 hours PRN Cough  melatonin 3 milliGRAM(s) Oral at bedtime PRN Insomnia  ondansetron Injectable 4 milliGRAM(s) IV Push every 8 hours PRN Nausea and/or Vomiting     REVIEW OF SYSTEMS:  CONSTITUTIONAL:  No Fever or chills  HEENT:  No diplopia or blurred vision.  No sore throat or runny nose.  CARDIOVASCULAR:  No chest pain or SOB.  RESPIRATORY:  No cough, shortness of breath, PND or orthopnea.  GASTROINTESTINAL:  No nausea, vomiting or diarrhea.  GENITOURINARY:  No dysuria, frequency or urgency. No Blood in urine  MUSCULOSKELETAL:  no joint aches, no muscle pain  SKIN:  No change in skin, hair or nails.    Physical Exam:  Vital Signs Last 24 Hrs  T(C): 36.7 (01 Jan 2024 12:38), Max: 36.7 (01 Jan 2024 12:38)  T(F): 98 (01 Jan 2024 12:38), Max: 98 (01 Jan 2024 12:38)  HR: 70 (01 Jan 2024 12:38) (60 - 89)  BP: 138/82 (01 Jan 2024 12:38) (138/82 - 152/82)  BP(mean): --  RR: 17 (01 Jan 2024 12:38) (17 - 18)  SpO2: 95% (01 Jan 2024 12:38) (93% - 95%)  Parameters below as of 01 Jan 2024 12:38  Patient On (Oxygen Delivery Method): room air  GEN: NAD  HEENT: normocephalic and atraumatic. EOMI. PERRL.    NECK: Supple.  No lymphadenopathy   LUNGS: Clear to auscultation.  HEART: Regular rate and rhythm   ABDOMEN: Soft, nontender, and nondistended.  Positive bowel sounds.    : No CVA tenderness  EXTREMITIES: Without edema. left arm AV fistula looks fine  NEUROLOGIC: grossly intact.  PSYCHIATRIC: Appropriate affect .  SKIN: No rash       Labs:                        10.5   2.83  )-----------( 119      ( 01 Jan 2024 06:50 )             31.3     01-01    138  |  105  |  42<H>  ----------------------------<  85  3.7   |  26  |  4.80<H>    Ca    8.1<L>      01 Jan 2024 06:50    TPro  5.8<L>  /  Alb  2.6<L>  /  TBili  0.3  /  DBili  x   /  AST  31  /  ALT  29  /  AlkPhos  58  01-01      Culture - Urine (collected 12-29-23 @ 18:05)  Source: Clean Catch Clean Catch (Midstream)  Preliminary Report (12-30-23 @ 21:01):    10,000 - 49,000 CFU/mL Gram Negative Rods    Culture - Blood (collected 12-29-23 @ 15:45)  Source: .Blood Blood-Peripheral  Preliminary Report (12-31-23 @ 22:02):    No growth at 48 Hours    Culture - Blood (collected 12-29-23 @ 15:40)  Source: .Blood Blood-Peripheral  Gram Stain (12-31-23 @ 12:01):    Growth in aerobic bottle: Gram Negative Rods  Preliminary Report (01-01-24 @ 10:38):    Growth in aerobic bottle: Serratia marcescens    Direct identification is available within approximately 3-5    hours either by Blood Panel Multiplexed PCR or Direct    MALDI-TOF. Details: [email or web address]  Organism: Blood Culture PCR (12-31-23 @ 12:09)  Organism: Blood Culture PCR (12-31-23 @ 12:09)    Sensitivities:      Method Type: PCR      -  Serratia marcescens: Detec    Culture - Urine (collected 08-25-23 @ 21:10)  Source: Clean Catch Clean Catch (Midstream)  Final Report (08-27-23 @ 17:41):    >=3 organisms. Probable collection contamination.    Culture - Blood (collected 08-25-23 @ 20:20)  Source: .Blood Blood-Peripheral  Final Report (08-31-23 @ 02:00):    No growth at 5 days    Culture - Blood (collected 08-25-23 @ 20:15)  Source: .Blood Blood-Peripheral  Final Report (08-31-23 @ 02:00):    No growth at 5 days    Culture - Blood (collected 07-17-23 @ 07:25)  Source: .Blood Blood-Venous  Final Report (07-22-23 @ 14:00):    No growth at 5 days    Culture - Blood (collected 07-17-23 @ 07:20)  Source: .Blood Blood-Venous  Final Report (07-22-23 @ 14:00):    No growth at 5 days    Culture - Urine (collected 07-17-23 @ 06:40)  Source: Clean Catch Clean Catch (Midstream)  Final Report (07-18-23 @ 07:41):    >=3 organisms. Probable collection contamination.    Culture - Blood (collected 06-19-23 @ 22:10)  Source: .Blood Blood-Peripheral  Final Report (06-25-23 @ 04:01):    No Growth Final    Culture - Blood (collected 06-19-23 @ 22:10)  Source: .Blood Blood-Peripheral  Final Report (06-25-23 @ 04:01):    No Growth Final    Culture - Urine (collected 06-17-23 @ 19:03)  Source: Clean Catch Clean Catch (Midstream)  Final Report (06-19-23 @ 07:26):    <10,000 CFU/mL Normal Urogenital Carmen    Culture - Blood (collected 06-17-23 @ 19:03)  Source: .Blood Blood-Peripheral  Gram Stain (06-19-23 @ 07:57):    Growth in aerobic bottle: Gram Negative Rods  Final Report (06-20-23 @ 16:17):    Growth in aerobic bottle: Klebsiella pneumoniae    ***Blood Panel PCR results on this specimen are available    approximately 3 hours after the Gram stain result.***    Gram stain, PCR, and/or culture results may not always    correspond due to difference in methodologies.    ************************************************************    This PCR assay was performed by multiplex PCR. This    Assay tests for 66 bacterial and resistance gene targets.    Please refer to the [facility] Labs test directory    at [email or web address] for details.  Organism: Blood Culture PCR  Klebsiella pneumoniae (06-20-23 @ 16:17)  Organism: Klebsiella pneumoniae (06-20-23 @ 16:17)    Sensitivities:      Method Type: WALESKA      -  Amikacin: S <=16      -  Ampicillin: R >16 These ampicillin results predict results for amoxicillin      -  Ampicillin/Sulbactam: S <=4/2 Enterobacter, Klebsiella aerogenes, Citrobacter, and Serratia may develop resistance during prolonged therapy (3-4 days)      -  Aztreonam: S <=4      -  Cefazolin: S <=2 Enterobacter, Klebsiella aerogenes, Citrobacter, and Serratia may develop resistance during prolonged therapy (3-4 days)      -  Cefepime: S <=2      -  Cefoxitin: S <=8      -  Ceftriaxone: S <=1 Enterobacter, Klebsiella aerogenes, Citrobacter, and Serratia may develop resistance during prolonged therapy      -  Ciprofloxacin: S <=0.25      -  Ertapenem: S <=0.5      -  Gentamicin: S <=2      -  Imipenem: S <=1      -  Levofloxacin: S <=0.5      -  Meropenem: S <=1      -  Piperacillin/Tazobactam: S <=8      -  Tobramycin: S <=2      -  Trimethoprim/Sulfamethoxazole: S <=0.5/9.5  Organism: Blood Culture PCR (06-20-23 @ 16:17)    Sensitivities:      Method Type: PCR      -  K. pneumoniae group: Detec (K. pneumoniae, K. quasipneumoniae, K. variicola)    Culture - Blood (collected 06-17-23 @ 19:03)  Source: .Blood Blood-Peripheral  Final Report (06-23-23 @ 01:01):    No Growth Final    WBC Count: 2.83 K/uL (01-01-24 @ 06:50)  WBC Count: 3.72 K/uL (12-31-23 @ 06:38)  WBC Count: 4.97 K/uL (12-30-23 @ 09:30)  WBC Count: 6.75 K/uL (12-29-23 @ 15:45)    Creatinine: 4.80 mg/dL (01-01-24 @ 06:50)  Creatinine: 6.60 mg/dL (12-31-23 @ 06:38)  Creatinine: 6.20 mg/dL (12-30-23 @ 09:30)  Creatinine: 5.80 mg/dL (12-29-23 @ 15:45)     SARS-CoV-2 Result: NotDetec (12-29-23 @ 15:45)  SARS-CoV-2: NotDetec (12-29-23 @ 15:45)    All imaging and other data have been reviewed.  < from: Xray Chest 1 View-PORTABLE IMMEDIATE (12.29.23 @ 15:58) >  IMPRESSION: No acute finding or change.      Assessment and Plan:   Patient is a 73-year-old male who has a history of polycystic kidney disease, status post nephrectomy, has end-stage renal failure undergoes hemodialysis Monday Wednesday Friday.  He has a history of hypertension polio walks with crutches, and status post cholecystectomy complicated by bacteremia [June 2023].   Patient reports that starting on Monday, he began feeling sick and developed a fever, had flu PCr positive but due to HD, no treatment given.  Today his blood culture is positive for serratia and UC for low CFU GNR, so ID was called for recommendations. He doesn't have any symptoms.     # Bacteremia   # UTI??  # Influenza    - Will follow blood cultures for sensitivity, growing serratia  - Will follow repeat blood culture   - Continue ceftriaxone 2gm daily for now  - Based on results will decide about the treatement regimen  - For Flu will continue supportive care   - Low WBC possibly due to viral infection     Will follow.  Discussed with the primary team.     Brandi Piedra MD  Division of Infectious Diseases   Please call ID service at [phone] with any question.    50 minutes spent on total encounter assessing patient, examination, chart review, counseling and coordinating care by the attending physician/nurse/care manager.

## 2024-01-01 NOTE — PHYSICAL THERAPY INITIAL EVALUATION ADULT - PERTINENT HX OF CURRENT PROBLEM, REHAB EVAL
73-year-old male with PMHx for polycystic kidney disease, status post nephrectomy, ESRD (HD on MWF), HTN, polio who was admitted for fever and chills. RVP + Influenza A.

## 2024-01-02 ENCOUNTER — TRANSCRIPTION ENCOUNTER (OUTPATIENT)
Age: 74
End: 2024-01-02

## 2024-01-02 LAB
-  AMOXICILLIN/CLAVULANIC ACID: SIGNIFICANT CHANGE UP
-  AMOXICILLIN/CLAVULANIC ACID: SIGNIFICANT CHANGE UP
-  AMPICILLIN/SULBACTAM: SIGNIFICANT CHANGE UP
-  AMPICILLIN: SIGNIFICANT CHANGE UP
-  AZTREONAM: SIGNIFICANT CHANGE UP
-  CEFAZOLIN: SIGNIFICANT CHANGE UP
-  CEFEPIME: SIGNIFICANT CHANGE UP
-  CEFOXITIN: SIGNIFICANT CHANGE UP
-  CEFTRIAXONE: SIGNIFICANT CHANGE UP
-  CIPROFLOXACIN: SIGNIFICANT CHANGE UP
-  ERTAPENEM: SIGNIFICANT CHANGE UP
-  GENTAMICIN: SIGNIFICANT CHANGE UP
-  IMIPENEM: SIGNIFICANT CHANGE UP
-  IMIPENEM: SIGNIFICANT CHANGE UP
-  LEVOFLOXACIN: SIGNIFICANT CHANGE UP
-  MEROPENEM: SIGNIFICANT CHANGE UP
-  NITROFURANTOIN: SIGNIFICANT CHANGE UP
-  NITROFURANTOIN: SIGNIFICANT CHANGE UP
-  PIPERACILLIN/TAZOBACTAM: SIGNIFICANT CHANGE UP
-  TOBRAMYCIN: SIGNIFICANT CHANGE UP
-  TRIMETHOPRIM/SULFAMETHOXAZOLE: SIGNIFICANT CHANGE UP
ANION GAP SERPL CALC-SCNC: 7 MMOL/L — SIGNIFICANT CHANGE UP (ref 5–17)
ANION GAP SERPL CALC-SCNC: 7 MMOL/L — SIGNIFICANT CHANGE UP (ref 5–17)
BUN SERPL-MCNC: 59 MG/DL — HIGH (ref 7–23)
BUN SERPL-MCNC: 59 MG/DL — HIGH (ref 7–23)
CALCIUM SERPL-MCNC: 8.4 MG/DL — LOW (ref 8.5–10.1)
CALCIUM SERPL-MCNC: 8.4 MG/DL — LOW (ref 8.5–10.1)
CHLORIDE SERPL-SCNC: 108 MMOL/L — SIGNIFICANT CHANGE UP (ref 96–108)
CHLORIDE SERPL-SCNC: 108 MMOL/L — SIGNIFICANT CHANGE UP (ref 96–108)
CO2 SERPL-SCNC: 23 MMOL/L — SIGNIFICANT CHANGE UP (ref 22–31)
CO2 SERPL-SCNC: 23 MMOL/L — SIGNIFICANT CHANGE UP (ref 22–31)
CREAT SERPL-MCNC: 5.5 MG/DL — HIGH (ref 0.5–1.3)
CREAT SERPL-MCNC: 5.5 MG/DL — HIGH (ref 0.5–1.3)
CULTURE RESULTS: ABNORMAL
EGFR: 10 ML/MIN/1.73M2 — LOW
EGFR: 10 ML/MIN/1.73M2 — LOW
GLUCOSE SERPL-MCNC: 90 MG/DL — SIGNIFICANT CHANGE UP (ref 70–99)
GLUCOSE SERPL-MCNC: 90 MG/DL — SIGNIFICANT CHANGE UP (ref 70–99)
HCT VFR BLD CALC: 30.2 % — LOW (ref 39–50)
HCT VFR BLD CALC: 30.2 % — LOW (ref 39–50)
HGB BLD-MCNC: 10.1 G/DL — LOW (ref 13–17)
HGB BLD-MCNC: 10.1 G/DL — LOW (ref 13–17)
MCHC RBC-ENTMCNC: 30.9 PG — SIGNIFICANT CHANGE UP (ref 27–34)
MCHC RBC-ENTMCNC: 30.9 PG — SIGNIFICANT CHANGE UP (ref 27–34)
MCHC RBC-ENTMCNC: 33.4 GM/DL — SIGNIFICANT CHANGE UP (ref 32–36)
MCHC RBC-ENTMCNC: 33.4 GM/DL — SIGNIFICANT CHANGE UP (ref 32–36)
MCV RBC AUTO: 92.4 FL — SIGNIFICANT CHANGE UP (ref 80–100)
MCV RBC AUTO: 92.4 FL — SIGNIFICANT CHANGE UP (ref 80–100)
METHOD TYPE: SIGNIFICANT CHANGE UP
NRBC # BLD: 0 /100 WBCS — SIGNIFICANT CHANGE UP (ref 0–0)
NRBC # BLD: 0 /100 WBCS — SIGNIFICANT CHANGE UP (ref 0–0)
ORGANISM # SPEC MICROSCOPIC CNT: ABNORMAL
ORGANISM # SPEC MICROSCOPIC CNT: SIGNIFICANT CHANGE UP
PLATELET # BLD AUTO: 113 K/UL — LOW (ref 150–400)
PLATELET # BLD AUTO: 113 K/UL — LOW (ref 150–400)
POTASSIUM SERPL-MCNC: 4 MMOL/L — SIGNIFICANT CHANGE UP (ref 3.5–5.3)
POTASSIUM SERPL-MCNC: 4 MMOL/L — SIGNIFICANT CHANGE UP (ref 3.5–5.3)
POTASSIUM SERPL-SCNC: 4 MMOL/L — SIGNIFICANT CHANGE UP (ref 3.5–5.3)
POTASSIUM SERPL-SCNC: 4 MMOL/L — SIGNIFICANT CHANGE UP (ref 3.5–5.3)
RBC # BLD: 3.27 M/UL — LOW (ref 4.2–5.8)
RBC # BLD: 3.27 M/UL — LOW (ref 4.2–5.8)
RBC # FLD: 13.9 % — SIGNIFICANT CHANGE UP (ref 10.3–14.5)
RBC # FLD: 13.9 % — SIGNIFICANT CHANGE UP (ref 10.3–14.5)
SODIUM SERPL-SCNC: 138 MMOL/L — SIGNIFICANT CHANGE UP (ref 135–145)
SODIUM SERPL-SCNC: 138 MMOL/L — SIGNIFICANT CHANGE UP (ref 135–145)
SPECIMEN SOURCE: SIGNIFICANT CHANGE UP
WBC # BLD: 2.64 K/UL — LOW (ref 3.8–10.5)
WBC # BLD: 2.64 K/UL — LOW (ref 3.8–10.5)
WBC # FLD AUTO: 2.64 K/UL — LOW (ref 3.8–10.5)
WBC # FLD AUTO: 2.64 K/UL — LOW (ref 3.8–10.5)

## 2024-01-02 PROCEDURE — 99232 SBSQ HOSP IP/OBS MODERATE 35: CPT

## 2024-01-02 PROCEDURE — 99233 SBSQ HOSP IP/OBS HIGH 50: CPT

## 2024-01-02 RX ORDER — AMLODIPINE BESYLATE 2.5 MG/1
10 TABLET ORAL DAILY
Refills: 0 | Status: DISCONTINUED | OUTPATIENT
Start: 2024-01-02 | End: 2024-01-03

## 2024-01-02 RX ORDER — CIPROFLOXACIN LACTATE 400MG/40ML
500 VIAL (ML) INTRAVENOUS EVERY 24 HOURS
Refills: 0 | Status: DISCONTINUED | OUTPATIENT
Start: 2024-01-03 | End: 2024-01-03

## 2024-01-02 RX ADMIN — Medication 200 MILLIGRAM(S): at 21:04

## 2024-01-02 RX ADMIN — HEPARIN SODIUM 5000 UNIT(S): 5000 INJECTION INTRAVENOUS; SUBCUTANEOUS at 05:25

## 2024-01-02 RX ADMIN — HEPARIN SODIUM 5000 UNIT(S): 5000 INJECTION INTRAVENOUS; SUBCUTANEOUS at 13:43

## 2024-01-02 RX ADMIN — TAMSULOSIN HYDROCHLORIDE 0.4 MILLIGRAM(S): 0.4 CAPSULE ORAL at 21:04

## 2024-01-02 RX ADMIN — HEPARIN SODIUM 5000 UNIT(S): 5000 INJECTION INTRAVENOUS; SUBCUTANEOUS at 21:04

## 2024-01-02 RX ADMIN — CEFTRIAXONE 100 MILLIGRAM(S): 500 INJECTION, POWDER, FOR SOLUTION INTRAMUSCULAR; INTRAVENOUS at 13:42

## 2024-01-02 RX ADMIN — Medication 325 MILLIGRAM(S): at 13:43

## 2024-01-02 RX ADMIN — CARVEDILOL PHOSPHATE 25 MILLIGRAM(S): 80 CAPSULE, EXTENDED RELEASE ORAL at 05:25

## 2024-01-02 RX ADMIN — Medication 1 TABLET(S): at 21:04

## 2024-01-02 RX ADMIN — Medication 200 MILLIGRAM(S): at 05:27

## 2024-01-02 RX ADMIN — AMLODIPINE BESYLATE 10 MILLIGRAM(S): 2.5 TABLET ORAL at 14:59

## 2024-01-02 RX ADMIN — ALBUTEROL 2 PUFF(S): 90 AEROSOL, METERED ORAL at 17:52

## 2024-01-02 NOTE — PROGRESS NOTE ADULT - ASSESSMENT
73-year-old male with PMHx for polycystic kidney disease, status post nephrectomy, ESRD (HD on MWF), HTN, polio who was admitted for fever and chills. RVP + Influenza A.    Flu A +     -  albuterol ordered prn bronchospasm     -  tessalon pearles ordered    - discussed with daughter, patient was symptomatic since monday. outside of window for tamiflu (which will need to be dosed by HD). Will hold off on tamiflu at this time. daughter aware.     - monitor for fevers. CXR negative. UA neg. Ucx + citrobacter. Blood cultures + Serratia. will empirically treat with rocephin 2gm daily and repeat cultures 12/31 NG x 24h. ID following.     - leukopenia likely secondary to infection. will monitor     PCKD on HD    - nephrology following. HD per nephro     HTN    - cont home coreg    BPH     - cont flomax     DVT proph: heparin subc    daughter updated over the phone.   notified of blood cultures and need for monitoring.   seen by PT, continue ambulation with crutches     anticipate discharge in AM if wbc is stable. discussed with nephro, will schedule dialysis early AM.  d/w ID     spoke with daughter Shahrzad over the phone.  73-year-old male with PMHx for polycystic kidney disease, status post nephrectomy, ESRD (HD on MWF), HTN, polio who was admitted for fever and chills. RVP + Influenza A.    Flu A +     -  albuterol ordered prn bronchospasm     -  tessalon pearles ordered    - discussed with daughter, patient was symptomatic since monday. outside of window for tamiflu (which will need to be dosed by HD). Will hold off on tamiflu at this time. daughter aware.     - monitor for fevers. CXR negative. UA neg. Ucx + citrobacter. Blood cultures + Serratia. will empirically treat with rocephin 2gm daily and repeat cultures 12/31 NG x 24h. ID following.     - leukopenia likely secondary to infection. will monitor     PCKD on HD    - nephrology following. HD per nephro     HTN    - cont home norvasc. daughter reports list was not correct on admission. patient is on norvasc 10mg daily not coreg. will change.   BPH     - cont flomax     DVT proph: heparin subc    daughter updated over the phone.   notified of blood cultures and need for monitoring.   seen by PT, continue ambulation with crutches     anticipate discharge in AM if wbc is stable. discussed with nephro, will schedule dialysis early AM.  d/w ID     spoke with daughter Shahrzad over the phone.

## 2024-01-02 NOTE — CARE COORDINATION ASSESSMENT. - NSCAREPROVIDERS_GEN_ALL_CORE_FT
CARE PROVIDERS:  Accepting Physician: Jersey Soto  Administration: Ace Betts  Administration: James Castellanos  Admitting: Jersey Soto  Attending: Valerie Strong  Case Management: Carlos Lu  Consultant: Matheus Prabhakar  Consultant: Brandi Piedra  Covering Team: Nas Hall  ED Attending: Lyle Rai ED Nurse: Mary Bills  Nurse: Alla Wells  Nurse: Franklin Hartmann  Nurse: Terrie Wallace  Nurse: Ivis Quan  Ordered: ADM, User  Outpatient Provider: Adonis Lewis  Outpatient Provider: Matheus Prabhakar  Override: Rebecca Tabor  Override: Azar Cantor  Override: Sharonda Luevano  Override: Kim Yi  PCA/Nursing Assistant: Azar Cantor  Primary Team: Elvira Brennan  Primary Team: Enrique Wayne  Primary Team: Valerie Strong  Quality Review: Marianela Winter  Registered Dietitian: Kelly Bonilla  Respiratory Therapy: Ronna Molina  : Millie Milton  : Kelle Gloria  Team: [facility], Team

## 2024-01-02 NOTE — PROGRESS NOTE ADULT - SUBJECTIVE AND OBJECTIVE BOX
Patient is a 73y old  Male who presents with a chief complaint of fever (30 Dec 2023 13:34)    Patient seen in follow up for ESRD on HD.        PAST MEDICAL HISTORY:  Hypertension    Renal failure, chronic    Cancer of kidney, left      MEDICATIONS  (STANDING):  albuterol    90 MICROgram(s) HFA Inhaler 2 Puff(s) Inhalation once  amLODIPine   Tablet 10 milliGRAM(s) Oral daily  ferrous    sulfate 325 milliGRAM(s) Oral daily  heparin   Injectable 5000 Unit(s) SubCutaneous every 8 hours  multivitamin 1 Tablet(s) Oral at bedtime  tamsulosin 0.4 milliGRAM(s) Oral at bedtime    MEDICATIONS  (PRN):  acetaminophen     Tablet .. 650 milliGRAM(s) Oral every 6 hours PRN Temp greater or equal to 38C (100.4F), Mild Pain (1 - 3)  albuterol    90 MICROgram(s) HFA Inhaler 2 Puff(s) Inhalation every 6 hours PRN Bronchospasm  aluminum hydroxide/magnesium hydroxide/simethicone Suspension 30 milliLiter(s) Oral every 4 hours PRN Dyspepsia  benzonatate 100 milliGRAM(s) Oral every 8 hours PRN Cough  guaiFENesin Oral Liquid (Sugar-Free) 200 milliGRAM(s) Oral every 6 hours PRN Cough  melatonin 3 milliGRAM(s) Oral at bedtime PRN Insomnia  ondansetron Injectable 4 milliGRAM(s) IV Push every 8 hours PRN Nausea and/or Vomiting    T(C): 36.7 (01-02-24 @ 12:54), Max: 36.7 (01-01-24 @ 12:38)  HR: 77 (01-02-24 @ 12:54) (59 - 89)  BP: 168/97 (01-02-24 @ 15:00) (138/82 - 168/97)  RR: 17 (01-02-24 @ 12:54)  SpO2: 95% (01-02-24 @ 12:54)  Wt(kg): --  I&O's Detail    02 Jan 2024 07:01  -  02 Jan 2024 16:37  --------------------------------------------------------  IN:    Oral Fluid: 480 mL  Total IN: 480 mL    OUT:    Voided (mL): 300 mL  Total OUT: 300 mL    Total NET: 180 mL              PHYSICAL EXAM:  General: No distress  Respiratory: b/l air entry  Cardiovascular: S1 S2  Gastrointestinal: soft  Extremities:  no edema, AVF                        LABORATORY:                        10.1   2.64  )-----------( 113      ( 02 Jan 2024 07:50 )             30.2     01-02    138  |  108  |  59<H>  ----------------------------<  90  4.0   |  23  |  5.50<H>    Ca    8.4<L>      02 Jan 2024 07:50    TPro  5.8<L>  /  Alb  2.6<L>  /  TBili  0.3  /  DBili  x   /  AST  31  /  ALT  29  /  AlkPhos  58  01-01    Sodium: 138 mmol/L (01-02 @ 07:50)  Sodium: 138 mmol/L (01-01 @ 06:50)    Potassium: 4.0 mmol/L (01-02 @ 07:50)  Potassium: 3.7 mmol/L (01-01 @ 06:50)    Hemoglobin: 10.1 g/dL (01-02 @ 07:50)  Hemoglobin: 10.5 g/dL (01-01 @ 06:50)  Hemoglobin: 10.6 g/dL (12-31 @ 06:38)    Creatinine, Serum 5.50 (01-02 @ 07:50)  Creatinine, Serum 4.80 (01-01 @ 06:50)  Creatinine, Serum 6.60 (12-31 @ 06:38)        LIVER FUNCTIONS - ( 01 Jan 2024 06:50 )  Alb: 2.6 g/dL / Pro: 5.8 g/dL / ALK PHOS: 58 U/L / ALT: 29 U/L / AST: 31 U/L / GGT: x           Urinalysis Basic - ( 02 Jan 2024 07:50 )    Color: x / Appearance: x / SG: x / pH: x  Gluc: 90 mg/dL / Ketone: x  / Bili: x / Urobili: x   Blood: x / Protein: x / Nitrite: x   Leuk Esterase: x / RBC: x / WBC x   Sq Epi: x / Non Sq Epi: x / Bacteria: x

## 2024-01-02 NOTE — CARE COORDINATION ASSESSMENT. - OTHER PERTINENT REFERRAL INFORMATION
Pt lives at home with dtr, ambulates with crutches and is indep with ADLS. Receives Dialysis MWF 10 am at [facility] [phone], plan is return 1/3/24, paperwork and flow sheets faxed and center aware of pt return.  Pt lives at home with dtr, ambulates with crutches and is indep with ADLS. Receives Dialysis MWF 10 am at [facility] [phone], plan is return 1/3/24, paperwork and flow sheets faxed and center aware of pt return.

## 2024-01-02 NOTE — DISCHARGE NOTE PROVIDER - NSDCMRMEDTOKEN_GEN_ALL_CORE_FT
Coreg 25 mg oral tablet: 1 tab(s) orally once a day  Dialyvite 800 oral tablet: 1 tab(s) orally once a day  Ferrousal 325 mg oral tablet: 1 tab(s) orally once a day  tamsulosin 0.4 mg oral capsule: 1 cap(s) orally once a day   acetaminophen 325 mg oral tablet: 2 tab(s) orally every 6 hours As needed Temp greater or equal to 38C (100.4F), Mild Pain (1 - 3)  amLODIPine 10 mg oral tablet: 1 tab(s) orally once a day  benzonatate 100 mg oral capsule: 1 cap(s) orally every 8 hours as needed for Cough  ciprofloxacin 500 mg oral tablet: 1 tab(s) orally every 24 hours  Dialyvite 800 oral tablet: 1 tab(s) orally once a day  Ferrousal 325 mg oral tablet: 1 tab(s) orally once a day  tamsulosin 0.4 mg oral capsule: 1 cap(s) orally once a day

## 2024-01-02 NOTE — DISCHARGE NOTE PROVIDER - CARE PROVIDER_API CALL
Foreign Garcia.  [address], [address]  [place] [address]  Phone: [phone]  Fax: [phone]  Follow Up Time: 1 week   Foreign Garcia.  [address], [address]  [place] [address]  Phone: [phone]  Fax: [phone]  Follow Up Time: 1 week

## 2024-01-02 NOTE — CARE COORDINATION ASSESSMENT. - OTHER PERTINENT DISCHARGE PLANNING INFORMATION:
pt from home receives dialysis at [facility] [phone], pt Dc today returning to [place] 1/3 for MWF 10am slot, pt drives himself.     pt from home receives dialysis at [facility] [phone], pt Dc today returning to [place] 1/3 for MWF 10am slot, pt drives himself.

## 2024-01-02 NOTE — DISCHARGE NOTE PROVIDER - NSDCFUSCHEDAPPT_GEN_ALL_CORE_FT
[facility]  ULTRASND  [place]  Scheduled Appointment: 01/09/2024    Juan Husain  [facility]  UROLOGY 8 [facility]  Scheduled Appointment: 01/11/2024    Ashish Spangler  [facility]  GASTRO 10 [facility]  Scheduled Appointment: 01/25/2024     [facility]  ULTRASND  [place]  Scheduled Appointment: 01/09/2024    Juan Husain  [facility]  UROLOGY 8 [facility]  Scheduled Appointment: 01/11/2024    Ashish Spangler  [facility]  GASTRO 10 [facility]  Scheduled Appointment: 01/25/2024

## 2024-01-02 NOTE — PROGRESS NOTE ADULT - ASSESSMENT
ESRD on HD, DANAE @ [facility]  h/p PCKD, Nephrectomy  Fever, Serratia bacteremia  Hypertension    Next HD tomorrow. IV abx, ID follow up. Fluid removal as tolerated by BP. Monitor BP trend. Titrate BP meds as needed. D/c planning.  ESRD on HD, DANAE @ [facility]  h/p PCKD, Nephrectomy  Fever, Serratia bacteremia  Hypertension    Next HD tomorrow. IV abx, ID follow up. Fluid removal as tolerated by BP. Monitor BP trend. Titrate BP meds as needed. D/c planning.

## 2024-01-02 NOTE — CARE COORDINATION ASSESSMENT. - MET/SPOKE WITH
Counseling and Referral of Other Preventative  (Italic type indicates deductible and co-insurance are waived)    Patient Name: Lani Brown  Today's Date: 7/27/2023    Health Maintenance       Date Due Completion Date    Hepatitis C Screening Never done ---    COVID-19 Vaccine (1) Never done ---    Diabetes Urine Screening Never done ---    Pneumococcal Vaccines (Age 0-64) (1 - PCV) Never done ---    Foot Exam Never done ---    Eye Exam Never done ---    HIV Screening Never done ---    TETANUS VACCINE Never done ---    Mammogram Never done ---    Low Dose Statin Never done ---    Colorectal Cancer Screening Never done ---    Shingles Vaccine (1 of 2) Never done ---    Hemoglobin A1c 05/14/2023 2/14/2023    Influenza Vaccine (1) 09/01/2023 ---    Lipid Panel 02/14/2024 2/14/2023        No orders of the defined types were placed in this encounter.    The following information is provided to all patients.  This information is to help you find resources for any of the problems found today that may be affecting your health:                Living healthy guide: www.[facility].[place].[facility]      Understanding Diabetes: www.diabetes.org      Eating healthy: www.cdc.gov/healthyweight      CDC home safety checklist: www.cdc.gov/steadi/patient.html      Agency on Aging: www.goea.[place].gov      Alcoholics anonymous (AA): www.aa.org      Physical Activity: www.sarita.nih.gov/jw9athg      Tobacco use: www.quitwithusla.org     
patient

## 2024-01-02 NOTE — PROGRESS NOTE ADULT - SUBJECTIVE AND OBJECTIVE BOX
Patient is a 73y old  Male who presents with a chief complaint of fever (01 Jan 2024 13:07)    INTERVAL HPI/OVERNIGHT EVENTS: Patient was seen and examined. No acute events occurred overnight. + cough   ambulating with walker.     I&O's Summary    02 Jan 2024 07:01  -  02 Jan 2024 14:13  --------------------------------------------------------  IN: 480 mL / OUT: 300 mL / NET: 180 mL        LABS:                        10.1   2.64  )-----------( 113      ( 02 Jan 2024 07:50 )             30.2     01-02    138  |  108  |  59<H>  ----------------------------<  90  4.0   |  23  |  5.50<H>    Ca    8.4<L>      02 Jan 2024 07:50    TPro  5.8<L>  /  Alb  2.6<L>  /  TBili  0.3  /  DBili  x   /  AST  31  /  ALT  29  /  AlkPhos  58  01-01      Urinalysis Basic - ( 02 Jan 2024 07:50 )    Color: x / Appearance: x / SG: x / pH: x  Gluc: 90 mg/dL / Ketone: x  / Bili: x / Urobili: x   Blood: x / Protein: x / Nitrite: x   Leuk Esterase: x / RBC: x / WBC x   Sq Epi: x / Non Sq Epi: x / Bacteria: x      CAPILLARY BLOOD GLUCOSE    Urinalysis Basic - ( 02 Jan 2024 07:50 )    Color: x / Appearance: x / SG: x / pH: x  Gluc: 90 mg/dL / Ketone: x  / Bili: x / Urobili: x   Blood: x / Protein: x / Nitrite: x   Leuk Esterase: x / RBC: x / WBC x   Sq Epi: x / Non Sq Epi: x / Bacteria: x        MEDICATIONS  (STANDING):  albuterol    90 MICROgram(s) HFA Inhaler 2 Puff(s) Inhalation once  carvedilol 25 milliGRAM(s) Oral every 24 hours  cefTRIAXone   IVPB 2000 milliGRAM(s) IV Intermittent every 24 hours  ferrous    sulfate 325 milliGRAM(s) Oral daily  heparin   Injectable 5000 Unit(s) SubCutaneous every 8 hours  multivitamin 1 Tablet(s) Oral at bedtime  tamsulosin 0.4 milliGRAM(s) Oral at bedtime    MEDICATIONS  (PRN):  acetaminophen     Tablet .. 650 milliGRAM(s) Oral every 6 hours PRN Temp greater or equal to 38C (100.4F), Mild Pain (1 - 3)  albuterol    90 MICROgram(s) HFA Inhaler 2 Puff(s) Inhalation every 6 hours PRN Bronchospasm  aluminum hydroxide/magnesium hydroxide/simethicone Suspension 30 milliLiter(s) Oral every 4 hours PRN Dyspepsia  benzonatate 100 milliGRAM(s) Oral every 8 hours PRN Cough  guaiFENesin Oral Liquid (Sugar-Free) 200 milliGRAM(s) Oral every 6 hours PRN Cough  melatonin 3 milliGRAM(s) Oral at bedtime PRN Insomnia  ondansetron Injectable 4 milliGRAM(s) IV Push every 8 hours PRN Nausea and/or Vomiting      REVIEW OF SYSTEMS:  CONSTITUTIONAL: No fever or chills  HEENT:  No headache, no sore throat  RESPIRATORY: No cough, wheezing, or shortness of breath  CARDIOVASCULAR: No chest pain, palpitations  GASTROINTESTINAL: No abdominal pain, nausea, vomiting, or diarrhea  GENITOURINARY: No dysuria, frequency, or hematuria  NEUROLOGICAL: no focal weakness or dizziness  MUSCULOSKELETAL: no myalgias  PSYCH: no recent changes in mood    RADIOLOGY & ADDITIONAL TESTS:    Imaging Personally Reviewed:  [x] YES  [ ] NO    Consultant(s) Notes Reviewed:  [x] YES  [ ] NO    PHYSICAL EXAM:  T(C): 36.7 (01-02-24 @ 12:54), Max: 36.7 (01-02-24 @ 12:54)  HR: 77 (01-02-24 @ 12:54) (59 - 85)  BP: 165/89 (01-02-24 @ 12:54) (161/88 - 167/82)  RR: 17 (01-02-24 @ 12:54) (17 - 17)  SpO2: 95% (01-02-24 @ 12:54) (94% - 96%)    GENERAL: NAD, well-developed, well-groomed  HEENT:  anicteric, moist mucous membranes  CHEST/LUNG:  CTA b/l, no rales, wheezes, or rhonchi  HEART:  RRR, S1, S2  ABDOMEN:  BS+, soft, nontender, nondistended  EXTREMITIES: no edema  NERVOUS SYSTEM: answers questions and follows commands appropriately  PSYCH: normal affect    Care Discussed with Consultants/Other Providers [x] YES  [ ] NO

## 2024-01-02 NOTE — DISCHARGE NOTE PROVIDER - NSDCCPCAREPLAN_GEN_ALL_CORE_FT
PRINCIPAL DISCHARGE DIAGNOSIS  Diagnosis: Dehydration fever  Assessment and Plan of Treatment: Your symptoms were secondary to influenza A. You were outside of the window to start tamiflu.   You were treated supportively      SECONDARY DISCHARGE DIAGNOSES  Diagnosis: Acute viral syndrome  Assessment and Plan of Treatment: You had lower white count on your blood work which was secondary to infection. Please follow up with your primary care physician for monitoring.    Diagnosis: HTN (hypertension)  Assessment and Plan of Treatment: You were continued on your home dose amlodipine 10mg daily.       Diagnosis: CKD (chronic kidney disease)  Assessment and Plan of Treatment: You were dialyzed during admission. Please continue your routine dialysis session.     PRINCIPAL DISCHARGE DIAGNOSIS  Diagnosis: Bacteremia  Assessment and Plan of Treatment: you were found to have an infection in your blood  you were started on iv anitbiotics and switched to oral antibiotics - Cipro 500mg daily for 4 more days (total of 1 week)  follow up with your primary care physician within the week      SECONDARY DISCHARGE DIAGNOSES  Diagnosis: Influenza A  Assessment and Plan of Treatment: you were found to have flu A  continue supportive care with tesselon perrle and robitussin    Diagnosis: CKD (chronic kidney disease)  Assessment and Plan of Treatment: You were dialyzed during admission. Please continue your routine dialysis session.    Diagnosis: HTN (hypertension)  Assessment and Plan of Treatment: You were continued on your home dose amlodipine 10mg daily.

## 2024-01-02 NOTE — CARE COORDINATION ASSESSMENT. - PRIMARY CARE PROVIDER FOR, PROFILE
Consent: The patient's consent was obtained including but not limited to risks of crusting, scabbing, blistering, scarring, darker or lighter pigmentary change, recurrence, incomplete removal and infection. Post-Care Instructions: I reviewed with the patient in detail post-care instructions. Patient is to wear sunprotection, and avoid picking at any of the treated lesions. Pt may apply Vaseline to crusted or scabbing areas. Detail Level: Detailed Render Post-Care Instructions In Note?: no Render Note In Bullet Format When Appropriate: Yes Duration Of Freeze Thaw-Cycle (Seconds): 3 no one

## 2024-01-02 NOTE — DISCHARGE NOTE PROVIDER - HOSPITAL COURSE
73-year-old male with PMHx for polycystic kidney disease, status post nephrectomy, ESRD (HD on MWF), HTN, polio who was admitted for fever and chills. RVP + Influenza A. He was outside of the window for tamiflu (symptoms started 4 days prior to arrival). He was treated supportively for influenza A. His initially blood cultures grew serratia in the aerobic cultures. He had repeat cultures on 12/31 which was NGTD. His urine culture grew citrobacter but he was asymptomatic. Suspect bacteruria.     He had leukopenia during admission. suspect secondary to infection.     Consults:   ID: Dr Piedra   Nephcecilia: Dr Prabhakar

## 2024-01-02 NOTE — PROGRESS NOTE ADULT - SUBJECTIVE AND OBJECTIVE BOX
[facility]  INFECTIOUS DISEASES   [address] [place] [address]  Tel: [phone]     Fax: [phone]  ========================================================  MD Charles Fernandez Kaushal, MD Cho, Michelle, MD Sunjit, Jaspal, MD  ========================================================    [id number]  CLEO PLUMMER     Follow up: bacteremia and possible UTI    Feels better, no fever, no overnight event.     PAST MEDICAL & SURGICAL HISTORY:  Hypertension  Renal failure, chronic  Cancer of kidney, left  History of left nephrectomy    Social Hx: No current smoking, EtOH or drugs     FAMILY HISTORY:  FHx: renal failure (Mother)    Allergies  Demerol HCl (Unknown)  Demerol (Faint)    MEDICATIONS  (STANDING):  albuterol    90 MICROgram(s) HFA Inhaler 2 Puff(s) Inhalation once  carvedilol 25 milliGRAM(s) Oral every 24 hours  cefTRIAXone   IVPB 2000 milliGRAM(s) IV Intermittent every 24 hours  ferrous    sulfate 325 milliGRAM(s) Oral daily  heparin   Injectable 5000 Unit(s) SubCutaneous every 8 hours  multivitamin 1 Tablet(s) Oral at bedtime  tamsulosin 0.4 milliGRAM(s) Oral at bedtime    MEDICATIONS  (PRN):  acetaminophen     Tablet .. 650 milliGRAM(s) Oral every 6 hours PRN Temp greater or equal to 38C (100.4F), Mild Pain (1 - 3)  albuterol    90 MICROgram(s) HFA Inhaler 2 Puff(s) Inhalation every 6 hours PRN Bronchospasm  aluminum hydroxide/magnesium hydroxide/simethicone Suspension 30 milliLiter(s) Oral every 4 hours PRN Dyspepsia  benzonatate 100 milliGRAM(s) Oral every 8 hours PRN Cough  guaiFENesin Oral Liquid (Sugar-Free) 200 milliGRAM(s) Oral every 6 hours PRN Cough  melatonin 3 milliGRAM(s) Oral at bedtime PRN Insomnia  ondansetron Injectable 4 milliGRAM(s) IV Push every 8 hours PRN Nausea and/or Vomiting     REVIEW OF SYSTEMS:  CONSTITUTIONAL:  No Fever or chills  HEENT:  No diplopia or blurred vision.  No sore throat or runny nose.  CARDIOVASCULAR:  No chest pain or SOB.  RESPIRATORY:  No cough, shortness of breath, PND or orthopnea.  GASTROINTESTINAL:  No nausea, vomiting or diarrhea.  GENITOURINARY:  No dysuria, frequency or urgency. No Blood in urine  MUSCULOSKELETAL:  no joint aches, no muscle pain  SKIN:  No change in skin, hair or nails.    Physical Exam:  Vital Signs Last 24 Hrs  T(C): 36.7 (02 Jan 2024 12:54), Max: 36.7 (02 Jan 2024 12:54)  T(F): 98.1 (02 Jan 2024 12:54), Max: 98.1 (02 Jan 2024 12:54)  HR: 77 (02 Jan 2024 12:54) (59 - 85)  BP: 168/97 (02 Jan 2024 15:00) (161/88 - 168/97)  BP(mean): --  RR: 17 (02 Jan 2024 12:54) (17 - 17)  SpO2: 95% (02 Jan 2024 12:54) (94% - 96%)  Parameters below as of 02 Jan 2024 12:54  Patient On (Oxygen Delivery Method): room air  GEN: NAD  HEENT: normocephalic and atraumatic. EOMI. PERRL.    NECK: Supple.  No lymphadenopathy   LUNGS: Clear to auscultation.  HEART: Regular rate and rhythm   ABDOMEN: Soft, nontender, and nondistended.  Positive bowel sounds.    : No CVA tenderness  EXTREMITIES: Without edema. left arm AV fistula looks fine  NEUROLOGIC: grossly intact.  PSYCHIATRIC: Appropriate affect .  SKIN: No rash     Labs:                        10.1   2.64  )-----------( 113      ( 02 Jan 2024 07:50 )             30.2     01-02    138  |  108  |  59<H>  ----------------------------<  90  4.0   |  23  |  5.50<H>    Ca    8.4<L>      02 Jan 2024 07:50    TPro  5.8<L>  /  Alb  2.6<L>  /  TBili  0.3  /  DBili  x   /  AST  31  /  ALT  29  /  AlkPhos  58  01-01    Culture - Blood (collected 12-31-23 @ 14:25)  Source: .Blood Blood  Preliminary Report (01-01-24 @ 17:02):    No growth at 24 hours    Culture - Blood (collected 12-31-23 @ 14:15)  Source: .Blood Blood  Preliminary Report (01-01-24 @ 17:02):    No growth at 24 hours    Culture - Urine (collected 12-29-23 @ 18:05)  Source: Clean Catch Clean Catch (Midstream)  Final Report (01-02-24 @ 11:15):    10,000 - 49,000 CFU/mL Citrobacter koseri  Organism: Citrobacter koseri (01-02-24 @ 11:15)  Organism: Citrobacter koseri (01-02-24 @ 11:15)    Sensitivities:      Method Type: WALESKA      -  Amoxicillin/Clavulanic Acid: S <=8/4      -  Ampicillin: R >16 These ampicillin results predict results for amoxicillin      -  Ampicillin/Sulbactam: S <=4/2      -  Aztreonam: S <=4      -  Cefazolin: S <=2      -  Cefepime: S <=2      -  Cefoxitin: S <=8      -  Ceftriaxone: S <=1      -  Ciprofloxacin: S <=0.25      -  Ertapenem: S <=0.5      -  Gentamicin: S <=2      -  Imipenem: S <=1      -  Levofloxacin: S <=0.5      -  Meropenem: S <=1      -  Nitrofurantoin: I 64 Should not be used to treat pyelonephritis      -  Piperacillin/Tazobactam: S <=8      -  Tobramycin: S <=2      -  Trimethoprim/Sulfamethoxazole: S <=0.5/9.5    Culture - Blood (collected 12-29-23 @ 15:45)  Source: .Blood Blood-Peripheral  Preliminary Report (01-01-24 @ 22:01):    No growth at 72 Hours    Culture - Blood (collected 12-29-23 @ 15:40)  Source: .Blood Blood-Peripheral  Gram Stain (12-31-23 @ 12:01):    Growth in aerobic bottle: Gram Negative Rods  Final Report (01-02-24 @ 09:23):    Growth in aerobic bottle: Serratia marcescens    Direct identification is available within approximately 3-5    hours either by Blood Panel Multiplexed PCR or Direct    MALDI-TOF. Details: [email or web address]  Organism: Blood Culture PCR  Serratia marcescens (01-02-24 @ 09:23)  Organism: Serratia marcescens (01-02-24 @ 09:23)    Sensitivities:      Method Type: WALESKA      -  Ampicillin: R >16 These ampicillin results predict results for amoxicillin      -  Ampicillin/Sulbactam: R >16/8      -  Aztreonam: S <=4      -  Cefazolin: R >16      -  Cefepime: S <=2      -  Cefoxitin: R <=8      -  Ceftriaxone: S <=1      -  Ciprofloxacin: S <=0.25      -  Ertapenem: S <=0.5      -  Gentamicin: S <=2      -  Levofloxacin: S <=0.5      -  Meropenem: S <=1      -  Piperacillin/Tazobactam: S <=8      -  Tobramycin: I 4      -  Trimethoprim/Sulfamethoxazole: S <=0.5/9.5  Organism: Blood Culture PCR (01-02-24 @ 09:23)    Sensitivities:      Method Type: PCR      -  Serratia marcescens: Detec    Culture - Urine (collected 08-25-23 @ 21:10)  Source: Clean Catch Clean Catch (Midstream)  Final Report (08-27-23 @ 17:41):    >=3 organisms. Probable collection contamination.    Culture - Blood (collected 08-25-23 @ 20:20)  Source: .Blood Blood-Peripheral  Final Report (08-31-23 @ 02:00):    No growth at 5 days    Culture - Blood (collected 08-25-23 @ 20:15)  Source: .Blood Blood-Peripheral  Final Report (08-31-23 @ 02:00):    No growth at 5 days    Culture - Blood (collected 07-17-23 @ 07:25)  Source: .Blood Blood-Venous  Final Report (07-22-23 @ 14:00):    No growth at 5 days    Culture - Blood (collected 07-17-23 @ 07:20)  Source: .Blood Blood-Venous  Final Report (07-22-23 @ 14:00):    No growth at 5 days    Culture - Urine (collected 07-17-23 @ 06:40)  Source: Clean Catch Clean Catch (Midstream)  Final Report (07-18-23 @ 07:41):    >=3 organisms. Probable collection contamination.    Culture - Blood (collected 06-19-23 @ 22:10)  Source: .Blood Blood-Peripheral  Final Report (06-25-23 @ 04:01):    No Growth Final    Culture - Blood (collected 06-19-23 @ 22:10)  Source: .Blood Blood-Peripheral  Final Report (06-25-23 @ 04:01):    No Growth Final    Culture - Urine (collected 06-17-23 @ 19:03)  Source: Clean Catch Clean Catch (Midstream)  Final Report (06-19-23 @ 07:26):    <10,000 CFU/mL Normal Urogenital Carmen    Culture - Blood (collected 06-17-23 @ 19:03)  Source: .Blood Blood-Peripheral  Gram Stain (06-19-23 @ 07:57):    Growth in aerobic bottle: Gram Negative Rods  Final Report (06-20-23 @ 16:17):    Growth in aerobic bottle: Klebsiella pneumoniae    ***Blood Panel PCR results on this specimen are available    approximately 3 hours after the Gram stain result.***    Gram stain, PCR, and/or culture results may not always    correspond due to difference in methodologies.    ************************************************************    This PCR assay was performed by multiplex PCR. This    Assay tests for 66 bacterial and resistance gene targets.    Please refer to the [facility] Labs test directory    at [email or web address] for details.  Organism: Blood Culture PCR  Klebsiella pneumoniae (06-20-23 @ 16:17)  Organism: Klebsiella pneumoniae (06-20-23 @ 16:17)    Sensitivities:      Method Type: WALESKA      -  Amikacin: S <=16      -  Ampicillin: R >16 These ampicillin results predict results for amoxicillin      -  Ampicillin/Sulbactam: S <=4/2 Enterobacter, Klebsiella aerogenes, Citrobacter, and Serratia may develop resistance during prolonged therapy (3-4 days)      -  Aztreonam: S <=4      -  Cefazolin: S <=2 Enterobacter, Klebsiella aerogenes, Citrobacter, and Serratia may develop resistance during prolonged therapy (3-4 days)      -  Cefepime: S <=2      -  Cefoxitin: S <=8      -  Ceftriaxone: S <=1 Enterobacter, Klebsiella aerogenes, Citrobacter, and Serratia may develop resistance during prolonged therapy      -  Ciprofloxacin: S <=0.25      -  Ertapenem: S <=0.5      -  Gentamicin: S <=2      -  Imipenem: S <=1      -  Levofloxacin: S <=0.5      -  Meropenem: S <=1      -  Piperacillin/Tazobactam: S <=8      -  Tobramycin: S <=2      -  Trimethoprim/Sulfamethoxazole: S <=0.5/9.5  Organism: Blood Culture PCR (06-20-23 @ 16:17)    Sensitivities:      Method Type: PCR      -  K. pneumoniae group: Detec (K. pneumoniae, K. quasipneumoniae, K. variicola)    Culture - Blood (collected 06-17-23 @ 19:03)  Source: .Blood Blood-Peripheral  Final Report (06-23-23 @ 01:01):    No Growth Final    WBC Count: 2.64 K/uL (01-02-24 @ 07:50)  WBC Count: 2.83 K/uL (01-01-24 @ 06:50)  WBC Count: 3.72 K/uL (12-31-23 @ 06:38)  WBC Count: 4.97 K/uL (12-30-23 @ 09:30)  WBC Count: 6.75 K/uL (12-29-23 @ 15:45)    Creatinine: 5.50 mg/dL (01-02-24 @ 07:50)  Creatinine: 4.80 mg/dL (01-01-24 @ 06:50)  Creatinine: 6.60 mg/dL (12-31-23 @ 06:38)  Creatinine: 6.20 mg/dL (12-30-23 @ 09:30)  Creatinine: 5.80 mg/dL (12-29-23 @ 15:45)     SARS-CoV-2 Result: NotDetec (12-29-23 @ 15:45)  SARS-CoV-2: NotDetec (12-29-23 @ 15:45)    All imaging and other data have been reviewed.  < from: Xray Chest 1 View-PORTABLE IMMEDIATE (12.29.23 @ 15:58) >  IMPRESSION: No acute finding or change.      Assessment and Plan:   Patient is a 73-year-old male who has a history of polycystic kidney disease, status post nephrectomy, has end-stage renal failure undergoes hemodialysis Monday Wednesday Friday.  He has a history of hypertension polio walks with crutches, and status post cholecystectomy complicated by bacteremia [June 2023].   Patient reports that starting on Monday, he began feeling sick and developed a fever, had flu PCr positive but due to HD, no treatment given.  Today his blood culture is positive for serratia and UC for low CFU GNR, so ID was called for recommendations. He doesn't have any symptoms.     # Bacteremia   # UTI??  # Influenza    - Blood culture with a sensitive serratia  - UC with a sensitive citrobactere   - Repeat blood culture NGTD   - On ceftriaxone 2gm daily, last dose today 1pm.   - Can stop ceftriaxone and start ciprofloxacin 500mg daily to start tomorrow after HD  - Will complete total one week   - For Flu will continue supportive care     Will follow PRN, please call with any question.     Brandi Piedra MD  Division of Infectious Diseases   Please call ID service at [phone] with any question.    50 minutes spent on total encounter assessing patient, examination, chart review, counseling and coordinating care by the attending physician/nurse/care manager.   [facility]  INFECTIOUS DISEASES   [address] [place] [address]  Tel: [phone]     Fax: [phone]  ========================================================  MD Charles Fernandez Kaushal, MD Cho, Michelle, MD Sunjit, Jaspal, MD  ========================================================    [id number]  CLEO PLUMMER     Follow up: bacteremia and possible UTI    Feels better, no fever, no overnight event.     PAST MEDICAL & SURGICAL HISTORY:  Hypertension  Renal failure, chronic  Cancer of kidney, left  History of left nephrectomy    Social Hx: No current smoking, EtOH or drugs     FAMILY HISTORY:  FHx: renal failure (Mother)    Allergies  Demerol HCl (Unknown)  Demerol (Faint)    MEDICATIONS  (STANDING):  albuterol    90 MICROgram(s) HFA Inhaler 2 Puff(s) Inhalation once  carvedilol 25 milliGRAM(s) Oral every 24 hours  cefTRIAXone   IVPB 2000 milliGRAM(s) IV Intermittent every 24 hours  ferrous    sulfate 325 milliGRAM(s) Oral daily  heparin   Injectable 5000 Unit(s) SubCutaneous every 8 hours  multivitamin 1 Tablet(s) Oral at bedtime  tamsulosin 0.4 milliGRAM(s) Oral at bedtime    MEDICATIONS  (PRN):  acetaminophen     Tablet .. 650 milliGRAM(s) Oral every 6 hours PRN Temp greater or equal to 38C (100.4F), Mild Pain (1 - 3)  albuterol    90 MICROgram(s) HFA Inhaler 2 Puff(s) Inhalation every 6 hours PRN Bronchospasm  aluminum hydroxide/magnesium hydroxide/simethicone Suspension 30 milliLiter(s) Oral every 4 hours PRN Dyspepsia  benzonatate 100 milliGRAM(s) Oral every 8 hours PRN Cough  guaiFENesin Oral Liquid (Sugar-Free) 200 milliGRAM(s) Oral every 6 hours PRN Cough  melatonin 3 milliGRAM(s) Oral at bedtime PRN Insomnia  ondansetron Injectable 4 milliGRAM(s) IV Push every 8 hours PRN Nausea and/or Vomiting     REVIEW OF SYSTEMS:  CONSTITUTIONAL:  No Fever or chills  HEENT:  No diplopia or blurred vision.  No sore throat or runny nose.  CARDIOVASCULAR:  No chest pain or SOB.  RESPIRATORY:  No cough, shortness of breath, PND or orthopnea.  GASTROINTESTINAL:  No nausea, vomiting or diarrhea.  GENITOURINARY:  No dysuria, frequency or urgency. No Blood in urine  MUSCULOSKELETAL:  no joint aches, no muscle pain  SKIN:  No change in skin, hair or nails.    Physical Exam:  Vital Signs Last 24 Hrs  T(C): 36.7 (02 Jan 2024 12:54), Max: 36.7 (02 Jan 2024 12:54)  T(F): 98.1 (02 Jan 2024 12:54), Max: 98.1 (02 Jan 2024 12:54)  HR: 77 (02 Jan 2024 12:54) (59 - 85)  BP: 168/97 (02 Jan 2024 15:00) (161/88 - 168/97)  BP(mean): --  RR: 17 (02 Jan 2024 12:54) (17 - 17)  SpO2: 95% (02 Jan 2024 12:54) (94% - 96%)  Parameters below as of 02 Jan 2024 12:54  Patient On (Oxygen Delivery Method): room air  GEN: NAD  HEENT: normocephalic and atraumatic. EOMI. PERRL.    NECK: Supple.  No lymphadenopathy   LUNGS: Clear to auscultation.  HEART: Regular rate and rhythm   ABDOMEN: Soft, nontender, and nondistended.  Positive bowel sounds.    : No CVA tenderness  EXTREMITIES: Without edema. left arm AV fistula looks fine  NEUROLOGIC: grossly intact.  PSYCHIATRIC: Appropriate affect .  SKIN: No rash     Labs:                        10.1   2.64  )-----------( 113      ( 02 Jan 2024 07:50 )             30.2     01-02    138  |  108  |  59<H>  ----------------------------<  90  4.0   |  23  |  5.50<H>    Ca    8.4<L>      02 Jan 2024 07:50    TPro  5.8<L>  /  Alb  2.6<L>  /  TBili  0.3  /  DBili  x   /  AST  31  /  ALT  29  /  AlkPhos  58  01-01    Culture - Blood (collected 12-31-23 @ 14:25)  Source: .Blood Blood  Preliminary Report (01-01-24 @ 17:02):    No growth at 24 hours    Culture - Blood (collected 12-31-23 @ 14:15)  Source: .Blood Blood  Preliminary Report (01-01-24 @ 17:02):    No growth at 24 hours    Culture - Urine (collected 12-29-23 @ 18:05)  Source: Clean Catch Clean Catch (Midstream)  Final Report (01-02-24 @ 11:15):    10,000 - 49,000 CFU/mL Citrobacter koseri  Organism: Citrobacter koseri (01-02-24 @ 11:15)  Organism: Citrobacter koseri (01-02-24 @ 11:15)    Sensitivities:      Method Type: WALESKA      -  Amoxicillin/Clavulanic Acid: S <=8/4      -  Ampicillin: R >16 These ampicillin results predict results for amoxicillin      -  Ampicillin/Sulbactam: S <=4/2      -  Aztreonam: S <=4      -  Cefazolin: S <=2      -  Cefepime: S <=2      -  Cefoxitin: S <=8      -  Ceftriaxone: S <=1      -  Ciprofloxacin: S <=0.25      -  Ertapenem: S <=0.5      -  Gentamicin: S <=2      -  Imipenem: S <=1      -  Levofloxacin: S <=0.5      -  Meropenem: S <=1      -  Nitrofurantoin: I 64 Should not be used to treat pyelonephritis      -  Piperacillin/Tazobactam: S <=8      -  Tobramycin: S <=2      -  Trimethoprim/Sulfamethoxazole: S <=0.5/9.5    Culture - Blood (collected 12-29-23 @ 15:45)  Source: .Blood Blood-Peripheral  Preliminary Report (01-01-24 @ 22:01):    No growth at 72 Hours    Culture - Blood (collected 12-29-23 @ 15:40)  Source: .Blood Blood-Peripheral  Gram Stain (12-31-23 @ 12:01):    Growth in aerobic bottle: Gram Negative Rods  Final Report (01-02-24 @ 09:23):    Growth in aerobic bottle: Serratia marcescens    Direct identification is available within approximately 3-5    hours either by Blood Panel Multiplexed PCR or Direct    MALDI-TOF. Details: [email or web address]  Organism: Blood Culture PCR  Serratia marcescens (01-02-24 @ 09:23)  Organism: Serratia marcescens (01-02-24 @ 09:23)    Sensitivities:      Method Type: WALESKA      -  Ampicillin: R >16 These ampicillin results predict results for amoxicillin      -  Ampicillin/Sulbactam: R >16/8      -  Aztreonam: S <=4      -  Cefazolin: R >16      -  Cefepime: S <=2      -  Cefoxitin: R <=8      -  Ceftriaxone: S <=1      -  Ciprofloxacin: S <=0.25      -  Ertapenem: S <=0.5      -  Gentamicin: S <=2      -  Levofloxacin: S <=0.5      -  Meropenem: S <=1      -  Piperacillin/Tazobactam: S <=8      -  Tobramycin: I 4      -  Trimethoprim/Sulfamethoxazole: S <=0.5/9.5  Organism: Blood Culture PCR (01-02-24 @ 09:23)    Sensitivities:      Method Type: PCR      -  Serratia marcescens: Detec    Culture - Urine (collected 08-25-23 @ 21:10)  Source: Clean Catch Clean Catch (Midstream)  Final Report (08-27-23 @ 17:41):    >=3 organisms. Probable collection contamination.    Culture - Blood (collected 08-25-23 @ 20:20)  Source: .Blood Blood-Peripheral  Final Report (08-31-23 @ 02:00):    No growth at 5 days    Culture - Blood (collected 08-25-23 @ 20:15)  Source: .Blood Blood-Peripheral  Final Report (08-31-23 @ 02:00):    No growth at 5 days    Culture - Blood (collected 07-17-23 @ 07:25)  Source: .Blood Blood-Venous  Final Report (07-22-23 @ 14:00):    No growth at 5 days    Culture - Blood (collected 07-17-23 @ 07:20)  Source: .Blood Blood-Venous  Final Report (07-22-23 @ 14:00):    No growth at 5 days    Culture - Urine (collected 07-17-23 @ 06:40)  Source: Clean Catch Clean Catch (Midstream)  Final Report (07-18-23 @ 07:41):    >=3 organisms. Probable collection contamination.    Culture - Blood (collected 06-19-23 @ 22:10)  Source: .Blood Blood-Peripheral  Final Report (06-25-23 @ 04:01):    No Growth Final    Culture - Blood (collected 06-19-23 @ 22:10)  Source: .Blood Blood-Peripheral  Final Report (06-25-23 @ 04:01):    No Growth Final    Culture - Urine (collected 06-17-23 @ 19:03)  Source: Clean Catch Clean Catch (Midstream)  Final Report (06-19-23 @ 07:26):    <10,000 CFU/mL Normal Urogenital Carmen    Culture - Blood (collected 06-17-23 @ 19:03)  Source: .Blood Blood-Peripheral  Gram Stain (06-19-23 @ 07:57):    Growth in aerobic bottle: Gram Negative Rods  Final Report (06-20-23 @ 16:17):    Growth in aerobic bottle: Klebsiella pneumoniae    ***Blood Panel PCR results on this specimen are available    approximately 3 hours after the Gram stain result.***    Gram stain, PCR, and/or culture results may not always    correspond due to difference in methodologies.    ************************************************************    This PCR assay was performed by multiplex PCR. This    Assay tests for 66 bacterial and resistance gene targets.    Please refer to the [facility] Labs test directory    at [email or web address] for details.  Organism: Blood Culture PCR  Klebsiella pneumoniae (06-20-23 @ 16:17)  Organism: Klebsiella pneumoniae (06-20-23 @ 16:17)    Sensitivities:      Method Type: WALESKA      -  Amikacin: S <=16      -  Ampicillin: R >16 These ampicillin results predict results for amoxicillin      -  Ampicillin/Sulbactam: S <=4/2 Enterobacter, Klebsiella aerogenes, Citrobacter, and Serratia may develop resistance during prolonged therapy (3-4 days)      -  Aztreonam: S <=4      -  Cefazolin: S <=2 Enterobacter, Klebsiella aerogenes, Citrobacter, and Serratia may develop resistance during prolonged therapy (3-4 days)      -  Cefepime: S <=2      -  Cefoxitin: S <=8      -  Ceftriaxone: S <=1 Enterobacter, Klebsiella aerogenes, Citrobacter, and Serratia may develop resistance during prolonged therapy      -  Ciprofloxacin: S <=0.25      -  Ertapenem: S <=0.5      -  Gentamicin: S <=2      -  Imipenem: S <=1      -  Levofloxacin: S <=0.5      -  Meropenem: S <=1      -  Piperacillin/Tazobactam: S <=8      -  Tobramycin: S <=2      -  Trimethoprim/Sulfamethoxazole: S <=0.5/9.5  Organism: Blood Culture PCR (06-20-23 @ 16:17)    Sensitivities:      Method Type: PCR      -  K. pneumoniae group: Detec (K. pneumoniae, K. quasipneumoniae, K. variicola)    Culture - Blood (collected 06-17-23 @ 19:03)  Source: .Blood Blood-Peripheral  Final Report (06-23-23 @ 01:01):    No Growth Final    WBC Count: 2.64 K/uL (01-02-24 @ 07:50)  WBC Count: 2.83 K/uL (01-01-24 @ 06:50)  WBC Count: 3.72 K/uL (12-31-23 @ 06:38)  WBC Count: 4.97 K/uL (12-30-23 @ 09:30)  WBC Count: 6.75 K/uL (12-29-23 @ 15:45)    Creatinine: 5.50 mg/dL (01-02-24 @ 07:50)  Creatinine: 4.80 mg/dL (01-01-24 @ 06:50)  Creatinine: 6.60 mg/dL (12-31-23 @ 06:38)  Creatinine: 6.20 mg/dL (12-30-23 @ 09:30)  Creatinine: 5.80 mg/dL (12-29-23 @ 15:45)     SARS-CoV-2 Result: NotDetec (12-29-23 @ 15:45)  SARS-CoV-2: NotDetec (12-29-23 @ 15:45)    All imaging and other data have been reviewed.  < from: Xray Chest 1 View-PORTABLE IMMEDIATE (12.29.23 @ 15:58) >  IMPRESSION: No acute finding or change.      Assessment and Plan:   Patient is a 73-year-old male who has a history of polycystic kidney disease, status post nephrectomy, has end-stage renal failure undergoes hemodialysis Monday Wednesday Friday.  He has a history of hypertension polio walks with crutches, and status post cholecystectomy complicated by bacteremia [June 2023].   Patient reports that starting on Monday, he began feeling sick and developed a fever, had flu PCr positive but due to HD, no treatment given.  Today his blood culture is positive for serratia and UC for low CFU GNR, so ID was called for recommendations. He doesn't have any symptoms.     # Bacteremia   # UTI??  # Influenza    - Blood culture with a sensitive serratia  - UC with a sensitive citrobactere   - Repeat blood culture NGTD   - On ceftriaxone 2gm daily, last dose today 1pm.   - Can stop ceftriaxone and start ciprofloxacin 500mg daily to start tomorrow after HD  - Will complete total one week   - For Flu will continue supportive care     Will follow PRN, please call with any question.     Brandi Piedra MD  Division of Infectious Diseases   Please call ID service at [phone] with any question.    50 minutes spent on total encounter assessing patient, examination, chart review, counseling and coordinating care by the attending physician/nurse/care manager.

## 2024-01-03 ENCOUNTER — TRANSCRIPTION ENCOUNTER (OUTPATIENT)
Age: 74
End: 2024-01-03

## 2024-01-03 VITALS
HEART RATE: 84 BPM | SYSTOLIC BLOOD PRESSURE: 147 MMHG | DIASTOLIC BLOOD PRESSURE: 82 MMHG | OXYGEN SATURATION: 95 % | RESPIRATION RATE: 18 BRPM | TEMPERATURE: 98 F

## 2024-01-03 LAB
ALBUMIN SERPL ELPH-MCNC: 2.8 G/DL — LOW (ref 3.3–5)
ALBUMIN SERPL ELPH-MCNC: 2.8 G/DL — LOW (ref 3.3–5)
ALP SERPL-CCNC: 63 U/L — SIGNIFICANT CHANGE UP (ref 40–120)
ALP SERPL-CCNC: 63 U/L — SIGNIFICANT CHANGE UP (ref 40–120)
ALT FLD-CCNC: 36 U/L — SIGNIFICANT CHANGE UP (ref 12–78)
ALT FLD-CCNC: 36 U/L — SIGNIFICANT CHANGE UP (ref 12–78)
ANION GAP SERPL CALC-SCNC: 9 MMOL/L — SIGNIFICANT CHANGE UP (ref 5–17)
ANION GAP SERPL CALC-SCNC: 9 MMOL/L — SIGNIFICANT CHANGE UP (ref 5–17)
AST SERPL-CCNC: 35 U/L — SIGNIFICANT CHANGE UP (ref 15–37)
AST SERPL-CCNC: 35 U/L — SIGNIFICANT CHANGE UP (ref 15–37)
BILIRUB SERPL-MCNC: 0.4 MG/DL — SIGNIFICANT CHANGE UP (ref 0.2–1.2)
BILIRUB SERPL-MCNC: 0.4 MG/DL — SIGNIFICANT CHANGE UP (ref 0.2–1.2)
BUN SERPL-MCNC: 63 MG/DL — HIGH (ref 7–23)
BUN SERPL-MCNC: 63 MG/DL — HIGH (ref 7–23)
CALCIUM SERPL-MCNC: 8.4 MG/DL — LOW (ref 8.5–10.1)
CALCIUM SERPL-MCNC: 8.4 MG/DL — LOW (ref 8.5–10.1)
CHLORIDE SERPL-SCNC: 108 MMOL/L — SIGNIFICANT CHANGE UP (ref 96–108)
CHLORIDE SERPL-SCNC: 108 MMOL/L — SIGNIFICANT CHANGE UP (ref 96–108)
CO2 SERPL-SCNC: 22 MMOL/L — SIGNIFICANT CHANGE UP (ref 22–31)
CO2 SERPL-SCNC: 22 MMOL/L — SIGNIFICANT CHANGE UP (ref 22–31)
CREAT SERPL-MCNC: 6.4 MG/DL — HIGH (ref 0.5–1.3)
CREAT SERPL-MCNC: 6.4 MG/DL — HIGH (ref 0.5–1.3)
CULTURE RESULTS: SIGNIFICANT CHANGE UP
CULTURE RESULTS: SIGNIFICANT CHANGE UP
EGFR: 9 ML/MIN/1.73M2 — LOW
EGFR: 9 ML/MIN/1.73M2 — LOW
GLUCOSE SERPL-MCNC: 90 MG/DL — SIGNIFICANT CHANGE UP (ref 70–99)
GLUCOSE SERPL-MCNC: 90 MG/DL — SIGNIFICANT CHANGE UP (ref 70–99)
HCT VFR BLD CALC: 31.4 % — LOW (ref 39–50)
HCT VFR BLD CALC: 31.4 % — LOW (ref 39–50)
HGB BLD-MCNC: 10.6 G/DL — LOW (ref 13–17)
HGB BLD-MCNC: 10.6 G/DL — LOW (ref 13–17)
MCHC RBC-ENTMCNC: 31.3 PG — SIGNIFICANT CHANGE UP (ref 27–34)
MCHC RBC-ENTMCNC: 31.3 PG — SIGNIFICANT CHANGE UP (ref 27–34)
MCHC RBC-ENTMCNC: 33.8 GM/DL — SIGNIFICANT CHANGE UP (ref 32–36)
MCHC RBC-ENTMCNC: 33.8 GM/DL — SIGNIFICANT CHANGE UP (ref 32–36)
MCV RBC AUTO: 92.6 FL — SIGNIFICANT CHANGE UP (ref 80–100)
MCV RBC AUTO: 92.6 FL — SIGNIFICANT CHANGE UP (ref 80–100)
NRBC # BLD: 0 /100 WBCS — SIGNIFICANT CHANGE UP (ref 0–0)
NRBC # BLD: 0 /100 WBCS — SIGNIFICANT CHANGE UP (ref 0–0)
PLATELET # BLD AUTO: 128 K/UL — LOW (ref 150–400)
PLATELET # BLD AUTO: 128 K/UL — LOW (ref 150–400)
POTASSIUM SERPL-MCNC: 4.4 MMOL/L — SIGNIFICANT CHANGE UP (ref 3.5–5.3)
POTASSIUM SERPL-MCNC: 4.4 MMOL/L — SIGNIFICANT CHANGE UP (ref 3.5–5.3)
POTASSIUM SERPL-SCNC: 4.4 MMOL/L — SIGNIFICANT CHANGE UP (ref 3.5–5.3)
POTASSIUM SERPL-SCNC: 4.4 MMOL/L — SIGNIFICANT CHANGE UP (ref 3.5–5.3)
PROT SERPL-MCNC: 6.2 G/DL — SIGNIFICANT CHANGE UP (ref 6–8.3)
PROT SERPL-MCNC: 6.2 G/DL — SIGNIFICANT CHANGE UP (ref 6–8.3)
RBC # BLD: 3.39 M/UL — LOW (ref 4.2–5.8)
RBC # BLD: 3.39 M/UL — LOW (ref 4.2–5.8)
RBC # FLD: 13.9 % — SIGNIFICANT CHANGE UP (ref 10.3–14.5)
RBC # FLD: 13.9 % — SIGNIFICANT CHANGE UP (ref 10.3–14.5)
SODIUM SERPL-SCNC: 139 MMOL/L — SIGNIFICANT CHANGE UP (ref 135–145)
SODIUM SERPL-SCNC: 139 MMOL/L — SIGNIFICANT CHANGE UP (ref 135–145)
SPECIMEN SOURCE: SIGNIFICANT CHANGE UP
SPECIMEN SOURCE: SIGNIFICANT CHANGE UP
WBC # BLD: 3.87 K/UL — SIGNIFICANT CHANGE UP (ref 3.8–10.5)
WBC # BLD: 3.87 K/UL — SIGNIFICANT CHANGE UP (ref 3.8–10.5)
WBC # FLD AUTO: 3.87 K/UL — SIGNIFICANT CHANGE UP (ref 3.8–10.5)
WBC # FLD AUTO: 3.87 K/UL — SIGNIFICANT CHANGE UP (ref 3.8–10.5)

## 2024-01-03 PROCEDURE — 99232 SBSQ HOSP IP/OBS MODERATE 35: CPT

## 2024-01-03 PROCEDURE — 97162 PT EVAL MOD COMPLEX 30 MIN: CPT

## 2024-01-03 PROCEDURE — 81001 URINALYSIS AUTO W/SCOPE: CPT

## 2024-01-03 PROCEDURE — 99261: CPT

## 2024-01-03 PROCEDURE — 87040 BLOOD CULTURE FOR BACTERIA: CPT

## 2024-01-03 PROCEDURE — 99239 HOSP IP/OBS DSCHRG MGMT >30: CPT

## 2024-01-03 PROCEDURE — 87186 SC STD MICRODIL/AGAR DIL: CPT

## 2024-01-03 PROCEDURE — 93970 EXTREMITY STUDY: CPT

## 2024-01-03 PROCEDURE — 87150 DNA/RNA AMPLIFIED PROBE: CPT

## 2024-01-03 PROCEDURE — 85730 THROMBOPLASTIN TIME PARTIAL: CPT

## 2024-01-03 PROCEDURE — 99285 EMERGENCY DEPT VISIT HI MDM: CPT | Mod: 25

## 2024-01-03 PROCEDURE — 85025 COMPLETE CBC W/AUTO DIFF WBC: CPT

## 2024-01-03 PROCEDURE — 80053 COMPREHEN METABOLIC PANEL: CPT

## 2024-01-03 PROCEDURE — 36415 COLL VENOUS BLD VENIPUNCTURE: CPT

## 2024-01-03 PROCEDURE — 93005 ELECTROCARDIOGRAM TRACING: CPT

## 2024-01-03 PROCEDURE — 83605 ASSAY OF LACTIC ACID: CPT

## 2024-01-03 PROCEDURE — 87086 URINE CULTURE/COLONY COUNT: CPT

## 2024-01-03 PROCEDURE — 87637 SARSCOV2&INF A&B&RSV AMP PRB: CPT

## 2024-01-03 PROCEDURE — 85610 PROTHROMBIN TIME: CPT

## 2024-01-03 PROCEDURE — 80048 BASIC METABOLIC PNL TOTAL CA: CPT

## 2024-01-03 PROCEDURE — 0225U NFCT DS DNA&RNA 21 SARSCOV2: CPT

## 2024-01-03 PROCEDURE — 94640 AIRWAY INHALATION TREATMENT: CPT

## 2024-01-03 PROCEDURE — 71045 X-RAY EXAM CHEST 1 VIEW: CPT

## 2024-01-03 PROCEDURE — 85027 COMPLETE CBC AUTOMATED: CPT

## 2024-01-03 PROCEDURE — 87077 CULTURE AEROBIC IDENTIFY: CPT

## 2024-01-03 RX ORDER — AMLODIPINE BESYLATE 2.5 MG/1
1 TABLET ORAL
Qty: 0 | Refills: 0 | DISCHARGE
Start: 2024-01-03

## 2024-01-03 RX ORDER — ACETAMINOPHEN 500 MG
2 TABLET ORAL
Qty: 0 | Refills: 0 | DISCHARGE
Start: 2024-01-03

## 2024-01-03 RX ORDER — CIPROFLOXACIN LACTATE 400MG/40ML
1 VIAL (ML) INTRAVENOUS
Qty: 4 | Refills: 0
Start: 2024-01-03 | End: 2024-01-06

## 2024-01-03 RX ORDER — CARVEDILOL PHOSPHATE 80 MG/1
1 CAPSULE, EXTENDED RELEASE ORAL
Refills: 0 | DISCHARGE

## 2024-01-03 RX ADMIN — HEPARIN SODIUM 5000 UNIT(S): 5000 INJECTION INTRAVENOUS; SUBCUTANEOUS at 13:17

## 2024-01-03 RX ADMIN — Medication 200 MILLIGRAM(S): at 05:28

## 2024-01-03 RX ADMIN — Medication 325 MILLIGRAM(S): at 13:16

## 2024-01-03 RX ADMIN — AMLODIPINE BESYLATE 10 MILLIGRAM(S): 2.5 TABLET ORAL at 05:28

## 2024-01-03 RX ADMIN — HEPARIN SODIUM 5000 UNIT(S): 5000 INJECTION INTRAVENOUS; SUBCUTANEOUS at 05:28

## 2024-01-03 NOTE — PROGRESS NOTE ADULT - SUBJECTIVE AND OBJECTIVE BOX
[facility]  INFECTIOUS DISEASES   [address] [place] [address]  Tel: [phone]     Fax: [phone]  ========================================================  MD Charles Fernandez Kaushal, MD Cho, Michelle, MD Sunjit, Jaspal, MD  ========================================================    [id number]  CLEO PLUMMER     Follow up: bacteremia and possible UTI    Feels better, no fever, no overnight event.   getting HD.     PAST MEDICAL & SURGICAL HISTORY:  Hypertension  Renal failure, chronic  Cancer of kidney, left  History of left nephrectomy    Social Hx: No current smoking, EtOH or drugs     FAMILY HISTORY:  FHx: renal failure (Mother)    Allergies  Demerol HCl (Unknown)  Demerol (Faint)    MEDICATIONS  (STANDING):  albuterol    90 MICROgram(s) HFA Inhaler 2 Puff(s) Inhalation once  amLODIPine   Tablet 10 milliGRAM(s) Oral daily  ciprofloxacin     Tablet 500 milliGRAM(s) Oral every 24 hours  ferrous    sulfate 325 milliGRAM(s) Oral daily  heparin   Injectable 5000 Unit(s) SubCutaneous every 8 hours  multivitamin 1 Tablet(s) Oral at bedtime  tamsulosin 0.4 milliGRAM(s) Oral at bedtime     REVIEW OF SYSTEMS:  CONSTITUTIONAL:  No Fever or chills  HEENT:  No diplopia or blurred vision.  No sore throat or runny nose.  CARDIOVASCULAR:  No chest pain or SOB.  RESPIRATORY:  No cough, shortness of breath, PND or orthopnea.  GASTROINTESTINAL:  No nausea, vomiting or diarrhea.  GENITOURINARY:  No dysuria, frequency or urgency. No Blood in urine  MUSCULOSKELETAL:  no joint aches, no muscle pain  SKIN:  No change in skin, hair or nails.    Physical Exam:  Vital Signs Last 24 Hrs  T(C): 36.8 (03 Jan 2024 09:25), Max: 36.8 (03 Jan 2024 09:25)  T(F): 98.2 (03 Jan 2024 09:25), Max: 98.2 (03 Jan 2024 09:25)  HR: 86 (03 Jan 2024 09:25) (78 - 86)  BP: 151/82 (03 Jan 2024 09:25) (151/82 - 173/87)  BP(mean): --  RR: 18 (03 Jan 2024 09:25) (17 - 18)  SpO2: 97% (03 Jan 2024 09:25) (96% - 97%)  Parameters below as of 03 Jan 2024 09:25  Patient On (Oxygen Delivery Method): room air  GEN: NAD  HEENT: normocephalic and atraumatic. EOMI. PERRL.    NECK: Supple.  No lymphadenopathy   LUNGS: Clear to auscultation.  HEART: Regular rate and rhythm   ABDOMEN: Soft, nontender, and nondistended.  Positive bowel sounds.    : No CVA tenderness  EXTREMITIES: Without edema. left arm AV fistula looks fine  NEUROLOGIC: grossly intact.  PSYCHIATRIC: Appropriate affect .  SKIN: No rash       Labs:                        10.6   3.87  )-----------( 128      ( 03 Jan 2024 06:20 )             31.4     01-03    139  |  108  |  63<H>  ----------------------------<  90  4.4   |  22  |  6.40<H>    Ca    8.4<L>      03 Jan 2024 06:20    TPro  6.2  /  Alb  2.8<L>  /  TBili  0.4  /  DBili  x   /  AST  35  /  ALT  36  /  AlkPhos  63  01-03      Culture - Blood (collected 12-31-23 @ 14:25)  Source: .Blood Blood  Preliminary Report (01-02-24 @ 17:01):    No growth at 48 Hours    Culture - Blood (collected 12-31-23 @ 14:15)  Source: .Blood Blood  Preliminary Report (01-02-24 @ 17:01):    No growth at 48 Hours    Culture - Urine (collected 12-29-23 @ 18:05)  Source: Clean Catch Clean Catch (Midstream)  Final Report (01-02-24 @ 11:15):    10,000 - 49,000 CFU/mL Citrobacter koseri  Organism: Citrobacter koseri (01-02-24 @ 11:15)  Organism: Citrobacter koseri (01-02-24 @ 11:15)    Sensitivities:      Method Type: WALESKA      -  Amoxicillin/Clavulanic Acid: S <=8/4      -  Ampicillin: R >16 These ampicillin results predict results for amoxicillin      -  Ampicillin/Sulbactam: S <=4/2      -  Aztreonam: S <=4      -  Cefazolin: S <=2      -  Cefepime: S <=2      -  Cefoxitin: S <=8      -  Ceftriaxone: S <=1      -  Ciprofloxacin: S <=0.25      -  Ertapenem: S <=0.5      -  Gentamicin: S <=2      -  Imipenem: S <=1      -  Levofloxacin: S <=0.5      -  Meropenem: S <=1      -  Nitrofurantoin: I 64 Should not be used to treat pyelonephritis      -  Piperacillin/Tazobactam: S <=8      -  Tobramycin: S <=2      -  Trimethoprim/Sulfamethoxazole: S <=0.5/9.5    Culture - Blood (collected 12-29-23 @ 15:45)  Source: .Blood Blood-Peripheral  Preliminary Report (01-02-24 @ 22:01):    No growth at 4 days    Culture - Blood (collected 12-29-23 @ 15:40)  Source: .Blood Blood-Peripheral  Gram Stain (12-31-23 @ 12:01):    Growth in aerobic bottle: Gram Negative Rods  Final Report (01-02-24 @ 09:23):    Growth in aerobic bottle: Serratia marcescens    Direct identification is available within approximately 3-5    hours either by Blood Panel Multiplexed PCR or Direct    MALDI-TOF. Details: [email or web address]  Organism: Blood Culture PCR  Serratia marcescens (01-02-24 @ 09:23)  Organism: Serratia marcescens (01-02-24 @ 09:23)    Sensitivities:      Method Type: WALESKA      -  Ampicillin: R >16 These ampicillin results predict results for amoxicillin      -  Ampicillin/Sulbactam: R >16/8      -  Aztreonam: S <=4      -  Cefazolin: R >16      -  Cefepime: S <=2      -  Cefoxitin: R <=8      -  Ceftriaxone: S <=1      -  Ciprofloxacin: S <=0.25      -  Ertapenem: S <=0.5      -  Gentamicin: S <=2      -  Levofloxacin: S <=0.5      -  Meropenem: S <=1      -  Piperacillin/Tazobactam: S <=8      -  Tobramycin: I 4      -  Trimethoprim/Sulfamethoxazole: S <=0.5/9.5  Organism: Blood Culture PCR (01-02-24 @ 09:23)    Sensitivities:      Method Type: PCR      -  Serratia marcescens: Detec    WBC Count: 3.87 K/uL (01-03-24 @ 06:20)  WBC Count: 2.64 K/uL (01-02-24 @ 07:50)  WBC Count: 2.83 K/uL (01-01-24 @ 06:50)  WBC Count: 3.72 K/uL (12-31-23 @ 06:38)  WBC Count: 4.97 K/uL (12-30-23 @ 09:30)  WBC Count: 6.75 K/uL (12-29-23 @ 15:45)    Creatinine: 6.40 mg/dL (01-03-24 @ 06:20)  Creatinine: 5.50 mg/dL (01-02-24 @ 07:50)  Creatinine: 4.80 mg/dL (01-01-24 @ 06:50)  Creatinine: 6.60 mg/dL (12-31-23 @ 06:38)  Creatinine: 6.20 mg/dL (12-30-23 @ 09:30)  Creatinine: 5.80 mg/dL (12-29-23 @ 15:45)     SARS-CoV-2 Result: NotDetec (12-29-23 @ 15:45)  SARS-CoV-2: NotDetec (12-29-23 @ 15:45)    All imaging and other data have been reviewed.  < from: Xray Chest 1 View-PORTABLE IMMEDIATE (12.29.23 @ 15:58) >  IMPRESSION: No acute finding or change.      Assessment and Plan:   Patient is a 73-year-old male who has a history of polycystic kidney disease, status post nephrectomy, has end-stage renal failure undergoes hemodialysis Monday Wednesday Friday.  He has a history of hypertension polio walks with crutches, and status post cholecystectomy complicated by bacteremia [June 2023].   Patient reports that starting on Monday, he began feeling sick and developed a fever, had flu PCr positive but due to HD, no treatment given.  Today his blood culture is positive for serratia and UC for low CFU GNR, so ID was called for recommendations. He doesn't have any symptoms.     # Bacteremia   # UTI??  # Influenza    - Blood culture with a sensitive serratia  - UC with a sensitive citrobactere   - Repeat blood culture NGTD   - On ceftriaxone 2gm daily, last dose today 1pm.   - Was on ceftriaxone switched to ciprofloxacin 500mg daily today to start after HD  - Will complete total one week   - For Flu will continue supportive care     Will sign off, please call with any question.     Brandi Piedra MD  Division of Infectious Diseases   Please call ID service at [phone] with any question.    50 minutes spent on total encounter assessing patient, examination, chart review, counseling and coordinating care by the attending physician/nurse/care manager.   [facility]  INFECTIOUS DISEASES   [address] [place] [address]  Tel: [phone]     Fax: [phone]  ========================================================  MD Charles Fernandez Kaushal, MD Cho, Michelle, MD Sunjit, Jaspal, MD  ========================================================    [id number]  CLEO PLUMMER     Follow up: bacteremia and possible UTI    Feels better, no fever, no overnight event.   getting HD.     PAST MEDICAL & SURGICAL HISTORY:  Hypertension  Renal failure, chronic  Cancer of kidney, left  History of left nephrectomy    Social Hx: No current smoking, EtOH or drugs     FAMILY HISTORY:  FHx: renal failure (Mother)    Allergies  Demerol HCl (Unknown)  Demerol (Faint)    MEDICATIONS  (STANDING):  albuterol    90 MICROgram(s) HFA Inhaler 2 Puff(s) Inhalation once  amLODIPine   Tablet 10 milliGRAM(s) Oral daily  ciprofloxacin     Tablet 500 milliGRAM(s) Oral every 24 hours  ferrous    sulfate 325 milliGRAM(s) Oral daily  heparin   Injectable 5000 Unit(s) SubCutaneous every 8 hours  multivitamin 1 Tablet(s) Oral at bedtime  tamsulosin 0.4 milliGRAM(s) Oral at bedtime     REVIEW OF SYSTEMS:  CONSTITUTIONAL:  No Fever or chills  HEENT:  No diplopia or blurred vision.  No sore throat or runny nose.  CARDIOVASCULAR:  No chest pain or SOB.  RESPIRATORY:  No cough, shortness of breath, PND or orthopnea.  GASTROINTESTINAL:  No nausea, vomiting or diarrhea.  GENITOURINARY:  No dysuria, frequency or urgency. No Blood in urine  MUSCULOSKELETAL:  no joint aches, no muscle pain  SKIN:  No change in skin, hair or nails.    Physical Exam:  Vital Signs Last 24 Hrs  T(C): 36.8 (03 Jan 2024 09:25), Max: 36.8 (03 Jan 2024 09:25)  T(F): 98.2 (03 Jan 2024 09:25), Max: 98.2 (03 Jan 2024 09:25)  HR: 86 (03 Jan 2024 09:25) (78 - 86)  BP: 151/82 (03 Jan 2024 09:25) (151/82 - 173/87)  BP(mean): --  RR: 18 (03 Jan 2024 09:25) (17 - 18)  SpO2: 97% (03 Jan 2024 09:25) (96% - 97%)  Parameters below as of 03 Jan 2024 09:25  Patient On (Oxygen Delivery Method): room air  GEN: NAD  HEENT: normocephalic and atraumatic. EOMI. PERRL.    NECK: Supple.  No lymphadenopathy   LUNGS: Clear to auscultation.  HEART: Regular rate and rhythm   ABDOMEN: Soft, nontender, and nondistended.  Positive bowel sounds.    : No CVA tenderness  EXTREMITIES: Without edema. left arm AV fistula looks fine  NEUROLOGIC: grossly intact.  PSYCHIATRIC: Appropriate affect .  SKIN: No rash       Labs:                        10.6   3.87  )-----------( 128      ( 03 Jan 2024 06:20 )             31.4     01-03    139  |  108  |  63<H>  ----------------------------<  90  4.4   |  22  |  6.40<H>    Ca    8.4<L>      03 Jan 2024 06:20    TPro  6.2  /  Alb  2.8<L>  /  TBili  0.4  /  DBili  x   /  AST  35  /  ALT  36  /  AlkPhos  63  01-03      Culture - Blood (collected 12-31-23 @ 14:25)  Source: .Blood Blood  Preliminary Report (01-02-24 @ 17:01):    No growth at 48 Hours    Culture - Blood (collected 12-31-23 @ 14:15)  Source: .Blood Blood  Preliminary Report (01-02-24 @ 17:01):    No growth at 48 Hours    Culture - Urine (collected 12-29-23 @ 18:05)  Source: Clean Catch Clean Catch (Midstream)  Final Report (01-02-24 @ 11:15):    10,000 - 49,000 CFU/mL Citrobacter koseri  Organism: Citrobacter koseri (01-02-24 @ 11:15)  Organism: Citrobacter koseri (01-02-24 @ 11:15)    Sensitivities:      Method Type: WALESKA      -  Amoxicillin/Clavulanic Acid: S <=8/4      -  Ampicillin: R >16 These ampicillin results predict results for amoxicillin      -  Ampicillin/Sulbactam: S <=4/2      -  Aztreonam: S <=4      -  Cefazolin: S <=2      -  Cefepime: S <=2      -  Cefoxitin: S <=8      -  Ceftriaxone: S <=1      -  Ciprofloxacin: S <=0.25      -  Ertapenem: S <=0.5      -  Gentamicin: S <=2      -  Imipenem: S <=1      -  Levofloxacin: S <=0.5      -  Meropenem: S <=1      -  Nitrofurantoin: I 64 Should not be used to treat pyelonephritis      -  Piperacillin/Tazobactam: S <=8      -  Tobramycin: S <=2      -  Trimethoprim/Sulfamethoxazole: S <=0.5/9.5    Culture - Blood (collected 12-29-23 @ 15:45)  Source: .Blood Blood-Peripheral  Preliminary Report (01-02-24 @ 22:01):    No growth at 4 days    Culture - Blood (collected 12-29-23 @ 15:40)  Source: .Blood Blood-Peripheral  Gram Stain (12-31-23 @ 12:01):    Growth in aerobic bottle: Gram Negative Rods  Final Report (01-02-24 @ 09:23):    Growth in aerobic bottle: Serratia marcescens    Direct identification is available within approximately 3-5    hours either by Blood Panel Multiplexed PCR or Direct    MALDI-TOF. Details: [email or web address]  Organism: Blood Culture PCR  Serratia marcescens (01-02-24 @ 09:23)  Organism: Serratia marcescens (01-02-24 @ 09:23)    Sensitivities:      Method Type: WALESKA      -  Ampicillin: R >16 These ampicillin results predict results for amoxicillin      -  Ampicillin/Sulbactam: R >16/8      -  Aztreonam: S <=4      -  Cefazolin: R >16      -  Cefepime: S <=2      -  Cefoxitin: R <=8      -  Ceftriaxone: S <=1      -  Ciprofloxacin: S <=0.25      -  Ertapenem: S <=0.5      -  Gentamicin: S <=2      -  Levofloxacin: S <=0.5      -  Meropenem: S <=1      -  Piperacillin/Tazobactam: S <=8      -  Tobramycin: I 4      -  Trimethoprim/Sulfamethoxazole: S <=0.5/9.5  Organism: Blood Culture PCR (01-02-24 @ 09:23)    Sensitivities:      Method Type: PCR      -  Serratia marcescens: Detec    WBC Count: 3.87 K/uL (01-03-24 @ 06:20)  WBC Count: 2.64 K/uL (01-02-24 @ 07:50)  WBC Count: 2.83 K/uL (01-01-24 @ 06:50)  WBC Count: 3.72 K/uL (12-31-23 @ 06:38)  WBC Count: 4.97 K/uL (12-30-23 @ 09:30)  WBC Count: 6.75 K/uL (12-29-23 @ 15:45)    Creatinine: 6.40 mg/dL (01-03-24 @ 06:20)  Creatinine: 5.50 mg/dL (01-02-24 @ 07:50)  Creatinine: 4.80 mg/dL (01-01-24 @ 06:50)  Creatinine: 6.60 mg/dL (12-31-23 @ 06:38)  Creatinine: 6.20 mg/dL (12-30-23 @ 09:30)  Creatinine: 5.80 mg/dL (12-29-23 @ 15:45)     SARS-CoV-2 Result: NotDetec (12-29-23 @ 15:45)  SARS-CoV-2: NotDetec (12-29-23 @ 15:45)    All imaging and other data have been reviewed.  < from: Xray Chest 1 View-PORTABLE IMMEDIATE (12.29.23 @ 15:58) >  IMPRESSION: No acute finding or change.      Assessment and Plan:   Patient is a 73-year-old male who has a history of polycystic kidney disease, status post nephrectomy, has end-stage renal failure undergoes hemodialysis Monday Wednesday Friday.  He has a history of hypertension polio walks with crutches, and status post cholecystectomy complicated by bacteremia [June 2023].   Patient reports that starting on Monday, he began feeling sick and developed a fever, had flu PCr positive but due to HD, no treatment given.  Today his blood culture is positive for serratia and UC for low CFU GNR, so ID was called for recommendations. He doesn't have any symptoms.     # Bacteremia   # UTI??  # Influenza    - Blood culture with a sensitive serratia  - UC with a sensitive citrobactere   - Repeat blood culture NGTD   - On ceftriaxone 2gm daily, last dose today 1pm.   - Was on ceftriaxone switched to ciprofloxacin 500mg daily today to start after HD  - Will complete total one week   - For Flu will continue supportive care     Will sign off, please call with any question.     Brandi Piedra MD  Division of Infectious Diseases   Please call ID service at [phone] with any question.    50 minutes spent on total encounter assessing patient, examination, chart review, counseling and coordinating care by the attending physician/nurse/care manager.

## 2024-01-03 NOTE — DISCHARGE NOTE NURSING/CASE MANAGEMENT/SOCIAL WORK - NSDCPEFALRISK_GEN_ALL_CORE
For information on Fall & Injury Prevention, visit: [email or web address] OR  [email or web address] OR  https://www.cdc.gov/steadi/patient.html For information on Fall & Injury Prevention, visit: [email or web address] OR  [email or web address] OR  https://www.cdc.gov/steadi/patient.html

## 2024-01-03 NOTE — PROGRESS NOTE ADULT - SUBJECTIVE AND OBJECTIVE BOX
Patient is a 73y old  Male who presents with a chief complaint of fever (30 Dec 2023 13:34)    Patient seen in follow up for ESRD on HD.        PAST MEDICAL HISTORY:  Hypertension    Renal failure, chronic    Cancer of kidney, left      MEDICATIONS  (STANDING):  albuterol    90 MICROgram(s) HFA Inhaler 2 Puff(s) Inhalation once  amLODIPine   Tablet 10 milliGRAM(s) Oral daily  ciprofloxacin     Tablet 500 milliGRAM(s) Oral every 24 hours  ferrous    sulfate 325 milliGRAM(s) Oral daily  heparin   Injectable 5000 Unit(s) SubCutaneous every 8 hours  multivitamin 1 Tablet(s) Oral at bedtime  tamsulosin 0.4 milliGRAM(s) Oral at bedtime    MEDICATIONS  (PRN):  acetaminophen     Tablet .. 650 milliGRAM(s) Oral every 6 hours PRN Temp greater or equal to 38C (100.4F), Mild Pain (1 - 3)  albuterol    90 MICROgram(s) HFA Inhaler 2 Puff(s) Inhalation every 6 hours PRN Bronchospasm  aluminum hydroxide/magnesium hydroxide/simethicone Suspension 30 milliLiter(s) Oral every 4 hours PRN Dyspepsia  benzonatate 100 milliGRAM(s) Oral every 8 hours PRN Cough  guaiFENesin Oral Liquid (Sugar-Free) 200 milliGRAM(s) Oral every 6 hours PRN Cough  melatonin 3 milliGRAM(s) Oral at bedtime PRN Insomnia  ondansetron Injectable 4 milliGRAM(s) IV Push every 8 hours PRN Nausea and/or Vomiting    T(C): 36.8 (01-03-24 @ 09:25), Max: 36.8 (01-03-24 @ 09:25)  HR: 86 (01-03-24 @ 09:25) (59 - 86)  BP: 151/82 (01-03-24 @ 09:25) (138/82 - 173/87)  RR: 18 (01-03-24 @ 09:25)  SpO2: 97% (01-03-24 @ 09:25)  Wt(kg): --  I&O's Detail    02 Jan 2024 07:01  -  03 Jan 2024 07:00  --------------------------------------------------------  IN:    Oral Fluid: 480 mL  Total IN: 480 mL    OUT:    Voided (mL): 300 mL  Total OUT: 300 mL    Total NET: 180 mL                PHYSICAL EXAM:  General: No distress  Respiratory: b/l air entry  Cardiovascular: S1 S2  Gastrointestinal: soft  Extremities:  no edema, AVF                        LABORATORY:                        10.6   3.87  )-----------( 128      ( 03 Jan 2024 06:20 )             31.4     01-03    139  |  108  |  63<H>  ----------------------------<  90  4.4   |  22  |  6.40<H>    Ca    8.4<L>      03 Jan 2024 06:20    TPro  6.2  /  Alb  2.8<L>  /  TBili  0.4  /  DBili  x   /  AST  35  /  ALT  36  /  AlkPhos  63  01-03    Sodium: 139 mmol/L (01-03 @ 06:20)  Sodium: 138 mmol/L (01-02 @ 07:50)    Potassium: 4.4 mmol/L (01-03 @ 06:20)  Potassium: 4.0 mmol/L (01-02 @ 07:50)    Hemoglobin: 10.6 g/dL (01-03 @ 06:20)  Hemoglobin: 10.1 g/dL (01-02 @ 07:50)  Hemoglobin: 10.5 g/dL (01-01 @ 06:50)    Creatinine, Serum 6.40 (01-03 @ 06:20)  Creatinine, Serum 5.50 (01-02 @ 07:50)  Creatinine, Serum 4.80 (01-01 @ 06:50)        LIVER FUNCTIONS - ( 03 Jan 2024 06:20 )  Alb: 2.8 g/dL / Pro: 6.2 g/dL / ALK PHOS: 63 U/L / ALT: 36 U/L / AST: 35 U/L / GGT: x           Urinalysis Basic - ( 03 Jan 2024 06:20 )    Color: x / Appearance: x / SG: x / pH: x  Gluc: 90 mg/dL / Ketone: x  / Bili: x / Urobili: x   Blood: x / Protein: x / Nitrite: x   Leuk Esterase: x / RBC: x / WBC x   Sq Epi: x / Non Sq Epi: x / Bacteria: x

## 2024-01-03 NOTE — PROGRESS NOTE ADULT - PROVIDER SPECIALTY LIST ADULT
Hospitalist
Hospitalist
Nephrology
Hospitalist
Nephrology
Nephrology
Infectious Disease
Infectious Disease
Nephrology
Hospitalist
Infectious Disease
Hospitalist

## 2024-01-03 NOTE — PROGRESS NOTE ADULT - ASSESSMENT
73-year-old male with PMHx for polycystic kidney disease, status post nephrectomy, ESRD (HD on MWF), HTN, polio who was admitted for fever and chills. RVP + Influenza A.    #Flu A +     - continue albuterol,  tessalon pearles     - discussed with daughter, patient was symptomatic since monday. outside of window for tamiflu (which will need to be dosed by HD). Will hold off on tamiflu at this time. daughter aware.     #Serratia bacteremia   - monitor for fevers. CXR negative. UA neg. Ucx + citrobacter. Blood cultures + Serratia. will empirically treat with rocephin 2gm daily and repeat cultures 12/31 NG x 24h. ID following.   - Plan to switch to Cipro for 1 week total     # leukopenia   likely secondary to infection  -follow up AM CBC  -Continue abx     #PCKD on HD    - nephrology following. HD per nephro     #HTN    - cont home amlodipine   - monitor vitals    #BPH     - continue flomax     DVT proph: heparin SQ  seen by PT, continue ambulation with crutches      73-year-old male with PMHx for polycystic kidney disease, status post nephrectomy, ESRD (HD on MWF), HTN, polio who was admitted for fever and chills. RVP + Influenza A.    #Flu A +     - continue albuterol,  tessalon pearles     - discussed with daughter, patient was symptomatic since monday. outside of window for tamiflu (which will need to be dosed by HD). Will hold off on tamiflu at this time. daughter aware.     #Serratia bacteremia   - monitor for fevers. CXR negative. UA neg. Ucx + citrobacter. Blood cultures + Serratia. will empirically treat with rocephin 2gm daily and repeat cultures 12/31 NG x 24h. ID following.   - Plan to switch to Cipro for 1 week total     # leukopenia   likely secondary to infection  -follow up AM CBC  -Continue abx     #PCKD on HD    - nephrology following. HD per nephro     #HTN    - cont home amlodipine   - monitor vitals    #BPH     - continue flomax     DVT proph: heparin SQ  seen by PT, continue ambulation with crutches     Discussed with daughter Shahrzad [phone] aware and in agreement with above dc today.   73-year-old male with PMHx for polycystic kidney disease, status post nephrectomy, ESRD (HD on MWF), HTN, polio who was admitted for fever and chills. RVP + Influenza A.    #Flu A +     - continue albuterol,  tessalon pearles     - discussed with daughter, patient was symptomatic since monday. outside of window for tamiflu (which will need to be dosed by HD). Will hold off on tamiflu at this time. daughter aware.     #Serratia bacteremia   - monitor for fevers. CXR negative. UA neg. Ucx + citrobacter. Blood cultures + Serratia. will empirically treat with rocephin 2gm daily and repeat cultures 12/31 NG x 24h. ID following.   - Plan to switch to Cipro for 1 week total     # leukopenia   likely secondary to infection  -follow up AM CBC  -Continue abx     #PCKD on HD    - nephrology following. HD per nephro     #HTN    - cont home amlodipine   - monitor vitals    #BPH     - continue flomax     DVT proph: heparin SQ  seen by PT, continue ambulation with crutches     Discussed with daughter Shahrzad [phone] aware and in agreement with above dc today.

## 2024-01-03 NOTE — DISCHARGE NOTE NURSING/CASE MANAGEMENT/SOCIAL WORK - NSDCPEPTCAREGIVEDUMATLIST _GEN_ALL_CORE
Influenza Vaccination

## 2024-01-03 NOTE — PROGRESS NOTE ADULT - ASSESSMENT
ESRD on HD, DANAE @ [facility]  h/p PCKD, Nephrectomy  Fever, Serratia bacteremia  Hypertension    HD today. Fluid removal as tolerated by BP. Monitor BP trend. Titrate BP meds as needed. D/c planning on PO abx.  ESRD on HD, DANAE @ [facility]  h/p PCKD, Nephrectomy  Fever, Serratia bacteremia  Hypertension    HD today. Fluid removal as tolerated by BP. Monitor BP trend. Titrate BP meds as needed. D/c planning on PO abx.

## 2024-01-03 NOTE — DISCHARGE NOTE NURSING/CASE MANAGEMENT/SOCIAL WORK - PATIENT PORTAL LINK FT
You can access the FollowMyHealth Patient Portal offered by [facility] by registering at the following website: [email or web address]. By joining [facility]’s FollowMyHealth portal, you will also be able to view your health information using other applications (apps) compatible with our system. You can access the FollowMyHealth Patient Portal offered by [facility] by registering at the following website: [email or web address]. By joining [facility]’s FollowMyHealth portal, you will also be able to view your health information using other applications (apps) compatible with our system.

## 2024-01-03 NOTE — PROGRESS NOTE ADULT - SUBJECTIVE AND OBJECTIVE BOX
Patient is a 73y old  Male who presents with a chief complaint of fever (03 Jan 2024 06:56)      INTERVAL HPI/OVERNIGHT EVENTS: Patient seen and examined at bedside. No overnight events.  Plan for HD today  reports improvement in cough    MEDICATIONS  (STANDING):  albuterol    90 MICROgram(s) HFA Inhaler 2 Puff(s) Inhalation once  amLODIPine   Tablet 10 milliGRAM(s) Oral daily  ciprofloxacin     Tablet 500 milliGRAM(s) Oral every 24 hours  ferrous    sulfate 325 milliGRAM(s) Oral daily  heparin   Injectable 5000 Unit(s) SubCutaneous every 8 hours  multivitamin 1 Tablet(s) Oral at bedtime  tamsulosin 0.4 milliGRAM(s) Oral at bedtime    MEDICATIONS  (PRN):  acetaminophen     Tablet .. 650 milliGRAM(s) Oral every 6 hours PRN Temp greater or equal to 38C (100.4F), Mild Pain (1 - 3)  albuterol    90 MICROgram(s) HFA Inhaler 2 Puff(s) Inhalation every 6 hours PRN Bronchospasm  aluminum hydroxide/magnesium hydroxide/simethicone Suspension 30 milliLiter(s) Oral every 4 hours PRN Dyspepsia  benzonatate 100 milliGRAM(s) Oral every 8 hours PRN Cough  guaiFENesin Oral Liquid (Sugar-Free) 200 milliGRAM(s) Oral every 6 hours PRN Cough  melatonin 3 milliGRAM(s) Oral at bedtime PRN Insomnia  ondansetron Injectable 4 milliGRAM(s) IV Push every 8 hours PRN Nausea and/or Vomiting      Allergies    Demerol HCl (Unknown)  Demerol (Faint)    Intolerances    Vital Signs Last 24 Hrs  T(C): 36.8 (03 Jan 2024 09:25), Max: 36.8 (03 Jan 2024 09:25)  T(F): 98.2 (03 Jan 2024 09:25), Max: 98.2 (03 Jan 2024 09:25)  HR: 86 (03 Jan 2024 09:25) (77 - 86)  BP: 151/82 (03 Jan 2024 09:25) (151/82 - 173/87)  BP(mean): --  RR: 18 (03 Jan 2024 09:25) (17 - 18)  SpO2: 97% (03 Jan 2024 09:25) (95% - 97%)    Parameters below as of 03 Jan 2024 09:25  Patient On (Oxygen Delivery Method): room air      I&O's Summary    02 Jan 2024 07:01  -  03 Jan 2024 07:00  --------------------------------------------------------  IN: 480 mL / OUT: 300 mL / NET: 180 mL      BMI (kg/m2): 25 (12-29-23 @ 14:37)    PHYSICAL EXAM:  GENERAL: NAD  HEENT:  AT/NC, anicteric, moist mucous membranes, EOMI, PERRL, no lid-lag, conjunctiva and sclera clear  CHEST/LUNG:  CTA b/l, no rales, wheezes, or rhonchi,  normal respiratory effort, no intercostal retractions  HEART:  RRR, S1, S2, no murmurs; no pitting edema  ABDOMEN:  BS+, soft, nontender, nondistended  MSK/EXTREMITIES: 2+ peripheral pulses, no clubbing or cyanosis  NERVOUS SYSTEM: answers questions and follows commands appropriately, A&Ox3 grossly moves all extremities   PSYCH: Appropriate affect, Alert & Awake; Good judgement      LABS: Personally reviewed  CBC                        10.6   3.87  )-----------( 128      ( 03 Jan 2024 06:20 )             31.4     CMP  01-03    139  |  108  |  63  ----------------------------<  90  4.4   |  22  |  6.40    Ca    8.4      03 Jan 2024 06:20    TPro  6.2  /  Alb  2.8  /  TBili  0.4  /  DBili  x   /  AST  35  /  ALT  36  /  AlkPhos  63  01-03                                      Urinalysis Basic - ( 03 Jan 2024 06:20 )    Color: x / Appearance: x / SG: x / pH: x  Gluc: 90 mg/dL / Ketone: x  / Bili: x / Urobili: x   Blood: x / Protein: x / Nitrite: x   Leuk Esterase: x / RBC: x / WBC x   Sq Epi: x / Non Sq Epi: x / Bacteria: x        Culture - Blood (collected 31 Dec 2023 14:25)  Source: .Blood Blood  Preliminary Report (02 Jan 2024 17:01):    No growth at 48 Hours    Culture - Blood (collected 31 Dec 2023 14:15)  Source: .Blood Blood  Preliminary Report (02 Jan 2024 17:01):    No growth at 48 Hours    Culture - Urine (collected 29 Dec 2023 18:05)  Source: Clean Catch Clean Catch (Midstream)  Final Report (02 Jan 2024 11:15):    10,000 - 49,000 CFU/mL Citrobacter koseri  Organism: Citrobacter koseri (02 Jan 2024 11:15)  Organism: Citrobacter koseri (02 Jan 2024 11:15)      Method Type: WALESKA      -  Amoxicillin/Clavulanic Acid: S <=8/4      -  Ampicillin: R >16 These ampicillin results predict results for amoxicillin      -  Ampicillin/Sulbactam: S <=4/2      -  Aztreonam: S <=4      -  Cefazolin: S <=2      -  Cefepime: S <=2      -  Cefoxitin: S <=8      -  Ceftriaxone: S <=1      -  Ciprofloxacin: S <=0.25      -  Ertapenem: S <=0.5      -  Gentamicin: S <=2      -  Imipenem: S <=1      -  Levofloxacin: S <=0.5      -  Meropenem: S <=1      -  Nitrofurantoin: I 64 Should not be used to treat pyelonephritis      -  Piperacillin/Tazobactam: S <=8      -  Tobramycin: S <=2      -  Trimethoprim/Sulfamethoxazole: S <=0.5/9.5    Culture - Blood (collected 29 Dec 2023 15:45)  Source: .Blood Blood-Peripheral  Preliminary Report (02 Jan 2024 22:01):    No growth at 4 days    Culture - Blood (collected 29 Dec 2023 15:40)  Source: .Blood Blood-Peripheral  Gram Stain (31 Dec 2023 12:01):    Growth in aerobic bottle: Gram Negative Rods  Final Report (02 Jan 2024 09:23):    Growth in aerobic bottle: Serratia marcescens    Direct identification is available within approximately 3-5    hours either by Blood Panel Multiplexed PCR or Direct    MALDI-TOF. Details: [email or web address]  Organism: Blood Culture PCR  Serratia marcescens (02 Jan 2024 09:23)  Organism: Serratia marcescens (02 Jan 2024 09:23)      Method Type: WALESKA      -  Ampicillin: R >16 These ampicillin results predict results for amoxicillin      -  Ampicillin/Sulbactam: R >16/8      -  Aztreonam: S <=4      -  Cefazolin: R >16      -  Cefepime: S <=2      -  Cefoxitin: R <=8      -  Ceftriaxone: S <=1      -  Ciprofloxacin: S <=0.25      -  Ertapenem: S <=0.5      -  Gentamicin: S <=2      -  Levofloxacin: S <=0.5      -  Meropenem: S <=1      -  Piperacillin/Tazobactam: S <=8      -  Tobramycin: I 4      -  Trimethoprim/Sulfamethoxazole: S <=0.5/9.5  Organism: Blood Culture PCR (02 Jan 2024 09:23)      Method Type: PCR      -  Serratia marcescens: Detec            Culture - Blood (collected 12-31-23 @ 14:25)  Source: .Blood Blood  Preliminary Report (01-02-24 @ 17:01):    No growth at 48 Hours    Culture - Blood (collected 12-31-23 @ 14:15)  Source: .Blood Blood  Preliminary Report (01-02-24 @ 17:01):    No growth at 48 Hours    Culture - Urine (collected 12-29-23 @ 18:05)  Source: Clean Catch Clean Catch (Midstream)  Final Report (01-02-24 @ 11:15):    10,000 - 49,000 CFU/mL Citrobacter koseri  Organism: Citrobacter koseri (01-02-24 @ 11:15)  Organism: Citrobacter koseri (01-02-24 @ 11:15)      Method Type: WALESKA      -  Amoxicillin/Clavulanic Acid: S <=8/4      -  Ampicillin: R >16 These ampicillin results predict results for amoxicillin      -  Ampicillin/Sulbactam: S <=4/2      -  Aztreonam: S <=4      -  Cefazolin: S <=2      -  Cefepime: S <=2      -  Cefoxitin: S <=8      -  Ceftriaxone: S <=1      -  Ciprofloxacin: S <=0.25      -  Ertapenem: S <=0.5      -  Gentamicin: S <=2      -  Imipenem: S <=1      -  Levofloxacin: S <=0.5      -  Meropenem: S <=1      -  Nitrofurantoin: I 64 Should not be used to treat pyelonephritis      -  Piperacillin/Tazobactam: S <=8      -  Tobramycin: S <=2      -  Trimethoprim/Sulfamethoxazole: S <=0.5/9.5    Culture - Blood (collected 12-29-23 @ 15:45)  Source: .Blood Blood-Peripheral  Preliminary Report (01-02-24 @ 22:01):    No growth at 4 days    Culture - Blood (collected 12-29-23 @ 15:40)  Source: .Blood Blood-Peripheral  Gram Stain (12-31-23 @ 12:01):    Growth in aerobic bottle: Gram Negative Rods  Final Report (01-02-24 @ 09:23):    Growth in aerobic bottle: Serratia marcescens    Direct identification is available within approximately 3-5    hours either by Blood Panel Multiplexed PCR or Direct    MALDI-TOF. Details: [email or web address]  Organism: Blood Culture PCR  Serratia marcescens (01-02-24 @ 09:23)  Organism: Serratia marcescens (01-02-24 @ 09:23)      Method Type: WALESKA      -  Ampicillin: R >16 These ampicillin results predict results for amoxicillin      -  Ampicillin/Sulbactam: R >16/8      -  Aztreonam: S <=4      -  Cefazolin: R >16      -  Cefepime: S <=2      -  Cefoxitin: R <=8      -  Ceftriaxone: S <=1      -  Ciprofloxacin: S <=0.25      -  Ertapenem: S <=0.5      -  Gentamicin: S <=2      -  Levofloxacin: S <=0.5      -  Meropenem: S <=1      -  Piperacillin/Tazobactam: S <=8      -  Tobramycin: I 4      -  Trimethoprim/Sulfamethoxazole: S <=0.5/9.5  Organism: Blood Culture PCR (01-02-24 @ 09:23)      Method Type: PCR      -  Serratia marcescens: Detec        RADIOLOGY & ADDITIONAL TESTS: Personally reviewed.     Consultant(s) Notes Reviewed:  [x] YES  [ ] NO   Discussed with SW/KENNETH, RN     Patient is a 73y old  Male who presents with a chief complaint of fever (03 Jan 2024 06:56)      INTERVAL HPI/OVERNIGHT EVENTS: Patient seen and examined at bedside. No overnight events.  Plan for HD today  reports improvement in cough    MEDICATIONS  (STANDING):  albuterol    90 MICROgram(s) HFA Inhaler 2 Puff(s) Inhalation once  amLODIPine   Tablet 10 milliGRAM(s) Oral daily  ciprofloxacin     Tablet 500 milliGRAM(s) Oral every 24 hours  ferrous    sulfate 325 milliGRAM(s) Oral daily  heparin   Injectable 5000 Unit(s) SubCutaneous every 8 hours  multivitamin 1 Tablet(s) Oral at bedtime  tamsulosin 0.4 milliGRAM(s) Oral at bedtime    MEDICATIONS  (PRN):  acetaminophen     Tablet .. 650 milliGRAM(s) Oral every 6 hours PRN Temp greater or equal to 38C (100.4F), Mild Pain (1 - 3)  albuterol    90 MICROgram(s) HFA Inhaler 2 Puff(s) Inhalation every 6 hours PRN Bronchospasm  aluminum hydroxide/magnesium hydroxide/simethicone Suspension 30 milliLiter(s) Oral every 4 hours PRN Dyspepsia  benzonatate 100 milliGRAM(s) Oral every 8 hours PRN Cough  guaiFENesin Oral Liquid (Sugar-Free) 200 milliGRAM(s) Oral every 6 hours PRN Cough  melatonin 3 milliGRAM(s) Oral at bedtime PRN Insomnia  ondansetron Injectable 4 milliGRAM(s) IV Push every 8 hours PRN Nausea and/or Vomiting      Allergies    Demerol HCl (Unknown)  Demerol (Faint)    Intolerances    Vital Signs Last 24 Hrs  T(C): 36.8 (03 Jan 2024 09:25), Max: 36.8 (03 Jan 2024 09:25)  T(F): 98.2 (03 Jan 2024 09:25), Max: 98.2 (03 Jan 2024 09:25)  HR: 86 (03 Jan 2024 09:25) (77 - 86)  BP: 151/82 (03 Jan 2024 09:25) (151/82 - 173/87)  BP(mean): --  RR: 18 (03 Jan 2024 09:25) (17 - 18)  SpO2: 97% (03 Jan 2024 09:25) (95% - 97%)    Parameters below as of 03 Jan 2024 09:25  Patient On (Oxygen Delivery Method): room air      I&O's Summary    02 Jan 2024 07:01  -  03 Jan 2024 07:00  --------------------------------------------------------  IN: 480 mL / OUT: 300 mL / NET: 180 mL      BMI (kg/m2): 25 (12-29-23 @ 14:37)    PHYSICAL EXAM:  GENERAL: NAD  HEENT:  AT/NC, anicteric, moist mucous membranes, EOMI, PERRL, no lid-lag, conjunctiva and sclera clear  CHEST/LUNG:  CTA b/l, no rales, wheezes, or rhonchi,  normal respiratory effort, no intercostal retractions  HEART:  RRR, S1, S2, no murmurs; no pitting edema  ABDOMEN:  BS+, soft, nontender, nondistended  MSK/EXTREMITIES: 2+ peripheral pulses, no clubbing or cyanosis  NERVOUS SYSTEM: answers questions and follows commands appropriately, A&Ox3 grossly moves all extremities   PSYCH: Appropriate affect, Alert & Awake; Good judgement      LABS: Personally reviewed  CBC                        10.6   3.87  )-----------( 128      ( 03 Jan 2024 06:20 )             31.4     CMP  01-03    139  |  108  |  63  ----------------------------<  90  4.4   |  22  |  6.40    Ca    8.4      03 Jan 2024 06:20    TPro  6.2  /  Alb  2.8  /  TBili  0.4  /  DBili  x   /  AST  35  /  ALT  36  /  AlkPhos  63  01-03                                      Urinalysis Basic - ( 03 Jan 2024 06:20 )    Color: x / Appearance: x / SG: x / pH: x  Gluc: 90 mg/dL / Ketone: x  / Bili: x / Urobili: x   Blood: x / Protein: x / Nitrite: x   Leuk Esterase: x / RBC: x / WBC x   Sq Epi: x / Non Sq Epi: x / Bacteria: x        Culture - Blood (collected 31 Dec 2023 14:25)  Source: .Blood Blood  Preliminary Report (02 Jan 2024 17:01):    No growth at 48 Hours    Culture - Blood (collected 31 Dec 2023 14:15)  Source: .Blood Blood  Preliminary Report (02 Jan 2024 17:01):    No growth at 48 Hours    Culture - Urine (collected 29 Dec 2023 18:05)  Source: Clean Catch Clean Catch (Midstream)  Final Report (02 Jan 2024 11:15):    10,000 - 49,000 CFU/mL Citrobacter koseri  Organism: Citrobacter koseri (02 Jan 2024 11:15)  Organism: Citrobacter koseri (02 Jan 2024 11:15)      Method Type: WALESKA      -  Amoxicillin/Clavulanic Acid: S <=8/4      -  Ampicillin: R >16 These ampicillin results predict results for amoxicillin      -  Ampicillin/Sulbactam: S <=4/2      -  Aztreonam: S <=4      -  Cefazolin: S <=2      -  Cefepime: S <=2      -  Cefoxitin: S <=8      -  Ceftriaxone: S <=1      -  Ciprofloxacin: S <=0.25      -  Ertapenem: S <=0.5      -  Gentamicin: S <=2      -  Imipenem: S <=1      -  Levofloxacin: S <=0.5      -  Meropenem: S <=1      -  Nitrofurantoin: I 64 Should not be used to treat pyelonephritis      -  Piperacillin/Tazobactam: S <=8      -  Tobramycin: S <=2      -  Trimethoprim/Sulfamethoxazole: S <=0.5/9.5    Culture - Blood (collected 29 Dec 2023 15:45)  Source: .Blood Blood-Peripheral  Preliminary Report (02 Jan 2024 22:01):    No growth at 4 days    Culture - Blood (collected 29 Dec 2023 15:40)  Source: .Blood Blood-Peripheral  Gram Stain (31 Dec 2023 12:01):    Growth in aerobic bottle: Gram Negative Rods  Final Report (02 Jan 2024 09:23):    Growth in aerobic bottle: Serratia marcescens    Direct identification is available within approximately 3-5    hours either by Blood Panel Multiplexed PCR or Direct    MALDI-TOF. Details: [email or web address]  Organism: Blood Culture PCR  Serratia marcescens (02 Jan 2024 09:23)  Organism: Serratia marcescens (02 Jan 2024 09:23)      Method Type: WALESKA      -  Ampicillin: R >16 These ampicillin results predict results for amoxicillin      -  Ampicillin/Sulbactam: R >16/8      -  Aztreonam: S <=4      -  Cefazolin: R >16      -  Cefepime: S <=2      -  Cefoxitin: R <=8      -  Ceftriaxone: S <=1      -  Ciprofloxacin: S <=0.25      -  Ertapenem: S <=0.5      -  Gentamicin: S <=2      -  Levofloxacin: S <=0.5      -  Meropenem: S <=1      -  Piperacillin/Tazobactam: S <=8      -  Tobramycin: I 4      -  Trimethoprim/Sulfamethoxazole: S <=0.5/9.5  Organism: Blood Culture PCR (02 Jan 2024 09:23)      Method Type: PCR      -  Serratia marcescens: Detec            Culture - Blood (collected 12-31-23 @ 14:25)  Source: .Blood Blood  Preliminary Report (01-02-24 @ 17:01):    No growth at 48 Hours    Culture - Blood (collected 12-31-23 @ 14:15)  Source: .Blood Blood  Preliminary Report (01-02-24 @ 17:01):    No growth at 48 Hours    Culture - Urine (collected 12-29-23 @ 18:05)  Source: Clean Catch Clean Catch (Midstream)  Final Report (01-02-24 @ 11:15):    10,000 - 49,000 CFU/mL Citrobacter koseri  Organism: Citrobacter koseri (01-02-24 @ 11:15)  Organism: Citrobacter koseri (01-02-24 @ 11:15)      Method Type: WALESKA      -  Amoxicillin/Clavulanic Acid: S <=8/4      -  Ampicillin: R >16 These ampicillin results predict results for amoxicillin      -  Ampicillin/Sulbactam: S <=4/2      -  Aztreonam: S <=4      -  Cefazolin: S <=2      -  Cefepime: S <=2      -  Cefoxitin: S <=8      -  Ceftriaxone: S <=1      -  Ciprofloxacin: S <=0.25      -  Ertapenem: S <=0.5      -  Gentamicin: S <=2      -  Imipenem: S <=1      -  Levofloxacin: S <=0.5      -  Meropenem: S <=1      -  Nitrofurantoin: I 64 Should not be used to treat pyelonephritis      -  Piperacillin/Tazobactam: S <=8      -  Tobramycin: S <=2      -  Trimethoprim/Sulfamethoxazole: S <=0.5/9.5    Culture - Blood (collected 12-29-23 @ 15:45)  Source: .Blood Blood-Peripheral  Preliminary Report (01-02-24 @ 22:01):    No growth at 4 days    Culture - Blood (collected 12-29-23 @ 15:40)  Source: .Blood Blood-Peripheral  Gram Stain (12-31-23 @ 12:01):    Growth in aerobic bottle: Gram Negative Rods  Final Report (01-02-24 @ 09:23):    Growth in aerobic bottle: Serratia marcescens    Direct identification is available within approximately 3-5    hours either by Blood Panel Multiplexed PCR or Direct    MALDI-TOF. Details: [email or web address]  Organism: Blood Culture PCR  Serratia marcescens (01-02-24 @ 09:23)  Organism: Serratia marcescens (01-02-24 @ 09:23)      Method Type: WALESKA      -  Ampicillin: R >16 These ampicillin results predict results for amoxicillin      -  Ampicillin/Sulbactam: R >16/8      -  Aztreonam: S <=4      -  Cefazolin: R >16      -  Cefepime: S <=2      -  Cefoxitin: R <=8      -  Ceftriaxone: S <=1      -  Ciprofloxacin: S <=0.25      -  Ertapenem: S <=0.5      -  Gentamicin: S <=2      -  Levofloxacin: S <=0.5      -  Meropenem: S <=1      -  Piperacillin/Tazobactam: S <=8      -  Tobramycin: I 4      -  Trimethoprim/Sulfamethoxazole: S <=0.5/9.5  Organism: Blood Culture PCR (01-02-24 @ 09:23)      Method Type: PCR      -  Serratia marcescens: Detec        RADIOLOGY & ADDITIONAL TESTS: Personally reviewed.     Consultant(s) Notes Reviewed:  [x] YES  [ ] NO   Discussed with SW/KENNETH, RN

## 2024-01-05 LAB
CULTURE RESULTS: SIGNIFICANT CHANGE UP
SPECIMEN SOURCE: SIGNIFICANT CHANGE UP

## 2024-01-18 ENCOUNTER — LABORATORY RESULT (OUTPATIENT)
Age: 74
End: 2024-01-18

## 2024-01-30 ENCOUNTER — OUTPATIENT (OUTPATIENT)
Dept: OUTPATIENT SERVICES | Facility: HOSPITAL | Age: 74
LOS: 1 days | End: 2024-01-30
Payer: MEDICARE

## 2024-01-30 ENCOUNTER — APPOINTMENT (OUTPATIENT)
Dept: ULTRASOUND IMAGING | Facility: CLINIC | Age: 74
End: 2024-01-30
Payer: MEDICARE

## 2024-01-30 DIAGNOSIS — Z90.5 ACQUIRED ABSENCE OF KIDNEY: Chronic | ICD-10-CM

## 2024-01-30 DIAGNOSIS — N40.0 BENIGN PROSTATIC HYPERPLASIA WITHOUT LOWER URINARY TRACT SYMPTOMS: ICD-10-CM

## 2024-01-30 PROCEDURE — 76770 US EXAM ABDO BACK WALL COMP: CPT

## 2024-01-30 PROCEDURE — 76770 US EXAM ABDO BACK WALL COMP: CPT | Mod: 26

## 2024-02-01 ENCOUNTER — APPOINTMENT (OUTPATIENT)
Dept: UROLOGY | Facility: CLINIC | Age: 74
End: 2024-02-01
Payer: MEDICARE

## 2024-02-01 DIAGNOSIS — R39.9 UNSPECIFIED SYMPTOMS AND SIGNS INVOLVING THE GENITOURINARY SYSTEM: ICD-10-CM

## 2024-02-01 DIAGNOSIS — C61 MALIGNANT NEOPLASM OF PROSTATE: ICD-10-CM

## 2024-02-01 DIAGNOSIS — N40.1 BENIGN PROSTATIC HYPERPLASIA WITH LOWER URINARY TRACT SYMPMS: ICD-10-CM

## 2024-02-01 DIAGNOSIS — N13.8 BENIGN PROSTATIC HYPERPLASIA WITH LOWER URINARY TRACT SYMPMS: ICD-10-CM

## 2024-02-01 PROCEDURE — 51798 US URINE CAPACITY MEASURE: CPT

## 2024-02-01 PROCEDURE — 99213 OFFICE O/P EST LOW 20 MIN: CPT

## 2024-02-01 RX ORDER — CARVEDILOL 25 MG/1
25 TABLET, FILM COATED ORAL
Refills: 0 | Status: DISCONTINUED | COMMUNITY

## 2024-02-01 RX ORDER — TAMSULOSIN HYDROCHLORIDE 0.4 MG/1
0.4 CAPSULE ORAL
Qty: 90 | Refills: 3 | Status: ACTIVE | COMMUNITY
Start: 2024-02-01 | End: 1900-01-01

## 2024-02-01 RX ORDER — VIT B COMPLX C/FOLIC ACID/ZINC 0.8MG-15MG
TABLET ORAL
Refills: 0 | Status: ACTIVE | COMMUNITY

## 2024-02-01 NOTE — REVIEW OF SYSTEMS
[see HPI] : see HPI [Negative] : Heme/Lymph [Feeling Tired] : feeling tired [Eyesight Problems] : eyesight problems [History of kidney stones] : history of kidney stones [Urine retention] : urine retention [Wake up at night to urinate  How many times?  ___] : wakes up to urinate [unfilled] times during the night [Strong urge to urinate] : strong urge to urinate [Bladder pressure] : experiences bladder pressure [Limb Weakness] : limb weakness [Difficulty Walking] : difficulty walking [Muscle Weakness] : muscle weakness [Feelings Of Weakness] : feelings of weakness

## 2024-02-01 NOTE — HISTORY OF PRESENT ILLNESS
[FreeTextEntry1] : Mr. PLUMMER is a 72 year  White  M who comes today to clinic for urinary retention after lap cholecystectomy. PMH: ESRD on HD (producing ~300cc/day), ADPCKD s/p left nephrectomy (path benign). Currently on tamsulosin 0.4mg, lithium controlled.  Uroflow: Low volume, PVR: 0 cc. Prostate biopsy 5/7/22: adenocarcinoma in the left posterior medial apex grade group 1, [place] 3+3=6 involving 40% of 1 of 1 core most recent PSA:  1/18/24, 1.57 10/9/23, 1.07 4/10/23, 1.2 12/20/2022, 1.39 12/19/2022, 1.42 9/20/2022, 1.30 4/24/2022, 1.28 11/24/2020, 1.45 6/4/2019, 1.14  Family history of Prostate cancer: Dad dx at 90+ years old Denies fevers, chills, flank pain, hematuria, AUR.

## 2024-02-01 NOTE — ASSESSMENT
[FreeTextEntry1] : Mr. PLUMMER is a 72 year  White  M who comes today to clinic for urinary retention after lap cholecystectomy. PMH: ESRD on HD (producing ~300cc/day), ADPCKD s/p left nephrectomy (path benign). Currently on tamsulosin 0.4mg, LUTS controlled.  Uroflow: Low volume, PVR: 0 cc. Prostate biopsy 5/7/22: adenocarcinoma in the left posterior medial apex grade group 1, [place] 3+3=6 involving 40% of 1 of 1 core most recent PSA:  1/18/24, 1.57  Fu in 3 moths for PSA/UF/PVR/IPSS.  Today we had an extensive discussion about prostate cancer including diagnosis/grading, natural history, and options including surveillance vs. treatment with surgery/radiation. We discussed that he is in the "very low risk" group and AUA guidelines strongly recommend active surveillance. We discussed the risks and benefits of this approach. If he is found to have progression, he understands that he may require treatment in the future. I provided information and links [email or web address] for further reading and encouraged him to seek a second opinion if he was interested. He understands clearly and would like to proceed with active surveillance.  - PSA test and AVERY every 3-6 months (next 4/24) - Follow up with prostate biopsy every 18-24 months (however, understands that it may be sooner or later depending on rectal exam and PSA). MRI not possible 2/2 ESRD on HD.

## 2024-02-13 NOTE — DISCHARGE NOTE PROVIDER - PROVIDER RX CONTACT NUMBER
Wound Care:  - Do not get incision wet for first 48 hours after surgery. After 48 hours, you may remove outer dressing. Can cover with gauze and tape if there is any drainage.   - After 48 hours, you may get incision wet. Allow water to run through and pat incision dry, do not scrub incision     Pain Control:  - Take tylenol over the counter as needed for mild pain  - A prescription for Oxycodone has been sent to your pharmacy. You may take as needed for moderate to severe pain Wound Care:  - Do not get incision wet for first 48 hours after surgery. After 48 hours, you may remove outer dressing. Can cover with gauze and tape if there is any drainage.   - After 48 hours, you may get incision wet. Allow water to run through and pat incision dry, do not scrub incision     Pain Control:  - Take Tylenol over the counter as needed for mild pain  - A prescription for Oxycodone has been sent to your pharmacy. You may take as needed for moderate to severe pain [phone]

## 2024-03-21 ENCOUNTER — APPOINTMENT (OUTPATIENT)
Dept: GASTROENTEROLOGY | Facility: CLINIC | Age: 74
End: 2024-03-21
Payer: MEDICARE

## 2024-03-21 VITALS
HEIGHT: 64 IN | HEART RATE: 82 BPM | OXYGEN SATURATION: 96 % | WEIGHT: 145 LBS | BODY MASS INDEX: 24.75 KG/M2 | DIASTOLIC BLOOD PRESSURE: 83 MMHG | RESPIRATION RATE: 16 BRPM | SYSTOLIC BLOOD PRESSURE: 145 MMHG | TEMPERATURE: 98 F

## 2024-03-21 PROCEDURE — 99215 OFFICE O/P EST HI 40 MIN: CPT

## 2024-03-21 RX ORDER — TAMSULOSIN HCL 0.4 MG
0.4 CAPSULE ORAL
Refills: 0 | Status: COMPLETED | COMMUNITY
End: 2024-03-21

## 2024-03-21 NOTE — HISTORY OF PRESENT ILLNESS
[de-identified] : 6/22/2023 for leak, pd and cbd stented [de-identified] : ACC: [id number]     EXAM:  CT ABDOMEN AND PELVIS   ORDERED BY: YUKI LICEA  ACC: [id number]     EXAM:  CT CHEST   ORDERED BY: YUKI LICEA  PROCEDURE DATE:  06/20/2023    INTERPRETATION:  CLINICAL INFORMATION: Klebsiella bacteremia  COMPARISON: CT abdomen pelvis 7/11/2019  CONTRAST/COMPLICATIONS: IV Contrast: Oral Contrast: Complications:  PROCEDURE: CT of the Chest, Abdomen and Pelvis was performed. Sagittal and coronal reformats were performed.  FINDINGS: CHEST: LUNGS AND LARGE AIRWAYS: Patent central airways. Scattered calcified granulomas. No pulmonary consolidation. Trace bibasilar dependent atelectasis. PLEURA: No pleural effusion. VESSELS: Atherosclerotic changes of the aorta and coronary arteries. HEART: Heart size is normal. No pericardial effusion. MEDIASTINUM AND NALLELY: No lymphadenopathy. CHEST WALL AND LOWER NECK: Within normal limits.  ABDOMEN AND PELVIS: LIVER: Within normal limits. BILE DUCTS: Normal caliber. GALLBLADDER: Cholelithiasis gallbladder wall thickening and pericholecystic inflammation is noted. SPLEEN: Within normal limits. PANCREAS: Within normal limits. ADRENALS: Within normal limits. KIDNEYS/URETERS: Left nephrectomy. Polycystic enlarged right kidney. Multiple hyperdense lesions are again noted, incompletely characterized without intravenous contrast. No hydronephrosis.  BLADDER: Within normal limits. REPRODUCTIVE ORGANS: Prostate is enlarged.  BOWEL: No bowel obstruction. Appendix is normal. PERITONEUM: No ascites. VESSELS: Atherosclerotic changes. RETROPERITONEUM/LYMPH NODES: No lymphadenopathy. ABDOMINAL WALL: Atrophic left psoas and pelvic girdle musculature. BONES: Degenerative changes.  IMPRESSION: No acute pulmonary pathology.  CT findings  [FreeTextEntry1] : PROCEDURE DATE:  08/26/2023    INTERPRETATION:  CLINICAL INDICATION: Fever, shaking rigors, sepsis, sp ercp and cholangeogram yesterday ro perforation.  PROCEDURE: CT of the chest, abdomen and pelvis was performed with intravenous contrast. Coronal and sagittal reconstruction images were obtained.  CONTRAST/COMPLICATIONS: IV Contrast: Omnipaque 350 (accession [id number]), IV contrast documented in unlinked concurrent exam (accession [id number])  80 cc administered   20 cc discarded Oral Contrast: NONE Complications: None reported at time of study completion  COMPARISON: 7/17/2023. 8/24/2023.  FINDINGS:  CHEST:  LUNGS AND AIRWAYS: Patent central airways. Atelectasis. Paraseptal emphysema. Scattered calcified granulomata. Stable small nodule floor intrafissural lymph node along the right major fissure. PLEURA: No pleural effusion or pneumothorax. HEART: Normal size. Trace pericardial effusion. VESSELS: Atherosclerosis. Normal caliber of the thoracic aorta. MEDIASTINUM AND NALLELY: No lymphadenopathy. CHEST WALL AND LOWER NECK: Bilateral gynecomastia.  ABDOMEN/PELVIS:  LIVER: Unremarkable. BILE DUCTS/GALLBLADDER: Cholecystectomy. Focus of air at the distended cystic duct remnant, reflecting recent post procedural changes. Biliary enhancement along the common bile duct and cystic duct remnant with surrounding stranding and trace fluid. Again noted, punctate densities at the distended cystic duct, suggesting stones. PANCREAS: Mild fatty atrophy. Prominent common bile duct at the head again noted. SPLEEN: Unremarkable.  ADRENALS: Unremarkable. KIDNEYS/URETERS: Left nephrectomy. Enlarged left kidney with multiple cysts, calcifications and subcentimeter hypodensities. No right hydroureteronephrosis. BLADDER: Partially distended. REPRODUCTIVE ORGANS: Enlarged prostate.  BOWEL: No bowel obstruction. Normal caliber of the appendix containing fecalith without inflammatory change.  Periampullary duodenal diverticulum. Underdistended stomach. Colon diverticulosis. PERITONEUM: No organized fluid collection or free air. Trace fluid at the perihepatic region and pelvis. VESSELS: Atherosclerosis. Normal caliber of the abdominal aorta. RETROPERITONEUM/LYMPH NODE: Subcentimeter lymph nodes. ABDOMINAL WALL/SOFT TISSUES: Small fat-containing umbilical and inguinal hernias. Again noted, marked muscle bulk loss and fatty replacement at the left pelvis and visualized proximal left lower extremity BONES: Degenerative changes of the spine. Stable endplate depression of the spine.  IMPRESSION:  No obvious intraperitoneal free air. Cholecystectomy. Focus of air at the distended cystic duct remnant, reflecting recent post procedural changes. Nonspecific biliary enhancement along the common bile duct and distended cystic duct remnant with surrounding stranding and trace fluid. These findings are nonspecific and can be seen in infection/inflammation (cholangitis) although neoplasm is not excluded. Suggest punctate stones at the cystic duct remnant.  Additional findings as described. [de-identified] : Progress Note:\par  - Provider Specialty Gastroenterology\par  \par  Reason for Admission:\par  Reason for Admission:\par  - Reason for Admission Klebsiella bacteremia\par  \par  \par  - Subjective and Objective:\par  INTERVAL HPI/OVERNIGHT EVENTS:\par  HPI:\par  \par  71 y/o male seen and examined.\par  \par  \par  MEDICATIONS (STANDING):\par  heparin Injectable 5000 Unit(s) SubCutaneous every 12 hours\par  indomethacin Suppository 100 milliGRAM(s) Rectal once\par  pantoprazole Tablet 40 milliGRAM(s) Oral before breakfast\par  piperacillin/tazobactam IVPB.. 3.375 Gram(s) IV Intermittent every 12 hours\par  tamsulosin 0.4 milliGRAM(s) Oral at bedtime\par  \par  MEDICATIONS (PRN):\par  acetaminophen Suppository .. 650 milliGRAM(s) Rectal every 6 hours PRN Temp\par  greater or equal to 38C (100.4F)\par  HYDROmorphone Injectable 2 milliGRAM(s) IV Push every 4 hours PRN Severe Pain\par  (7 - 10)\par  HYDROmorphone Injectable 0.5 milliGRAM(s) IV Push every 4 hours PRN Mild Pain\par  (1 - 3)\par  HYDROmorphone Injectable 1 milliGRAM(s) IV Push every 4 hours PRN Moderate\par  Pain (4 - 6)\par  senna 2 Tablet(s) Oral daily PRN Constipation\par  simethicone 80 milliGRAM(s) Chew two times a day PRN Gas\par  \par  \par  Allergies\par  \par  Demerol HCl (Unknown)\par  \par  Intolerances\par  \par  \par  \par  PAST MEDICAL & SURGICAL HISTORY:\par  Hypertension\par  \par  \par  Renal failure, chronic\par  \par  \par  Cancer of kidney, left\par  \par  \par  History of left nephrectomy\par  \par  PHYSICAL EXAM:\par  Vital Signs: Vital Signs Last 24 Hrs\par  T(C): 37 (2023 05:20), Max: 37.7 (2023 12:00)\par  T(F): 98.6 (2023 05:20), Max: 99.9 (2023 12:00)\par  HR: 73 (2023 05:20) (73 - 86)\par  BP: 130/75 (2023 05:20) (115/70 - 130/75)\par  BP(mean): --\par  RR: 19 (2023 05:20) (19 - 21)\par  SpO2: 97% (2023 05:20) (94% - 97%)\par  \par  Parameters below as of 2023 05:20\par  Patient On (Oxygen Delivery Method): room air\par  \par  \par  Daily\par  Daily Weight in k.5 (2023 05:20)I&O's Summary\par  \par  2023 07:01 - 2023 07:00\par  --------------------------------------------------------\par  IN: 350 mL / OUT: 1633 mL / NET: -1283 mL\par  \par  GENERAL: Appears stated age,\par  HEENT: NC/AT, conjunctivae clear and pink, sclera -anicteric\par  CHEST: Full & symmetric excursion, no increased effort, breath sounds clear\par  HEART: Regular rhythm, S1, S2, no murmur\par  ABDOMEN: Soft, non-tender, non-distended, normoactive bowel sounds,\par  EXTEREMITIES: no edema\par  SKIN: No rash/warm/dry\par  NEURO: Alert, oriented,\par  \par  \par  LABS:\par  \par  10.4\par  9.57 )-----------( 235 ( 2023 07:14 )\par  30.3\par  \par  \par  \par  \par  \par  \par  amylase\par  lipase\par  RADIOLOGY & ADDITIONAL TESTS:\par  \par  \par  \par  Assessment and Plan:\par  - Assessment \par  71 y/o male POD #3 from robotic sub-total cholecystectomy with 19F kiesha drain,\par  complicated by bile leak, GI consulted, now s/p ERCP with stent on 23.\par  \par  \par  \par  Problem/Plan - 1:\par  - Problem: Bile duct leak.\par  - Plan: S/P ERCP with stent placement on 2023\par  Advance diet\par  Surgical follow up noted\par  No GI contraindication to discharge, if remains stable and tolerating diet\par  Outpatient follow up with Dr. Spangler for stent retrieval timing TBD.\par  \par  Problem/Plan - 2:\par  - Problem: Bacteremia.\par  - Plan: Klebsiella bacteremia\par  Day 8/?, unknown duration\par  ID following.\par  \par  Problem/Plan - 3:\par  - Problem: ESRD on dialysis.\par  - Plan: Nephrology recommendations reviewed\par  HD Today.\par  \par  Attestation Statements:\par  Attestation Statements:\par  Attending and PA/NP shared services statement (NON-critical care):\par  \par  Attending to bill.\par  \par  I attest my time as attending is greater than 50% of the total combined time\par  spent on qualifying patient care activities by the PA/NP and attending.\par  \par  I have made amendments to the documentation where necessary. Additional\par  comments: Agree.\par  \par  \par  Electronic Signatures:\par  Ashish Spangler (MD) (Signed 2023 15:01)\par  Entered: Attestation Statements\par  Authored: Progress Note, Reason for Admission, Subjective and Objective,\par  Assessment and Plan, Attestation Statements\par  Rosa Florence (NP) (Entered 2023 09:36)\par  Entered: Progress Note, Reason for Admission, Subjective and Objective,\par  Assessment and Plan\par

## 2024-03-21 NOTE — ASSESSMENT
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  Abx rx'd to hold  Plan:  MRCP Labs  Next visit COLOGUARD vs. colonoscopy

## 2024-03-21 NOTE — PHYSICAL EXAM
[Alert] : alert [Healthy Appearing] : healthy appearing [Normal Voice/Communication] : normal voice/communication [No Acute Distress] : no acute distress [Sclera] : the sclera and conjunctiva were normal [Hearing Threshold Finger Rub Not [place]] : hearing was normal [Normal Lips/Gums] : the lips and gums were normal [Oropharynx] : the oropharynx was normal [Normal Appearance] : the appearance of the neck was normal [No Respiratory Distress] : no respiratory distress [No Neck Mass] : no neck mass was observed [No Acc Muscle Use] : no accessory muscle use [Auscultation Breath Sounds / Voice Sounds] : lungs were clear to auscultation bilaterally [Respiration, Rhythm And Depth] : normal respiratory rhythm and effort [Normal S1, S2] : normal S1 and S2 [Heart Rate And Rhythm] : heart rate was normal and rhythm regular [Murmurs] : no murmurs [Bowel Sounds] : normal bowel sounds [Abdomen Tenderness] : non-tender [No Masses] : no abdominal mass palpated [Abdomen Soft] : soft [] : no hepatosplenomegaly [Oriented To Time, Place, And Person] : oriented to person, place, and time [de-identified] : multiple healing scars.  drain in situ without fluid [de-identified] : in wheelchair, left leg atrophy (hx/o polio)

## 2024-03-25 LAB
ALBUMIN SERPL ELPH-MCNC: 4.2 G/DL
ALP BLD-CCNC: 85 U/L
ALT SERPL-CCNC: 20 U/L
AMYLASE/CREAT SERPL: 102 U/L
ANION GAP SERPL CALC-SCNC: 13 MMOL/L
AST SERPL-CCNC: 15 U/L
BILIRUB SERPL-MCNC: 0.3 MG/DL
BUN SERPL-MCNC: 36 MG/DL
CALCIUM SERPL-MCNC: 9.8 MG/DL
CHLORIDE SERPL-SCNC: 100 MMOL/L
CO2 SERPL-SCNC: 27 MMOL/L
CREAT SERPL-MCNC: 4.24 MG/DL
EGFR: 14 ML/MIN/1.73M2
GLUCOSE SERPL-MCNC: 91 MG/DL
HCT VFR BLD CALC: 38.5 %
HGB BLD-MCNC: 12.5 G/DL
LPL SERPL-CCNC: 52 U/L
MCHC RBC-ENTMCNC: 30.6 PG
MCHC RBC-ENTMCNC: 32.5 GM/DL
MCV RBC AUTO: 94.4 FL
PLATELET # BLD AUTO: 233 K/UL
POTASSIUM SERPL-SCNC: 4.9 MMOL/L
PROT SERPL-MCNC: 6.6 G/DL
RBC # BLD: 4.08 M/UL
RBC # FLD: 14.6 %
SODIUM SERPL-SCNC: 140 MMOL/L
WBC # FLD AUTO: 5.03 K/UL

## 2024-04-04 ENCOUNTER — OUTPATIENT (OUTPATIENT)
Dept: OUTPATIENT SERVICES | Facility: HOSPITAL | Age: 74
LOS: 1 days | End: 2024-04-04
Payer: MEDICARE

## 2024-04-04 ENCOUNTER — APPOINTMENT (OUTPATIENT)
Dept: MRI IMAGING | Facility: CLINIC | Age: 74
End: 2024-04-04
Payer: MEDICARE

## 2024-04-04 DIAGNOSIS — R10.11 RIGHT UPPER QUADRANT PAIN: ICD-10-CM

## 2024-04-04 PROCEDURE — 74183 MRI ABD W/O CNTR FLWD CNTR: CPT | Mod: 26,MH

## 2024-04-04 PROCEDURE — 74183 MRI ABD W/O CNTR FLWD CNTR: CPT

## 2024-04-18 ENCOUNTER — APPOINTMENT (OUTPATIENT)
Dept: GASTROENTEROLOGY | Facility: CLINIC | Age: 74
End: 2024-04-18
Payer: MEDICARE

## 2024-04-18 VITALS
TEMPERATURE: 98 F | RESPIRATION RATE: 16 BRPM | SYSTOLIC BLOOD PRESSURE: 160 MMHG | OXYGEN SATURATION: 99 % | BODY MASS INDEX: 24.75 KG/M2 | HEIGHT: 64 IN | HEART RATE: 91 BPM | DIASTOLIC BLOOD PRESSURE: 84 MMHG | WEIGHT: 145 LBS

## 2024-04-18 DIAGNOSIS — Z94.4 OTHER SPECIFIED POSTPROCEDURAL STATES: ICD-10-CM

## 2024-04-18 DIAGNOSIS — R10.11 RIGHT UPPER QUADRANT PAIN: ICD-10-CM

## 2024-04-18 DIAGNOSIS — Z98.890 OTHER SPECIFIED POSTPROCEDURAL STATES: ICD-10-CM

## 2024-04-18 PROCEDURE — 99215 OFFICE O/P EST HI 40 MIN: CPT

## 2024-04-18 RX ORDER — METRONIDAZOLE 500 MG/1
500 TABLET ORAL TWICE DAILY
Qty: 10 | Refills: 0 | Status: COMPLETED | COMMUNITY
Start: 2024-03-21 | End: 2024-04-18

## 2024-04-18 RX ORDER — IRON,CARB/VIT C/VIT B12/FOLIC 100-250-1
TABLET ORAL
Refills: 0 | Status: COMPLETED | COMMUNITY
End: 2024-04-18

## 2024-04-18 RX ORDER — CIPROFLOXACIN HYDROCHLORIDE 500 MG/1
500 TABLET, FILM COATED ORAL
Qty: 10 | Refills: 0 | Status: COMPLETED | COMMUNITY
Start: 2024-03-21 | End: 2024-04-18

## 2024-04-18 RX ORDER — URSODIOL 250 MG/1
250 TABLET ORAL TWICE DAILY
Qty: 120 | Refills: 1 | Status: ACTIVE | COMMUNITY
Start: 2024-04-18 | End: 1900-01-01

## 2024-05-15 NOTE — DISCHARGE NOTE PROVIDER - CARE PROVIDERS DIRECT ADDRESSES
Visit Discharge/Physician Orders     Discharge condition: Stable     Assessment of pain at discharge: yes     Anesthetic used:  lidocaine 4%      Discharge to: Home     Left via:Private automobile     Accompanied by: accompanied by none     ECF/HHA: [facility]     Dressing Orders:Cleanse wound to right leg with normal saline, apply ALGINATE AG to wound bed and cover ABD and wrap with UNNA boot and coban from base of toes to base of knees- change weekly at wound care center and twice a week at home with home health. (Please order [place] CoFlex TLC Calamine two layer for three times a week changes)      Keep wrap dry at all times. If wrap becomes wet, becomes painful or falls 2 inches- may remove wrap- do daily dressing changes and apply spandigrip.      Treatment Orders:Vasculars to be done 4/4- reviewed    EAT DIET WITH PROTEINS AND VITAMIN C  TAKE A MULTIVITAMIN DAILY IF NOT CONTRAINDICATED    4/15 Miconazole Ointment prescription sent to pharmacy- apply to feet, toes, and legs- avoiding wounds twice a day for one month -  at prescription   4/22 Culture taken - On doxycyline      [facility] followup visit _____1 week _______________________  (Please note your next appointment above and if you are unable to keep, kindly give a 24 hour notice. Thank you.)     Physician signature:__________________________        If you experience any of the following, please call the Wound Care Center during business hours:     * Increase in Pain  * Temperature over 101  * Increase in drainage from your wound  * Drainage with a foul odor  * Bleeding  * Increase in swelling  * Need for compression bandage changes due to slippage, breakthrough drainage.     If you need medical attention outside of the business hours of the Wound Care Centers please contact your PCP or go to the nearest emergency room.          ,DirectAddress_Unknown,DirectAddress_Unknown ,DirectAddress_Unknown,DirectAddress_Unknown,DirectAddress_Unknown

## 2024-05-16 ENCOUNTER — NON-APPOINTMENT (OUTPATIENT)
Age: 74
End: 2024-05-16

## 2024-05-16 RX ORDER — AMLODIPINE BESYLATE 10 MG/1
10 TABLET ORAL
Refills: 0 | Status: ACTIVE | COMMUNITY

## 2024-05-17 ENCOUNTER — APPOINTMENT (OUTPATIENT)
Dept: GASTROENTEROLOGY | Facility: HOSPITAL | Age: 74
End: 2024-05-17

## 2024-05-17 ENCOUNTER — OUTPATIENT (OUTPATIENT)
Dept: OUTPATIENT SERVICES | Facility: HOSPITAL | Age: 74
LOS: 1 days | End: 2024-05-17
Payer: MEDICARE

## 2024-05-17 DIAGNOSIS — Z90.5 ACQUIRED ABSENCE OF KIDNEY: Chronic | ICD-10-CM

## 2024-05-17 DIAGNOSIS — R10.11 RIGHT UPPER QUADRANT PAIN: ICD-10-CM

## 2024-05-17 DIAGNOSIS — Z98.890 OTHER SPECIFIED POSTPROCEDURAL STATES: ICD-10-CM

## 2024-05-17 DIAGNOSIS — K80.50 CALCULUS OF BILE DUCT WITHOUT CHOLANGITIS OR CHOLECYSTITIS WITHOUT OBSTRUCTION: ICD-10-CM

## 2024-05-17 DIAGNOSIS — Z86.010 PERSONAL HISTORY OF COLONIC POLYPS: ICD-10-CM

## 2024-05-17 PROCEDURE — 43264 ERCP REMOVE DUCT CALCULI: CPT

## 2024-05-17 PROCEDURE — 43273 ENDOSCOPIC PANCREATOSCOPY: CPT

## 2024-05-17 PROCEDURE — C9399: CPT

## 2024-05-17 PROCEDURE — C1773: CPT

## 2024-05-17 PROCEDURE — 74330 X-RAY BILE/PANC ENDOSCOPY: CPT

## 2024-05-17 PROCEDURE — 43273 ENDOSCOPIC PANCREATOSCOPY: CPT | Mod: 59

## 2024-05-17 PROCEDURE — 43262 ENDO CHOLANGIOPANCREATOGRAPH: CPT

## 2024-05-17 PROCEDURE — C1769: CPT

## 2024-05-17 DEVICE — CATH BLLN BIL RX 9-12CM: Type: IMPLANTABLE DEVICE | Status: FUNCTIONAL

## 2024-05-17 DEVICE — HYDRATOME 44: Type: IMPLANTABLE DEVICE | Status: FUNCTIONAL

## 2024-05-17 DEVICE — GWIRE STRT JAGWIRE 0.035IN X 450: Type: IMPLANTABLE DEVICE | Status: FUNCTIONAL

## 2024-05-17 RX ORDER — SODIUM CHLORIDE 9 MG/ML
500 INJECTION INTRAMUSCULAR; INTRAVENOUS; SUBCUTANEOUS
Refills: 0 | Status: COMPLETED | OUTPATIENT
Start: 2024-05-17 | End: 2024-05-17

## 2024-05-17 RX ORDER — INDOMETHACIN 50 MG
100 CAPSULE ORAL ONCE
Refills: 0 | Status: COMPLETED | OUTPATIENT
Start: 2024-05-17 | End: 2024-05-17

## 2024-05-17 RX ORDER — CEFAZOLIN SODIUM 1 G
2000 VIAL (EA) INJECTION ONCE
Refills: 0 | Status: DISCONTINUED | OUTPATIENT
Start: 2024-05-17 | End: 2024-06-01

## 2024-05-17 RX ADMIN — SODIUM CHLORIDE 75 MILLILITER(S): 9 INJECTION INTRAMUSCULAR; INTRAVENOUS; SUBCUTANEOUS at 13:11

## 2024-05-17 RX ADMIN — Medication 100 MILLIGRAM(S): at 13:44

## 2024-05-17 NOTE — ASSESSMENT
[FreeTextEntry1] : My impression is that of choledocholithiasis and cholelithiasis.  Plan:    IVANIA BID  ERCP with lithotripsy Start abx if fever  Will ask last gi about surveillance.

## 2024-05-17 NOTE — HISTORY OF PRESENT ILLNESS
[de-identified] : 6/22/2023 for leak, pd and cbd stented [de-identified] : ACC: [id number]     EXAM:  CT ABDOMEN AND PELVIS   ORDERED BY: YUKI LICEA  ACC: [id number]     EXAM:  CT CHEST   ORDERED BY: YUKI LICEA  PROCEDURE DATE:  06/20/2023    INTERPRETATION:  CLINICAL INFORMATION: Klebsiella bacteremia  COMPARISON: CT abdomen pelvis 7/11/2019  CONTRAST/COMPLICATIONS: IV Contrast: Oral Contrast: Complications:  PROCEDURE: CT of the Chest, Abdomen and Pelvis was performed. Sagittal and coronal reformats were performed.  FINDINGS: CHEST: LUNGS AND LARGE AIRWAYS: Patent central airways. Scattered calcified granulomas. No pulmonary consolidation. Trace bibasilar dependent atelectasis. PLEURA: No pleural effusion. VESSELS: Atherosclerotic changes of the aorta and coronary arteries. HEART: Heart size is normal. No pericardial effusion. MEDIASTINUM AND NALLELY: No lymphadenopathy. CHEST WALL AND LOWER NECK: Within normal limits.  ABDOMEN AND PELVIS: LIVER: Within normal limits. BILE DUCTS: Normal caliber. GALLBLADDER: Cholelithiasis gallbladder wall thickening and pericholecystic inflammation is noted. SPLEEN: Within normal limits. PANCREAS: Within normal limits. ADRENALS: Within normal limits. KIDNEYS/URETERS: Left nephrectomy. Polycystic enlarged right kidney. Multiple hyperdense lesions are again noted, incompletely characterized without intravenous contrast. No hydronephrosis.  BLADDER: Within normal limits. REPRODUCTIVE ORGANS: Prostate is enlarged.  BOWEL: No bowel obstruction. Appendix is normal. PERITONEUM: No ascites. VESSELS: Atherosclerotic changes. RETROPERITONEUM/LYMPH NODES: No lymphadenopathy. ABDOMINAL WALL: Atrophic left psoas and pelvic girdle musculature. BONES: Degenerative changes.  IMPRESSION: No acute pulmonary pathology.  CT findings  [FreeTextEntry1] : PROCEDURE DATE:  08/26/2023    INTERPRETATION:  CLINICAL INDICATION: Fever, shaking rigors, sepsis, sp ercp and cholangeogram yesterday ro perforation.  PROCEDURE: CT of the chest, abdomen and pelvis was performed with intravenous contrast. Coronal and sagittal reconstruction images were obtained.  CONTRAST/COMPLICATIONS: IV Contrast: Omnipaque 350 (accession [id number]), IV contrast documented in unlinked concurrent exam (accession [id number])  80 cc administered   20 cc discarded Oral Contrast: NONE Complications: None reported at time of study completion  COMPARISON: 7/17/2023. 8/24/2023.  FINDINGS:  CHEST:  LUNGS AND AIRWAYS: Patent central airways. Atelectasis. Paraseptal emphysema. Scattered calcified granulomata. Stable small nodule floor intrafissural lymph node along the right major fissure. PLEURA: No pleural effusion or pneumothorax. HEART: Normal size. Trace pericardial effusion. VESSELS: Atherosclerosis. Normal caliber of the thoracic aorta. MEDIASTINUM AND NALLELY: No lymphadenopathy. CHEST WALL AND LOWER NECK: Bilateral gynecomastia.  ABDOMEN/PELVIS:  LIVER: Unremarkable. BILE DUCTS/GALLBLADDER: Cholecystectomy. Focus of air at the distended cystic duct remnant, reflecting recent post procedural changes. Biliary enhancement along the common bile duct and cystic duct remnant with surrounding stranding and trace fluid. Again noted, punctate densities at the distended cystic duct, suggesting stones. PANCREAS: Mild fatty atrophy. Prominent common bile duct at the head again noted. SPLEEN: Unremarkable.  ADRENALS: Unremarkable. KIDNEYS/URETERS: Left nephrectomy. Enlarged left kidney with multiple cysts, calcifications and subcentimeter hypodensities. No right hydroureteronephrosis. BLADDER: Partially distended. REPRODUCTIVE ORGANS: Enlarged prostate.  BOWEL: No bowel obstruction. Normal caliber of the appendix containing fecalith without inflammatory change.  Periampullary duodenal diverticulum. Underdistended stomach. Colon diverticulosis. PERITONEUM: No organized fluid collection or free air. Trace fluid at the perihepatic region and pelvis. VESSELS: Atherosclerosis. Normal caliber of the abdominal aorta. RETROPERITONEUM/LYMPH NODE: Subcentimeter lymph nodes. ABDOMINAL WALL/SOFT TISSUES: Small fat-containing umbilical and inguinal hernias. Again noted, marked muscle bulk loss and fatty replacement at the left pelvis and visualized proximal left lower extremity BONES: Degenerative changes of the spine. Stable endplate depression of the spine.  IMPRESSION:  No obvious intraperitoneal free air. Cholecystectomy. Focus of air at the distended cystic duct remnant, reflecting recent post procedural changes. Nonspecific biliary enhancement along the common bile duct and distended cystic duct remnant with surrounding stranding and trace fluid. These findings are nonspecific and can be seen in infection/inflammation (cholangitis) although neoplasm is not excluded. Suggest punctate stones at the cystic duct remnant.  Additional findings as described. [de-identified] : Progress Note:\par  - Provider Specialty Gastroenterology\par  \par  Reason for Admission:\par  Reason for Admission:\par  - Reason for Admission Klebsiella bacteremia\par  \par  \par  - Subjective and Objective:\par  INTERVAL HPI/OVERNIGHT EVENTS:\par  HPI:\par  \par  73 y/o male seen and examined.\par  \par  \par  MEDICATIONS (STANDING):\par  heparin Injectable 5000 Unit(s) SubCutaneous every 12 hours\par  indomethacin Suppository 100 milliGRAM(s) Rectal once\par  pantoprazole Tablet 40 milliGRAM(s) Oral before breakfast\par  piperacillin/tazobactam IVPB.. 3.375 Gram(s) IV Intermittent every 12 hours\par  tamsulosin 0.4 milliGRAM(s) Oral at bedtime\par  \par  MEDICATIONS (PRN):\par  acetaminophen Suppository .. 650 milliGRAM(s) Rectal every 6 hours PRN Temp\par  greater or equal to 38C (100.4F)\par  HYDROmorphone Injectable 2 milliGRAM(s) IV Push every 4 hours PRN Severe Pain\par  (7 - 10)\par  HYDROmorphone Injectable 0.5 milliGRAM(s) IV Push every 4 hours PRN Mild Pain\par  (1 - 3)\par  HYDROmorphone Injectable 1 milliGRAM(s) IV Push every 4 hours PRN Moderate\par  Pain (4 - 6)\par  senna 2 Tablet(s) Oral daily PRN Constipation\par  simethicone 80 milliGRAM(s) Chew two times a day PRN Gas\par  \par  \par  Allergies\par  \par  Demerol HCl (Unknown)\par  \par  Intolerances\par  \par  \par  \par  PAST MEDICAL & SURGICAL HISTORY:\par  Hypertension\par  \par  \par  Renal failure, chronic\par  \par  \par  Cancer of kidney, left\par  \par  \par  History of left nephrectomy\par  \par  PHYSICAL EXAM:\par  Vital Signs: Vital Signs Last 24 Hrs\par  T(C): 37 (2023 05:20), Max: 37.7 (2023 12:00)\par  T(F): 98.6 (2023 05:20), Max: 99.9 (2023 12:00)\par  HR: 73 (2023 05:20) (73 - 86)\par  BP: 130/75 (2023 05:20) (115/70 - 130/75)\par  BP(mean): --\par  RR: 19 (2023 05:20) (19 - 21)\par  SpO2: 97% (2023 05:20) (94% - 97%)\par  \par  Parameters below as of 2023 05:20\par  Patient On (Oxygen Delivery Method): room air\par  \par  \par  Daily\par  Daily Weight in k.5 (2023 05:20)I&O's Summary\par  \par  2023 07:01 - 2023 07:00\par  --------------------------------------------------------\par  IN: 350 mL / OUT: 1633 mL / NET: -1283 mL\par  \par  GENERAL: Appears stated age,\par  HEENT: NC/AT, conjunctivae clear and pink, sclera -anicteric\par  CHEST: Full & symmetric excursion, no increased effort, breath sounds clear\par  HEART: Regular rhythm, S1, S2, no murmur\par  ABDOMEN: Soft, non-tender, non-distended, normoactive bowel sounds,\par  EXTEREMITIES: no edema\par  SKIN: No rash/warm/dry\par  NEURO: Alert, oriented,\par  \par  \par  LABS:\par  \par  10.4\par  9.57 )-----------( 235 ( 2023 07:14 )\par  30.3\par  \par  \par  \par  \par  \par  \par  amylase\par  lipase\par  RADIOLOGY & ADDITIONAL TESTS:\par  \par  \par  \par  Assessment and Plan:\par  - Assessment \par  73 y/o male POD #3 from robotic sub-total cholecystectomy with 19F kiesha drain,\par  complicated by bile leak, GI consulted, now s/p ERCP with stent on 23.\par  \par  \par  \par  Problem/Plan - 1:\par  - Problem: Bile duct leak.\par  - Plan: S/P ERCP with stent placement on 2023\par  Advance diet\par  Surgical follow up noted\par  No GI contraindication to discharge, if remains stable and tolerating diet\par  Outpatient follow up with Dr. Spangler for stent retrieval timing TBD.\par  \par  Problem/Plan - 2:\par  - Problem: Bacteremia.\par  - Plan: Klebsiella bacteremia\par  Day 8/?, unknown duration\par  ID following.\par  \par  Problem/Plan - 3:\par  - Problem: ESRD on dialysis.\par  - Plan: Nephrology recommendations reviewed\par  HD Today.\par  \par  Attestation Statements:\par  Attestation Statements:\par  Attending and PA/NP shared services statement (NON-critical care):\par  \par  Attending to bill.\par  \par  I attest my time as attending is greater than 50% of the total combined time\par  spent on qualifying patient care activities by the PA/NP and attending.\par  \par  I have made amendments to the documentation where necessary. Additional\par  comments: Agree.\par  \par  \par  Electronic Signatures:\par  Ashish Spangler (MD) (Signed 2023 15:01)\par  Entered: Attestation Statements\par  Authored: Progress Note, Reason for Admission, Subjective and Objective,\par  Assessment and Plan, Attestation Statements\par  Rosa Florence (NP) (Entered 2023 09:36)\par  Entered: Progress Note, Reason for Admission, Subjective and Objective,\par  Assessment and Plan\par

## 2024-05-17 NOTE — PHYSICAL EXAM
[Alert] : alert [Normal Voice/Communication] : normal voice/communication [Healthy Appearing] : healthy appearing [No Acute Distress] : no acute distress [Sclera] : the sclera and conjunctiva were normal [Hearing Threshold Finger Rub Not [place]] : hearing was normal [Normal Lips/Gums] : the lips and gums were normal [Oropharynx] : the oropharynx was normal [Normal Appearance] : the appearance of the neck was normal [No Neck Mass] : no neck mass was observed [No Respiratory Distress] : no respiratory distress [No Acc Muscle Use] : no accessory muscle use [Respiration, Rhythm And Depth] : normal respiratory rhythm and effort [Auscultation Breath Sounds / Voice Sounds] : lungs were clear to auscultation bilaterally [Heart Rate And Rhythm] : heart rate was normal and rhythm regular [Normal S1, S2] : normal S1 and S2 [Murmurs] : no murmurs [Bowel Sounds] : normal bowel sounds [Abdomen Tenderness] : non-tender [No Masses] : no abdominal mass palpated [Abdomen Soft] : soft [] : no hepatosplenomegaly [Oriented To Time, Place, And Person] : oriented to person, place, and time [de-identified] : walks with crutches

## 2024-05-21 ENCOUNTER — APPOINTMENT (OUTPATIENT)
Dept: GASTROENTEROLOGY | Facility: CLINIC | Age: 74
End: 2024-05-21
Payer: MEDICARE

## 2024-05-21 VITALS — OXYGEN SATURATION: 97 %

## 2024-05-21 VITALS
WEIGHT: 145 LBS | TEMPERATURE: 98.7 F | BODY MASS INDEX: 24.89 KG/M2 | RESPIRATION RATE: 97 BRPM | HEART RATE: 116 BPM | DIASTOLIC BLOOD PRESSURE: 70 MMHG | SYSTOLIC BLOOD PRESSURE: 140 MMHG

## 2024-05-21 DIAGNOSIS — K80.50 CALCULUS OF BILE DUCT W/OUT CHOLANGITIS OR CHOLECYSTITIS W/OUT OBSTRUCTION: ICD-10-CM

## 2024-05-21 DIAGNOSIS — R79.89 OTHER SPECIFIED ABNORMAL FINDINGS OF BLOOD CHEMISTRY: ICD-10-CM

## 2024-05-21 DIAGNOSIS — Z86.010 PERSONAL HISTORY OF COLONIC POLYPS: ICD-10-CM

## 2024-05-21 LAB
ALBUMIN SERPL ELPH-MCNC: 4.2 G/DL
ALP BLD-CCNC: 92 U/L
ALT SERPL-CCNC: <5 U/L
ANION GAP SERPL CALC-SCNC: 15 MMOL/L
AST SERPL-CCNC: 13 U/L
BILIRUB SERPL-MCNC: 0.6 MG/DL
BUN SERPL-MCNC: 25 MG/DL
CALCIUM SERPL-MCNC: 9.5 MG/DL
CHLORIDE SERPL-SCNC: 102 MMOL/L
CO2 SERPL-SCNC: 24 MMOL/L
CREAT SERPL-MCNC: 5.03 MG/DL
EGFR: 11 ML/MIN/1.73M2
GLUCOSE SERPL-MCNC: 85 MG/DL
HCT VFR BLD CALC: 34.5 %
HGB BLD-MCNC: 11.5 G/DL
MCHC RBC-ENTMCNC: 30.6 PG
MCHC RBC-ENTMCNC: 33.3 GM/DL
MCV RBC AUTO: 91.8 FL
PLATELET # BLD AUTO: 133 K/UL
POTASSIUM SERPL-SCNC: 5.1 MMOL/L
PROT SERPL-MCNC: 6.4 G/DL
RBC # BLD: 3.76 M/UL
RBC # FLD: 14.3 %
SODIUM SERPL-SCNC: 141 MMOL/L
WBC # FLD AUTO: 6.33 K/UL

## 2024-05-21 PROCEDURE — 99215 OFFICE O/P EST HI 40 MIN: CPT

## 2024-05-21 RX ORDER — METRONIDAZOLE 500 MG/1
500 TABLET ORAL
Refills: 0 | Status: ACTIVE | COMMUNITY

## 2024-05-21 RX ORDER — CIPROFLOXACIN HYDROCHLORIDE 500 MG/1
500 TABLET, FILM COATED ORAL
Refills: 0 | Status: ACTIVE | COMMUNITY

## 2024-05-21 NOTE — ASSESSMENT
[FreeTextEntry1] : My impression is that of choledocholithiasis , s/p ercp with subsequent chills.  Continue on Cipro/Flagyl to complete 5 days.  CMP, CB  Will ask last gi about surveillance.  RTO 3 months.

## 2024-05-21 NOTE — HISTORY OF PRESENT ILLNESS
[de-identified] : 6/22/2023 for leak, pd and cbd stented [de-identified] : ACC: [id number]     EXAM:  CT ABDOMEN AND PELVIS   ORDERED BY: YUKI LICEA  ACC: [id number]     EXAM:  CT CHEST   ORDERED BY: YUKI LICEA  PROCEDURE DATE:  06/20/2023    INTERPRETATION:  CLINICAL INFORMATION: Klebsiella bacteremia  COMPARISON: CT abdomen pelvis 7/11/2019  CONTRAST/COMPLICATIONS: IV Contrast: Oral Contrast: Complications:  PROCEDURE: CT of the Chest, Abdomen and Pelvis was performed. Sagittal and coronal reformats were performed.  FINDINGS: CHEST: LUNGS AND LARGE AIRWAYS: Patent central airways. Scattered calcified granulomas. No pulmonary consolidation. Trace bibasilar dependent atelectasis. PLEURA: No pleural effusion. VESSELS: Atherosclerotic changes of the aorta and coronary arteries. HEART: Heart size is normal. No pericardial effusion. MEDIASTINUM AND NALLELY: No lymphadenopathy. CHEST WALL AND LOWER NECK: Within normal limits.  ABDOMEN AND PELVIS: LIVER: Within normal limits. BILE DUCTS: Normal caliber. GALLBLADDER: Cholelithiasis gallbladder wall thickening and pericholecystic inflammation is noted. SPLEEN: Within normal limits. PANCREAS: Within normal limits. ADRENALS: Within normal limits. KIDNEYS/URETERS: Left nephrectomy. Polycystic enlarged right kidney. Multiple hyperdense lesions are again noted, incompletely characterized without intravenous contrast. No hydronephrosis.  BLADDER: Within normal limits. REPRODUCTIVE ORGANS: Prostate is enlarged.  BOWEL: No bowel obstruction. Appendix is normal. PERITONEUM: No ascites. VESSELS: Atherosclerotic changes. RETROPERITONEUM/LYMPH NODES: No lymphadenopathy. ABDOMINAL WALL: Atrophic left psoas and pelvic girdle musculature. BONES: Degenerative changes.  IMPRESSION: No acute pulmonary pathology.  CT findings  [FreeTextEntry1] : PROCEDURE DATE:  08/26/2023    INTERPRETATION:  CLINICAL INDICATION: Fever, shaking rigors, sepsis, sp ercp and cholangeogram yesterday ro perforation.  PROCEDURE: CT of the chest, abdomen and pelvis was performed with intravenous contrast. Coronal and sagittal reconstruction images were obtained.  CONTRAST/COMPLICATIONS: IV Contrast: Omnipaque 350 (accession [id number]), IV contrast documented in unlinked concurrent exam (accession [id number])  80 cc administered   20 cc discarded Oral Contrast: NONE Complications: None reported at time of study completion  COMPARISON: 7/17/2023. 8/24/2023.  FINDINGS:  CHEST:  LUNGS AND AIRWAYS: Patent central airways. Atelectasis. Paraseptal emphysema. Scattered calcified granulomata. Stable small nodule floor intrafissural lymph node along the right major fissure. PLEURA: No pleural effusion or pneumothorax. HEART: Normal size. Trace pericardial effusion. VESSELS: Atherosclerosis. Normal caliber of the thoracic aorta. MEDIASTINUM AND NALLELY: No lymphadenopathy. CHEST WALL AND LOWER NECK: Bilateral gynecomastia.  ABDOMEN/PELVIS:  LIVER: Unremarkable. BILE DUCTS/GALLBLADDER: Cholecystectomy. Focus of air at the distended cystic duct remnant, reflecting recent post procedural changes. Biliary enhancement along the common bile duct and cystic duct remnant with surrounding stranding and trace fluid. Again noted, punctate densities at the distended cystic duct, suggesting stones. PANCREAS: Mild fatty atrophy. Prominent common bile duct at the head again noted. SPLEEN: Unremarkable.  ADRENALS: Unremarkable. KIDNEYS/URETERS: Left nephrectomy. Enlarged left kidney with multiple cysts, calcifications and subcentimeter hypodensities. No right hydroureteronephrosis. BLADDER: Partially distended. REPRODUCTIVE ORGANS: Enlarged prostate.  BOWEL: No bowel obstruction. Normal caliber of the appendix containing fecalith without inflammatory change.  Periampullary duodenal diverticulum. Underdistended stomach. Colon diverticulosis. PERITONEUM: No organized fluid collection or free air. Trace fluid at the perihepatic region and pelvis. VESSELS: Atherosclerosis. Normal caliber of the abdominal aorta. RETROPERITONEUM/LYMPH NODE: Subcentimeter lymph nodes. ABDOMINAL WALL/SOFT TISSUES: Small fat-containing umbilical and inguinal hernias. Again noted, marked muscle bulk loss and fatty replacement at the left pelvis and visualized proximal left lower extremity BONES: Degenerative changes of the spine. Stable endplate depression of the spine.  IMPRESSION:  No obvious intraperitoneal free air. Cholecystectomy. Focus of air at the distended cystic duct remnant, reflecting recent post procedural changes. Nonspecific biliary enhancement along the common bile duct and distended cystic duct remnant with surrounding stranding and trace fluid. These findings are nonspecific and can be seen in infection/inflammation (cholangitis) although neoplasm is not excluded. Suggest punctate stones at the cystic duct remnant.  Additional findings as described. [de-identified] : Progress Note:\par  - Provider Specialty Gastroenterology\par  \par  Reason for Admission:\par  Reason for Admission:\par  - Reason for Admission Klebsiella bacteremia\par  \par  \par  - Subjective and Objective:\par  INTERVAL HPI/OVERNIGHT EVENTS:\par  HPI:\par  \par  71 y/o male seen and examined.\par  \par  \par  MEDICATIONS (STANDING):\par  heparin Injectable 5000 Unit(s) SubCutaneous every 12 hours\par  indomethacin Suppository 100 milliGRAM(s) Rectal once\par  pantoprazole Tablet 40 milliGRAM(s) Oral before breakfast\par  piperacillin/tazobactam IVPB.. 3.375 Gram(s) IV Intermittent every 12 hours\par  tamsulosin 0.4 milliGRAM(s) Oral at bedtime\par  \par  MEDICATIONS (PRN):\par  acetaminophen Suppository .. 650 milliGRAM(s) Rectal every 6 hours PRN Temp\par  greater or equal to 38C (100.4F)\par  HYDROmorphone Injectable 2 milliGRAM(s) IV Push every 4 hours PRN Severe Pain\par  (7 - 10)\par  HYDROmorphone Injectable 0.5 milliGRAM(s) IV Push every 4 hours PRN Mild Pain\par  (1 - 3)\par  HYDROmorphone Injectable 1 milliGRAM(s) IV Push every 4 hours PRN Moderate\par  Pain (4 - 6)\par  senna 2 Tablet(s) Oral daily PRN Constipation\par  simethicone 80 milliGRAM(s) Chew two times a day PRN Gas\par  \par  \par  Allergies\par  \par  Demerol HCl (Unknown)\par  \par  Intolerances\par  \par  \par  \par  PAST MEDICAL & SURGICAL HISTORY:\par  Hypertension\par  \par  \par  Renal failure, chronic\par  \par  \par  Cancer of kidney, left\par  \par  \par  History of left nephrectomy\par  \par  PHYSICAL EXAM:\par  Vital Signs: Vital Signs Last 24 Hrs\par  T(C): 37 (2023 05:20), Max: 37.7 (2023 12:00)\par  T(F): 98.6 (2023 05:20), Max: 99.9 (2023 12:00)\par  HR: 73 (2023 05:20) (73 - 86)\par  BP: 130/75 (2023 05:20) (115/70 - 130/75)\par  BP(mean): --\par  RR: 19 (2023 05:20) (19 - 21)\par  SpO2: 97% (2023 05:20) (94% - 97%)\par  \par  Parameters below as of 2023 05:20\par  Patient On (Oxygen Delivery Method): room air\par  \par  \par  Daily\par  Daily Weight in k.5 (2023 05:20)I&O's Summary\par  \par  2023 07:01 - 2023 07:00\par  --------------------------------------------------------\par  IN: 350 mL / OUT: 1633 mL / NET: -1283 mL\par  \par  GENERAL: Appears stated age,\par  HEENT: NC/AT, conjunctivae clear and pink, sclera -anicteric\par  CHEST: Full & symmetric excursion, no increased effort, breath sounds clear\par  HEART: Regular rhythm, S1, S2, no murmur\par  ABDOMEN: Soft, non-tender, non-distended, normoactive bowel sounds,\par  EXTEREMITIES: no edema\par  SKIN: No rash/warm/dry\par  NEURO: Alert, oriented,\par  \par  \par  LABS:\par  \par  10.4\par  9.57 )-----------( 235 ( 2023 07:14 )\par  30.3\par  \par  \par  \par  \par  \par  \par  amylase\par  lipase\par  RADIOLOGY & ADDITIONAL TESTS:\par  \par  \par  \par  Assessment and Plan:\par  - Assessment \par  71 y/o male POD #3 from robotic sub-total cholecystectomy with 19F kiesha drain,\par  complicated by bile leak, GI consulted, now s/p ERCP with stent on 23.\par  \par  \par  \par  Problem/Plan - 1:\par  - Problem: Bile duct leak.\par  - Plan: S/P ERCP with stent placement on 2023\par  Advance diet\par  Surgical follow up noted\par  No GI contraindication to discharge, if remains stable and tolerating diet\par  Outpatient follow up with Dr. Spangler for stent retrieval timing TBD.\par  \par  Problem/Plan - 2:\par  - Problem: Bacteremia.\par  - Plan: Klebsiella bacteremia\par  Day 8/?, unknown duration\par  ID following.\par  \par  Problem/Plan - 3:\par  - Problem: ESRD on dialysis.\par  - Plan: Nephrology recommendations reviewed\par  HD Today.\par  \par  Attestation Statements:\par  Attestation Statements:\par  Attending and PA/NP shared services statement (NON-critical care):\par  \par  Attending to bill.\par  \par  I attest my time as attending is greater than 50% of the total combined time\par  spent on qualifying patient care activities by the PA/NP and attending.\par  \par  I have made amendments to the documentation where necessary. Additional\par  comments: Agree.\par  \par  \par  Electronic Signatures:\par  Ashish Spangler (MD) (Signed 2023 15:01)\par  Entered: Attestation Statements\par  Authored: Progress Note, Reason for Admission, Subjective and Objective,\par  Assessment and Plan, Attestation Statements\par  Rosa Florence (NP) (Entered 2023 09:36)\par  Entered: Progress Note, Reason for Admission, Subjective and Objective,\par  Assessment and Plan\par

## 2024-05-21 NOTE — PHYSICAL EXAM

## 2024-06-04 ENCOUNTER — APPOINTMENT (OUTPATIENT)
Dept: GASTROENTEROLOGY | Facility: CLINIC | Age: 74
End: 2024-06-04

## 2024-06-04 LAB
PSA FREE FLD-MCNC: 63 %
PSA FREE SERPL-MCNC: 0.86 NG/ML
PSA SERPL-MCNC: 1.35 NG/ML

## 2024-06-06 ENCOUNTER — APPOINTMENT (OUTPATIENT)
Dept: UROLOGY | Facility: CLINIC | Age: 74
End: 2024-06-06

## 2024-06-12 NOTE — H&P ADULT - NSHPSOCIALHISTORY_GEN_ALL_CORE
lives alone   retired   social etoh What Type Of Note Output Would You Prefer (Optional)?: Standard Output Hpi Title: Evaluation of Skin Lesions How Severe Are Your Spot(S)?: moderate

## 2024-07-10 DIAGNOSIS — N40.0 BENIGN PROSTATIC HYPERPLASIA WITHOUT LOWER URINARY TRACT SYMPMS: ICD-10-CM

## 2024-07-11 ENCOUNTER — APPOINTMENT (OUTPATIENT)
Dept: UROLOGY | Facility: CLINIC | Age: 74
End: 2024-07-11
Payer: MEDICARE

## 2024-07-11 VITALS — SYSTOLIC BLOOD PRESSURE: 172 MMHG | DIASTOLIC BLOOD PRESSURE: 88 MMHG | OXYGEN SATURATION: 96 % | HEART RATE: 99 BPM

## 2024-07-11 DIAGNOSIS — N13.8 BENIGN PROSTATIC HYPERPLASIA WITH LOWER URINARY TRACT SYMPMS: ICD-10-CM

## 2024-07-11 DIAGNOSIS — N40.1 BENIGN PROSTATIC HYPERPLASIA WITH LOWER URINARY TRACT SYMPMS: ICD-10-CM

## 2024-07-11 DIAGNOSIS — C61 MALIGNANT NEOPLASM OF PROSTATE: ICD-10-CM

## 2024-07-11 PROCEDURE — 99214 OFFICE O/P EST MOD 30 MIN: CPT

## 2024-07-11 PROCEDURE — 51798 US URINE CAPACITY MEASURE: CPT

## 2024-08-20 ENCOUNTER — APPOINTMENT (OUTPATIENT)
Dept: GASTROENTEROLOGY | Facility: CLINIC | Age: 74
End: 2024-08-20
Payer: MEDICARE

## 2024-08-20 VITALS
BODY MASS INDEX: 24.75 KG/M2 | DIASTOLIC BLOOD PRESSURE: 80 MMHG | HEART RATE: 77 BPM | WEIGHT: 145 LBS | TEMPERATURE: 98 F | HEIGHT: 64 IN | RESPIRATION RATE: 16 BRPM | OXYGEN SATURATION: 98 % | SYSTOLIC BLOOD PRESSURE: 165 MMHG

## 2024-08-20 DIAGNOSIS — R79.89 OTHER SPECIFIED ABNORMAL FINDINGS OF BLOOD CHEMISTRY: ICD-10-CM

## 2024-08-20 DIAGNOSIS — Z98.890 OTHER SPECIFIED POSTPROCEDURAL STATES: ICD-10-CM

## 2024-08-20 DIAGNOSIS — Z86.010 PERSONAL HISTORY OF COLONIC POLYPS: ICD-10-CM

## 2024-08-20 PROCEDURE — 99215 OFFICE O/P EST HI 40 MIN: CPT

## 2024-08-23 LAB
ALBUMIN SERPL ELPH-MCNC: 4.4 G/DL
ALP BLD-CCNC: 107 U/L
ALT SERPL-CCNC: 12 U/L
ANION GAP SERPL CALC-SCNC: 13 MMOL/L
AST SERPL-CCNC: 11 U/L
BILIRUB SERPL-MCNC: 0.4 MG/DL
BUN SERPL-MCNC: 34 MG/DL
CALCIUM SERPL-MCNC: 10.2 MG/DL
CHLORIDE SERPL-SCNC: 101 MMOL/L
CO2 SERPL-SCNC: 27 MMOL/L
CREAT SERPL-MCNC: 4.59 MG/DL
EGFR: 13 ML/MIN/1.73M2
GLUCOSE SERPL-MCNC: 104 MG/DL
POTASSIUM SERPL-SCNC: 5.7 MMOL/L
PROT SERPL-MCNC: 7 G/DL
SODIUM SERPL-SCNC: 141 MMOL/L

## 2024-08-27 NOTE — ED ADULT NURSE NOTE - PHONE #
Detail Level: Detailed Add 13463 Cpt? (Important Note: In 2017 The Use Of 12964 Is Being Tracked By Cms To Determine Future Global Period Reimbursement For Global Periods): yes [phone]

## 2024-10-14 NOTE — PATIENT PROFILE ADULT - WILL THE PATIENT ACCEPT THE PFIZER COVID-19 VACCINE IF ELIGIBLE AND IT IS AVAILABLE?
Assessment/Plan:    Buffy Colón came into the [facility] Office today 10/14/24 to receive Prolia injection.      Verbal consent obtained.  Consent given by: patient    patient states patient has been medically healthy with no underlining concerns/complications.      Buffy Colón presents with no symptoms today.       All insturctions were reviewed with the patient.    If the patient should have any questions/concerns, advised patient to contacted [facility].       Subjective:     History provided by: patient    Patient ID: Buffy Colón is a 89 y.o. female      Objective:    There were no vitals filed for this visit.    Patient tolerated the injection well without any complications.  Injection site/s Right arm.  Medication was provided by Rheumatology.    Patient signed consent form yes   Patient signed ABN form no (If no patient is not a medicare patient).   Patient waited 15 minutes after injection no (This only applies to patient's receiving first time injection).       Last Visit: Visit date not found  Next visit:Visit date not found     
No

## 2024-10-29 NOTE — PATIENT PROFILE ADULT - FALL HARM RISK - PATIENT NEEDS ASSISTANCE
Patient Education   Preventive Care Advice   This is general advice given by our system to help you stay healthy. However, your care team may have specific advice just for you. Please talk to your care team about your preventive care needs.  Nutrition  Eat 5 or more servings of fruits and vegetables each day.  Try wheat bread, brown rice and whole grain pasta (instead of white bread, rice, and pasta).  Get enough calcium and vitamin D. Check the label on foods and aim for 100% of the RDA (recommended daily allowance).  Lifestyle  Exercise at least 150 minutes each week  (30 minutes a day, 5 days a week).  Do muscle strengthening activities 2 days a week. These help control your weight and prevent disease.  No smoking.  Wear sunscreen to prevent skin cancer.  Have a dental exam and cleaning every 6 months.  Yearly exams  See your health care team every year to talk about:  Any changes in your health.  Any medicines your care team has prescribed.  Preventive care, family planning, and ways to prevent chronic diseases.  Shots (vaccines)   HPV shots (up to age 26), if you've never had them before.  Hepatitis B shots (up to age 59), if you've never had them before.  COVID-19 shot: Get this shot when it's due.  Flu shot: Get a flu shot every year.  Tetanus shot: Get a tetanus shot every 10 years.  Pneumococcal, hepatitis A, and RSV shots: Ask your care team if you need these based on your risk.  Shingles shot (for age 50 and up)  General health tests  Diabetes screening:  Starting at age 35, Get screened for diabetes at least every 3 years.  If you are younger than age 35, ask your care team if you should be screened for diabetes.  Cholesterol test: At age 39, start having a cholesterol test every 5 years, or more often if advised.  Bone density scan (DEXA): At age 50, ask your care team if you should have this scan for osteoporosis (brittle bones).  Hepatitis C: Get tested at least once in your life.  STIs (sexually  transmitted infections)  Before age 24: Ask your care team if you should be screened for STIs.  After age 24: Get screened for STIs if you're at risk. You are at risk for STIs (including HIV) if:  You are sexually active with more than one person.  You don't use condoms every time.  You or a partner was diagnosed with a sexually transmitted infection.  If you are at risk for HIV, ask about PrEP medicine to prevent HIV.  Get tested for HIV at least once in your life, whether you are at risk for HIV or not.  Cancer screening tests  Cervical cancer screening: If you have a cervix, begin getting regular cervical cancer screening tests starting at age 21.  Breast cancer scan (mammogram): If you've ever had breasts, begin having regular mammograms starting at age 40. This is a scan to check for breast cancer.  Colon cancer screening: It is important to start screening for colon cancer at age 45.  Have a colonoscopy test every 10 years (or more often if you're at risk) Or, ask your provider about stool tests like a FIT test every year or Cologuard test every 3 years.  To learn more about your testing options, visit:   .  For help making a decision, visit:   https://bit.ly/ww89676.  Prostate cancer screening test: If you have a prostate, ask your care team if a prostate cancer screening test (PSA) at age 55 is right for you.  Lung cancer screening: If you are a current or former smoker ages 50 to 80, ask your care team if ongoing lung cancer screenings are right for you.  For informational purposes only. Not to replace the advice of your health care provider. Copyright   2023 [facility]. All rights reserved. Clinically reviewed by the [facility] Transitions Program. Get.com [id number] - REV 01/24.  Learning About Activities of Daily Living  What are activities of daily living?     Activities of daily living (ADLs) are the basic self-care tasks you do every day. These include eating, bathing, dressing,  and moving around.  As you age, and if you have health problems, you may find that it's harder to do some of these tasks. If so, your doctor can suggest ideas that may help.  To measure what kind of help you may need, your doctor will ask how well you are able to do ADLs. Let your doctor know if there are any tasks that you are having trouble doing. This is an important first step to getting help. And when you have the help you need, you can stay as independent as possible.  How will a doctor assess your ADLs?  Asking about ADLs is part of a routine health checkup your doctor will likely do as you age. Your health check might be done in a doctor's office, in your home, or at a hospital. The goal is to find out if you are having any problems that could make it hard to care for yourself or that make it unsafe for you to be on your own.  To measure your ADLs, your doctor will ask how hard it is for you to do routine tasks. Your doctor may also want to know if you have changed the way you do a task because of a health problem. Your doctor may watch how you:  Walk back and forth.  Keep your balance while you stand or walk.  Move from sitting to standing or from a bed to a chair.  Button or unbutton a shirt or sweater.  Remove and put on your shoes.  It's common to feel a little worried or anxious if you find you can't do all the things you used to be able to do. Talking with your doctor about ADLs is a way to make sure you're as safe as possible and able to care for yourself as well as you can. You may want to bring a caregiver, friend, or family member to your checkup. They can help you talk to your doctor.  Follow-up care is a key part of your treatment and safety. Be sure to make and go to all appointments, and call your doctor if you are having problems. It's also a good idea to know your test results and keep a list of the medicines you take.  Current as of: October 24, 2023  Content Version: 14.2 2024 [facility]  [employer].   Care instructions adapted under license by your healthcare professional. If you have questions about a medical condition or this instruction, always ask your healthcare professional. [employer] disclaims any warranty or liability for your use of this information.    Bladder Training: Care Instructions  Your Care Instructions     Bladder training is used to treat urge incontinence and stress incontinence. Urge incontinence means that the need to urinate comes on so fast that you can't get to a toilet in time. Stress incontinence means that you leak urine because of pressure on your bladder. For example, it may happen when you laugh, cough, or lift something heavy.  Bladder training can increase how long you can wait before you have to urinate. It can also help your bladder hold more urine. And it can give you better control over the urge to urinate.  It is important to remember that bladder training takes a few weeks to a few months to make a difference. You may not see results right away, but don't give up.  Follow-up care is a key part of your treatment and safety. Be sure to make and go to all appointments, and call your doctor if you are having problems. It's also a good idea to know your test results and keep a list of the medicines you take.  How can you care for yourself at home?  Work with your doctor to come up with a bladder training program that is right for you. You may use one or more of the following methods.  Delayed urination  In the beginning, try to keep from urinating for 5 minutes after you first feel the need to go.  While you wait, take deep, slow breaths to relax. Kegel exercises can also help you delay the need to go to the bathroom.  After some practice, when you can easily wait 5 minutes to urinate, try to wait 10 minutes before you urinate.  Slowly increase the waiting period until you are able to control when you have to urinate.  Scheduled urination  Empty  "your bladder when you first wake up in the morning.  Schedule times throughout the day when you will urinate.  Start by going to the bathroom every hour, even if you don't need to go.  Slowly increase the time between trips to the bathroom.  When you have found a schedule that works well for you, keep doing it.  If you wake up during the night and have to urinate, do it. Apply your schedule to waking hours only.  Kegel exercises  These tighten and strengthen pelvic muscles, which can help you control the flow of urine. (If doing these exercises causes pain, stop doing them and talk with your doctor.) To do Kegel exercises:  Squeeze your muscles as if you were trying not to pass gas. Or squeeze your muscles as if you were stopping the flow of urine. Your belly, legs, and buttocks shouldn't move.  Hold the squeeze for 3 seconds, then relax for 5 to 10 seconds.  Start with 3 seconds, then add 1 second each week until you are able to squeeze for 10 seconds.  Repeat the exercise 10 times a session. Do 3 to 8 sessions a day.  When should you call for help?  Watch closely for changes in your health, and be sure to contact your doctor if:    Your incontinence is getting worse.     You do not get better as expected.   Where can you learn more?  Go to https://www.[employer].net/patiented  Enter V684 in the search box to learn more about \"Bladder Training: Care Instructions.\"  Current as of: November 15, 2023  Content Version: 14.2 2024 [facility] [employer].   Care instructions adapted under license by your healthcare professional. If you have questions about a medical condition or this instruction, always ask your healthcare professional. [employer] disclaims any warranty or liability for your use of this information.    Learning About Depression Screening  What is depression screening?  Depression screening is a way to see if you have depression symptoms. It may be done by a doctor or counselor. It's often " "part of a routine checkup. That's because your mental health is just as important as your physical health.  Depression is a mental health condition that affects how you feel, think, and act. You may:  Have less energy.  Lose interest in your daily activities.  Feel sad and grouchy for a long time.  Depression is very common. It affects people of all ages.  Many things can lead to depression. Some people become depressed after they have a stroke or find out they have a major illness like cancer or heart disease. The death of a loved one or a breakup may lead to depression. It can run in families. Most experts believe that a combination of inherited genes and stressful life events can cause it.  What happens during screening?  You may be asked to fill out a form about your depression symptoms. You and the doctor will discuss your answers. The doctor may ask you more questions to learn more about how you think, act, and feel.  What happens after screening?  If you have symptoms of depression, your doctor will talk to you about your options.  Doctors usually treat depression with medicines or counseling. Often, combining the two works best. Many people don't get help because they think that they'll get over the depression on their own. But people with depression may not get better unless they get treatment.  The cause of depression is not well understood. There may be many factors involved. But if you have depression, it's not your fault.  A serious symptom of depression is thinking about death or suicide. If you or someone you care about talks about this or about feeling hopeless, get help right away.  It's important to know that depression can be treated. Medicine, counseling, and self-care may help.  Where can you learn more?  Go to https://www.healthwise.net/patiented  Enter T185 in the search box to learn more about \"Learning About Depression Screening.\"  Current as of: June 24, 2023  Content Version: 14.2 2024 " [facility] [employer]   Care instructions adapted under license by your healthcare professional. If you have questions about a medical condition or this instruction, always ask your healthcare professional. Healthwise, Incorporated disclaims any warranty or liability for your use of this information.    Substance Use Disorder: Care Instructions  Overview     You can improve your life and health by stopping your use of alcohol or drugs. When you don't drink or use drugs, you may feel and sleep better. You may get along better with your family, friends, and coworkers. There are medicines and programs that can help with substance use disorder.  How can you care for yourself at home?  Here are some ways to help you stay sober and prevent relapse.  If you have been given medicine to help keep you sober or reduce your cravings, be sure to take it exactly as prescribed.  Talk to your doctor about programs that can help you stop using drugs or drinking alcohol.  Do not keep alcohol or drugs in your home.  Plan ahead. Think about what you'll say if other people ask you to drink or use drugs. Try not to spend time with people who drink or use drugs.  Use the time and money spent on drinking or drugs to do something that's important to you.  Preventing a relapse  Have a plan to deal with relapse. Learn to recognize changes in your thinking that lead you to drink or use drugs. Get help before you start to drink or use drugs again.  Try to stay away from situations, friends, or places that may lead you to drink or use drugs.  If you feel the need to drink alcohol or use drugs again, seek help right away. Call a trusted friend or family member. Some people get support from organizations such as Narcotics Anonymous or [employer] or from treatment facilities.  If you relapse, get help as soon as you can. Some people make a plan with another person that outlines what they want that person to do for them if they relapse. The  Standing/Walking plan usually includes how to handle the relapse and who to notify in case of relapse.  Don't give up. Remember that a relapse doesn't mean that you have failed. Use the experience to learn the triggers that lead you to drink or use drugs. Then quit again. Recovery is a lifelong process. Many people have several relapses before they are able to quit for good.  Follow-up care is a key part of your treatment and safety. Be sure to make and go to all appointments, and call your doctor if you are having problems. It's also a good idea to know your test results and keep a list of the medicines you take.  When should you call for help?   Call 911  anytime you think you may need emergency care. For example, call if you or someone else:    Has overdosed or has withdrawal signs. Be sure to tell the emergency workers that you are or someone else is using or trying to quit using drugs. Overdose or withdrawal signs may include:  Losing consciousness.  Seizure.  Seeing or hearing things that aren't there (hallucinations).     Is thinking or talking about suicide or harming others.   Where to get help 24 hours a day, 7 days a week   If you or someone you know talks about suicide, self-harm, a mental health crisis, a substance use crisis, or any other kind of emotional distress, get help right away. You can:    Call the Suicide and Crisis Lifeline at [facility].     Call 7-049-650-XZFR (1-995.452.2036).     Text HOME to 264730 to access the Crisis Text Line.   Consider saving these numbers in your phone.  Go to [email or web address].org for more information or to chat online.  Call your doctor now or seek immediate medical care if:    You are having withdrawal symptoms. These may include nausea or vomiting, sweating, shakiness, and anxiety.   Watch closely for changes in your health, and be sure to contact your doctor if:    You have a relapse.     You need more help or support to stop.   Where can you learn more?  Go to  "https://www.[employer].net/patiented  Enter H573 in the search box to learn more about \"Substance Use Disorder: Care Instructions.\"  Current as of: November 15, 2023  Content Version: 14.2 2024 [employer].   Care instructions adapted under license by your healthcare professional. If you have questions about a medical condition or this instruction, always ask your healthcare professional. Healthwise, Incorporated disclaims any warranty or liability for your use of this information.    Chronic Pain: Care Instructions  Your Care Instructions     Chronic pain is pain that lasts a long time (months or even years) and may or may not have a clear cause. It is different from acute pain, which usually does have a clear cause--like an injury or illness--and gets better over time. Chronic pain:  Lasts over time but may vary from day to day.  Does not go away despite efforts to end it.  May disrupt your sleep and lead to fatigue.  May cause depression or anxiety.  May make your muscles tense, causing more pain.  Can disrupt your work, hobbies, home life, and relationships with friends and family.  Chronic pain is a very real condition. It is not just in your head. Treatment can help and usually includes several methods used together, such as medicines, physical therapy, exercise, and other treatments. Learning how to relax and changing negative thought patterns can also help you cope.  Chronic pain is complex. Taking an active role in your treatment will help you better manage your pain. Tell your doctor if you have trouble dealing with your pain. You may have to try several things before you find what works best for you.  Follow-up care is a key part of your treatment and safety. Be sure to make and go to all appointments, and call your doctor if you are having problems. It's also a good idea to know your test results and keep a list of the medicines you take.  How can you care for yourself at home?  Pace " yourself. Break up large jobs into smaller tasks. Save harder tasks for days when you have less pain, or go back and forth between hard tasks and easier ones. Take rest breaks.  Relax, and reduce stress. Relaxation techniques such as deep breathing or meditation can help.  Keep moving. Gentle, daily exercise can help reduce pain over the long run. Try low- or no-impact exercises such as walking, swimming, and stationary biking. Do stretches to stay flexible.  Try heat, cold packs, and massage.  Get enough sleep. Chronic pain can make you tired and drain your energy. Talk with your doctor if you have trouble sleeping because of pain.  Think positive. Your thoughts can affect your pain level. Do things that you enjoy to distract yourself when you have pain instead of focusing on the pain. See a movie, read a book, listen to music, or spend time with a friend.  If you think you are depressed, talk to your doctor about treatment.  Keep a daily pain diary. Record how your moods, thoughts, sleep patterns, activities, and medicine affect your pain. You may find that your pain is worse during or after certain activities or when you are feeling a certain emotion. Having a record of your pain can help you and your doctor find the best ways to treat your pain.  Take pain medicines exactly as directed.  If the doctor gave you a prescription medicine for pain, take it as prescribed.  If you are not taking a prescription pain medicine, ask your doctor if you can take an over-the-counter medicine.  Reducing constipation caused by pain medicine  Talk to your doctor about a laxative. If a laxative doesn't work, your doctor may suggest a prescription medicine.  Include fruits, vegetables, beans, and whole grains in your diet each day. These foods are high in fiber.  If your doctor recommends it, get more exercise. Walking is a good choice. Bit by bit, increase the amount you walk every day. Try for at least 30 minutes on most days of  "the week.  Schedule time each day for a bowel movement. A daily routine may help. Take your time and do not strain when having a bowel movement.  When should you call for help?   Call your doctor now or seek immediate medical care if:    Your pain gets worse or is out of control.     You feel down or blue, or you do not enjoy things like you once did. You may be depressed, which is common in people with chronic pain. Depression can be treated.     You have vomiting or cramps for more than 2 hours.   Watch closely for changes in your health, and be sure to contact your doctor if:    You cannot sleep because of pain.     You are very worried or anxious about your pain.     You have trouble taking your pain medicine.     You have any concerns about your pain medicine.     You have trouble with bowel movements, such as:  No bowel movement in 3 days.  Blood in the anal area, in your stool, or on the toilet paper.  Diarrhea for more than 24 hours.   Where can you learn more?  Go to https://www.[employer]Pocket.net/patiented  Enter N004 in the search box to learn more about \"Chronic Pain: Care Instructions.\"  Current as of: July 10, 2023  Content Version: 14.2 2024 [employer].   Care instructions adapted under license by your healthcare professional. If you have questions about a medical condition or this instruction, always ask your healthcare professional. Healthwise, Incorporated disclaims any warranty or liability for your use of this information.       "

## 2024-11-19 ENCOUNTER — APPOINTMENT (OUTPATIENT)
Dept: GASTROENTEROLOGY | Facility: CLINIC | Age: 74
End: 2024-11-19
Payer: MEDICARE

## 2024-11-19 DIAGNOSIS — Z86.0101 PERSONAL HISTORY OF ADENOMATOUS AND SERRATED COLON POLYPS: ICD-10-CM

## 2024-11-19 PROCEDURE — 99214 OFFICE O/P EST MOD 30 MIN: CPT

## 2024-11-19 RX ORDER — POLYETHYLENE GLYCOL-3350 AND ELECTROLYTES WITH FLAVOR PACK 240; 5.84; 2.98; 6.72; 22.72 G/278.26G; G/278.26G; G/278.26G; G/278.26G; G/278.26G
240 POWDER, FOR SOLUTION ORAL
Qty: 1 | Refills: 0 | Status: ACTIVE | COMMUNITY
Start: 2024-11-19 | End: 1900-01-01

## 2024-11-22 NOTE — H&P ADULT - TIME BILLING
no
I personally conducted a physical examination of the patient. I personally gathered the patient's history. I edited the above listed findings which were prepared by the listed resident physician. I personally discussed the plan of care with the patient. The questions and concerns were addressed to the best of my ability. The patient is in agreement with the listed treatment plan.

## 2024-11-25 ENCOUNTER — NON-APPOINTMENT (OUTPATIENT)
Age: 74
End: 2024-11-25

## 2024-12-02 ENCOUNTER — RESULT REVIEW (OUTPATIENT)
Age: 74
End: 2024-12-02

## 2024-12-02 ENCOUNTER — OUTPATIENT (OUTPATIENT)
Dept: OUTPATIENT SERVICES | Facility: HOSPITAL | Age: 74
LOS: 1 days | End: 2024-12-02
Payer: MEDICARE

## 2024-12-02 ENCOUNTER — APPOINTMENT (OUTPATIENT)
Dept: GASTROENTEROLOGY | Facility: HOSPITAL | Age: 74
End: 2024-12-02

## 2024-12-02 DIAGNOSIS — Z86.010 PERSONAL HISTORY OF COLON POLYPS: ICD-10-CM

## 2024-12-02 DIAGNOSIS — Z90.5 ACQUIRED ABSENCE OF KIDNEY: Chronic | ICD-10-CM

## 2024-12-02 PROCEDURE — 45380 COLONOSCOPY AND BIOPSY: CPT | Mod: 59

## 2024-12-02 PROCEDURE — 45385 COLONOSCOPY W/LESION REMOVAL: CPT | Mod: PT

## 2024-12-02 PROCEDURE — 88305 TISSUE EXAM BY PATHOLOGIST: CPT | Mod: 26

## 2024-12-02 PROCEDURE — 88305 TISSUE EXAM BY PATHOLOGIST: CPT

## 2024-12-02 PROCEDURE — 45385 COLONOSCOPY W/LESION REMOVAL: CPT

## 2024-12-02 PROCEDURE — 45380 COLONOSCOPY AND BIOPSY: CPT | Mod: XS,PT

## 2024-12-02 RX ORDER — SODIUM CHLORIDE 9 MG/ML
500 INJECTION, SOLUTION INTRAMUSCULAR; INTRAVENOUS; SUBCUTANEOUS
Refills: 0 | Status: COMPLETED | OUTPATIENT
Start: 2024-12-02 | End: 2024-12-02

## 2024-12-02 RX ADMIN — SODIUM CHLORIDE 75 MILLILITER(S): 9 INJECTION, SOLUTION INTRAMUSCULAR; INTRAVENOUS; SUBCUTANEOUS at 15:34

## 2024-12-04 LAB — SURGICAL PATHOLOGY STUDY: SIGNIFICANT CHANGE UP

## 2024-12-10 ENCOUNTER — APPOINTMENT (OUTPATIENT)
Dept: GASTROENTEROLOGY | Facility: CLINIC | Age: 74
End: 2024-12-10

## 2025-01-04 ENCOUNTER — EMERGENCY (EMERGENCY)
Facility: HOSPITAL | Age: 75
LOS: 1 days | Discharge: HOME | End: 2025-01-04
Attending: STUDENT IN AN ORGANIZED HEALTH CARE EDUCATION/TRAINING PROGRAM | Admitting: STUDENT IN AN ORGANIZED HEALTH CARE EDUCATION/TRAINING PROGRAM
Payer: MEDICARE

## 2025-01-04 VITALS
SYSTOLIC BLOOD PRESSURE: 159 MMHG | RESPIRATION RATE: 18 BRPM | OXYGEN SATURATION: 93 % | WEIGHT: 149.91 LBS | HEART RATE: 97 BPM | DIASTOLIC BLOOD PRESSURE: 83 MMHG | TEMPERATURE: 100 F

## 2025-01-04 DIAGNOSIS — Z90.5 ACQUIRED ABSENCE OF KIDNEY: Chronic | ICD-10-CM

## 2025-01-04 LAB
ALBUMIN SERPL ELPH-MCNC: 3.4 G/DL — SIGNIFICANT CHANGE UP (ref 3.3–5)
ALP SERPL-CCNC: 79 U/L — SIGNIFICANT CHANGE UP (ref 40–120)
ALT FLD-CCNC: 20 U/L — SIGNIFICANT CHANGE UP (ref 10–45)
ANION GAP SERPL CALC-SCNC: 9 MMOL/L — SIGNIFICANT CHANGE UP (ref 5–17)
APPEARANCE UR: CLEAR — SIGNIFICANT CHANGE UP
APTT BLD: 27.9 SEC — SIGNIFICANT CHANGE UP (ref 24.5–35.6)
AST SERPL-CCNC: 11 U/L — SIGNIFICANT CHANGE UP (ref 10–40)
BASOPHILS # BLD AUTO: 0.01 K/UL — SIGNIFICANT CHANGE UP (ref 0–0.2)
BASOPHILS NFR BLD AUTO: 0.2 % — SIGNIFICANT CHANGE UP (ref 0–2)
BILIRUB SERPL-MCNC: 0.6 MG/DL — SIGNIFICANT CHANGE UP (ref 0.2–1.2)
BILIRUB UR-MCNC: NEGATIVE — SIGNIFICANT CHANGE UP
BUN SERPL-MCNC: 55 MG/DL — HIGH (ref 7–23)
CALCIUM SERPL-MCNC: 9 MG/DL — SIGNIFICANT CHANGE UP (ref 8.4–10.5)
CHLORIDE SERPL-SCNC: 102 MMOL/L — SIGNIFICANT CHANGE UP (ref 96–108)
CO2 SERPL-SCNC: 28 MMOL/L — SIGNIFICANT CHANGE UP (ref 22–31)
COLOR SPEC: YELLOW — SIGNIFICANT CHANGE UP
CREAT SERPL-MCNC: 6.27 MG/DL — HIGH (ref 0.5–1.3)
DIFF PNL FLD: NEGATIVE — SIGNIFICANT CHANGE UP
EGFR: 9 ML/MIN/1.73M2 — LOW
EOSINOPHIL # BLD AUTO: 0.11 K/UL — SIGNIFICANT CHANGE UP (ref 0–0.5)
EOSINOPHIL NFR BLD AUTO: 2 % — SIGNIFICANT CHANGE UP (ref 0–6)
FLUAV AG NPH QL: SIGNIFICANT CHANGE UP
FLUBV AG NPH QL: SIGNIFICANT CHANGE UP
GLUCOSE SERPL-MCNC: 72 MG/DL — SIGNIFICANT CHANGE UP (ref 70–99)
GLUCOSE UR QL: 100 MG/DL
HCT VFR BLD CALC: 32.4 % — LOW (ref 39–50)
HGB BLD-MCNC: 11.2 G/DL — LOW (ref 13–17)
IMM GRANULOCYTES NFR BLD AUTO: 0 % — SIGNIFICANT CHANGE UP (ref 0–0.9)
INR BLD: 1.07 RATIO — SIGNIFICANT CHANGE UP (ref 0.85–1.16)
KETONES UR-MCNC: NEGATIVE MG/DL — SIGNIFICANT CHANGE UP
LACTATE SERPL-SCNC: 1 MMOL/L — SIGNIFICANT CHANGE UP (ref 0.7–2)
LEUKOCYTE ESTERASE UR-ACNC: NEGATIVE — SIGNIFICANT CHANGE UP
LYMPHOCYTES # BLD AUTO: 0.16 K/UL — LOW (ref 1–3.3)
LYMPHOCYTES # BLD AUTO: 2.9 % — LOW (ref 13–44)
MCHC RBC-ENTMCNC: 32 PG — SIGNIFICANT CHANGE UP (ref 27–34)
MCHC RBC-ENTMCNC: 34.6 G/DL — SIGNIFICANT CHANGE UP (ref 32–36)
MCV RBC AUTO: 92.6 FL — SIGNIFICANT CHANGE UP (ref 80–100)
MONOCYTES # BLD AUTO: 0.06 K/UL — SIGNIFICANT CHANGE UP (ref 0–0.9)
MONOCYTES NFR BLD AUTO: 1.1 % — LOW (ref 2–14)
NEUTROPHILS # BLD AUTO: 5.11 K/UL — SIGNIFICANT CHANGE UP (ref 1.8–7.4)
NEUTROPHILS NFR BLD AUTO: 93.8 % — HIGH (ref 43–77)
NITRITE UR-MCNC: NEGATIVE — SIGNIFICANT CHANGE UP
NRBC # BLD: 0 /100 WBCS — SIGNIFICANT CHANGE UP (ref 0–0)
PH UR: 8.5 (ref 5–8)
PLATELET # BLD AUTO: 123 K/UL — LOW (ref 150–400)
POTASSIUM SERPL-MCNC: 4.2 MMOL/L — SIGNIFICANT CHANGE UP (ref 3.5–5.3)
POTASSIUM SERPL-SCNC: 4.2 MMOL/L — SIGNIFICANT CHANGE UP (ref 3.5–5.3)
PROT SERPL-MCNC: 6.6 G/DL — SIGNIFICANT CHANGE UP (ref 6–8.3)
PROT UR-MCNC: >=1000 MG/DL
PROTHROM AB SERPL-ACNC: 12.6 SEC — SIGNIFICANT CHANGE UP (ref 9.9–13.4)
RBC # BLD: 3.5 M/UL — LOW (ref 4.2–5.8)
RBC # FLD: 14.3 % — SIGNIFICANT CHANGE UP (ref 10.3–14.5)
RSV RNA NPH QL NAA+NON-PROBE: SIGNIFICANT CHANGE UP
SARS-COV-2 RNA SPEC QL NAA+PROBE: SIGNIFICANT CHANGE UP
SODIUM SERPL-SCNC: 139 MMOL/L — SIGNIFICANT CHANGE UP (ref 135–145)
SP GR SPEC: 1.01 — SIGNIFICANT CHANGE UP (ref 1–1.03)
UROBILINOGEN FLD QL: 0.2 MG/DL — SIGNIFICANT CHANGE UP (ref 0.2–1)
WBC # BLD: 5.45 K/UL — SIGNIFICANT CHANGE UP (ref 3.8–10.5)
WBC # FLD AUTO: 5.45 K/UL — SIGNIFICANT CHANGE UP (ref 3.8–10.5)

## 2025-01-04 PROCEDURE — 93010 ELECTROCARDIOGRAM REPORT: CPT

## 2025-01-04 PROCEDURE — 71045 X-RAY EXAM CHEST 1 VIEW: CPT | Mod: 26

## 2025-01-04 PROCEDURE — 99285 EMERGENCY DEPT VISIT HI MDM: CPT

## 2025-01-04 RX ORDER — SODIUM CHLORIDE 9 MG/ML
500 INJECTION, SOLUTION INTRAMUSCULAR; INTRAVENOUS; SUBCUTANEOUS ONCE
Refills: 0 | Status: COMPLETED | OUTPATIENT
Start: 2025-01-04 | End: 2025-01-04

## 2025-01-04 RX ORDER — ACETAMINOPHEN 80 MG/.8ML
1000 SOLUTION/ DROPS ORAL ONCE
Refills: 0 | Status: COMPLETED | OUTPATIENT
Start: 2025-01-04 | End: 2025-01-04

## 2025-01-04 RX ORDER — ONDANSETRON 4 MG/1
4 TABLET ORAL ONCE
Refills: 0 | Status: COMPLETED | OUTPATIENT
Start: 2025-01-04 | End: 2025-01-04

## 2025-01-04 RX ADMIN — ONDANSETRON 4 MILLIGRAM(S): 4 TABLET ORAL at 22:45

## 2025-01-04 RX ADMIN — SODIUM CHLORIDE 500 MILLILITER(S): 9 INJECTION, SOLUTION INTRAMUSCULAR; INTRAVENOUS; SUBCUTANEOUS at 22:45

## 2025-01-04 RX ADMIN — ACETAMINOPHEN 400 MILLIGRAM(S): 80 SOLUTION/ DROPS ORAL at 22:45

## 2025-01-04 NOTE — ED ADULT NURSE NOTE - OBJECTIVE STATEMENT
Pt BIB EMS from home for c/o n/v starting today. Pt with hx ESRD -HD sessions M,W,F. Reports body aches and fever/chills. Noted to have an oral temp = 100.3F.

## 2025-01-04 NOTE — ED ADULT NURSE NOTE - NSICDXPASTMEDICALHX_GEN_ALL_CORE_FT
Hub staff attempted to follow warm transfer process and was unsuccessful     Caller: Nora Bone    Relationship to patient: Self    Best call back number: [phone]    Patient is needing:  PATIENT IS CALLING BECAUSE SHE HAD A VERY BAD ALLERGIC REACTION TO PANTOPRAZOLE PRESCRIBED BY DR AMIN.  PLEASE REACH OUT TO PATIENT TO ASSIST.  THANK YOU    PAST MEDICAL HISTORY:  Cancer of kidney, left     Hypertension     Renal failure, chronic

## 2025-01-04 NOTE — ED ADULT NURSE NOTE - NS ED NURSE DC INFO COMPLEXITY
Pt here after having a syncopal episode in the parking lot of the hospital.  Pt was just discharged for having a loop recorder placed due to syncopal episodes
Moderate: Comprehensive teaching

## 2025-01-04 NOTE — ED ADULT NURSE NOTE - NSFALLHARMRISKINTERV_ED_ALL_ED
Assistance OOB with selected safe patient handling equipment if applicable/Communicate risk of Fall with Harm to all staff, patient, and family/Monitor gait and stability/Provide patient with walking aids/Provide visual cue: red socks, yellow wristband, yellow gown, etc/Reinforce activity limits and safety measures with patient and family/Bed in lowest position, wheels locked, appropriate side rails in place/Call bell, personal items and telephone in reach/Instruct patient to call for assistance before getting out of bed/chair/stretcher/Non-slip footwear applied when patient is off stretcher/[place] to call system/Physically safe environment - no spills, clutter or unnecessary equipment/Purposeful Proactive Rounding/Room/bathroom lighting operational, light cord in reach

## 2025-01-05 VITALS
HEART RATE: 84 BPM | DIASTOLIC BLOOD PRESSURE: 88 MMHG | RESPIRATION RATE: 16 BRPM | SYSTOLIC BLOOD PRESSURE: 142 MMHG | OXYGEN SATURATION: 98 %

## 2025-01-05 LAB
BACTERIA # UR AUTO: NEGATIVE /HPF — SIGNIFICANT CHANGE UP
COMMENT - URINE: SIGNIFICANT CHANGE UP
EPI CELLS # UR: PRESENT
HYALINE CASTS # UR AUTO: SIGNIFICANT CHANGE UP
RBC CASTS # UR COMP ASSIST: 0 /HPF — SIGNIFICANT CHANGE UP (ref 0–4)
WBC UR QL: 0 /HPF — SIGNIFICANT CHANGE UP (ref 0–5)

## 2025-01-05 PROCEDURE — 74176 CT ABD & PELVIS W/O CONTRAST: CPT | Mod: 26,MC

## 2025-01-05 NOTE — ED ADULT NURSE REASSESSMENT NOTE - NS ED NURSE REASSESS COMMENT FT1
Pt uses crutches at baseline for ambulation. Able to ambulate independently, steady gait. LAVF clean, dry and intact. A&Ox4, VSS. No complaints at this time. Care continued.

## 2025-01-05 NOTE — ED PROVIDER NOTE - PATIENT PORTAL LINK FT
You can access the FollowMyHealth Patient Portal offered by [facility] by registering at the following website: [email or web address]. By joining [facility]’s FollowMyHealth portal, you will also be able to view your health information using other applications (apps) compatible with our system.

## 2025-01-05 NOTE — ED PROVIDER NOTE - CLINICAL SUMMARY MEDICAL DECISION MAKING FREE TEXT BOX
74-year-old male with a history of polycystic kidney disease status post nephrectomy and end-stage renal failure HD Monday Wednesday Friday last HD was on Friday, hypertension, polio crutches, status postcholecystectomy presenting with nausea vomiting and fever today.  Patient was out having lunch and then had an episode of nausea and vomiting and subjective fevers or chills.  On arrival to the emergency room he is febrile but otherwise vitals normal.  Denies any chest pain or shortness of breath or abdominal pain or coughs or sick contacts or falls or traumatic injuries.     Denies any   headaches,   diarrhea ,constipation, weakness, syncope, hematuria, dysuria, urinary symptoms, subjective neurological deficits.     AP: Patient is well-appearing and hemodynamically stable with no hypoxia presenting with fever, likely secondary to viral illness. DDx includes but is not limited to: COVID-19 pneumonia, viral URTI, influenza, UTI, pneumonia. Low suspicion for serious bacterial infection (such as meningitis/encephalitis, sepsis, or appendicitis) given nontoxic appearance and otherwise healthy patient.  PLAN:  - CBC, CMP, Flu and COVID swab, CXR, UA, BCx   - Tylenol PRN pain/fever control and IVF  - Re-evaluation and likely discharge home.     5:00 AM, CT results reviewed nonactionable, UA is not suggestive of UTI, chest x-ray is negative for pneumonia, COVID flu RSV swabs were negative,  normal white count, normal electrolytes. patient awake and alert to name place and time, he is able to use his crutches independently and is at his baseline ambulatory status.  I spoke with his daughter Shahrzad and discussed the results of a negative fever workup, since lab work is reassuring that he would be able to be discharged home.

## 2025-01-05 NOTE — ED PROVIDER NOTE - PHYSICAL EXAMINATION
VITAL SIGNS: I have reviewed nursing notes and confirm.   GEN: Well-developed; well-nourished; in no acute distress. Speaking full sentences.  SKIN: Warm, pink, no rash, no diaphoresis, no cyanosis, well perfused.   HEAD: Normocephalic; atraumatic.    NECK: Supple; non tender. Full range of motion.   EYES: Pupils 3mm equal, round, reactive to light and accomodation, conjunctiva and sclera clear. Extra-ocular movements intact bilaterally.  ENT: No nasal discharge; airway clear. Trachea is midline.    CV: RRR. S1, S2 normal; no murmurs, gallops, or rubs. Capillary refill < 2 seconds throughout. Distal pulses intact 2+ throughout.  RESP: CTA bilaterally. No wheezes, rales, or rhonchi.   ABD: Normal bowel sounds, soft, non-distended, non-tender, no rebound, no guarding, no rigidity   MSK: Normal range of motion and movement of all 4 extremities. No apparent joint or muscular pain throughout.    BACK: No thoracolumbar midline or paravertebral tenderness.    NEURO: Alert & oriented x 3, (+) able to walk with his crutches at baseline, secondary to polio with LE weakness.

## 2025-01-05 NOTE — ED PROVIDER NOTE - OBJECTIVE STATEMENT
74-year-old male with a history of polycystic kidney disease status post nephrectomy and end-stage renal failure HD Monday Wednesday Friday last HD was on Friday, hypertension, polio crutches, status postcholecystectomy presenting with nausea vomiting and fever today.  Patient was out having lunch and then had an episode of nausea and vomiting and subjective fevers or chills.  On arrival to the emergency room he is febrile but otherwise vitals normal.  Denies any chest pain or shortness of breath or abdominal pain or coughs or sick contacts or falls or traumatic injuries.     Denies any   headaches,   diarrhea ,constipation, weakness, syncope, hematuria, dysuria, urinary symptoms, subjective neurological deficits.

## 2025-01-05 NOTE — ED PROVIDER NOTE - EKG/XRAY ADDITIONAL INFORMATION
normal sinus rhythm 91 bpm,  ms, QRS at 82 ms, QTc of 428 ms, there are no ST elevations or ST depressions.

## 2025-01-06 ENCOUNTER — INPATIENT (INPATIENT)
Facility: HOSPITAL | Age: 75
LOS: 3 days | Discharge: HOME | DRG: 871 | End: 2025-01-10
Attending: INTERNAL MEDICINE | Admitting: STUDENT IN AN ORGANIZED HEALTH CARE EDUCATION/TRAINING PROGRAM
Payer: MEDICARE

## 2025-01-06 VITALS
WEIGHT: 145.06 LBS | SYSTOLIC BLOOD PRESSURE: 179 MMHG | HEART RATE: 85 BPM | TEMPERATURE: 97 F | OXYGEN SATURATION: 97 % | HEIGHT: 64 IN | RESPIRATION RATE: 17 BRPM | DIASTOLIC BLOOD PRESSURE: 91 MMHG

## 2025-01-06 DIAGNOSIS — R78.81 BACTEREMIA: ICD-10-CM

## 2025-01-06 DIAGNOSIS — Z90.5 ACQUIRED ABSENCE OF KIDNEY: Chronic | ICD-10-CM

## 2025-01-06 DIAGNOSIS — I77.0 ARTERIOVENOUS FISTULA, ACQUIRED: Chronic | ICD-10-CM

## 2025-01-06 LAB
ALBUMIN SERPL ELPH-MCNC: 3.6 G/DL — SIGNIFICANT CHANGE UP (ref 3.3–5)
ALP SERPL-CCNC: 84 U/L — SIGNIFICANT CHANGE UP (ref 40–120)
ALT FLD-CCNC: 27 U/L — SIGNIFICANT CHANGE UP (ref 10–45)
ANION GAP SERPL CALC-SCNC: 11 MMOL/L — SIGNIFICANT CHANGE UP (ref 5–17)
APPEARANCE UR: CLEAR — SIGNIFICANT CHANGE UP
APTT BLD: 30.6 SEC — SIGNIFICANT CHANGE UP (ref 24.5–35.6)
AST SERPL-CCNC: 14 U/L — SIGNIFICANT CHANGE UP (ref 10–40)
BACTERIA # UR AUTO: NEGATIVE /HPF — SIGNIFICANT CHANGE UP
BASOPHILS # BLD AUTO: 0.02 K/UL — SIGNIFICANT CHANGE UP (ref 0–0.2)
BASOPHILS NFR BLD AUTO: 0.3 % — SIGNIFICANT CHANGE UP (ref 0–2)
BILIRUB SERPL-MCNC: 0.6 MG/DL — SIGNIFICANT CHANGE UP (ref 0.2–1.2)
BILIRUB UR-MCNC: NEGATIVE — SIGNIFICANT CHANGE UP
BUN SERPL-MCNC: 73 MG/DL — HIGH (ref 7–23)
CALCIUM SERPL-MCNC: 8.8 MG/DL — SIGNIFICANT CHANGE UP (ref 8.4–10.5)
CHLORIDE SERPL-SCNC: 100 MMOL/L — SIGNIFICANT CHANGE UP (ref 96–108)
CO2 SERPL-SCNC: 26 MMOL/L — SIGNIFICANT CHANGE UP (ref 22–31)
COLOR SPEC: YELLOW — SIGNIFICANT CHANGE UP
CREAT SERPL-MCNC: 8.27 MG/DL — HIGH (ref 0.5–1.3)
DIFF PNL FLD: NEGATIVE — SIGNIFICANT CHANGE UP
E CLOAC COMP DNA BLD POS QL NAA+PROBE: SIGNIFICANT CHANGE UP
EGFR: 6 ML/MIN/1.73M2 — LOW
EOSINOPHIL # BLD AUTO: 0.49 K/UL — SIGNIFICANT CHANGE UP (ref 0–0.5)
EOSINOPHIL NFR BLD AUTO: 8.5 % — HIGH (ref 0–6)
EPI CELLS # UR: SIGNIFICANT CHANGE UP
FLUAV AG NPH QL: SIGNIFICANT CHANGE UP
FLUBV AG NPH QL: SIGNIFICANT CHANGE UP
GLUCOSE SERPL-MCNC: 112 MG/DL — HIGH (ref 70–99)
GLUCOSE UR QL: 100 MG/DL
GRAM STN FLD: ABNORMAL
GRAM STN FLD: ABNORMAL
HCT VFR BLD CALC: 33.7 % — LOW (ref 39–50)
HGB BLD-MCNC: 11.4 G/DL — LOW (ref 13–17)
IMM GRANULOCYTES NFR BLD AUTO: 0.2 % — SIGNIFICANT CHANGE UP (ref 0–0.9)
INR BLD: 1.11 RATIO — SIGNIFICANT CHANGE UP (ref 0.85–1.16)
KETONES UR-MCNC: NEGATIVE MG/DL — SIGNIFICANT CHANGE UP
LACTATE SERPL-SCNC: 1.4 MMOL/L — SIGNIFICANT CHANGE UP (ref 0.7–2)
LEUKOCYTE ESTERASE UR-ACNC: NEGATIVE — SIGNIFICANT CHANGE UP
LYMPHOCYTES # BLD AUTO: 0.35 K/UL — LOW (ref 1–3.3)
LYMPHOCYTES # BLD AUTO: 6.1 % — LOW (ref 13–44)
MCHC RBC-ENTMCNC: 32.3 PG — SIGNIFICANT CHANGE UP (ref 27–34)
MCHC RBC-ENTMCNC: 33.8 G/DL — SIGNIFICANT CHANGE UP (ref 32–36)
MCV RBC AUTO: 95.5 FL — SIGNIFICANT CHANGE UP (ref 80–100)
METHOD TYPE: SIGNIFICANT CHANGE UP
MONOCYTES # BLD AUTO: 0.46 K/UL — SIGNIFICANT CHANGE UP (ref 0–0.9)
MONOCYTES NFR BLD AUTO: 8 % — SIGNIFICANT CHANGE UP (ref 2–14)
NEUTROPHILS # BLD AUTO: 4.41 K/UL — SIGNIFICANT CHANGE UP (ref 1.8–7.4)
NEUTROPHILS NFR BLD AUTO: 76.9 % — SIGNIFICANT CHANGE UP (ref 43–77)
NITRITE UR-MCNC: NEGATIVE — SIGNIFICANT CHANGE UP
NRBC # BLD: 0 /100 WBCS — SIGNIFICANT CHANGE UP (ref 0–0)
PH UR: 8 — SIGNIFICANT CHANGE UP (ref 5–8)
PLATELET # BLD AUTO: 113 K/UL — LOW (ref 150–400)
POTASSIUM SERPL-MCNC: 4.9 MMOL/L — SIGNIFICANT CHANGE UP (ref 3.5–5.3)
POTASSIUM SERPL-SCNC: 4.9 MMOL/L — SIGNIFICANT CHANGE UP (ref 3.5–5.3)
PROT SERPL-MCNC: 7 G/DL — SIGNIFICANT CHANGE UP (ref 6–8.3)
PROT UR-MCNC: 300 MG/DL
PROTHROM AB SERPL-ACNC: 13.1 SEC — SIGNIFICANT CHANGE UP (ref 9.9–13.4)
RBC # BLD: 3.53 M/UL — LOW (ref 4.2–5.8)
RBC # FLD: 14.6 % — HIGH (ref 10.3–14.5)
RBC CASTS # UR COMP ASSIST: 1 /HPF — SIGNIFICANT CHANGE UP (ref 0–4)
RSV RNA NPH QL NAA+NON-PROBE: SIGNIFICANT CHANGE UP
SARS-COV-2 RNA SPEC QL NAA+PROBE: SIGNIFICANT CHANGE UP
SODIUM SERPL-SCNC: 137 MMOL/L — SIGNIFICANT CHANGE UP (ref 135–145)
SP GR SPEC: 1.01 — SIGNIFICANT CHANGE UP (ref 1–1.03)
SPECIMEN SOURCE: SIGNIFICANT CHANGE UP
UROBILINOGEN FLD QL: 0.2 MG/DL — SIGNIFICANT CHANGE UP (ref 0.2–1)
WBC # BLD: 5.74 K/UL — SIGNIFICANT CHANGE UP (ref 3.8–10.5)
WBC # FLD AUTO: 5.74 K/UL — SIGNIFICANT CHANGE UP (ref 3.8–10.5)
WBC UR QL: 0 /HPF — SIGNIFICANT CHANGE UP (ref 0–5)

## 2025-01-06 PROCEDURE — 99223 1ST HOSP IP/OBS HIGH 75: CPT

## 2025-01-06 PROCEDURE — 99285 EMERGENCY DEPT VISIT HI MDM: CPT

## 2025-01-06 PROCEDURE — 93010 ELECTROCARDIOGRAM REPORT: CPT

## 2025-01-06 RX ORDER — MEROPENEM 1 G/20ML
500 INJECTION, POWDER, FOR SOLUTION INTRAVENOUS ONCE
Refills: 0 | Status: COMPLETED | OUTPATIENT
Start: 2025-01-06 | End: 2025-01-06

## 2025-01-06 RX ORDER — B COMPLEX, C NO.20/FOLIC ACID 1 MG
1 CAPSULE ORAL DAILY
Refills: 0 | Status: DISCONTINUED | OUTPATIENT
Start: 2025-01-06 | End: 2025-01-10

## 2025-01-06 RX ORDER — MAG HYDROX/ALUMINUM HYD/SIMETH 200-200-20
30 SUSPENSION, ORAL (FINAL DOSE FORM) ORAL EVERY 4 HOURS
Refills: 0 | Status: DISCONTINUED | OUTPATIENT
Start: 2025-01-06 | End: 2025-01-10

## 2025-01-06 RX ORDER — ONDANSETRON 4 MG/1
4 TABLET ORAL EVERY 8 HOURS
Refills: 0 | Status: DISCONTINUED | OUTPATIENT
Start: 2025-01-06 | End: 2025-01-10

## 2025-01-06 RX ORDER — ACETAMINOPHEN 80 MG/.8ML
650 SOLUTION/ DROPS ORAL EVERY 6 HOURS
Refills: 0 | Status: DISCONTINUED | OUTPATIENT
Start: 2025-01-06 | End: 2025-01-10

## 2025-01-06 RX ORDER — SODIUM CHLORIDE 9 MG/ML
1000 INJECTION, SOLUTION INTRAVENOUS
Refills: 0 | Status: DISCONTINUED | OUTPATIENT
Start: 2025-01-06 | End: 2025-01-06

## 2025-01-06 RX ORDER — SODIUM CHLORIDE 9 MG/ML
1000 INJECTION, SOLUTION INTRAMUSCULAR; INTRAVENOUS; SUBCUTANEOUS ONCE
Refills: 0 | Status: COMPLETED | OUTPATIENT
Start: 2025-01-06 | End: 2025-01-06

## 2025-01-06 RX ORDER — TAMSULOSIN HYDROCHLORIDE 0.4 MG/1
0.4 CAPSULE ORAL AT BEDTIME
Refills: 0 | Status: DISCONTINUED | OUTPATIENT
Start: 2025-01-06 | End: 2025-01-10

## 2025-01-06 RX ORDER — MEROPENEM 1 G/20ML
500 INJECTION, POWDER, FOR SOLUTION INTRAVENOUS EVERY 24 HOURS
Refills: 0 | Status: DISCONTINUED | OUTPATIENT
Start: 2025-01-07 | End: 2025-01-10

## 2025-01-06 RX ORDER — GINKGO BILOBA 40 MG
3 CAPSULE ORAL AT BEDTIME
Refills: 0 | Status: DISCONTINUED | OUTPATIENT
Start: 2025-01-06 | End: 2025-01-10

## 2025-01-06 RX ADMIN — TAMSULOSIN HYDROCHLORIDE 0.4 MILLIGRAM(S): 0.4 CAPSULE ORAL at 21:51

## 2025-01-06 RX ADMIN — MEROPENEM 100 MILLIGRAM(S): 1 INJECTION, POWDER, FOR SOLUTION INTRAVENOUS at 09:02

## 2025-01-06 RX ADMIN — Medication 1 TABLET(S): at 17:37

## 2025-01-06 NOTE — CONSULT NOTE ADULT - SUBJECTIVE AND OBJECTIVE BOX
HPI:   Patient is a 74y male with history of nephrectomy for mass, esrd on hd, hep c, polio,  kleb bacteremia from cholecystitis and cholangitis back in  s/p subtotal cholecystectomy and ercp/stent then stent removal. He then had serratia bacteremia or unclear source 1 year ago. He now returns with acute rigors and n/v 2 days ago, seen in ED, sent home. He was called back because bc grew enterobacter. He has no pain, no dysuria and urinates well, no n,v,d further . Had a polypectomy from colon a couple of months ago. has av fistula that is good.     REVIEW OF SYSTEMS:  All other review of systems negative (Comprehensive ROS)    PAST MEDICAL & SURGICAL HISTORY:  Hypertension      Renal failure, chronic      Cancer of kidney, left      Polio      Dialysis patient      History of left nephrectomy      AV fistula          Allergies    Demerol (Faint)  Demerol HCl (Unknown)    Intolerances        Antimicrobials Day #  :    Other Medications:  acetaminophen     Tablet .. 650 milliGRAM(s) Oral every 6 hours PRN  aluminum hydroxide/magnesium hydroxide/simethicone Suspension 30 milliLiter(s) Oral every 4 hours PRN  amLODIPine   Tablet 10 milliGRAM(s) Oral daily  melatonin 3 milliGRAM(s) Oral at bedtime PRN  Nephro-odessa 1 Tablet(s) Oral daily  ondansetron Injectable 4 milliGRAM(s) IV Push every 8 hours PRN  tamsulosin 0.4 milliGRAM(s) Oral at bedtime      FAMILY HISTORY:  FHx: renal failure (Mother)        SOCIAL HISTORY:  Smoking: [ ]Yes [ ]No  ETOH: [ ]Yes [ ]No  Drug Use: [ ]Yes [ ]No   [ ] Single[ ]    T(F): 98.2 (25 @ 15:45), Max: 98.8 (25 @ 12:45)  HR: 84 (25 @ 15:45)  BP: 148/75 (25 @ 15:45)  RR: 16 (25 @ 15:45)  SpO2: 100% (25 @ 15:45)  Wt(kg): --    PHYSICAL EXAM:  General: alert, no acute distress  Eyes:  anicteric, no conjunctival injection, no discharge  Oropharynx: no lesions or injection 	  Neck: supple, without adenopathy  Lungs: clear to auscultation  Heart: regular rate and rhythm; no murmur, rubs or gallops  Abdomen: soft, nondistended, nontender, without mass or organomegaly  Skin: no lesions  Extremities: no clubbing, cyanosis, or edema, av fistula good  Neurologic: alert, oriented, moves all extremities except left leg    LAB RESULTS:                        11.4   5.74  )-----------( 113      ( 2025 08:10 )             33.7         137  |  100  |  73[H]  ----------------------------<  112[H]  4.9   |  26  |  8.27[H]    Ca    8.8      2025 08:10    TPro  7.0  /  Alb  3.6  /  TBili  0.6  /  DBili  x   /  AST  14  /  ALT  27  /  AlkPhos  84  -    LIVER FUNCTIONS - ( 2025 08:10 )  < from: CT Abdomen and Pelvis No Cont (25 @ 00:00) >    ACC: [id number] EXAM:  CT ABDOMEN AND PELVIS   ORDERED BY: MARQUEZ DONAHUE     PROCEDURE DATE:  2025          INTERPRETATION:  CLINICAL INFORMATION: Fever. Abdominal pain, nausea and   vomiting. History of cholecystectomy.    COMPARISON: CT of the abdomen and pelvis from 2023. MRCP from   2024.    CONTRAST/COMPLICATIONS:  IV Contrast: None  Evaluation of the visceral organs is limited without   intravenous contrast  Oral Contrast: None      PROCEDURE:  CT of the Abdomen and Pelvis was performed.  Sagittal and coronal reformats were performed.    FINDINGS:  LOWER CHEST: Bibasilar subsegmental atelectasis, left greater than right.   Scattered calcified granulomas.    LIVER: Within normal limits.  BILE DUCTS: Normal caliber.  GALLBLADDER: Redemonstrated cholelithiasis within a remnant gallbladder.  SPLEEN: Within normal limits.  PANCREAS: Within normal limits.  ADRENALS: Thickening of the limbs of bilateral adrenal glands.  KIDNEYS/URETERS: Left nephrectomy. Multiple right renal cysts, some of   which have associated wall calcifications. Scattered right renal high   attenuation lesions likely hemorrhagic cysts. No hydronephrosis.    BLADDER: Within normal limits.  REPRODUCTIVE ORGANS: Enlarged prostate to 5.5 cm. Prostatic   calcifications.    BOWEL: No bowel obstruction or inflammation. Colonic diverticulosis   without diverticulitis. Appendix is normal.  PERITONEUM/RETROPERITONEUM: Within normal limits.  VESSELS: Within normal limits.  LYMPH NODES: No lymphadenopathy.  ABDOMINAL WALL: Tiny fat-containing right inguinal hernia.  BONES: Within normal limits.    IMPRESSION:  No bowel obstruction or inflammation.        --- End of Report ---    < end of copied text >  Alb: 3.6 g/dL / Pro: 7.0 g/dL / ALK PHOS: 84 U/L / ALT: 27 U/L / AST: 14 U/L / GGT: x           Urinalysis Basic - ( 2025 09:34 )    Color: Yellow / Appearance: Clear / S.012 / pH: x  Gluc: x / Ketone: Negative mg/dL  / Bili: Negative / Urobili: 0.2 mg/dL   Blood: x / Protein: 300 mg/dL / Nitrite: Negative   Leuk Esterase: Negative / RBC: 1 /HPF / WBC 0 /HPF   Sq Epi: x / Non Sq Epi: x / Bacteria: Negative /HPF        MICROBIOLOGY:  RECENT CULTURES:   @ 22:40 .Blood BLOOD     Growth in anaerobic bottle: Gram Negative Rods    Growth in anaerobic bottle: Gram Negative Rods     @ 22:35 .Blood BLOOD Blood Culture PCR    Growth in anaerobic bottle: Enterobacter cloacae complex  Direct identification is available within approximately 3-5  hours either by Blood Panel Multiplexed PCR or Direct  MALDI-TOF. Details: [email or web address]    Growth in anaerobic bottle: Gram Negative Rods          RADIOLOGY REVIEWED:    < from: MR MRCP w/wo IV Cont (24 @ 11:46) >  EXAM: [id number] - MR MRCP WAW IC  - ORDERED BY: ZAC STEPHENSON      PROCEDURE DATE:  2024           INTERPRETATION:  CLINICAL INFORMATION: Right upper quadrant abdominal   pain, history of stones. Patient is on dialysis. Reported history of   cholecystectomy 2023.    COMPARISON: CT 2023. MRI 2017    CONTRAST/COMPLICATIONS:  IV Contrast: NONE  the patient declined IV contrast.  Oral Contrast: NONE  Complications: None reported at time of study completion    PROCEDURE:  MRI of the abdomen was performed.  MRCP was performed.    FINDINGS:    LIVER: Normal in size and morphology with subcentimeter cysts.  BILE DUCTS: No intrahepatic biliary duct dilatation. The common bile duct   is mildly dilated to 8 mm with at least 2 CBD stones, the larger   measuring 6 mm in the distal duct.  GALLBLADDER: Cholelithiasis within a remnant gallbladder.  SPLEEN: Enlarged at 13.5 cm in craniocaudad dimension, previously 14.2 cm   on prior CT..  PANCREAS: Scattered cysts, largest 1.3 cm in the pancreatic tail,   unchanged since prior CT. No main pancreatic duct dilatation.  ADRENALS: Within normal limits.  KIDNEYS/URETERS: Innumerable right renal cysts, largest 5.3 cm. Several   renal masses have hemorrhage or proteinaceous content. Further evaluation   is limited by lack of contrast.. No hydronephrosis. The left kidney is   not visualized.    VISUALIZED PORTIONS:  BOWEL: Within normal limits.  PERITONEUM: No ascites.  VESSELS: Within normal limits.  RETROPERITONEUM/LYMPH NODES: No lymphadenopathy.  ABDOMINAL WALL: Within normal limits.    IMPRESSION:  Mild CBD dilatation with choledocholithiasis. Cholelithiasis within a   remnant gallbladder without evidence of cholecystitis.    Right renal and pancreatic cysts. Further evaluation is limited without   contrast.    Mild splenomegaly, slightly decreased since prior.      < end of copied text >            < from: CT Abdomen and Pelvis No Cont (25 @ 00:00) >    ACC: [id number] EXAM:  CT ABDOMEN AND PELVIS   ORDERED BY: MARQUEZ DONAHUE     PROCEDURE DATE:  2025          INTERPRETATION:  CLINICAL INFORMATION: Fever. Abdominal pain, nausea and   vomiting. History of cholecystectomy.    COMPARISON: CT of the abdomen and pelvis from 2023. MRCP from   2024.    CONTRAST/COMPLICATIONS:  IV Contrast: None  Evaluation of the visceral organs is limited without   intravenous contrast  Oral Contrast: None      PROCEDURE:  CT of the Abdomen and Pelvis was performed.  Sagittal and coronal reformats were performed.    FINDINGS:  LOWER CHEST: Bibasilar subsegmental atelectasis, left greater than right.   Scattered calcified granulomas.    LIVER: Within normal limits.  BILE DUCTS: Normal caliber.  GALLBLADDER: Redemonstrated cholelithiasis within a remnant gallbladder.  SPLEEN: Within normal limits.  PANCREAS: Within normal limits.  ADRENALS: Thickening of the limbs of bilateral adrenal glands.  KIDNEYS/URETERS: Left nephrectomy. Multiple right renal cysts, some of   which have associated wall calcifications. Scattered right renal high   attenuation lesions likely hemorrhagic cysts. No hydronephrosis.    BLADDER: Within normal limits.  REPRODUCTIVE ORGANS: Enlarged prostate to 5.5 cm. Prostatic   calcifications.    BOWEL: No bowel obstruction or inflammation. Colonic diverticulosis   without diverticulitis. Appendix is normal.  PERITONEUM/RETROPERITONEUM: Within normal limits.  VESSELS: Within normal limits.  LYMPH NODES: No lymphadenopathy.  ABDOMINAL WALL: Tiny fat-containing right inguinal hernia.  BONES: Within normal limits.    IMPRESSION:  No bowel obstruction or inflammation.        --- End of Report ---              < end of copied text >      Impression: 74y male with history of nephrectomy for mass, esrd on hd, hep c, polio,  kleb bacteremia from cholecystitis and cholangitis back in  s/p subtotal cholecystectomy and ercp/stent then stent removal. He then had serratia bacteremia or unclear source 1 year ago. He now returns with acute rigors and n/v 2 days ago, seen in ED, sent home. He was called back because bc grew enterobacter. He has no pain, no dysuria and urinates well, no n,v,d further . Had a polypectomy from colon a couple of months ago. has av fistula that is good. Given history of choledocholithiasis and no other obvious source of gnr bacteremia other than maybe urine, I am concerned may need furtehr gi w/u. He ahs no gu symptoms and the urine looks clean  Recommendations:  continue meropenem  await sensi  f/u urine cx  gi eval  mrco  strongy titers

## 2025-01-06 NOTE — PROVIDER CONTACT NOTE (CRITICAL VALUE NOTIFICATION) - BACKGROUND
Patient AOx4 and was admitted with bacteremia. Patient has a history of Polio, hypertension, and chronic renal failure.

## 2025-01-06 NOTE — CONSULT NOTE ADULT - SUBJECTIVE AND OBJECTIVE BOX
NEPHROLOGY CONSULTATION    CHIEF COMPLAINT: bacteremia    HPI:  Pt is 75 yo m w/polycystic kidney disease status post L nephrectomy, ESRD on HD MWF, hypertension, hx polio, LLE weakness, ambulates with crutches, s/p cholecystectomy, came to ED 25 with n/v/fever, had sepsis workup done, initially negative and he was d/c home, today called back since found to have + blood cultures for enterobacter cl. Today no CP, SOB, N/V/D/C/F/C.    ROS:  as above    Allergies:  Demerol (Faint)    PAST MEDICAL & SURGICAL HISTORY:  PKD  Hypertension  ESRD on HD  Cancer of kidney, L nephrectomy  Polio  AV fistula    SOCIAL HISTORY:  negative    FAMILY HISTORY:  renal failure (Mother)    MEDICATIONS  (STANDING):  amLODIPine   Tablet 10 milliGRAM(s) Oral daily  Nephro-odessa 1 Tablet(s) Oral daily  tamsulosin 0.4 milliGRAM(s) Oral at bedtime    Vital Signs Last 24 Hrs  T(C): 36.8 (25 @ 15:45), Max: 37.1 (25 @ 12:45)  T(F): 98.2 (25 @ 15:45), Max: 98.8 (25 @ 12:45)  HR: 84 (25 @ 15:45) (72 - 87)  BP: 148/75 (25 @ 15:45) (140/82 - 179/91)  RR: 16 (25 @ 15:45) (16 - 23)  SpO2: 100% (25 @ 15:45) (95% - 100%)    s1s2  b/l air entry  soft, ND  no edema     LABS:                        11.4   5.74  )-----------( 113      ( 2025 08:10 )             33.7         137  |  100  |  73[H]  ----------------------------<  112[H]  4.9   |  26  |  8.27[H]    Ca    8.8      2025 08:10    TPro  7.0  /  Alb  3.6  /  TBili  0.6  /  DBili  x   /  AST  14  /  ALT  27  /  AlkPhos  84  01-06    Urinalysis Basic - ( 2025 09:34 )    Color: Yellow / Appearance: Clear / S.012 / pH: x  Gluc: x / Ketone: Negative mg/dL  / Bili: Negative / Urobili: 0.2 mg/dL   Blood: x / Protein: 300 mg/dL / Nitrite: Negative   Leuk Esterase: Negative / RBC: 1 /HPF / WBC 0 /HPF   Sq Epi: x / Non Sq Epi: x / Bacteria: Negative /HPF    LIVER FUNCTIONS - ( 2025 08:10 )  Alb: 3.6 g/dL / Pro: 7.0 g/dL / ALK PHOS: 84 U/L / ALT: 27 U/L / AST: 14 U/L / GGT: x           Culture - Blood (collected 2025 22:40)  Source: .Blood BLOOD  Gram Stain (2025 14:56):    Growth in anaerobic bottle: Gram Negative Rods  Preliminary Report (2025 14:56):    Growth in anaerobic bottle: Gram Negative Rods    Culture - Blood (collected 2025 22:35)  Source: .Blood BLOOD  Gram Stain (2025 03:37):    Growth in anaerobic bottle: Gram Negative Rods  Preliminary Report (2025 17:41):    Growth in anaerobic bottle: Enterobacter cloacae complex    Direct identification is available within approximately 3-5    hours either by Blood Panel Multiplexed PCR or Direct    MALDI-TOF. Details: [email or web address]  Organism: Blood Culture PCR (2025 05:13)  Organism: Blood Culture PCR (2025 05:13)    PT/INR - ( 2025 08:10 )   PT: 13.1 sec;   INR: 1.11 ratio      A/P:    Enterobacter bacteremia  Abx, w/up per ID  CT A/P w/o acute findings   HD today   TMP 2kg as able  Renal diet  Next HD on Wednesday     [phone]

## 2025-01-06 NOTE — ED PROVIDER NOTE - OBJECTIVE STATEMENT
73 yo m ho polycystic kidney disease status post nephrectomy and end-stage renal failure HD Monday Wednesday Friday last HD was on Friday, hypertension, polio crutches, status postcholecystectomy, came to ED 3 days ago with weakness/fever presents with weakness and abnormal lab. Patient had sepsis workup done 3 days ago - initial workup negative for any source but found to be +blood cultures with gram negative/enterobacter cloacae complex  Pt denies any acute complaints including cp, sob, abd pain, hematuria, dysuria, nausea, vomiting

## 2025-01-06 NOTE — ED ADULT NURSE NOTE - NS ED PATIENT SAFETY CONCERN
The stone is up in the kidney- not in the ureter so should not need pain meds-   with her history should limit amount of opioid   No

## 2025-01-06 NOTE — PATIENT PROFILE ADULT - FALL HARM RISK - HARM RISK INTERVENTIONS

## 2025-01-06 NOTE — ED ADULT NURSE NOTE - NSFALLDEVICES_ED_ALL_ED
IV removed intact, pt given discharge instructions and RX with understanding. Pt ambulatory out of ED steady gait in no distress.      Crutches

## 2025-01-06 NOTE — ED ADULT TRIAGE NOTE - CHIEF COMPLAINT QUOTE
Pt called back to ED for + blood cultures. Pt c/o generalized weakness. Pt with left AV fistula, last had dialysis on Friday (on MWF schedule)

## 2025-01-06 NOTE — ED ADULT NURSE NOTE - NSFALLRISKINTERV_ED_ALL_ED

## 2025-01-06 NOTE — ED PROVIDER NOTE - NSCAREINITIATED _GEN_ER
It has been over 6 months since his last visit with me which was in December of last year.  I would like to see if he can get into see myself or an GIA for an exam and to discuss this further.  
Phone call to patient to discuss [employer] message further. Reports for the last 2 years his back has acted up usually in August. He is trying to avoid it going completely out by getting ahead of the pain. States it started last week where he was noticing the pain. In the last couple of days the pain has become more noticeable especially when he is trying to get up from a sitting position.     He is hoping to get a Medrol dose pack as he had great relief with last one. He had that at end of August and has lasted until now. He asks for this instead of an injection as his blood sugars don't elevate as high with the oral steroid. Please advise.    
Phone call to patient to discuss. Rang and rang, but unable to leave a message. Also attempted his cell number, but call dropped both times. Will send information through to patient on [email or web address].   
Katelynn Howell(Attending)

## 2025-01-06 NOTE — ED ADULT NURSE NOTE - OBJECTIVE STATEMENT
Pt. to Ed stating he was here two days ago for an episode of vomiting and abdominal pain which has since resolved.  Pt. states he was called back in for positive blood cultures.  Pt. denies any fevers, chills, abdominal pain.  Pt. is due for dialysis this morning, MWF.  Pt. states he makes little urine and denies any changes in urination.  Pt. arrived using crutches which states is chronic due to polio.

## 2025-01-06 NOTE — H&P ADULT - NSHPLABSRESULTS_GEN_ALL_CORE
11.4                 137  | 26   | 73           5.74  >-----------< 113     ------------------------< 112                   33.7                 4.9  | 100  | 8.27                                         Ca 8.8   Mg x     Ph x      Urinalysis Basic - ( 2025 09:34 )    Color: Yellow / Appearance: Clear / S.012 / pH: x  Gluc: x / Ketone: Negative mg/dL  / Bili: Negative / Urobili: 0.2 mg/dL   Blood: x / Protein: 300 mg/dL / Nitrite: Negative   Leuk Esterase: Negative / RBC: 1 /HPF / WBC 0 /HPF   Sq Epi: x / Non Sq Epi: x / Bacteria: Negative /HPF        Culture - Blood (25 @ 22:35)    -  Enterobacter cloacae complex: Detec    Gram Stain:   Growth in anaerobic bottle: Gram Negative Rods    Specimen Source: .Blood BLOOD    Organism: Blood Culture PCR    Culture Results:   Growth in anaerobic bottle: Gram Negative Rods  Direct identification is available within approximately 3-5  hours either by Blood Panel Multiplexed PCR or Direct  MALDI-TOF. Details: [email or web address]    Organism Identification: Blood Culture PCR    Method Type: PCR    Labs reviewed:     CXR personally reviewed: napd    ECG reviewed and interpreted: < from: 12 Lead ECG (25 @ 22:36) >    Diagnosis Line Normal sinus rhythm 91    < from: CT Abdomen and Pelvis No Cont (25 @ 00:00) >    PROCEDURE:  CT of the Abdomen and Pelvis was performed.  Sagittal and coronal reformats were performed.    FINDINGS:  LOWER CHEST: Bibasilar subsegmental atelectasis, left greater than right.   Scattered calcified granulomas.    LIVER: Within normal limits.  BILE DUCTS: Normal caliber.  GALLBLADDER: Redemonstrated cholelithiasis within a remnant gallbladder.  SPLEEN: Within normal limits.  PANCREAS: Within normal limits.  ADRENALS: Thickening of the limbs of bilateral adrenal glands.  KIDNEYS/URETERS: Left nephrectomy. Multiple right renal cysts, some of   which have associated wall calcifications. Scattered right renal high   attenuation lesions likely hemorrhagic cysts. No hydronephrosis.    BLADDER: Within normal limits.  REPRODUCTIVE ORGANS: Enlarged prostate to 5.5 cm. Prostatic   calcifications.    BOWEL: No bowel obstruction or inflammation. Colonic diverticulosis   without diverticulitis. Appendix is normal.  PERITONEUM/RETROPERITONEUM: Within normal limits.  VESSELS: Within normal limits.  LYMPH NODES: No lymphadenopathy.  ABDOMINAL WALL: Tiny fat-containing right inguinal hernia.  BONES: Within normal limits.    IMPRESSION:  No bowel obstruction or inflammation.                MEDICATIONS  (STANDING):  amLODIPine   Tablet 10 milliGRAM(s) Oral daily  Nephro-odessa 1 Tablet(s) Oral daily  sodium chloride 0.45%. 1000 milliLiter(s) (75 mL/Hr) IV Continuous <Continuous>  tamsulosin 0.4 milliGRAM(s) Oral at bedtime    MEDICATIONS  (PRN):  acetaminophen     Tablet .. 650 milliGRAM(s) Oral every 6 hours PRN Temp greater or equal to 38C (100.4F), Mild Pain (1 - 3)  aluminum hydroxide/magnesium hydroxide/simethicone Suspension 30 milliLiter(s) Oral every 4 hours PRN Dyspepsia  melatonin 3 milliGRAM(s) Oral at bedtime PRN Insomnia  ondansetron Injectable 4 milliGRAM(s) IV Push every 8 hours PRN Nausea and/or Vomiting

## 2025-01-06 NOTE — H&P ADULT - NSHPPHYSICALEXAM_GEN_ALL_CORE
Vital Signs Last 24 Hrs  T(C): 36.9 (06 Jan 2025 08:14), Max: 36.9 (06 Jan 2025 08:14)  T(F): 98.4 (06 Jan 2025 08:14), Max: 98.4 (06 Jan 2025 08:14)  HR: 76 (06 Jan 2025 09:15) (72 - 85)  BP: 151/81 (06 Jan 2025 09:15) (140/82 - 179/91)  BP(mean): --  RR: 17 (06 Jan 2025 09:15) (17 - 23)  SpO2: 96% (06 Jan 2025 09:15) (95% - 97%)    Parameters below as of 06 Jan 2025 09:15  Patient On (Oxygen Delivery Method): room air    GENERAL- NAD  EAR/NOSE/MOUTH/THROAT -  MMM  EYES- MORAIMA, conjunctiva and Sclera clear  NECK- supple  RESPIRATORY-  clear to auscultation bilaterally  CARDIOVASCULAR - SIS2, RRR  GI - soft NT BS present  EXTREMITIES- no pedal edema, LUE shunt   NEUROLOGY- LLE weakness due to polio  PSYCHIATRY- AAO X 3

## 2025-01-06 NOTE — ED ADULT NURSE NOTE - NSICDXPASTMEDICALHX_GEN_ALL_CORE_FT
PAST MEDICAL HISTORY:  Cancer of kidney, left     Dialysis patient     Hypertension     Polio     Renal failure, chronic

## 2025-01-06 NOTE — H&P ADULT - ASSESSMENT
73 yo m ho polycystic kidney disease status post nephrectomy, end-stage renal failure HD Monday Wednesday Friday last HD was on Friday, hypertension, polio, LLE weakness, ambulates with crutches, status postcholecystectomy, came to ED 1/4/25 with n/v/fever,  Patient had sepsis workup done,  initial workup was negative for any source he was d/c home, today called back since found to have +blood cultures with gram negative/enterobacter cloacae complex  Pt denies any acute complaints including cp, sob, abd pain, hematuria, dysuria, nausea, vomiting    N/V, fever, likely due to GNR bacteremia of unclear source  ed course- blood cultures positive, ct - no acute abnormality noted  admit to medicine  c/w meropenem for now  repeat BC sent  flu covid negative  UA negative  iv fluids for hydration  ID consulted dr. katherin prater.  am labs  tylenol prn    PCKD, s/p  nephrectomy, ESRD, HTN  HD ON M,W,F  c/w amlodipine  renal consulted- aware    BPH-   C/W Flomax    vte ppx-   PAS

## 2025-01-06 NOTE — ED POST DISCHARGE NOTE - DETAILS
Contacted patient, discussed results of Gram negative rods and need to return for IV antibiotics. He will discuss with his daughter. I urged him to return as soon as possible so he can get admitted for IV antibiotics. Delaying IV abx may worsen his infection. He understands and will come back to the hospital.

## 2025-01-06 NOTE — ED PROVIDER NOTE - CLINICAL SUMMARY MEDICAL DECISION MAKING FREE TEXT BOX
75 yo m ho polycystic kidney disease status post nephrectomy and end-stage renal failure HD Monday Wednesday Friday last HD was on Friday, hypertension, polio crutches, status postcholecystectomy, came to ED 3 days ago with weakness/fever presents with weakness and abnormal lab. Patient had sepsis workup done 3 days ago - initial workup negative for any source but found to be +blood cultures with gram negative/enterobacter cloacae complex  Pt denies any acute complaints including cp, sob, abd pain, hematuria, dysuria, nausea, vomiting  Exam as stated   Plan for labs, swab, XR, UA. Will treat with meropenem and admit 73 yo m ho polycystic kidney disease status post nephrectomy and end-stage renal failure HD Monday Wednesday Friday last HD was on Friday, hypertension, polio crutches, status postcholecystectomy, came to ED 3 days ago with weakness/fever presents with weakness and abnormal lab. Patient had sepsis workup done 3 days ago - initial workup negative for any source but found to be +blood cultures with gram negative/enterobacter cloacae complex  Pt denies any acute complaints including cp, sob, abd pain, hematuria, dysuria, nausea, vomiting  Exam as stated   Plan for labs, swab, XR, UA. Will treat with meropenem and admit  Patient to be admitted to an inpatient floor. Pt/family notified and agreeable to plan. Case discussed with and care endorsed to hospitalist. 73 yo m ho polycystic kidney disease status post nephrectomy and end-stage renal failure HD Monday Wednesday Friday last HD was on Friday, hypertension, polio crutches, status postcholecystectomy, came to ED 3 days ago with weakness/fever presents with weakness and abnormal lab. Patient had sepsis workup done 3 days ago - initial workup negative for any source but found to be +blood cultures with gram negative/enterobacter cloacae complex  Pt denies any acute complaints including cp, sob, abd pain, hematuria, dysuria, nausea, vomiting  Exam as stated   Plan for labs, swab, XR, UA. Will treat with meropenem and admit  Patient to be admitted to an inpatient floor. Pt/family notified and agreeable to plan. Case discussed with and care endorsed to hospitalist.  Consulted nephro Dr Eagle as pt will require HD - will follow

## 2025-01-06 NOTE — H&P ADULT - HISTORY OF PRESENT ILLNESS
75 yo m ho polycystic kidney disease status post nephrectomy, end-stage renal failure HD Monday Wednesday Friday last HD was on Friday, hypertension, polio, LLE weakness, ambulates with crutches, status postcholecystectomy, came to ED 1/4/25 with n/v/fever,  Patient had sepsis workup done,  initial workup was negative for any source he was d/c home, today called back since found to have +blood cultures with gram negative/enterobacter cloacae complex  Pt denies any acute complaints including cp, sob, abd pain, hematuria, dysuria, nausea, vomiting    ed course- blood cultures positive, ct - no acute abnormality noted

## 2025-01-07 DIAGNOSIS — R78.81 BACTEREMIA: ICD-10-CM

## 2025-01-07 LAB
-  AMPICILLIN/SULBACTAM: SIGNIFICANT CHANGE UP
-  AMPICILLIN: SIGNIFICANT CHANGE UP
-  AZTREONAM: SIGNIFICANT CHANGE UP
-  CEFAZOLIN: SIGNIFICANT CHANGE UP
-  CEFEPIME: SIGNIFICANT CHANGE UP
-  CEFOXITIN: SIGNIFICANT CHANGE UP
-  CEFTRIAXONE: SIGNIFICANT CHANGE UP
-  CIPROFLOXACIN: SIGNIFICANT CHANGE UP
-  ERTAPENEM: SIGNIFICANT CHANGE UP
-  GENTAMICIN: SIGNIFICANT CHANGE UP
-  IMIPENEM: SIGNIFICANT CHANGE UP
-  LEVOFLOXACIN: SIGNIFICANT CHANGE UP
-  MEROPENEM: SIGNIFICANT CHANGE UP
-  PIPERACILLIN/TAZOBACTAM: SIGNIFICANT CHANGE UP
-  TOBRAMYCIN: SIGNIFICANT CHANGE UP
-  TRIMETHOPRIM/SULFAMETHOXAZOLE: SIGNIFICANT CHANGE UP
ALBUMIN SERPL ELPH-MCNC: 2.8 G/DL — LOW (ref 3.3–5)
ALP SERPL-CCNC: 59 U/L — SIGNIFICANT CHANGE UP (ref 40–120)
ALT FLD-CCNC: 18 U/L — SIGNIFICANT CHANGE UP (ref 10–45)
ANION GAP SERPL CALC-SCNC: 7 MMOL/L — SIGNIFICANT CHANGE UP (ref 5–17)
AST SERPL-CCNC: 15 U/L — SIGNIFICANT CHANGE UP (ref 10–40)
BASOPHILS # BLD AUTO: 0.01 K/UL — SIGNIFICANT CHANGE UP (ref 0–0.2)
BASOPHILS NFR BLD AUTO: 0.2 % — SIGNIFICANT CHANGE UP (ref 0–2)
BILIRUB SERPL-MCNC: 0.5 MG/DL — SIGNIFICANT CHANGE UP (ref 0.2–1.2)
BUN SERPL-MCNC: 41 MG/DL — HIGH (ref 7–23)
CALCIUM SERPL-MCNC: 8.7 MG/DL — SIGNIFICANT CHANGE UP (ref 8.4–10.5)
CHLORIDE SERPL-SCNC: 103 MMOL/L — SIGNIFICANT CHANGE UP (ref 96–108)
CO2 SERPL-SCNC: 30 MMOL/L — SIGNIFICANT CHANGE UP (ref 22–31)
CREAT SERPL-MCNC: 5.62 MG/DL — HIGH (ref 0.5–1.3)
CULTURE RESULTS: ABNORMAL
CULTURE RESULTS: ABNORMAL
CULTURE RESULTS: SIGNIFICANT CHANGE UP
EGFR: 10 ML/MIN/1.73M2 — LOW
EOSINOPHIL # BLD AUTO: 0.32 K/UL — SIGNIFICANT CHANGE UP (ref 0–0.5)
EOSINOPHIL NFR BLD AUTO: 7.7 % — HIGH (ref 0–6)
GLUCOSE SERPL-MCNC: 86 MG/DL — SIGNIFICANT CHANGE UP (ref 70–99)
HCT VFR BLD CALC: 28.9 % — LOW (ref 39–50)
HGB BLD-MCNC: 9.6 G/DL — LOW (ref 13–17)
IMM GRANULOCYTES NFR BLD AUTO: 0.5 % — SIGNIFICANT CHANGE UP (ref 0–0.9)
LYMPHOCYTES # BLD AUTO: 0.58 K/UL — LOW (ref 1–3.3)
LYMPHOCYTES # BLD AUTO: 14 % — SIGNIFICANT CHANGE UP (ref 13–44)
MCHC RBC-ENTMCNC: 31.5 PG — SIGNIFICANT CHANGE UP (ref 27–34)
MCHC RBC-ENTMCNC: 33.2 G/DL — SIGNIFICANT CHANGE UP (ref 32–36)
MCV RBC AUTO: 94.8 FL — SIGNIFICANT CHANGE UP (ref 80–100)
METHOD TYPE: SIGNIFICANT CHANGE UP
MONOCYTES # BLD AUTO: 0.75 K/UL — SIGNIFICANT CHANGE UP (ref 0–0.9)
MONOCYTES NFR BLD AUTO: 18.2 % — HIGH (ref 2–14)
NEUTROPHILS # BLD AUTO: 2.45 K/UL — SIGNIFICANT CHANGE UP (ref 1.8–7.4)
NEUTROPHILS NFR BLD AUTO: 59.4 % — SIGNIFICANT CHANGE UP (ref 43–77)
NRBC # BLD: 0 /100 WBCS — SIGNIFICANT CHANGE UP (ref 0–0)
ORGANISM # SPEC MICROSCOPIC CNT: ABNORMAL
ORGANISM # SPEC MICROSCOPIC CNT: ABNORMAL
ORGANISM # SPEC MICROSCOPIC CNT: SIGNIFICANT CHANGE UP
PLATELET # BLD AUTO: 98 K/UL — LOW (ref 150–400)
POTASSIUM SERPL-MCNC: 4.3 MMOL/L — SIGNIFICANT CHANGE UP (ref 3.5–5.3)
POTASSIUM SERPL-SCNC: 4.3 MMOL/L — SIGNIFICANT CHANGE UP (ref 3.5–5.3)
PROT SERPL-MCNC: 5.9 G/DL — LOW (ref 6–8.3)
RBC # BLD: 3.05 M/UL — LOW (ref 4.2–5.8)
RBC # FLD: 14.5 % — SIGNIFICANT CHANGE UP (ref 10.3–14.5)
SODIUM SERPL-SCNC: 140 MMOL/L — SIGNIFICANT CHANGE UP (ref 135–145)
SPECIMEN SOURCE: SIGNIFICANT CHANGE UP
WBC # BLD: 4.13 K/UL — SIGNIFICANT CHANGE UP (ref 3.8–10.5)
WBC # FLD AUTO: 4.13 K/UL — SIGNIFICANT CHANGE UP (ref 3.8–10.5)

## 2025-01-07 PROCEDURE — 99233 SBSQ HOSP IP/OBS HIGH 50: CPT

## 2025-01-07 PROCEDURE — 99223 1ST HOSP IP/OBS HIGH 75: CPT | Mod: FS

## 2025-01-07 RX ADMIN — TAMSULOSIN HYDROCHLORIDE 0.4 MILLIGRAM(S): 0.4 CAPSULE ORAL at 21:48

## 2025-01-07 RX ADMIN — Medication 10 MILLIGRAM(S): at 05:45

## 2025-01-07 RX ADMIN — MEROPENEM 100 MILLIGRAM(S): 1 INJECTION, POWDER, FOR SOLUTION INTRAVENOUS at 12:19

## 2025-01-07 RX ADMIN — Medication 1 TABLET(S): at 12:19

## 2025-01-07 RX ADMIN — ACETAMINOPHEN 650 MILLIGRAM(S): 80 SOLUTION/ DROPS ORAL at 17:24

## 2025-01-07 NOTE — PROGRESS NOTE ADULT - SUBJECTIVE AND OBJECTIVE BOX
Patient is a 74y old  Male who presents with a chief complaint of chills n/v (06 Jan 2025 22:14)      Patient seen and examined at bedside. No overnight events reported. Patient states he is feeling well. Denies chest pain, abdominal pain, nausea, vomiting, sob, diarrhea, constipation.     ALLERGIES:  Demerol (Faint)  Demerol HCl (Unknown)    MEDICATIONS  (STANDING):  amLODIPine   Tablet 10 milliGRAM(s) Oral daily  meropenem  IVPB 500 milliGRAM(s) IV Intermittent every 24 hours  Nephro-odessa 1 Tablet(s) Oral daily  tamsulosin 0.4 milliGRAM(s) Oral at bedtime    MEDICATIONS  (PRN):  acetaminophen     Tablet .. 650 milliGRAM(s) Oral every 6 hours PRN Temp greater or equal to 38C (100.4F), Mild Pain (1 - 3)  aluminum hydroxide/magnesium hydroxide/simethicone Suspension 30 milliLiter(s) Oral every 4 hours PRN Dyspepsia  melatonin 3 milliGRAM(s) Oral at bedtime PRN Insomnia  ondansetron Injectable 4 milliGRAM(s) IV Push every 8 hours PRN Nausea and/or Vomiting    Vital Signs Last 24 Hrs  T(F): 98.1 (07 Jan 2025 05:43), Max: 98.8 (06 Jan 2025 12:45)  HR: 80 (07 Jan 2025 05:43) (76 - 87)  BP: 157/75 (07 Jan 2025 05:43) (148/75 - 160/83)  RR: 18 (07 Jan 2025 05:43) (16 - 18)  SpO2: 96% (07 Jan 2025 05:43) (96% - 100%)  I&O's Summary    06 Jan 2025 07:01  -  07 Jan 2025 07:00  --------------------------------------------------------  IN: 0 mL / OUT: 2000 mL / NET: -2000 mL      PHYSICAL EXAM:  General: NAD, A/O x 3  ENT: No gross hearing impairment, Moist mucous membranes, no thrush  Neck: Supple, No JVD  Lungs: Clear to auscultation bilaterally, good air entry, non-labored breathing  Cardio: RRR, S1/S2, No murmur  Abdomen: Soft, Nontender, Nondistended; Bowel sounds present  Extremities: No calf tenderness, No cyanosis, No pitting edema  Psych: Appropriate mood and affect    LABS:                        9.6    4.13  )-----------( 98       ( 07 Jan 2025 06:33 )             28.9     01-07    140  |  103  |  41  ----------------------------<  86  4.3   |  30  |  5.62    Ca    8.7      07 Jan 2025 06:33    TPro  5.9  /  Alb  2.8  /  TBili  0.5  /  DBili  x   /  AST  15  /  ALT  18  /  AlkPhos  59  01-07          PT/INR - ( 06 Jan 2025 08:10 )   PT: 13.1 sec;   INR: 1.11 ratio         PTT - ( 06 Jan 2025 08:10 )  PTT:30.6 sec  Lactate, Blood: 1.4 mmol/L (01-06 @ 08:10)  Lactate, Blood: 1.0 mmol/L (01-04 @ 22:35)                            Urinalysis Basic - ( 07 Jan 2025 06:33 )    Color: x / Appearance: x / SG: x / pH: x  Gluc: 86 mg/dL / Ketone: x  / Bili: x / Urobili: x   Blood: x / Protein: x / Nitrite: x   Leuk Esterase: x / RBC: x / WBC x   Sq Epi: x / Non Sq Epi: x / Bacteria: x        Culture - Blood (collected 04 Jan 2025 22:40)  Source: .Blood BLOOD  Gram Stain (06 Jan 2025 14:56):    Growth in anaerobic bottle: Gram Negative Rods  Preliminary Report (06 Jan 2025 14:56):    Growth in anaerobic bottle: Gram Negative Rods    Culture - Blood (collected 04 Jan 2025 22:35)  Source: .Blood BLOOD  Gram Stain (06 Jan 2025 03:37):    Growth in anaerobic bottle: Gram Negative Rods  Preliminary Report (06 Jan 2025 17:41):    Growth in anaerobic bottle: Enterobacter cloacae complex    Direct identification is available within approximately 3-5    hours either by Blood Panel Multiplexed PCR or Direct    MALDI-TOF. Details: [email or web address]  Organism: Blood Culture PCR (06 Jan 2025 05:13)  Organism: Blood Culture PCR (06 Jan 2025 05:13)      Method Type: PCR      -  Enterobacter cloacae complex: Detec        RADIOLOGY & ADDITIONAL TESTS:    Care Discussed with Consultants/Other Providers:

## 2025-01-07 NOTE — CONSULT NOTE ADULT - PROBLEM SELECTOR RECOMMENDATION 9
- In the absent of GI symptoms and normal liver chemistries unlikely bacteremia unlike to be from a biliary source  - Will sign off, please reconsult if clinically indicated - In the absence of GI symptoms and normal liver chemistries bacteremia unlikely to be from a biliary source  - Will sign off, please reconsult if clinically indicated

## 2025-01-07 NOTE — CONSULT NOTE ADULT - NS ATTEND AMEND GEN_ALL_CORE FT
Agree with assessment and plan as above. I attest my time as attending was greater than 50% of the total time of 75 min on patient care (ACP <= 25 mins) on this DOS. Time spent includes review of hospital course, labs, vitals, medical records, patient evaluation, contact with family (when present), discussion of plan with the primary team, coordinating services and documenting encounter.     75 y/o M who presents to the ED after being called and informed of positive blood cultures. Patient was previously seen in the ED on 1/4 after having fevers, nausea and vomiting. Blood cultures were positive for gram negative/enterobacter cloacae complex. Labs reviewed and revealing normal bili as well as alk phos and liver chemistries. Patient does not complain of any abdominal pain. Given the absence of symptoms as well as normal lab values it is unlikely that the source of bacteremia is biliary in origin. Concern understood as patient has a hx of ascending cholangitis with ERCP in the past and stent placement as well as subsequent removal. Appreciate ID recommendations. Abx per ID team. GI will sign off. Please reconsult as clinically necessary.

## 2025-01-07 NOTE — PROGRESS NOTE ADULT - SUBJECTIVE AND OBJECTIVE BOX
No distress    Vital Signs Last 24 Hrs  T(C): 37.1 (01-07-25 @ 20:10), Max: 37.1 (01-07-25 @ 20:10)  T(F): 98.7 (01-07-25 @ 20:10), Max: 98.7 (01-07-25 @ 20:10)  HR: 86 (01-07-25 @ 20:10) (74 - 86)  BP: 163/81 (01-07-25 @ 20:10) (147/75 - 163/81)  RR: 16 (01-07-25 @ 20:10) (16 - 18)  SpO2: 97% (01-07-25 @ 20:10) (94% - 97%)    I&O's Detail    06 Jan 2025 07:01  -  07 Jan 2025 07:00  --------------------------------------------------------  OUT:    Other (mL): 2000 mL  Total OUT: 2000 mL    s1s2  b/l air entry  soft, ND  no edema                         9.6    4.13  )-----------( 98       ( 07 Jan 2025 06:33 )             28.9     07 Jan 2025 06:33    140    |  103    |  41     ----------------------------<  86     4.3     |  30     |  5.62     Ca    8.7        07 Jan 2025 06:33    TPro  5.9    /  Alb  2.8    /  TBili  0.5    /  DBili  x      /  AST  15     /  ALT  18     /  AlkPhos  59     07 Jan 2025 06:33    LIVER FUNCTIONS - ( 07 Jan 2025 06:33 )  Alb: 2.8 g/dL / Pro: 5.9 g/dL / ALK PHOS: 59 U/L / ALT: 18 U/L / AST: 15 U/L / GGT: x           PT/INR - ( 06 Jan 2025 08:10 )   PT: 13.1 sec;   INR: 1.11 ratio      Culture - Urine (collected 06 Jan 2025 09:34)  Source: Clean Catch Clean Catch (Midstream)  Final Report (07 Jan 2025 13:13):    <10,000 CFU/mL Normal Urogenital Carmen    Culture - Blood (collected 06 Jan 2025 08:20)  Source: .Blood BLOOD  Preliminary Report (07 Jan 2025 16:01):    No growth at 24 hours    Culture - Blood (collected 06 Jan 2025 08:14)  Source: .Blood BLOOD  Preliminary Report (07 Jan 2025 16:01):    No growth at 24 hours    Culture - Blood (collected 04 Jan 2025 22:40)  Source: .Blood BLOOD  Gram Stain (06 Jan 2025 14:56):    Growth in anaerobic bottle: Gram Negative Rods  Final Report (07 Jan 2025 13:40):    Growth in anaerobic bottle: Enterobacter cloacae complex    See previous culture [id number]    Culture - Blood (collected 04 Jan 2025 22:35)  Source: .Blood BLOOD  Gram Stain (06 Jan 2025 03:37):    Growth in anaerobic bottle: Gram Negative Rods  Final Report (07 Jan 2025 09:40):    Growth in anaerobic bottle: Enterobacter cloacae complex    Direct identification is available within approximately 3-5    hours either by Blood Panel Multiplexed PCR or Direct    MALDI-TOF. Details: [email or web address]  Organism: Blood Culture PCR  Enterobacter cloacae complex (07 Jan 2025 09:40)  Organism: Enterobacter cloacae complex (07 Jan 2025 09:40)  Organism: Blood Culture PCR (07 Jan 2025 09:40)    acetaminophen     Tablet .. 650 milliGRAM(s) Oral every 6 hours PRN  aluminum hydroxide/magnesium hydroxide/simethicone Suspension 30 milliLiter(s) Oral every 4 hours PRN  amLODIPine   Tablet 10 milliGRAM(s) Oral daily  melatonin 3 milliGRAM(s) Oral at bedtime PRN  meropenem  IVPB 500 milliGRAM(s) IV Intermittent every 24 hours  Nephro-odessa 1 Tablet(s) Oral daily  ondansetron Injectable 4 milliGRAM(s) IV Push every 8 hours PRN  tamsulosin 0.4 milliGRAM(s) Oral at bedtime    A/P:    Enterobacter bacteremia  Abx, w/up per ID  CT A/P w/o acute findings   HD tomorrow as ordered  TMP 2kg as able  Renal diet    [phone]

## 2025-01-07 NOTE — CONSULT NOTE ADULT - ASSESSMENT
75 yo m ho polycystic kidney disease status post nephrectomy, end-stage renal failure HD Monday Wednesday Friday last HD was on Friday, hypertension, polio, LLE weakness, ambulates with crutches, status postcholecystectomy, came to ED 1/4/25 with n/v/fever,      Pt denies any acute GI complaints including nausea, vomiting, diarrhea.

## 2025-01-07 NOTE — PROGRESS NOTE ADULT - SUBJECTIVE AND OBJECTIVE BOX
CC: f/u for enterobacter bacteremia    Patient reports: he is feeling okay, he offers no complaints    REVIEW OF SYSTEMS:  All other review of systems negative (Comprehensive ROS)    Antimicrobials Day #  :day 3  meropenem  IVPB 500 milliGRAM(s) IV Intermittent every 24 hours    Other Medications Reviewed  MEDICATIONS  (STANDING):  amLODIPine   Tablet 10 milliGRAM(s) Oral daily  meropenem  IVPB 500 milliGRAM(s) IV Intermittent every 24 hours  Nephro-odessa 1 Tablet(s) Oral daily  tamsulosin 0.4 milliGRAM(s) Oral at bedtime    T(F): 97.9 (01-07-25 @ 12:01), Max: 98.2 (01-06-25 @ 15:45)  HR: 74 (01-07-25 @ 12:01)  BP: 147/75 (01-07-25 @ 12:01)  RR: 17 (01-07-25 @ 12:01)  SpO2: 94% (01-07-25 @ 12:01)  Wt(kg): --    PHYSICAL EXAM:  General: alert, no acute distress  Eyes:  anicteric, no conjunctival injection, no discharge  Oropharynx: no lesions or injection 	  Neck: supple, without adenopathy  Lungs: clear to auscultation  Heart: regular rate and rhythm; no murmur, rubs or gallops  Abdomen: soft, nondistended, nontender, without mass or organomegaly  Skin: no lesions  Extremities: no clubbing, cyanosis, or edema  Neurologic: alert, oriented, moves all extremities  Left arm AVF  LAB RESULTS:                        9.6    4.13  )-----------( 98       ( 07 Jan 2025 06:33 )             28.9     01-07    140  |  103  |  41[H]  ----------------------------<  86  4.3   |  30  |  5.62[H]    Ca    8.7      07 Jan 2025 06:33    TPro  5.9[L]  /  Alb  2.8[L]  /  TBili  0.5  /  DBili  x   /  AST  15  /  ALT  18  /  AlkPhos  59  01-07    LIVER FUNCTIONS - ( 07 Jan 2025 06:33 )  Alb: 2.8 g/dL / Pro: 5.9 g/dL / ALK PHOS: 59 U/L / ALT: 18 U/L / AST: 15 U/L / GGT: x                 MICROBIOLOGY:  RECENT CULTURES:  01-06 @ 09:34 Clean Catch Clean Catch (Midstream)     <10,000 CFU/mL Normal Urogenital Carmen      01-04 @ 22:40 .Blood BLOOD     Growth in anaerobic bottle: Enterobacter cloacae complex  See previous culture [id number]    Growth in anaerobic bottle: Gram Negative Rods    01-04 @ 22:35 .Blood BLOOD Blood Culture PCR  Enterobacter cloacae complex    Growth in anaerobic bottle: Enterobacter cloacae complex  Direct identification is available within approximately 3-5  hours either by Blood Panel Multiplexed PCR or Direct  MALDI-TOF. Details: [email or web address]    Growth in anaerobic bottle: Gram Negative Rods        RADIOLOGY REVIEWED:  < from: CT Abdomen and Pelvis No Cont (01.05.25 @ 00:00) >    FINDINGS:  LOWER CHEST: Bibasilar subsegmental atelectasis, left greater than right.   Scattered calcified granulomas.    LIVER: Within normal limits.  BILE DUCTS: Normal caliber.  GALLBLADDER: Redemonstrated cholelithiasis within a remnant gallbladder.  SPLEEN: Within normal limits.  PANCREAS: Within normal limits.  ADRENALS: Thickening of the limbs of bilateral adrenal glands.  KIDNEYS/URETERS: Left nephrectomy. Multiple right renal cysts, some of   which have associated wall calcifications. Scattered right renal high   attenuation lesions likely hemorrhagic cysts. No hydronephrosis.    BLADDER: Within normal limits.  REPRODUCTIVE ORGANS: Enlarged prostate to 5.5 cm. Prostatic   calcifications.    BOWEL: No bowel obstruction or inflammation. Colonic diverticulosis   without diverticulitis. Appendix is normal.  PERITONEUM/RETROPERITONEUM: Within normal limits.  VESSELS: Within normal limits.  LYMPH NODES: No lymphadenopathy.  ABDOMINAL WALL: Tiny fat-containing right inguinal hernia.  BONES: Within normal limits.    IMPRESSION:  No bowel obstruction or inflammation.    < end of copied text >  < from: MR MRCP w/wo IV Cont (04.04.24 @ 11:46) >  IMPRESSION:  Mild CBD dilatation with choledocholithiasis. Cholelithiasis within a   remnant gallbladder without evidence of cholecystitis.    Right renal and pancreatic cysts. Further evaluation is limited without   contrast.    Mild splenomegaly, slightly decreased since prior.    --- End of Report     < end of copied text >

## 2025-01-07 NOTE — CONSULT NOTE ADULT - SUBJECTIVE AND OBJECTIVE BOX
INTERVAL HPI/OVERNIGHT EVENTS:  HPI:  73 yo m ho polycystic kidney disease status post nephrectomy, end-stage renal failure HD Monday Wednesday Friday last HD was on Friday, hypertension, polio, LLE weakness, ambulates with crutches, status postcholecystectomy, came to ED 1/4/25 with n/v/fever,  Patient had sepsis workup done,  initial workup was negative for any source he was d/c home, today called back since found to have +blood cultures with gram negative/enterobacter cloacae complex  Pt denies any acute complaints including cp, sob, abd pain, hematuria, dysuria, nausea, vomiting    ed course- blood cultures positive, ct - no acute abnormality noted     (06 Jan 2025 10:29)    MEDICATIONS  (STANDING):  amLODIPine   Tablet 10 milliGRAM(s) Oral daily  meropenem  IVPB 500 milliGRAM(s) IV Intermittent every 24 hours  Nephro-odessa 1 Tablet(s) Oral daily  tamsulosin 0.4 milliGRAM(s) Oral at bedtime    MEDICATIONS  (PRN):  acetaminophen     Tablet .. 650 milliGRAM(s) Oral every 6 hours PRN Temp greater or equal to 38C (100.4F), Mild Pain (1 - 3)  aluminum hydroxide/magnesium hydroxide/simethicone Suspension 30 milliLiter(s) Oral every 4 hours PRN Dyspepsia  melatonin 3 milliGRAM(s) Oral at bedtime PRN Insomnia  ondansetron Injectable 4 milliGRAM(s) IV Push every 8 hours PRN Nausea and/or Vomiting      Allergies    Demerol (Faint)  Demerol HCl (Unknown)    Intolerances        PAST MEDICAL & SURGICAL HISTORY:  Hypertension      Renal failure, chronic      Cancer of kidney, left      Polio      Dialysis patient      History of left nephrectomy      AV fistula          REVIEW OF SYSTEMS      General:	    Skin/Breast:  	  Ophthalmologic:  	  ENMT:	    Respiratory and Thorax:  	  Cardiovascular:	    Gastrointestinal:	    Genitourinary:	    Musculoskeletal:	    Neurological:	    Psychiatric:	    Hematology/Lymphatics:	    Endocrine:	    Allergic/Immunologic:	 - See HPI    PHYSICAL EXAM:   Vital Signs:  Vital Signs Last 24 Hrs  T(C): 36.6 (07 Jan 2025 12:01), Max: 36.7 (07 Jan 2025 05:43)  T(F): 97.9 (07 Jan 2025 12:01), Max: 98.1 (07 Jan 2025 05:43)  HR: 74 (07 Jan 2025 12:01) (74 - 80)  BP: 147/75 (07 Jan 2025 12:01) (147/75 - 157/75)  BP(mean): --  RR: 17 (07 Jan 2025 12:01) (17 - 18)  SpO2: 94% (07 Jan 2025 12:01) (94% - 96%)    Parameters below as of 07 Jan 2025 12:01  Patient On (Oxygen Delivery Method): room air      Daily     Daily I&O's Summary    06 Jan 2025 07:01  -  07 Jan 2025 07:00  --------------------------------------------------------  IN: 0 mL / OUT: 2000 mL / NET: -2000 mL        GENERAL:  Appears stated age, well-groomed, well-nourished, no distress  HEENT:  Moist and clear sclera and conjuctivae  CHEST:  Full & symmetric excursion, no increased effort, breath sounds clear  HEART:  Regular rhythm, S1, S2  ABDOMEN:  Soft, non-tender, non-distended, normoactive bowel sounds,  no masses   EXTREMITIES:  no edema  SKIN:  No rash  NEURO:  Alert, oriented, no tremor, no encephalopathy      LABS:                        9.6    4.13  )-----------( 98       ( 07 Jan 2025 06:33 )             28.9     01-07    140  |  103  |  41[H]  ----------------------------<  86  4.3   |  30  |  5.62[H]    Ca    8.7      07 Jan 2025 06:33    TPro  5.9[L]  /  Alb  2.8[L]  /  TBili  0.5  /  DBili  x   /  AST  15  /  ALT  18  /  AlkPhos  59  01-07    PT/INR - ( 06 Jan 2025 08:10 )   PT: 13.1 sec;   INR: 1.11 ratio         PTT - ( 06 Jan 2025 08:10 )  PTT:30.6 sec  Urinalysis Basic - ( 07 Jan 2025 06:33 )    Color: x / Appearance: x / SG: x / pH: x  Gluc: 86 mg/dL / Ketone: x  / Bili: x / Urobili: x   Blood: x / Protein: x / Nitrite: x   Leuk Esterase: x / RBC: x / WBC x   Sq Epi: x / Non Sq Epi: x / Bacteria: x         INTERVAL HPI/OVERNIGHT EVENTS:  HPI:  75 yo m ho polycystic kidney disease status post nephrectomy, end-stage renal failure HD Monday Wednesday Friday last HD was on Friday, hypertension, polio, LLE weakness, ambulates with crutches, status postcholecystectomy, came to ED 1/4/25 with n/v/fever,  Patient had sepsis workup done,  initial workup was negative for any source he was d/c home, today called back since found to have +blood cultures with gram negative/enterobacter cloacae complex  Pt denies any acute complaints including cp, sob, abd pain, hematuria, dysuria, nausea, vomiting    ed course- blood cultures positive, ct - no acute abnormality noted     (06 Jan 2025 10:29)    MEDICATIONS  (STANDING):  amLODIPine   Tablet 10 milliGRAM(s) Oral daily  meropenem  IVPB 500 milliGRAM(s) IV Intermittent every 24 hours  Nephro-odessa 1 Tablet(s) Oral daily  tamsulosin 0.4 milliGRAM(s) Oral at bedtime    MEDICATIONS  (PRN):  acetaminophen     Tablet .. 650 milliGRAM(s) Oral every 6 hours PRN Temp greater or equal to 38C (100.4F), Mild Pain (1 - 3)  aluminum hydroxide/magnesium hydroxide/simethicone Suspension 30 milliLiter(s) Oral every 4 hours PRN Dyspepsia  melatonin 3 milliGRAM(s) Oral at bedtime PRN Insomnia  ondansetron Injectable 4 milliGRAM(s) IV Push every 8 hours PRN Nausea and/or Vomiting      Allergies    Demerol (Faint)  Demerol HCl (Unknown)    Intolerances        PAST MEDICAL & SURGICAL HISTORY:  Hypertension      Renal failure, chronic      Cancer of kidney, left      Polio      Dialysis patient      History of left nephrectomy      AV fistula          REVIEW OF SYSTEMS      General:	    Skin/Breast:  	  Ophthalmologic:  	  ENMT:	    Respiratory and Thorax:  	  Cardiovascular:	    Gastrointestinal:	    Genitourinary:	    Musculoskeletal:	    Neurological:	    Psychiatric:	    Hematology/Lymphatics:	    Endocrine:	    Allergic/Immunologic:	 - See HPI    PHYSICAL EXAM:   Vital Signs:  Vital Signs Last 24 Hrs  T(C): 36.6 (07 Jan 2025 12:01), Max: 36.7 (07 Jan 2025 05:43)  T(F): 97.9 (07 Jan 2025 12:01), Max: 98.1 (07 Jan 2025 05:43)  HR: 74 (07 Jan 2025 12:01) (74 - 80)  BP: 147/75 (07 Jan 2025 12:01) (147/75 - 157/75)  BP(mean): --  RR: 17 (07 Jan 2025 12:01) (17 - 18)  SpO2: 94% (07 Jan 2025 12:01) (94% - 96%)    Parameters below as of 07 Jan 2025 12:01  Patient On (Oxygen Delivery Method): room air      Daily     Daily I&O's Summary    06 Jan 2025 07:01  -  07 Jan 2025 07:00  --------------------------------------------------------  IN: 0 mL / OUT: 2000 mL / NET: -2000 mL        GENERAL:  Appears stated age, well-groomed, well-nourished, no distress  HEENT:  Moist and clear sclera and conjuctivae  CHEST:  Full & symmetric excursion, no increased effort, breath sounds clear  HEART:  Regular rhythm, S1, S2  ABDOMEN:  Soft, non-tender, non-distended, normoactive bowel sounds,  no masses   EXTREMITIES:  no edema  SKIN:  No rash  NEURO:  Alert, oriented, no tremor, no encephalopathy      LABS:                        9.6    4.13  )-----------( 98       ( 07 Jan 2025 06:33 )             28.9     01-07    140  |  103  |  41[H]  ----------------------------<  86  4.3   |  30  |  5.62[H]    Ca    8.7      07 Jan 2025 06:33    TPro  5.9[L]  /  Alb  2.8[L]  /  TBili  0.5  /  DBili  x   /  AST  15  /  ALT  18  /  AlkPhos  59  01-07    PT/INR - ( 06 Jan 2025 08:10 )   PT: 13.1 sec;   INR: 1.11 ratio         PTT - ( 06 Jan 2025 08:10 )  PTT:30.6 sec  Urinalysis Basic - ( 07 Jan 2025 06:33 )    Color: x / Appearance: x / SG: x / pH: x  Gluc: 86 mg/dL / Ketone: x  / Bili: x / Urobili: x   Blood: x / Protein: x / Nitrite: x   Leuk Esterase: x / RBC: x / WBC x   Sq Epi: x / Non Sq Epi: x / Bacteria: x

## 2025-01-08 LAB
ANION GAP SERPL CALC-SCNC: 12 MMOL/L — SIGNIFICANT CHANGE UP (ref 5–17)
BUN SERPL-MCNC: 62 MG/DL — HIGH (ref 7–23)
CALCIUM SERPL-MCNC: 9 MG/DL — SIGNIFICANT CHANGE UP (ref 8.4–10.5)
CHLORIDE SERPL-SCNC: 99 MMOL/L — SIGNIFICANT CHANGE UP (ref 96–108)
CO2 SERPL-SCNC: 26 MMOL/L — SIGNIFICANT CHANGE UP (ref 22–31)
CREAT SERPL-MCNC: 7.84 MG/DL — HIGH (ref 0.5–1.3)
EGFR: 7 ML/MIN/1.73M2 — LOW
EOSINOPHIL NFR BLD AUTO: 2 % — SIGNIFICANT CHANGE UP (ref 0–6)
GLUCOSE SERPL-MCNC: 141 MG/DL — HIGH (ref 70–99)
HCT VFR BLD CALC: 30.2 % — LOW (ref 39–50)
HGB BLD-MCNC: 10.2 G/DL — LOW (ref 13–17)
LYMPHOCYTES # BLD AUTO: 9 % — LOW (ref 13–44)
MCHC RBC-ENTMCNC: 31.6 PG — SIGNIFICANT CHANGE UP (ref 27–34)
MCHC RBC-ENTMCNC: 33.8 G/DL — SIGNIFICANT CHANGE UP (ref 32–36)
MCV RBC AUTO: 93.5 FL — SIGNIFICANT CHANGE UP (ref 80–100)
MONOCYTES NFR BLD AUTO: 12 % — SIGNIFICANT CHANGE UP (ref 2–14)
NEUTROPHILS NFR BLD AUTO: 77 % — SIGNIFICANT CHANGE UP (ref 43–77)
PHOSPHATE SERPL-MCNC: 5.6 MG/DL — HIGH (ref 2.5–4.5)
PLATELET # BLD AUTO: 94 K/UL — LOW (ref 150–400)
POTASSIUM SERPL-MCNC: 4.5 MMOL/L — SIGNIFICANT CHANGE UP (ref 3.5–5.3)
POTASSIUM SERPL-SCNC: 4.5 MMOL/L — SIGNIFICANT CHANGE UP (ref 3.5–5.3)
RBC # BLD: 3.23 M/UL — LOW (ref 4.2–5.8)
RBC # FLD: 14.3 % — SIGNIFICANT CHANGE UP (ref 10.3–14.5)
SODIUM SERPL-SCNC: 137 MMOL/L — SIGNIFICANT CHANGE UP (ref 135–145)
WBC # BLD: 5.6 K/UL — SIGNIFICANT CHANGE UP (ref 3.8–10.5)
WBC # FLD AUTO: 5.6 K/UL — SIGNIFICANT CHANGE UP (ref 3.8–10.5)

## 2025-01-08 PROCEDURE — 99233 SBSQ HOSP IP/OBS HIGH 50: CPT

## 2025-01-08 RX ORDER — MEROPENEM 1 G/20ML
500 INJECTION, POWDER, FOR SOLUTION INTRAVENOUS ONCE
Refills: 0 | Status: COMPLETED | OUTPATIENT
Start: 2025-01-08 | End: 2025-01-08

## 2025-01-08 RX ADMIN — Medication 1 TABLET(S): at 13:20

## 2025-01-08 RX ADMIN — Medication 10 MILLIGRAM(S): at 05:27

## 2025-01-08 RX ADMIN — TAMSULOSIN HYDROCHLORIDE 0.4 MILLIGRAM(S): 0.4 CAPSULE ORAL at 21:25

## 2025-01-08 RX ADMIN — MEROPENEM 100 MILLIGRAM(S): 1 INJECTION, POWDER, FOR SOLUTION INTRAVENOUS at 13:20

## 2025-01-08 NOTE — PROGRESS NOTE ADULT - SUBJECTIVE AND OBJECTIVE BOX
Patient is a 74y old  Male who presents with a chief complaint of chills n/v (07 Jan 2025 21:19)    Patient seen and examined at bedside.  no acute overnight events    ALLERGIES:  Demerol (Faint)  Demerol HCl (Unknown)        Vital Signs Last 24 Hrs  T(F): 98.2 (08 Jan 2025 05:25), Max: 98.7 (07 Jan 2025 20:10)  HR: 91 (08 Jan 2025 05:25) (74 - 91)  BP: 154/79 (08 Jan 2025 05:25) (147/75 - 163/81)  RR: 17 (08 Jan 2025 05:25) (16 - 17)  SpO2: 91% (08 Jan 2025 05:25) (91% - 97%)  I&O's Summary    MEDICATIONS:  acetaminophen     Tablet .. 650 milliGRAM(s) Oral every 6 hours PRN  aluminum hydroxide/magnesium hydroxide/simethicone Suspension 30 milliLiter(s) Oral every 4 hours PRN  amLODIPine   Tablet 10 milliGRAM(s) Oral daily  melatonin 3 milliGRAM(s) Oral at bedtime PRN  meropenem  IVPB 500 milliGRAM(s) IV Intermittent every 24 hours  Nephro-odessa 1 Tablet(s) Oral daily  ondansetron Injectable 4 milliGRAM(s) IV Push every 8 hours PRN  tamsulosin 0.4 milliGRAM(s) Oral at bedtime      PHYSICAL EXAM:  General: NAD, A/O x 3  ENT: MMM, no thrush  Neck: Supple, No JVD  Lungs: Clear to auscultation bilaterally, non labored, fair inspiratory effort  Cardio: RRR, S1/S2, No murmurs  Abdomen: Soft, Nontender, Nondistended; Bowel sounds present  Extremities: No cyanosis, No edema    LABS:                        9.6    4.13  )-----------( 98       ( 07 Jan 2025 06:33 )             28.9     01-07    140  |  103  |  41  ----------------------------<  86  4.3   |  30  |  5.62    Ca    8.7      07 Jan 2025 06:33    TPro  5.9  /  Alb  2.8  /  TBili  0.5  /  DBili  x   /  AST  15  /  ALT  18  /  AlkPhos  59  01-07      PT/INR - ( 06 Jan 2025 08:10 )   PT: 13.1 sec;   INR: 1.11 ratio         PTT - ( 06 Jan 2025 08:10 )  PTT:30.6 sec  Lactate, Blood: 1.4 mmol/L (01-06 @ 08:10)                          Urinalysis Basic - ( 07 Jan 2025 06:33 )    Color: x / Appearance: x / SG: x / pH: x  Gluc: 86 mg/dL / Ketone: x  / Bili: x / Urobili: x   Blood: x / Protein: x / Nitrite: x   Leuk Esterase: x / RBC: x / WBC x   Sq Epi: x / Non Sq Epi: x / Bacteria: x        Culture - Urine (collected 06 Jan 2025 09:34)  Source: Clean Catch Clean Catch (Midstream)  Final Report (07 Jan 2025 13:13):    <10,000 CFU/mL Normal Urogenital Carmen    Culture - Blood (collected 06 Jan 2025 08:20)  Source: .Blood BLOOD  Preliminary Report (07 Jan 2025 16:01):    No growth at 24 hours    Culture - Blood (collected 06 Jan 2025 08:14)  Source: .Blood BLOOD  Preliminary Report (07 Jan 2025 16:01):    No growth at 24 hours    Culture - Blood (collected 04 Jan 2025 22:40)  Source: .Blood BLOOD  Gram Stain (06 Jan 2025 14:56):    Growth in anaerobic bottle: Gram Negative Rods  Final Report (07 Jan 2025 13:40):    Growth in anaerobic bottle: Enterobacter cloacae complex    See previous culture [id number]    Culture - Blood (collected 04 Jan 2025 22:35)  Source: .Blood BLOOD  Gram Stain (06 Jan 2025 03:37):    Growth in anaerobic bottle: Gram Negative Rods  Final Report (07 Jan 2025 09:40):    Growth in anaerobic bottle: Enterobacter cloacae complex    Direct identification is available within approximately 3-5    hours either by Blood Panel Multiplexed PCR or Direct    MALDI-TOF. Details: [email or web address]  Organism: Blood Culture PCR  Enterobacter cloacae complex (07 Jan 2025 09:40)  Organism: Enterobacter cloacae complex (07 Jan 2025 09:40)      Method Type: WALESKA      -  Ampicillin: R 16 These ampicillin results predict results for amoxicillin      -  Ampicillin/Sulbactam: R 8/4      -  Aztreonam: S <=4      -  Cefazolin: R >16      -  Cefepime: S <=2      -  Cefoxitin: R >16      -  Ceftriaxone: S <=1 Enterobacter cloacae, Klebsiella aerogenes, and Citrobacter freundii may develop resistance during prolonged therapy.      -  Ciprofloxacin: I 0.5      -  Ertapenem: S <=0.5      -  Gentamicin: S <=2      -  Imipenem: S <=1      -  Levofloxacin: S <=0.5      -  Meropenem: S <=1      -  Piperacillin/Tazobactam: S <=8 Treatment with Pipercillin/Tazobactam is not recommended in severe infections casued by Klebsiella aerogenes, Enterobacter cloacae complex, and Citrobacter freundii complex.      -  Tobramycin: S <=2      -  Trimethoprim/Sulfamethoxazole: S <=0.5/9.5  Organism: Blood Culture PCR (07 Jan 2025 09:40)      Method Type: PCR      -  Enterobacter cloacae complex: Detec          RADIOLOGY & ADDITIONAL TESTS:    Care Discussed with Consultants/Other Providers:

## 2025-01-08 NOTE — PROGRESS NOTE ADULT - SUBJECTIVE AND OBJECTIVE BOX
CC: f/u for enterobacter bacteremia    Patient reports: he offers no complaints    REVIEW OF SYSTEMS:  All other review of systems negative (Comprehensive ROS)    Antimicrobials Day #  :day 4  meropenem  IVPB 500 milliGRAM(s) IV Intermittent every 24 hours    Other Medications Reviewed  MEDICATIONS  (STANDING):  amLODIPine   Tablet 10 milliGRAM(s) Oral daily  meropenem  IVPB 500 milliGRAM(s) IV Intermittent every 24 hours  Nephro-odessa 1 Tablet(s) Oral daily  tamsulosin 0.4 milliGRAM(s) Oral at bedtime    T(F): 98 (01-08-25 @ 13:57), Max: 98.7 (01-07-25 @ 20:10)  HR: 69 (01-08-25 @ 13:57)  BP: 133/73 (01-08-25 @ 13:57)  RR: 16 (01-08-25 @ 13:57)  SpO2: 94% (01-08-25 @ 13:57)  Wt(kg): --    PHYSICAL EXAM:  General: alert, no acute distress  Eyes:  anicteric, no conjunctival injection, no discharge  Oropharynx: no lesions or injection 	  Neck: supple, without adenopathy  Lungs: clear to auscultation  Heart: regular rate and rhythm; no murmur, rubs or gallops  Abdomen: soft, nondistended, nontender, without mass or organomegaly  Skin: no lesions  Extremities: no clubbing, cyanosis, or edema  Neurologic: alert, oriented, moves all extremities  Left arm AVF  LAB RESULTS:                        10.2   5.60  )-----------( 94       ( 08 Jan 2025 09:40 )             30.2     01-08    137  |  99  |  62[H]  ----------------------------<  141[H]  4.5   |  26  |  7.84[H]    Ca    9.0      08 Jan 2025 09:40  Phos  5.6     01-08    TPro  5.9[L]  /  Alb  2.8[L]  /  TBili  0.5  /  DBili  x   /  AST  15  /  ALT  18  /  AlkPhos  59  01-07    LIVER FUNCTIONS - ( 07 Jan 2025 06:33 )  Alb: 2.8 g/dL / Pro: 5.9 g/dL / ALK PHOS: 59 U/L / ALT: 18 U/L / AST: 15 U/L / GGT: x           Urinalysis Basic - ( 08 Jan 2025 09:40 )    Color: x / Appearance: x / SG: x / pH: x  Gluc: 141 mg/dL / Ketone: x  / Bili: x / Urobili: x   Blood: x / Protein: x / Nitrite: x   Leuk Esterase: x / RBC: x / WBC x   Sq Epi: x / Non Sq Epi: x / Bacteria: x      MICROBIOLOGY:  RECENT CULTURES:  01-06 @ 09:34 Clean Catch Clean Catch (Midstream)     <10,000 CFU/mL Normal Urogenital Carmen      01-06 @ 08:20 .Blood BLOOD     No growth at 24 hours      01-06 @ 08:14 .Blood BLOOD     No growth at 24 hours      01-04 @ 22:40 .Blood BLOOD     Growth in anaerobic bottle: Enterobacter cloacae complex  See previous culture [id number]    Growth in anaerobic bottle: Gram Negative Rods    01-04 @ 22:35 .Blood BLOOD Blood Culture PCR  Enterobacter cloacae complex    Growth in anaerobic bottle: Enterobacter cloacae complex  Direct identification is available within approximately 3-5  hours either by Blood Panel Multiplexed PCR or Direct  MALDI-TOF. Details: [email or web address]    Growth in anaerobic bottle: Gram Negative Rods        RADIOLOGY REVIEWED:

## 2025-01-08 NOTE — PROGRESS NOTE ADULT - SUBJECTIVE AND OBJECTIVE BOX
S/p stable HD today     Vital Signs Last 24 Hrs  T(C): 37.2 (01-08-25 @ 18:32), Max: 37.2 (01-08-25 @ 18:32)  T(F): 99 (01-08-25 @ 18:32), Max: 99 (01-08-25 @ 18:32)  HR: 84 (01-08-25 @ 18:32) (66 - 91)  BP: 144/66 (01-08-25 @ 18:32) (112/66 - 154/79)  RR: 16 (01-08-25 @ 18:32) (16 - 17)  SpO2: 93% (01-08-25 @ 18:32) (91% - 97%)    I&O's Detail    08 Jan 2025 07:01  -  08 Jan 2025 23:24  --------------------------------------------------------  IN:    Oral Fluid: 480 mL    Other (mL): 800 mL  Total IN: 1280 mL    OUT:    Other (mL): 2800 mL    Voided (mL): 100 mL  Total OUT: 2900 mL    s1s2  b/l air entry  soft, ND  no edema                                10.2   5.60  )-----------( 94       ( 08 Jan 2025 09:40 )             30.2     08 Jan 2025 09:40    137    |  99     |  62     ----------------------------<  141    4.5     |  26     |  7.84     Ca    9.0        08 Jan 2025 09:40  Phos  5.6       08 Jan 2025 09:40    TPro  5.9    /  Alb  2.8    /  TBili  0.5    /  DBili  x      /  AST  15     /  ALT  18     /  AlkPhos  59     07 Jan 2025 06:33    LIVER FUNCTIONS - ( 07 Jan 2025 06:33 )  Alb: 2.8 g/dL / Pro: 5.9 g/dL / ALK PHOS: 59 U/L / ALT: 18 U/L / AST: 15 U/L / GGT: x           Culture - Urine (collected 06 Jan 2025 09:34)  Source: Clean Catch Clean Catch (Midstream)  Final Report (07 Jan 2025 13:13):    <10,000 CFU/mL Normal Urogenital Carmen    Culture - Blood (collected 06 Jan 2025 08:20)  Source: .Blood BLOOD  Preliminary Report (08 Jan 2025 16:01):    No growth at 48 Hours    Culture - Blood (collected 06 Jan 2025 08:14)  Source: .Blood BLOOD  Preliminary Report (08 Jan 2025 16:01):    No growth at 48 Hours    acetaminophen     Tablet .. 650 milliGRAM(s) Oral every 6 hours PRN  aluminum hydroxide/magnesium hydroxide/simethicone Suspension 30 milliLiter(s) Oral every 4 hours PRN  amLODIPine   Tablet 10 milliGRAM(s) Oral daily  melatonin 3 milliGRAM(s) Oral at bedtime PRN  meropenem  IVPB 500 milliGRAM(s) IV Intermittent every 24 hours  Nephro-odessa 1 Tablet(s) Oral daily  ondansetron Injectable 4 milliGRAM(s) IV Push every 8 hours PRN  tamsulosin 0.4 milliGRAM(s) Oral at bedtime    A/P:    Enterobacter bacteremia  Abx, w/up per ID  CT A/P w/o acute findings   ESRD on HD MWF  TMP 2kg as able  Renal diet    [phone]

## 2025-01-09 ENCOUNTER — APPOINTMENT (OUTPATIENT)
Dept: UROLOGY | Facility: CLINIC | Age: 75
End: 2025-01-09

## 2025-01-09 LAB
ANION GAP SERPL CALC-SCNC: 9 MMOL/L — SIGNIFICANT CHANGE UP (ref 5–17)
BASOPHILS # BLD AUTO: 0.01 K/UL — SIGNIFICANT CHANGE UP (ref 0–0.2)
BASOPHILS NFR BLD AUTO: 0.2 % — SIGNIFICANT CHANGE UP (ref 0–2)
BUN SERPL-MCNC: 42 MG/DL — HIGH (ref 7–23)
CALCIUM SERPL-MCNC: 9.5 MG/DL — SIGNIFICANT CHANGE UP (ref 8.4–10.5)
CHLORIDE SERPL-SCNC: 100 MMOL/L — SIGNIFICANT CHANGE UP (ref 96–108)
CO2 SERPL-SCNC: 29 MMOL/L — SIGNIFICANT CHANGE UP (ref 22–31)
CREAT SERPL-MCNC: 6.12 MG/DL — HIGH (ref 0.5–1.3)
EGFR: 9 ML/MIN/1.73M2 — LOW
EOSINOPHIL # BLD AUTO: 0.14 K/UL — SIGNIFICANT CHANGE UP (ref 0–0.5)
GLUCOSE SERPL-MCNC: 93 MG/DL — SIGNIFICANT CHANGE UP (ref 70–99)
HCT VFR BLD CALC: 31.5 % — LOW (ref 39–50)
HGB BLD-MCNC: 10.5 G/DL — LOW (ref 13–17)
IMM GRANULOCYTES NFR BLD AUTO: 0.4 % — SIGNIFICANT CHANGE UP (ref 0–0.9)
LYMPHOCYTES # BLD AUTO: 0.48 K/UL — LOW (ref 1–3.3)
MCHC RBC-ENTMCNC: 31.5 PG — SIGNIFICANT CHANGE UP (ref 27–34)
MCHC RBC-ENTMCNC: 33.3 G/DL — SIGNIFICANT CHANGE UP (ref 32–36)
MCV RBC AUTO: 94.6 FL — SIGNIFICANT CHANGE UP (ref 80–100)
MONOCYTES # BLD AUTO: 0.7 K/UL — SIGNIFICANT CHANGE UP (ref 0–0.9)
NEUTROPHILS # BLD AUTO: 4.25 K/UL — SIGNIFICANT CHANGE UP (ref 1.8–7.4)
NRBC # BLD: 0 /100 WBCS — SIGNIFICANT CHANGE UP (ref 0–0)
NRBC # BLD: 0 /100 WBCS — SIGNIFICANT CHANGE UP (ref 0–0)
PLATELET # BLD AUTO: 107 K/UL — LOW (ref 150–400)
POTASSIUM SERPL-MCNC: 4.6 MMOL/L — SIGNIFICANT CHANGE UP (ref 3.5–5.3)
POTASSIUM SERPL-SCNC: 4.6 MMOL/L — SIGNIFICANT CHANGE UP (ref 3.5–5.3)
RBC # BLD: 3.33 M/UL — LOW (ref 4.2–5.8)
RBC # FLD: 14 % — SIGNIFICANT CHANGE UP (ref 10.3–14.5)
SODIUM SERPL-SCNC: 138 MMOL/L — SIGNIFICANT CHANGE UP (ref 135–145)
STRONGYLOIDES AB SER-ACNC: NEGATIVE — SIGNIFICANT CHANGE UP
WBC # BLD: 5.39 K/UL — SIGNIFICANT CHANGE UP (ref 3.8–10.5)
WBC # FLD AUTO: 5.39 K/UL — SIGNIFICANT CHANGE UP (ref 3.8–10.5)

## 2025-01-09 PROCEDURE — 99232 SBSQ HOSP IP/OBS MODERATE 35: CPT

## 2025-01-09 RX ADMIN — TAMSULOSIN HYDROCHLORIDE 0.4 MILLIGRAM(S): 0.4 CAPSULE ORAL at 21:16

## 2025-01-09 RX ADMIN — Medication 10 MILLIGRAM(S): at 05:11

## 2025-01-09 RX ADMIN — Medication 1 TABLET(S): at 13:32

## 2025-01-09 RX ADMIN — MEROPENEM 100 MILLIGRAM(S): 1 INJECTION, POWDER, FOR SOLUTION INTRAVENOUS at 09:19

## 2025-01-09 NOTE — PROGRESS NOTE ADULT - SUBJECTIVE AND OBJECTIVE BOX
Patient is a 74y old  Male who presents with a chief complaint of chills n/v (08 Jan 2025 23:24)    Patient seen and examined at bedside.  no acute overnight events    ALLERGIES:  Demerol (Faint)  Demerol HCl (Unknown)        Vital Signs Last 24 Hrs  T(F): 97.9 (09 Jan 2025 05:32), Max: 99 (08 Jan 2025 18:32)  HR: 69 (09 Jan 2025 05:32) (66 - 84)  BP: 125/71 (09 Jan 2025 05:32) (112/66 - 144/66)  RR: 16 (09 Jan 2025 05:32) (16 - 17)  SpO2: 95% (09 Jan 2025 05:32) (93% - 97%)  I&O's Summary    08 Jan 2025 07:01  -  09 Jan 2025 07:00  --------------------------------------------------------  IN: 1280 mL / OUT: 2900 mL / NET: -1620 mL      MEDICATIONS:  acetaminophen     Tablet .. 650 milliGRAM(s) Oral every 6 hours PRN  aluminum hydroxide/magnesium hydroxide/simethicone Suspension 30 milliLiter(s) Oral every 4 hours PRN  amLODIPine   Tablet 10 milliGRAM(s) Oral daily  melatonin 3 milliGRAM(s) Oral at bedtime PRN  meropenem  IVPB 500 milliGRAM(s) IV Intermittent every 24 hours  Nephro-odessa 1 Tablet(s) Oral daily  ondansetron Injectable 4 milliGRAM(s) IV Push every 8 hours PRN  tamsulosin 0.4 milliGRAM(s) Oral at bedtime      PHYSICAL EXAM:  General: NAD, A/O x 3  ENT: MMM, no thrush  Neck: Supple, No JVD  Lungs: Clear to auscultation bilaterally, non labored, good air entry  Cardio: RRR, S1/S2, No murmurs  Abdomen: Soft, Nontender, Nondistended; Bowel sounds present  Extremities: No cyanosis, No edema    LABS:                        10.5   5.39  )-----------( 107      ( 09 Jan 2025 06:58 )             31.5     01-09    138  |  100  |  42  ----------------------------<  93  4.6   |  29  |  6.12    Ca    9.5      09 Jan 2025 06:58  Phos  5.6     01-08    TPro  5.9  /  Alb  2.8  /  TBili  0.5  /  DBili  x   /  AST  15  /  ALT  18  /  AlkPhos  59  01-07                                Urinalysis Basic - ( 09 Jan 2025 06:58 )    Color: x / Appearance: x / SG: x / pH: x  Gluc: 93 mg/dL / Ketone: x  / Bili: x / Urobili: x   Blood: x / Protein: x / Nitrite: x   Leuk Esterase: x / RBC: x / WBC x   Sq Epi: x / Non Sq Epi: x / Bacteria: x        Culture - Urine (collected 06 Jan 2025 09:34)  Source: Clean Catch Clean Catch (Midstream)  Final Report (07 Jan 2025 13:13):    <10,000 CFU/mL Normal Urogenital Carmen    Culture - Blood (collected 06 Jan 2025 08:20)  Source: .Blood BLOOD  Preliminary Report (08 Jan 2025 16:01):    No growth at 48 Hours    Culture - Blood (collected 06 Jan 2025 08:14)  Source: .Blood BLOOD  Preliminary Report (08 Jan 2025 16:01):    No growth at 48 Hours    Culture - Blood (collected 04 Jan 2025 22:40)  Source: .Blood BLOOD  Gram Stain (06 Jan 2025 14:56):    Growth in anaerobic bottle: Gram Negative Rods  Final Report (07 Jan 2025 13:40):    Growth in anaerobic bottle: Enterobacter cloacae complex    See previous culture [id number]    Culture - Blood (collected 04 Jan 2025 22:35)  Source: .Blood BLOOD  Gram Stain (06 Jan 2025 03:37):    Growth in anaerobic bottle: Gram Negative Rods  Final Report (07 Jan 2025 09:40):    Growth in anaerobic bottle: Enterobacter cloacae complex    Direct identification is available within approximately 3-5    hours either by Blood Panel Multiplexed PCR or Direct    MALDI-TOF. Details: [email or web address]  Organism: Blood Culture PCR  Enterobacter cloacae complex (07 Jan 2025 09:40)  Organism: Enterobacter cloacae complex (07 Jan 2025 09:40)      -  Levofloxacin: S <=0.5      -  Tobramycin: S <=2      -  Aztreonam: S <=4      -  Gentamicin: S <=2      -  Cefazolin: R >16      -  Cefepime: S <=2      -  Piperacillin/Tazobactam: S <=8 Treatment with Pipercillin/Tazobactam is not recommended in severe infections casued by Klebsiella aerogenes, Enterobacter cloacae complex, and Citrobacter freundii complex.      -  Ciprofloxacin: I 0.5      -  Imipenem: S <=1      -  Ceftriaxone: S <=1 Enterobacter cloacae, Klebsiella aerogenes, and Citrobacter freundii may develop resistance during prolonged therapy.      -  Ampicillin: R 16 These ampicillin results predict results for amoxicillin      Method Type: WALESKA      -  Meropenem: S <=1      -  Ampicillin/Sulbactam: R 8/4      -  Cefoxitin: R >16      -  Trimethoprim/Sulfamethoxazole: S <=0.5/9.5      -  Ertapenem: S <=0.5  Organism: Blood Culture PCR (07 Jan 2025 09:40)      Method Type: PCR      -  Enterobacter cloacae complex: Detec          RADIOLOGY & ADDITIONAL TESTS:    Care Discussed with Consultants/Other Providers:

## 2025-01-09 NOTE — PROGRESS NOTE ADULT - SUBJECTIVE AND OBJECTIVE BOX
No CP, SOB    Vital Signs Last 24 Hrs  T(C): 36.6 (01-09-25 @ 12:18), Max: 36.6 (01-09-25 @ 05:32)  T(F): 97.9 (01-09-25 @ 12:18), Max: 97.9 (01-09-25 @ 05:32)  HR: 79 (01-09-25 @ 12:18) (69 - 79)  BP: 143/91 (01-09-25 @ 12:18) (125/71 - 143/91)  RR: 16 (01-09-25 @ 12:18) (16 - 16)  SpO2: 96% (01-09-25 @ 12:18) (95% - 96%)    I&O's Detail    08 Jan 2025 07:01  -  09 Jan 2025 07:00  --------------------------------------------------------  IN:    Oral Fluid: 480 mL    Other (mL): 800 mL  Total IN: 1280 mL    OUT:    Other (mL): 2800 mL    Voided (mL): 100 mL  Total OUT: 2900 mL    s1s2  b/l air entry  soft, ND  no edema                                        10.5   5.39  )-----------( 107      ( 09 Jan 2025 06:58 )             31.5     09 Jan 2025 06:58    138    |  100    |  42     ----------------------------<  93     4.6     |  29     |  6.12     Ca    9.5        09 Jan 2025 06:58  Phos  5.6       08 Jan 2025 09:40    acetaminophen     Tablet .. 650 milliGRAM(s) Oral every 6 hours PRN  aluminum hydroxide/magnesium hydroxide/simethicone Suspension 30 milliLiter(s) Oral every 4 hours PRN  amLODIPine   Tablet 10 milliGRAM(s) Oral daily  melatonin 3 milliGRAM(s) Oral at bedtime PRN  meropenem  IVPB 500 milliGRAM(s) IV Intermittent every 24 hours  Nephro-odessa 1 Tablet(s) Oral daily  ondansetron Injectable 4 milliGRAM(s) IV Push every 8 hours PRN  tamsulosin 0.4 milliGRAM(s) Oral at bedtime    A/P:    Enterobacter bacteremia  Abx per ID  CT A/P w/o acute findings   ESRD on HD MWF  TMP 2kg as able w/HD tomorrow   Renal diet    [phone]

## 2025-01-09 NOTE — PROGRESS NOTE ADULT - SUBJECTIVE AND OBJECTIVE BOX
CC: f/u for enterobacter bacteremia    Patient reports he is feeling fine    REVIEW OF SYSTEMS:  All other review of systems negative (Comprehensive ROS)    Antimicrobials Day #  :4  meropenem  IVPB 500 milliGRAM(s) IV Intermittent every 24 hours    Other Medications Reviewed    T(F): 97.9 (01-09-25 @ 05:32), Max: 99 (01-08-25 @ 18:32)  HR: 69 (01-09-25 @ 05:32)  BP: 125/71 (01-09-25 @ 05:32)  RR: 16 (01-09-25 @ 05:32)  SpO2: 95% (01-09-25 @ 05:32)  Wt(kg): --    PHYSICAL EXAM:  General: alert, no acute distress  Eyes:  anicteric, no conjunctival injection, no discharge  Oropharynx: no lesions or injection 	  Neck: supple, without adenopathy  Lungs: clear to auscultation  Heart: regular rate and rhythm; no murmur, rubs or gallops  Abdomen: soft, nondistended, nontender, without mass or organomegaly  Skin: no lesions  Extremities: no clubbing, cyanosis, or edema  Neurologic: alert, oriented, moves all extremities except the left leg  scrotum is not swollen or tender  LAB RESULTS:                        10.5   5.39  )-----------( 107      ( 09 Jan 2025 06:58 )             31.5     01-09    138  |  100  |  42[H]  ----------------------------<  93  4.6   |  29  |  6.12[H]    Ca    9.5      09 Jan 2025 06:58  Phos  5.6     01-08        Urinalysis Basic - ( 09 Jan 2025 06:58 )    Color: x / Appearance: x / SG: x / pH: x  Gluc: 93 mg/dL / Ketone: x  / Bili: x / Urobili: x   Blood: x / Protein: x / Nitrite: x   Leuk Esterase: x / RBC: x / WBC x   Sq Epi: x / Non Sq Epi: x / Bacteria: x      MICROBIOLOGY:  RECENT CULTURES:  01-06 @ 09:34 Clean Catch Clean Catch (Midstream)     <10,000 CFU/mL Normal Urogenital Carmen      01-06 @ 08:20 .Blood BLOOD     No growth at 48 Hours      01-06 @ 08:14 .Blood BLOOD     No growth at 48 Hours      01-04 @ 22:40 .Blood BLOOD     Growth in anaerobic bottle: Enterobacter cloacae complex  See previous culture [id number]    Growth in anaerobic bottle: Gram Negative Rods    01-04 @ 22:35 .Blood BLOOD Blood Culture PCR  Enterobacter cloacae complex    Growth in anaerobic bottle: Enterobacter cloacae complex  Direct identification is available within approximately 3-5  hours either by Blood Panel Multiplexed PCR or Direct  MALDI-TOF. Details: [email or web address]    Growth in anaerobic bottle: Gram Negative Rods        RADIOLOGY REVIEWED:    < from: CT Abdomen and Pelvis No Cont (01.05.25 @ 00:00) >  ACC: [id number] EXAM:  CT ABDOMEN AND PELVIS   ORDERED BY: MARQUEZ DONAHUE     PROCEDURE DATE:  01/05/2025          INTERPRETATION:  CLINICAL INFORMATION: Fever. Abdominal pain, nausea and   vomiting. History of cholecystectomy.    COMPARISON: CT of the abdomen and pelvis from 8/26/2023. MRCP from   4/4/2024.    CONTRAST/COMPLICATIONS:  IV Contrast: None  Evaluation of the visceral organs is limited without   intravenous contrast  Oral Contrast: None      PROCEDURE:  CT of the Abdomen and Pelvis was performed.  Sagittal and coronal reformats were performed.    FINDINGS:  LOWER CHEST: Bibasilar subsegmental atelectasis, left greater than right.   Scattered calcified granulomas.    LIVER: Within normal limits.  BILE DUCTS: Normal caliber.  GALLBLADDER: Redemonstrated cholelithiasis within a remnant gallbladder.  SPLEEN: Within normal limits.  PANCREAS: Within normal limits.  ADRENALS: Thickening of the limbs of bilateral adrenal glands.  KIDNEYS/URETERS: Left nephrectomy. Multiple right renal cysts, some of   which have associated wall calcifications. Scattered right renal high   attenuation lesions likely hemorrhagic cysts. No hydronephrosis.    BLADDER: Within normal limits.  REPRODUCTIVE ORGANS: Enlarged prostate to 5.5 cm. Prostatic   calcifications.    BOWEL: No bowel obstruction or inflammation. Colonic diverticulosis   without diverticulitis. Appendix is normal.  PERITONEUM/RETROPERITONEUM: Within normal limits.  VESSELS: Within normal limits.  LYMPH NODES: No lymphadenopathy.  ABDOMINAL WALL: Tiny fat-containing right inguinal hernia.  BONES: Within normal limits.    IMPRESSION:  No bowel obstruction or inflammation.        --- End of Report ---            JOSUE POLLARD MD; Attending Radiologist  This document has been electronically signed. Jan 5 2025 12:46AM    < end of copied text >          < from: MR MRCP w/wo IV Cont (04.04.24 @ 11:46) >  EXAM: [id number] - MR MRCP [facility] IC  - ORDERED BY: ZAC STEPHENSON      PROCEDURE DATE:  04/04/2024           INTERPRETATION:  CLINICAL INFORMATION: Right upper quadrant abdominal   pain, history of stones. Patient is on dialysis. Reported history of   cholecystectomy June 2023.    COMPARISON: CT 8/26/2023. MRI 2/22/2017    CONTRAST/COMPLICATIONS:  IV Contrast: NONE  the patient declined IV contrast.  Oral Contrast: NONE  Complications: None reported at time of study completion    PROCEDURE:  MRI of the abdomen was performed.  MRCP was performed.    FINDINGS:    LIVER: Normal in size and morphology with subcentimeter cysts.  BILE DUCTS: No intrahepatic biliary duct dilatation. The common bile duct   is mildly dilated to 8 mm with at least 2 CBD stones, the larger   measuring 6 mm in the distal duct.  GALLBLADDER: Cholelithiasis within a remnant gallbladder.  SPLEEN: Enlarged at 13.5 cm in craniocaudad dimension, previously 14.2 cm   on prior CT..  PANCREAS: Scattered cysts, largest 1.3 cm in the pancreatic tail,   unchanged since prior CT. No main pancreatic duct dilatation.  ADRENALS: Within normal limits.  KIDNEYS/URETERS: Innumerable right renal cysts, largest 5.3 cm. Several   renal masses have hemorrhage or proteinaceous content. Further evaluation   is limited by lack of contrast.. No hydronephrosis. The left kidney is   not visualized.    VISUALIZED PORTIONS:  BOWEL: Within normal limits.  PERITONEUM: No ascites.  VESSELS: Within normal limits.  RETROPERITONEUM/LYMPH NODES: No lymphadenopathy.  ABDOMINAL WALL: Within normal limits.    IMPRESSION:  Mild CBD dilatation with choledocholithiasis. Cholelithiasis within a   remnant gallbladder without evidence of cholecystitis.    Right renal and pancreatic cysts. Further evaluation is limited without   contrast.    Mild splenomegaly, slightly decreased since prior.    --- End of Report ---               ELIZABETH STEVENS MD; Attending Radiologist   This document has been electronically signed. Apr 9 2024  1:57PM    < end of copied text >    Assessment:  Patient with esrd on hd via fistula, , history of subtotal antonio and bacteremia in 2023 then had a serratia bacteremia of unclear source, now comes in with enterobacter bacteremia of occult source. I am concerned abotu a gi source.   Plan:  continue meropenem   will need his dose tomorrow after dialysis then a dose of levaquin on saturday after discharge.   He will need further gi work up for hepatobiliary source  CC: f/u for enterobacter bacteremia    Patient reports he is feeling fine    REVIEW OF SYSTEMS:  All other review of systems negative (Comprehensive ROS)    Antimicrobials Day #  :4  meropenem  IVPB 500 milliGRAM(s) IV Intermittent every 24 hours    Other Medications Reviewed    T(F): 97.9 (01-09-25 @ 05:32), Max: 99 (01-08-25 @ 18:32)  HR: 69 (01-09-25 @ 05:32)  BP: 125/71 (01-09-25 @ 05:32)  RR: 16 (01-09-25 @ 05:32)  SpO2: 95% (01-09-25 @ 05:32)  Wt(kg): --    PHYSICAL EXAM:  General: alert, no acute distress  Eyes:  anicteric, no conjunctival injection, no discharge  Oropharynx: no lesions or injection 	  Neck: supple, without adenopathy  Lungs: clear to auscultation  Heart: regular rate and rhythm; no murmur, rubs or gallops  Abdomen: soft, nondistended, nontender, without mass or organomegaly  Skin: no lesions  Extremities: no clubbing, cyanosis, or edema  Neurologic: alert, oriented, moves all extremities except the left leg  scrotum is not swollen or tender  LAB RESULTS:                        10.5   5.39  )-----------( 107      ( 09 Jan 2025 06:58 )             31.5     01-09    138  |  100  |  42[H]  ----------------------------<  93  4.6   |  29  |  6.12[H]    Ca    9.5      09 Jan 2025 06:58  Phos  5.6     01-08        Urinalysis Basic - ( 09 Jan 2025 06:58 )    Color: x / Appearance: x / SG: x / pH: x  Gluc: 93 mg/dL / Ketone: x  / Bili: x / Urobili: x   Blood: x / Protein: x / Nitrite: x   Leuk Esterase: x / RBC: x / WBC x   Sq Epi: x / Non Sq Epi: x / Bacteria: x      MICROBIOLOGY:  RECENT CULTURES:  01-06 @ 09:34 Clean Catch Clean Catch (Midstream)     <10,000 CFU/mL Normal Urogenital Carmen      01-06 @ 08:20 .Blood BLOOD     No growth at 48 Hours      01-06 @ 08:14 .Blood BLOOD     No growth at 48 Hours      01-04 @ 22:40 .Blood BLOOD     Growth in anaerobic bottle: Enterobacter cloacae complex  See previous culture [id number]    Growth in anaerobic bottle: Gram Negative Rods    01-04 @ 22:35 .Blood BLOOD Blood Culture PCR  Enterobacter cloacae complex    Growth in anaerobic bottle: Enterobacter cloacae complex  Direct identification is available within approximately 3-5  hours either by Blood Panel Multiplexed PCR or Direct  MALDI-TOF. Details: [email or web address]    Growth in anaerobic bottle: Gram Negative Rods        RADIOLOGY REVIEWED:    < from: CT Abdomen and Pelvis No Cont (01.05.25 @ 00:00) >  ACC: [id number] EXAM:  CT ABDOMEN AND PELVIS   ORDERED BY: MARQUEZ DONAHUE     PROCEDURE DATE:  01/05/2025          INTERPRETATION:  CLINICAL INFORMATION: Fever. Abdominal pain, nausea and   vomiting. History of cholecystectomy.    COMPARISON: CT of the abdomen and pelvis from 8/26/2023. MRCP from   4/4/2024.    CONTRAST/COMPLICATIONS:  IV Contrast: None  Evaluation of the visceral organs is limited without   intravenous contrast  Oral Contrast: None      PROCEDURE:  CT of the Abdomen and Pelvis was performed.  Sagittal and coronal reformats were performed.    FINDINGS:  LOWER CHEST: Bibasilar subsegmental atelectasis, left greater than right.   Scattered calcified granulomas.    LIVER: Within normal limits.  BILE DUCTS: Normal caliber.  GALLBLADDER: Redemonstrated cholelithiasis within a remnant gallbladder.  SPLEEN: Within normal limits.  PANCREAS: Within normal limits.  ADRENALS: Thickening of the limbs of bilateral adrenal glands.  KIDNEYS/URETERS: Left nephrectomy. Multiple right renal cysts, some of   which have associated wall calcifications. Scattered right renal high   attenuation lesions likely hemorrhagic cysts. No hydronephrosis.    BLADDER: Within normal limits.  REPRODUCTIVE ORGANS: Enlarged prostate to 5.5 cm. Prostatic   calcifications.    BOWEL: No bowel obstruction or inflammation. Colonic diverticulosis   without diverticulitis. Appendix is normal.  PERITONEUM/RETROPERITONEUM: Within normal limits.  VESSELS: Within normal limits.  LYMPH NODES: No lymphadenopathy.  ABDOMINAL WALL: Tiny fat-containing right inguinal hernia.  BONES: Within normal limits.    IMPRESSION:  No bowel obstruction or inflammation.        --- End of Report ---            JOSUE POLLARD MD; Attending Radiologist  This document has been electronically signed. Jan 5 2025 12:46AM    < end of copied text >          < from: MR MRCP w/wo IV Cont (04.04.24 @ 11:46) >  EXAM: [id number] - MR MRCP [facility] IC  - ORDERED BY: ZAC STEPHENSON      PROCEDURE DATE:  04/04/2024           INTERPRETATION:  CLINICAL INFORMATION: Right upper quadrant abdominal   pain, history of stones. Patient is on dialysis. Reported history of   cholecystectomy June 2023.    COMPARISON: CT 8/26/2023. MRI 2/22/2017    CONTRAST/COMPLICATIONS:  IV Contrast: NONE  the patient declined IV contrast.  Oral Contrast: NONE  Complications: None reported at time of study completion    PROCEDURE:  MRI of the abdomen was performed.  MRCP was performed.    FINDINGS:    LIVER: Normal in size and morphology with subcentimeter cysts.  BILE DUCTS: No intrahepatic biliary duct dilatation. The common bile duct   is mildly dilated to 8 mm with at least 2 CBD stones, the larger   measuring 6 mm in the distal duct.  GALLBLADDER: Cholelithiasis within a remnant gallbladder.  SPLEEN: Enlarged at 13.5 cm in craniocaudad dimension, previously 14.2 cm   on prior CT..  PANCREAS: Scattered cysts, largest 1.3 cm in the pancreatic tail,   unchanged since prior CT. No main pancreatic duct dilatation.  ADRENALS: Within normal limits.  KIDNEYS/URETERS: Innumerable right renal cysts, largest 5.3 cm. Several   renal masses have hemorrhage or proteinaceous content. Further evaluation   is limited by lack of contrast.. No hydronephrosis. The left kidney is   not visualized.    VISUALIZED PORTIONS:  BOWEL: Within normal limits.  PERITONEUM: No ascites.  VESSELS: Within normal limits.  RETROPERITONEUM/LYMPH NODES: No lymphadenopathy.  ABDOMINAL WALL: Within normal limits.    IMPRESSION:  Mild CBD dilatation with choledocholithiasis. Cholelithiasis within a   remnant gallbladder without evidence of cholecystitis.    Right renal and pancreatic cysts. Further evaluation is limited without   contrast.    Mild splenomegaly, slightly decreased since prior.    --- End of Report ---               ELIZABETH STEVENS MD; Attending Radiologist   This document has been electronically signed. Apr 9 2024  1:57PM    < end of copied text >    Assessment:  Patient with esrd on hd via fistula, , history of subtotal antonio and bacteremia in 2023 then had a serratia bacteremia of unclear source, now comes in with enterobacter bacteremia of occult source. I am concerned about occult  a gi source. The bacteremia was transient. NO localizing symptoms  Plan:  continue meropenem   will need his dose tomorrow after dialysis then a dose of levaquin on saturday after discharge.   He will need further gi follow up after discharge

## 2025-01-09 NOTE — PROGRESS NOTE ADULT - NS ATTEND AMEND GEN_ALL_CORE FT
clinically better. IV want to c/w IV Abx for total of 5 days. then PO. if IV abx can be given in HD, then pateint can go ome today.
clinically better. ID suggested to give one doise of meropenem after HD tomorrow and send home with PO levaquin one dose on saturday
Seen and examined. Chart reviewed.   patient is improving clinically.   ID noted. GI cx. c/w current rx

## 2025-01-10 ENCOUNTER — TRANSCRIPTION ENCOUNTER (OUTPATIENT)
Age: 75
End: 2025-01-10

## 2025-01-10 VITALS
DIASTOLIC BLOOD PRESSURE: 80 MMHG | RESPIRATION RATE: 16 BRPM | HEART RATE: 87 BPM | SYSTOLIC BLOOD PRESSURE: 147 MMHG | TEMPERATURE: 97 F | OXYGEN SATURATION: 94 %

## 2025-01-10 LAB
ANION GAP SERPL CALC-SCNC: 19 MMOL/L — HIGH (ref 5–17)
BUN SERPL-MCNC: 69 MG/DL — HIGH (ref 7–23)
CALCIUM SERPL-MCNC: 8.9 MG/DL — SIGNIFICANT CHANGE UP (ref 8.4–10.5)
CHLORIDE SERPL-SCNC: 97 MMOL/L — SIGNIFICANT CHANGE UP (ref 96–108)
CO2 SERPL-SCNC: 22 MMOL/L — SIGNIFICANT CHANGE UP (ref 22–31)
CREAT SERPL-MCNC: 8.15 MG/DL — HIGH (ref 0.5–1.3)
EGFR: 6 ML/MIN/1.73M2 — LOW
GLUCOSE SERPL-MCNC: 170 MG/DL — HIGH (ref 70–99)
HCT VFR BLD CALC: 31.4 % — LOW (ref 39–50)
HGB BLD-MCNC: 11 G/DL — LOW (ref 13–17)
MCHC RBC-ENTMCNC: 32 PG — SIGNIFICANT CHANGE UP (ref 27–34)
MCHC RBC-ENTMCNC: 35 G/DL — SIGNIFICANT CHANGE UP (ref 32–36)
MCV RBC AUTO: 91.3 FL — SIGNIFICANT CHANGE UP (ref 80–100)
NRBC # BLD: 0 /100 WBCS — SIGNIFICANT CHANGE UP (ref 0–0)
PHOSPHATE SERPL-MCNC: 8.3 MG/DL — HIGH (ref 2.5–4.5)
PLATELET # BLD AUTO: 121 K/UL — LOW (ref 150–400)
POTASSIUM SERPL-MCNC: 4.4 MMOL/L — SIGNIFICANT CHANGE UP (ref 3.5–5.3)
POTASSIUM SERPL-SCNC: 4.4 MMOL/L — SIGNIFICANT CHANGE UP (ref 3.5–5.3)
RBC # BLD: 3.44 M/UL — LOW (ref 4.2–5.8)
RBC # FLD: 13.6 % — SIGNIFICANT CHANGE UP (ref 10.3–14.5)
SODIUM SERPL-SCNC: 138 MMOL/L — SIGNIFICANT CHANGE UP (ref 135–145)
WBC # BLD: 3.99 K/UL — SIGNIFICANT CHANGE UP (ref 3.8–10.5)
WBC # FLD AUTO: 3.99 K/UL — SIGNIFICANT CHANGE UP (ref 3.8–10.5)

## 2025-01-10 PROCEDURE — 71045 X-RAY EXAM CHEST 1 VIEW: CPT

## 2025-01-10 PROCEDURE — 96374 THER/PROPH/DIAG INJ IV PUSH: CPT

## 2025-01-10 PROCEDURE — 87077 CULTURE AEROBIC IDENTIFY: CPT

## 2025-01-10 PROCEDURE — 87086 URINE CULTURE/COLONY COUNT: CPT

## 2025-01-10 PROCEDURE — 74176 CT ABD & PELVIS W/O CONTRAST: CPT | Mod: MC

## 2025-01-10 PROCEDURE — 36415 COLL VENOUS BLD VENIPUNCTURE: CPT

## 2025-01-10 PROCEDURE — 85027 COMPLETE CBC AUTOMATED: CPT

## 2025-01-10 PROCEDURE — 83605 ASSAY OF LACTIC ACID: CPT

## 2025-01-10 PROCEDURE — 87186 SC STD MICRODIL/AGAR DIL: CPT

## 2025-01-10 PROCEDURE — 87040 BLOOD CULTURE FOR BACTERIA: CPT

## 2025-01-10 PROCEDURE — 99261: CPT

## 2025-01-10 PROCEDURE — 87150 DNA/RNA AMPLIFIED PROBE: CPT

## 2025-01-10 PROCEDURE — 84100 ASSAY OF PHOSPHORUS: CPT

## 2025-01-10 PROCEDURE — 93005 ELECTROCARDIOGRAM TRACING: CPT

## 2025-01-10 PROCEDURE — 85025 COMPLETE CBC W/AUTO DIFF WBC: CPT

## 2025-01-10 PROCEDURE — 85730 THROMBOPLASTIN TIME PARTIAL: CPT

## 2025-01-10 PROCEDURE — 80048 BASIC METABOLIC PNL TOTAL CA: CPT

## 2025-01-10 PROCEDURE — 96375 TX/PRO/DX INJ NEW DRUG ADDON: CPT

## 2025-01-10 PROCEDURE — 86682 HELMINTH ANTIBODY: CPT

## 2025-01-10 PROCEDURE — 99239 HOSP IP/OBS DSCHRG MGMT >30: CPT

## 2025-01-10 PROCEDURE — 99285 EMERGENCY DEPT VISIT HI MDM: CPT

## 2025-01-10 PROCEDURE — 80053 COMPREHEN METABOLIC PANEL: CPT

## 2025-01-10 PROCEDURE — 87637 SARSCOV2&INF A&B&RSV AMP PRB: CPT

## 2025-01-10 PROCEDURE — 81001 URINALYSIS AUTO W/SCOPE: CPT

## 2025-01-10 PROCEDURE — 85610 PROTHROMBIN TIME: CPT

## 2025-01-10 RX ORDER — LEVOFLOXACIN 250 MG
1 TABLET ORAL
Qty: 0 | Refills: 0 | DISCHARGE
Start: 2025-01-10

## 2025-01-10 RX ORDER — LEVOFLOXACIN 250 MG
750 TABLET ORAL ONCE
Refills: 0 | Status: DISCONTINUED | OUTPATIENT
Start: 2025-01-11 | End: 2025-01-10

## 2025-01-10 RX ADMIN — Medication 1 TABLET(S): at 13:07

## 2025-01-10 RX ADMIN — Medication 10 MILLIGRAM(S): at 05:06

## 2025-01-10 RX ADMIN — MEROPENEM 100 MILLIGRAM(S): 1 INJECTION, POWDER, FOR SOLUTION INTRAVENOUS at 13:07

## 2025-01-10 NOTE — DISCHARGE NOTE PROVIDER - NSDCCPCAREPLAN_GEN_ALL_CORE_FT
PRINCIPAL DISCHARGE DIAGNOSIS  Diagnosis: Bacteremia  Assessment and Plan of Treatment: you were admitted to the hospital with blood borne infection, you completed antibiotics.  please follow up with GI when able

## 2025-01-10 NOTE — DISCHARGE NOTE PROVIDER - HOSPITAL COURSE
Hospital Course  73 yo m ho polycystic kidney disease status post nephrectomy, end-stage renal failure HD Monday Wednesday Friday last HD was on Friday, hypertension, polio, LLE weakness, ambulates with crutches, status postcholecystectomy, came to ED 1/4/25 with n/v/fever,  Patient had sepsis workup done,  initial workup was negative for any source he was d/c home, today called back since found to have +blood cultures with gram negative/enterobacter cloacae complex  Pt denies any acute complaints including cp, sob, abd pain, hematuria, dysuria, nausea, vomiting    ed course- blood cultures positive, ct - no acute abnormality noted    Patient with ESRD on HD with hx of subtotal cholecystectomy and bacteremia in 2023 admitted with enterobacter bacteremia, presumed possible GI source.  ID followed closely maintained on meropenem for five full days.  Plan to give one dose of levaquin on 1/11 to complete course.  Bacteremia proved transient, resolved, medically stable, cleared for KACIE.  Recommend outpatient GI follow up.     Source of Infection:  Antibiotic / Last Day: completed meropenem, one more day of oral levaquin    Palliative Care / Advanced Care Planning  Code Status: full code  Patient/Family agreeable to Hospice/Palliative (Y/N)?  Summary of Goals of Care Conversation:    Discharging Provider:  Gumaro Cotton NP  Contact Info: Cell [phone] - Please call with any questions or concerns.    Outpatient Provider: Dr. Young           Hospital Course  75 yo m ho polycystic kidney disease status post nephrectomy, end-stage renal failure HD Monday Wednesday Friday last HD was on Friday, hypertension, polio, LLE weakness, ambulates with crutches, status postcholecystectomy, came to ED 1/4/25 with n/v/fever,  Patient had sepsis workup done,  initial workup was negative for any source he was d/c home, today called back since found to have +blood cultures with gram negative/enterobacter cloacae complex  Pt denies any acute complaints including cp, sob, abd pain, hematuria, dysuria, nausea, vomiting    ed course- blood cultures positive, ct - no acute abnormality noted    Patient with ESRD on HD with hx of subtotal cholecystectomy and bacteremia in 2023 admitted with enterobacter bacteremia, presumed possible GI source.  ID followed closely maintained on meropenem for five full days.  Plan to give one dose of levaquin on 1/11 at [facility] after HD to complete course.  Bacteremia proved transient, resolved, medically stable, cleared for KACIE.  Recommend outpatient GI follow up.     Source of Infection:  Antibiotic / Last Day: completed meropenem, one more day of oral levaquin    Palliative Care / Advanced Care Planning  Code Status: full code  Patient/Family agreeable to Hospice/Palliative (Y/N)?  Summary of Goals of Care Conversation:    Discharging Provider:  Gumaro Cotton NP  Contact Info: Cell [phone] - Please call with any questions or concerns.    Outpatient Provider: Dr. Young

## 2025-01-10 NOTE — DISCHARGE NOTE NURSING/CASE MANAGEMENT/SOCIAL WORK - NSDCPEFALRISK_GEN_ALL_CORE
For information on Fall & Injury Prevention, visit: [email or web address] OR  [email or web address] OR  https://www.cdc.gov/steadi/patient.html

## 2025-01-10 NOTE — DISCHARGE NOTE PROVIDER - NSDCMRMEDTOKEN_GEN_ALL_CORE_FT
amLODIPine 10 mg oral tablet: 1 tab(s) orally once a day  Dialyvite 800 oral tablet: 1 tab(s) orally once a day  tamsulosin 0.4 mg oral capsule: 1 cap(s) orally once a day   amLODIPine 10 mg oral tablet: 1 tab(s) orally once a day  Dialyvite 800 oral tablet: 1 tab(s) orally once a day  levoFLOXacin 750 mg oral tablet: 1 tab(s) orally once  tamsulosin 0.4 mg oral capsule: 1 cap(s) orally once a day

## 2025-01-10 NOTE — PROGRESS NOTE ADULT - ASSESSMENT
73 yo m ho polycystic kidney disease status post nephrectomy, end-stage renal failure HD Monday Wednesday Friday last HD was on Friday, hypertension, polio, LLE weakness, ambulates with crutches, status postcholecystectomy, came to ED 1/4/25 with n/v/fever,  Patient had sepsis workup done,  initial workup was negative for any source he was d/c home, today called back since found to have +blood cultures with gram negative/enterobacter cloacae complex  Pt denies any acute complaints including cp, sob, abd pain, hematuria, dysuria, nausea, vomiting     GNR (enterobacter) bacteremia of unclear source  c/w meropenem for now  repeat BC sent  flu/ covid/rsv  negative  UA negative  iv fluids for hydration  ID consult appreciated   GI consult placed  tylenol prn    PCKD, s/p  nephrectomy, ESRD, HTN  HD ON M,W,F  c/w amlodipine  renal consult appreciated     BPH-   C/W Flomax    vte ppx-   PAS    Patient will update family members  
73 yo male with ESRD, Hep C, childhood polio who was admitted 1/5 with rigors and fever .  He developed above and came to ER on 1/4, had negative evaluation, and was discharged home.  He was called back with GNR in blood cultures at less than 24 hours, now known to have enterobacter in blood cultures in both sets of 1/4 blood cultures.  His urine culture is negative.  History of subtotal cholecystectomy, both klebsiella and serratia bacteremias in the past, ERCP with stent placement and removal, and polypectomy.  He had N/V with tigors on 1/4, no abdominal pain, and has not had recurrent rigors .  CT of A/P negative, LFT's normal, hence while I suspect a GI focus , no obvious portal.  Suggest:  1 Will leave on meropenem for now  2 7 days of IV should be adequate  3 Consider GI input although I am not sure additional GI w/u will be fruitful unless additional symptoms develop.  4 Oral options appear to be limited to TMP-SMX, favor minimum of 5 days of IV, preferably 7 days
75 yo m ho polycystic kidney disease status post nephrectomy, end-stage renal failure HD Monday Wednesday Friday last HD was on Friday, hypertension, polio, LLE weakness, ambulates with crutches, status postcholecystectomy, came to ED 1/4/25 with n/v/fever,  Patient had sepsis workup done,  initial workup was negative for any source he was d/c home, today called back since found to have +blood cultures with gram negative/enterobacter cloacae complex  Pt denies any acute complaints including cp, sob, abd pain, hematuria, dysuria, nausea, vomiting    GNR (enterobacter) bacteremia of unclear source  admitted with rigors and fever on 1/5, growing GNR in blood cultures +Enterobacter, Urine culture negative  CT AP negative, LFTs normal, +GI focus  Hx cholecystectomy, klebsiella and serratia bacteremias in past, ERCP with stent placed and removed  ID following, continue meropenem for now through Friday  Recommend last dose of meropenem today after dialysis then a dose of levaquin on Saturday after discharge  GI appreciated, in the absence of GI symptoms and normal liver chemistries bacteremia unlikely to be from biliary source, signed off  Repeat BCX negative to date, tylenol prn, supportive care, IVF for hydration    PCKD, s/p  nephrectomy, ESRD, HTN  HD ON M,W,F  c/w amlodipine  renal consult appreciated     BPH-   C/W Flomax    vte ppx-   PAS    Patient will update family members
75 yo m ho polycystic kidney disease status post nephrectomy, end-stage renal failure HD Monday Wednesday Friday last HD was on Friday, hypertension, polio, LLE weakness, ambulates with crutches, status postcholecystectomy, came to ED 1/4/25 with n/v/fever,  Patient had sepsis workup done,  initial workup was negative for any source he was d/c home, today called back since found to have +blood cultures with gram negative/enterobacter cloacae complex  Pt denies any acute complaints including cp, sob, abd pain, hematuria, dysuria, nausea, vomiting    GNR (enterobacter) bacteremia of unclear source  admitted with rigors and fever on 1/5, growing GNR in blood cultures +Enterobacter, Urine culture negative  CT AP negative, LFTs normal, +GI focus  Hx cholecystectomy, klebsiella and serratia bacteremias in past, ERCP with stent placed and removed  ID following, continue meropenem for now through Friday  Recommend total of seven days, plan to give extra dose of levaquin post dialysis  GI appreciated, in the absence of GI symptoms and normal liver chemistries bacteremia unlikely to be from biliary source, signed off  Follow repeat blood cultures, tylenol prn, supportive care, IVF for hydration    PCKD, s/p  nephrectomy, ESRD, HTN  HD ON M,W,F  c/w amlodipine  renal consult appreciated     BPH-   C/W Flomax    vte ppx-   PAS    Patient will update family members
75 yo m ho polycystic kidney disease status post nephrectomy, end-stage renal failure HD Monday Wednesday Friday last HD was on Friday, hypertension, polio, LLE weakness, ambulates with crutches, status postcholecystectomy, came to ED 1/4/25 with n/v/fever,  Patient had sepsis workup done,  initial workup was negative for any source he was d/c home, today called back since found to have +blood cultures with gram negative/enterobacter cloacae complex  Pt denies any acute complaints including cp, sob, abd pain, hematuria, dysuria, nausea, vomiting    GNR (enterobacter) bacteremia of unclear source  admitted with rigors and fever on 1/5, growing GNR in blood cultures +Enterobacter, Urine culture negative  CT AP negative, LFTs normal, +GI focus  Hx cholecystectomy, klebsiella and serratia bacteremias in past, ERCP with stent placed and removed  ID following, continue meropenem for now, 7 days recommended  GI appreciated, in the absence of GI symptoms and normal liver chemistries bacteremia unlikely to be from biliary source, signed off  Follow repeat blood cultures, tylenol prn, supportive care, IVF for hydration    PCKD, s/p  nephrectomy, ESRD, HTN  HD ON M,W,F  c/w amlodipine  renal consult appreciated     BPH-   C/W Flomax    vte ppx-   PAS    Patient will update family members
75 yo male with ESRD, Hep C, childhood polio who was admitted 1/5 with rigors and fever .  He developed above and came to ER on 1/4, had negative evaluation, and was discharged home.  He was called back with GNR in blood cultures at less than 24 hours, now known to have enterobacter in blood cultures in both sets of 1/4 blood cultures.  His urine culture is negative.  History of subtotal cholecystectomy, both klebsiella and serratia bacteremias in the past, ERCP with stent placement and removal, and polypectomy.  He had N/V with rigors on 1/4, no abdominal pain, and has not had recurrent rigors .  CT of A/P negative, LFT's normal, hence while I suspect a GI focus , no obvious portal.  Appreciate GI input, no additional GI w/u planned at this point.His repeat blood cultures are negative at 48 hours  Suggest:  1 Will leave on meropenem for now, favor continuing through Friday  2 7 days of IV should be adequate, hence if he can receive a dose post dialysis on Friday he should be covered  3 We are dealing with a cryptogenic focus for bacteremia, event very transient, unless he has additional bloodstream infections than perhaps w/u is complete.     However, if he has additional bacteremias with GNR than one would need to consider a cryptogenic GI focus

## 2025-01-10 NOTE — PROGRESS NOTE ADULT - PROVIDER SPECIALTY LIST ADULT
Nephrology
Hospitalist
Hospitalist
Infectious Disease
Infectious Disease
Nephrology
Hospitalist
Hospitalist
Infectious Disease

## 2025-01-10 NOTE — DISCHARGE NOTE NURSING/CASE MANAGEMENT/SOCIAL WORK - PATIENT PORTAL LINK FT
You can access the FollowMyHealth Patient Portal offered by [facility] by registering at the following website: [email or web address]. By joining [facility]’s FollowMyHealth portal, you will also be able to view your health information using other applications (apps) compatible with our system.

## 2025-01-10 NOTE — DISCHARGE NOTE PROVIDER - CARE PROVIDER_API CALL
Brennon Barnes  Gastroenterology  [address] [place] [address], [address]  [place] [address]  Phone: [phone]  Fax: [phone]  Follow Up Time:

## 2025-01-10 NOTE — PROGRESS NOTE ADULT - SUBJECTIVE AND OBJECTIVE BOX
Patient is a 74y old  Male who presents with a chief complaint of chills n/v (09 Jan 2025 18:49)    Patient seen and examined at bedside.  no acute events overnight    ALLERGIES:  Demerol (Faint)  Demerol HCl (Unknown)        Vital Signs Last 24 Hrs  T(F): 97.5 (10 Jorge 2025 05:00), Max: 98 (09 Jan 2025 19:30)  HR: 70 (10 Jorge 2025 05:00) (70 - 82)  BP: 131/71 (10 Jorge 2025 05:00) (131/71 - 143/91)  RR: 16 (10 Jorge 2025 05:00) (16 - 16)  SpO2: 95% (10 Jorge 2025 05:00) (94% - 96%)  I&O's Summary    09 Jan 2025 07:01  -  10 Jorge 2025 07:00  --------------------------------------------------------  IN: 360 mL / OUT: 100 mL / NET: 260 mL      MEDICATIONS:  acetaminophen     Tablet .. 650 milliGRAM(s) Oral every 6 hours PRN  aluminum hydroxide/magnesium hydroxide/simethicone Suspension 30 milliLiter(s) Oral every 4 hours PRN  amLODIPine   Tablet 10 milliGRAM(s) Oral daily  melatonin 3 milliGRAM(s) Oral at bedtime PRN  meropenem  IVPB 500 milliGRAM(s) IV Intermittent every 24 hours  Nephro-odessa 1 Tablet(s) Oral daily  ondansetron Injectable 4 milliGRAM(s) IV Push every 8 hours PRN  tamsulosin 0.4 milliGRAM(s) Oral at bedtime      PHYSICAL EXAM:  General: NAD, A/O x 3  ENT: MMM, no thrush  Neck: Supple, No JVD  Lungs: Clear to auscultation bilaterally, non labored, good air entry  Cardio: RRR, S1/S2, No murmurs  Abdomen: Soft, Nontender, Nondistended; Bowel sounds present  Extremities: No cyanosis, No edema    LABS:                        10.5   5.39  )-----------( 107      ( 09 Jan 2025 06:58 )             31.5     01-09    138  |  100  |  42  ----------------------------<  93  4.6   |  29  |  6.12    Ca    9.5      09 Jan 2025 06:58  Phos  5.6     01-08                                  Urinalysis Basic - ( 09 Jan 2025 06:58 )    Color: x / Appearance: x / SG: x / pH: x  Gluc: 93 mg/dL / Ketone: x  / Bili: x / Urobili: x   Blood: x / Protein: x / Nitrite: x   Leuk Esterase: x / RBC: x / WBC x   Sq Epi: x / Non Sq Epi: x / Bacteria: x        Culture - Urine (collected 06 Jan 2025 09:34)  Source: Clean Catch Clean Catch (Midstream)  Final Report (07 Jan 2025 13:13):    <10,000 CFU/mL Normal Urogenital Carmen    Culture - Blood (collected 06 Jan 2025 08:20)  Source: .Blood BLOOD  Preliminary Report (09 Jan 2025 16:00):    No growth at 72 Hours    Culture - Blood (collected 06 Jan 2025 08:14)  Source: .Blood BLOOD  Preliminary Report (09 Jan 2025 16:00):    No growth at 72 Hours    Culture - Blood (collected 04 Jan 2025 22:40)  Source: .Blood BLOOD  Gram Stain (06 Jan 2025 14:56):    Growth in anaerobic bottle: Gram Negative Rods  Final Report (07 Jan 2025 13:40):    Growth in anaerobic bottle: Enterobacter cloacae complex    See previous culture [id number]    Culture - Blood (collected 04 Jan 2025 22:35)  Source: .Blood BLOOD  Gram Stain (06 Jan 2025 03:37):    Growth in anaerobic bottle: Gram Negative Rods  Final Report (07 Jan 2025 09:40):    Growth in anaerobic bottle: Enterobacter cloacae complex    Direct identification is available within approximately 3-5    hours either by Blood Panel Multiplexed PCR or Direct    MALDI-TOF. Details: [email or web address]  Organism: Blood Culture PCR  Enterobacter cloacae complex (07 Jan 2025 09:40)  Organism: Enterobacter cloacae complex (07 Jan 2025 09:40)      Method Type: WALESKA      -  Ampicillin: R 16 These ampicillin results predict results for amoxicillin      -  Ampicillin/Sulbactam: R 8/4      -  Aztreonam: S <=4      -  Cefazolin: R >16      -  Cefepime: S <=2      -  Cefoxitin: R >16      -  Ceftriaxone: S <=1 Enterobacter cloacae, Klebsiella aerogenes, and Citrobacter freundii may develop resistance during prolonged therapy.      -  Ciprofloxacin: I 0.5      -  Ertapenem: S <=0.5      -  Gentamicin: S <=2      -  Imipenem: S <=1      -  Levofloxacin: S <=0.5      -  Meropenem: S <=1      -  Piperacillin/Tazobactam: S <=8 Treatment with Pipercillin/Tazobactam is not recommended in severe infections casued by Klebsiella aerogenes, Enterobacter cloacae complex, and Citrobacter freundii complex.      -  Tobramycin: S <=2      -  Trimethoprim/Sulfamethoxazole: S <=0.5/9.5  Organism: Blood Culture PCR (07 Jan 2025 09:40)      Method Type: PCR      -  Enterobacter cloacae complex: Detec          RADIOLOGY & ADDITIONAL TESTS:    Care Discussed with Consultants/Other Providers:

## 2025-01-10 NOTE — DISCHARGE NOTE PROVIDER - ATTENDING DISCHARGE PHYSICAL EXAMINATION:
GENERAL: Not in distress. Alert    HEENT: AT/NC. clear conjuctiva, MMM.   no pallor or icterus  CARDIOVASCULAR: RRR S1, S2. SM. no rubs/gallop  LUNGS: BLAE+, no rales, no wheezing, no rhonchi.    ABDOMEN: ND. Soft,  NT, no guarding / rebound / rigidity. BS normoactive. No CVA tenderness.    BACK: No spine tenderness.  EXTREMITIES: no edema. no leg or calf TP.  SKIN: no rash. or skin break or ulcer on exposed skin. No cellulitis. LUE AVF  NEUROLOGIC: AAO*3.strength is symmetric, sensation intact, speech fluent.    PSYCHIATRIC: Calm.  No agitation.

## 2025-01-10 NOTE — PROGRESS NOTE ADULT - REASON FOR ADMISSION
chills n/v

## 2025-01-10 NOTE — PROGRESS NOTE ADULT - SUBJECTIVE AND OBJECTIVE BOX
S/p stable HD today     Vital Signs Last 24 Hrs  T(C): 36.3 (01-10-25 @ 13:42), Max: 36.6 (01-10-25 @ 09:15)  T(F): 97.4 (01-10-25 @ 13:42), Max: 97.9 (01-10-25 @ 09:15)  HR: 87 (01-10-25 @ 13:42) (70 - 87)  BP: 147/80 (01-10-25 @ 13:42) (122/74 - 147/80)  RR: 16 (01-10-25 @ 13:42) (16 - 16)  SpO2: 94% (01-10-25 @ 13:42) (94% - 99%)    I&O's Detail    10 Jorge 2025 07:01  -  10 Jorge 2025 22:44  --------------------------------------------------------  OUT:    Other (mL): 1500 mL  Total OUT: 1500 mL    s1s2  b/l air entry  soft, ND  no edema                                                11.0   3.99  )-----------( 121      ( 10 Jorge 2025 09:10 )             31.4     10 Jorge 2025 09:10    138    |  97     |  69     ----------------------------<  170    4.4     |  22     |  8.15     Ca    8.9        10 Jorge 2025 09:10    acetaminophen     Tablet .. 650 milliGRAM(s) Oral every 6 hours PRN  aluminum hydroxide/magnesium hydroxide/simethicone Suspension 30 milliLiter(s) Oral every 4 hours PRN  amLODIPine   Tablet 10 milliGRAM(s) Oral daily  melatonin 3 milliGRAM(s) Oral at bedtime PRN  meropenem  IVPB 500 milliGRAM(s) IV Intermittent every 24 hours  Nephro-odessa 1 Tablet(s) Oral daily  ondansetron Injectable 4 milliGRAM(s) IV Push every 8 hours PRN  tamsulosin 0.4 milliGRAM(s) Oral at bedtime    A/P:    Enterobacter bacteremia  Abx per ID  CT A/P w/o acute findings   ESRD on HD MWF  TMP 1.5kg w/HD today   Renal diet  Renvela for high Phos    [phone]

## 2025-01-31 ENCOUNTER — RX RENEWAL (OUTPATIENT)
Age: 75
End: 2025-01-31

## 2025-03-06 ENCOUNTER — APPOINTMENT (OUTPATIENT)
Dept: UROLOGY | Facility: CLINIC | Age: 75
End: 2025-03-06
Payer: MEDICARE

## 2025-03-06 VITALS
BODY MASS INDEX: 24.75 KG/M2 | OXYGEN SATURATION: 100 % | RESPIRATION RATE: 16 BRPM | DIASTOLIC BLOOD PRESSURE: 83 MMHG | TEMPERATURE: 98.1 F | HEIGHT: 64 IN | HEART RATE: 73 BPM | WEIGHT: 145 LBS | SYSTOLIC BLOOD PRESSURE: 174 MMHG

## 2025-03-06 DIAGNOSIS — C61 MALIGNANT NEOPLASM OF PROSTATE: ICD-10-CM

## 2025-03-06 DIAGNOSIS — N40.0 BENIGN PROSTATIC HYPERPLASIA WITHOUT LOWER URINARY TRACT SYMPMS: ICD-10-CM

## 2025-03-06 DIAGNOSIS — N40.1 BENIGN PROSTATIC HYPERPLASIA WITH LOWER URINARY TRACT SYMPMS: ICD-10-CM

## 2025-03-06 DIAGNOSIS — N13.8 BENIGN PROSTATIC HYPERPLASIA WITH LOWER URINARY TRACT SYMPMS: ICD-10-CM

## 2025-03-06 DIAGNOSIS — N43.3 HYDROCELE, UNSPECIFIED: ICD-10-CM

## 2025-03-06 PROCEDURE — 99214 OFFICE O/P EST MOD 30 MIN: CPT

## 2025-03-07 LAB
ALBUMIN SERPL ELPH-MCNC: 4.6 G/DL
ALP BLD-CCNC: 73 U/L
ALT SERPL-CCNC: 12 U/L
ANION GAP SERPL CALC-SCNC: 16 MMOL/L
APTT BLD: 31.9 SEC
AST SERPL-CCNC: 8 U/L
BASOPHILS # BLD AUTO: 0.03 K/UL
BASOPHILS NFR BLD AUTO: 0.8 %
BILIRUB SERPL-MCNC: 0.3 MG/DL
BUN SERPL-MCNC: 42 MG/DL
CALCIUM SERPL-MCNC: 10.2 MG/DL
CHLORIDE SERPL-SCNC: 99 MMOL/L
CO2 SERPL-SCNC: 28 MMOL/L
CREAT SERPL-MCNC: 6.54 MG/DL
EGFRCR SERPLBLD CKD-EPI 2021: 8 ML/MIN/1.73M2
EOSINOPHIL # BLD AUTO: 0.45 K/UL
EOSINOPHIL NFR BLD AUTO: 12.6 %
ESTIMATED AVERAGE GLUCOSE: 85 MG/DL
GLUCOSE SERPL-MCNC: 100 MG/DL
HBA1C MFR BLD HPLC: 4.6 %
HCT VFR BLD CALC: 34.5 %
HGB BLD-MCNC: 11.1 G/DL
IMM GRANULOCYTES NFR BLD AUTO: 0.3 %
INR PPP: 0.91 RATIO
LYMPHOCYTES # BLD AUTO: 0.67 K/UL
LYMPHOCYTES NFR BLD AUTO: 18.7 %
MAN DIFF?: NORMAL
MCHC RBC-ENTMCNC: 31.3 PG
MCHC RBC-ENTMCNC: 32.2 G/DL
MCV RBC AUTO: 97.2 FL
MONOCYTES # BLD AUTO: 0.43 K/UL
MONOCYTES NFR BLD AUTO: 12 %
NEUTROPHILS # BLD AUTO: 1.99 K/UL
NEUTROPHILS NFR BLD AUTO: 55.6 %
PLATELET # BLD AUTO: 121 K/UL
POTASSIUM SERPL-SCNC: 5.5 MMOL/L
PROT SERPL-MCNC: 6.5 G/DL
PSA FREE FLD-MCNC: 78 %
PSA FREE SERPL-MCNC: 1.04 NG/ML
PSA SERPL-MCNC: 1.34 NG/ML
PT BLD: 10.8 SEC
RBC # BLD: 3.55 M/UL
RBC # FLD: 15 %
SODIUM SERPL-SCNC: 143 MMOL/L
WBC # FLD AUTO: 3.58 K/UL

## 2025-04-29 ENCOUNTER — NON-APPOINTMENT (OUTPATIENT)
Age: 75
End: 2025-04-29

## 2025-05-01 ENCOUNTER — APPOINTMENT (OUTPATIENT)
Dept: PHYSICAL MEDICINE AND REHAB | Facility: CLINIC | Age: 75
End: 2025-05-01
Payer: MEDICARE

## 2025-05-01 DIAGNOSIS — G83.12: ICD-10-CM

## 2025-05-01 DIAGNOSIS — G14 POSTPOLIO SYNDROME: ICD-10-CM

## 2025-05-01 DIAGNOSIS — R26.9 UNSPECIFIED ABNORMALITIES OF GAIT AND MOBILITY: ICD-10-CM

## 2025-05-01 PROCEDURE — 99203 OFFICE O/P NEW LOW 30 MIN: CPT

## 2025-06-27 NOTE — ED PROVIDER NOTE - WET READ LAUNCH FT
Please follow-up with your nephrologist about the kidney injury showing acute kidney injury on the labs.  You need to drink plenty of fluids to hydrate yourself.  He also was found to have a urinary tract infection you need to be prescribed antibiotics to take for next 5 days please take to completion.    Please return immediately to the hospital for any other concerning signs or symptoms including if the Morgan catheter has been blocked, you start to experience abdominal pain, any fevers, chills, vomiting or weakness episodes.    .wet  
There are no Wet Read(s) to document.

## 2025-07-01 NOTE — DISCHARGE NOTE NURSING/CASE MANAGEMENT/SOCIAL WORK - NSDCVIVACCINE_GEN_ALL_CORE_FT
Problem: Adult Inpatient Plan of Care  Goal: Plan of Care Review  Outcome: Progressing  Goal: Patient-Specific Goal (Individualized)  Outcome: Progressing  Goal: Absence of Hospital-Acquired Illness or Injury  Outcome: Progressing  Goal: Optimal Comfort and Wellbeing  Outcome: Progressing  Goal: Readiness for Transition of Care  Outcome: Progressing     Problem: Wound  Goal: Optimal Coping  Outcome: Progressing  Goal: Optimal Functional Ability  Outcome: Progressing  Goal: Absence of Infection Signs and Symptoms  Outcome: Progressing  Goal: Improved Oral Intake  Outcome: Progressing  Goal: Optimal Pain Control and Function  Outcome: Progressing  Goal: Skin Health and Integrity  Outcome: Progressing  Goal: Optimal Wound Healing  Outcome: Progressing     Problem: Sepsis/Septic Shock  Goal: Optimal Coping  Outcome: Progressing  Goal: Absence of Bleeding  Outcome: Progressing  Goal: Blood Glucose Level Within Targeted Range  Outcome: Progressing  Goal: Absence of Infection Signs and Symptoms  Outcome: Progressing  Goal: Optimal Nutrition Intake  Outcome: Progressing     Problem: Skin Injury Risk Increased  Goal: Skin Health and Integrity  Outcome: Progressing     Problem: Fall Injury Risk  Goal: Absence of Fall and Fall-Related Injury  Outcome: Progressing     Problem: Infection  Goal: Absence of Infection Signs and Symptoms  Outcome: Progressing      No Vaccines Administered.

## (undated) DEVICE — TROCAR COVIDIEN VERSAPORT BLADELESS OPTICAL 5MM STANDARD

## (undated) DEVICE — TUBING CAP SET ERBEFLO CLEVERCAP HYBRID CO2 FOR OLYMPUS SCOPES AND UCR

## (undated) DEVICE — LOK DVC RX AND BIOPSY

## (undated) DEVICE — SOL IRR POUR H2O 1000ML

## (undated) DEVICE — VENODYNE/SCD SLEEVE CALF MEDIUM

## (undated) DEVICE — PACK MINOR

## (undated) DEVICE — INSUFFLATION NDL COVIDIEN STEP 14G FOR STEP/VERSASTEP

## (undated) DEVICE — DVC AUTOCAP RX LOKG EXALT

## (undated) DEVICE — TUBING INSUFLATOR VIDEO TOWER NON HEATED

## (undated) DEVICE — FORCEP RADIAL JAW 4 240CM DISP

## (undated) DEVICE — SOL IRR POUR H2O 1500ML

## (undated) DEVICE — KIT ENDO PROCEDURE CUST W/VLV

## (undated) DEVICE — Device

## (undated) DEVICE — GLV 7.5 PROTEXIS (WHITE)

## (undated) DEVICE — POSITIONER FOAM HEAD CRADLE (PINK)

## (undated) DEVICE — SPECIMEN CONTAINER PET

## (undated) DEVICE — WARMING BLANKET UPPER ADULT

## (undated) DEVICE — SCOPE WARMER SEAL DISP

## (undated) DEVICE — TROCAR COVIDIEN VERSASTEP PLUS 11MM STANDARD

## (undated) DEVICE — SOL IRR POUR NS 0.9% 500ML

## (undated) DEVICE — GLV 8 PROTEXIS (WHITE)

## (undated) DEVICE — APPLICATOR FOR ARISTA XL 38CM

## (undated) DEVICE — DRAPE LIGHT HANDLE COVER (GREEN)

## (undated) DEVICE — TUBING W FILTER STERILE FOR INSUFFLATION

## (undated) DEVICE — CONTAINER FORMALIN BUFF 10% 60ML

## (undated) DEVICE — PRESSURE INFUSOR BAG 1000ML

## (undated) DEVICE — TROCAR COVIDIEN VERSAONE BLADED FIXATION 11MM STANDARD

## (undated) DEVICE — XI TIP COVER

## (undated) DEVICE — SPYSCOPE DS 2

## (undated) DEVICE — XI STAPLER SUREFORM 45

## (undated) DEVICE — XI OBTURATOR OPTICAL BLADELESS 8MM

## (undated) DEVICE — TROCAR COVIDIEN VERSASTEP 5MM STANDARD

## (undated) DEVICE — STAPLER SKIN VISI-STAT 35 WIDE

## (undated) DEVICE — LAP PAD 18 X 18"

## (undated) DEVICE — INJ SYS RAP REFIL

## (undated) DEVICE — DRAPE BACK TABLE COVER HEAVY DUTY 60"

## (undated) DEVICE — XI DRAPE ARM

## (undated) DEVICE — DRSG DERMABOND 0.7ML

## (undated) DEVICE — D HELP - CLEARVIEW CLEARIFY SYSTEM

## (undated) DEVICE — POSITIONER PINK PAD PIGAZZI SYSTEM XL W ARM PROTECTOR

## (undated) DEVICE — DISSECTOR ENDO PEANUT 5MM

## (undated) DEVICE — XI DRAPE COLUMN

## (undated) DEVICE — POLY TRAP ETRAP

## (undated) DEVICE — XI ENDOWRIST SUCTION IRRIGATOR 8MM

## (undated) DEVICE — TUBING IV EXTENSION 30"

## (undated) DEVICE — PLATE NESSY 170

## (undated) DEVICE — ENDOCATCH 10MM SPECIMEN POUCH

## (undated) DEVICE — DRSG STERISTRIPS 0.5 X 4"

## (undated) DEVICE — ENDOCUFF VISION SZ 2 LG GRN

## (undated) DEVICE — CANISTER SUCTION LID GUARD 3000CC

## (undated) DEVICE — FORCEP SPYBITE MAX BIOPSY

## (undated) DEVICE — DVC AUTO CAP RX LOKG

## (undated) DEVICE — SNARE POLYP SENS SM 13MM 240CM

## (undated) DEVICE — SNARE EXACTO COLD 9MMX230CM

## (undated) DEVICE — XI SEAL UNIVERSIAL 5-12MM

## (undated) DEVICE — GLV 6.5 PROTEXIS (WHITE)

## (undated) DEVICE — SUT POLYSORB 0 30" GS-23

## (undated) DEVICE — PREP CHLORAPREP HI-LITE ORANGE 26ML

## (undated) DEVICE — POSITIONER STRAP ARMBOARD VELCRO TS-30

## (undated) DEVICE — TUBING AIRSEAL TRI-LUMEN FILTERED

## (undated) DEVICE — TUBING HYDRO-SURG PLUS IRRIGATOR W SMOKEVAC & PROBE

## (undated) DEVICE — SOLIDIFIER CANN EXPRESS 3K